# Patient Record
Sex: FEMALE | Race: WHITE | NOT HISPANIC OR LATINO | URBAN - METROPOLITAN AREA
[De-identification: names, ages, dates, MRNs, and addresses within clinical notes are randomized per-mention and may not be internally consistent; named-entity substitution may affect disease eponyms.]

---

## 2017-06-08 ENCOUNTER — APPOINTMENT (OUTPATIENT)
Dept: URBAN - METROPOLITAN AREA CLINIC 200 | Age: 57
Setting detail: DERMATOLOGY
End: 2017-06-08

## 2017-06-08 DIAGNOSIS — L57.8 OTHER SKIN CHANGES DUE TO CHRONIC EXPOSURE TO NONIONIZING RADIATION: ICD-10-CM

## 2017-06-08 DIAGNOSIS — L82.0 INFLAMED SEBORRHEIC KERATOSIS: ICD-10-CM

## 2017-06-08 DIAGNOSIS — L82.1 OTHER SEBORRHEIC KERATOSIS: ICD-10-CM

## 2017-06-08 DIAGNOSIS — Z87.2 PERSONAL HISTORY OF DISEASES OF THE SKIN AND SUBCUTANEOUS TISSUE: ICD-10-CM

## 2017-06-08 PROBLEM — D48.5 NEOPLASM OF UNCERTAIN BEHAVIOR OF SKIN: Status: ACTIVE | Noted: 2017-06-08

## 2017-06-08 PROCEDURE — 99202 OFFICE O/P NEW SF 15 MIN: CPT

## 2017-06-08 PROCEDURE — OTHER COUNSELING: OTHER

## 2017-06-08 PROCEDURE — OTHER REASSURANCE: OTHER

## 2017-06-08 ASSESSMENT — LOCATION SIMPLE DESCRIPTION DERM
LOCATION SIMPLE: RIGHT UPPER BACK
LOCATION SIMPLE: LEFT SHOULDER
LOCATION SIMPLE: RIGHT POSTERIOR THIGH
LOCATION SIMPLE: UPPER BACK

## 2017-06-08 ASSESSMENT — LOCATION DETAILED DESCRIPTION DERM
LOCATION DETAILED: LEFT POSTERIOR SHOULDER
LOCATION DETAILED: RIGHT SUPERIOR UPPER BACK
LOCATION DETAILED: SUPERIOR THORACIC SPINE
LOCATION DETAILED: RIGHT DISTAL POSTERIOR THIGH

## 2017-06-08 ASSESSMENT — LOCATION ZONE DERM
LOCATION ZONE: LEG
LOCATION ZONE: TRUNK
LOCATION ZONE: ARM

## 2018-06-21 ENCOUNTER — APPOINTMENT (OUTPATIENT)
Dept: URBAN - METROPOLITAN AREA CLINIC 200 | Age: 58
Setting detail: DERMATOLOGY
End: 2018-06-27

## 2018-06-21 DIAGNOSIS — D18.0 HEMANGIOMA: ICD-10-CM

## 2018-06-21 DIAGNOSIS — L57.8 OTHER SKIN CHANGES DUE TO CHRONIC EXPOSURE TO NONIONIZING RADIATION: ICD-10-CM

## 2018-06-21 DIAGNOSIS — L82.1 OTHER SEBORRHEIC KERATOSIS: ICD-10-CM

## 2018-06-21 PROBLEM — D18.01 HEMANGIOMA OF SKIN AND SUBCUTANEOUS TISSUE: Status: ACTIVE | Noted: 2018-06-21

## 2018-06-21 PROBLEM — D23.5 OTHER BENIGN NEOPLASM OF SKIN OF TRUNK: Status: ACTIVE | Noted: 2018-06-21

## 2018-06-21 PROCEDURE — OTHER REASSURANCE: OTHER

## 2018-06-21 PROCEDURE — 99213 OFFICE O/P EST LOW 20 MIN: CPT

## 2018-06-21 PROCEDURE — OTHER COUNSELING: OTHER

## 2018-06-21 ASSESSMENT — LOCATION ZONE DERM
LOCATION ZONE: ARM
LOCATION ZONE: SCALP
LOCATION ZONE: TRUNK

## 2018-06-21 ASSESSMENT — LOCATION SIMPLE DESCRIPTION DERM
LOCATION SIMPLE: RIGHT SCALP
LOCATION SIMPLE: RIGHT SHOULDER
LOCATION SIMPLE: CHEST

## 2018-06-21 ASSESSMENT — LOCATION DETAILED DESCRIPTION DERM
LOCATION DETAILED: RIGHT ANTERIOR SHOULDER
LOCATION DETAILED: RIGHT MEDIAL FRONTAL SCALP
LOCATION DETAILED: LEFT MEDIAL SUPERIOR CHEST

## 2018-10-25 RX ORDER — ALPRAZOLAM 0.5 MG/1
0.5 TABLET ORAL 2 TIMES DAILY PRN
Qty: 180 TABLET | Refills: 0 | Status: SHIPPED | OUTPATIENT
Start: 2018-10-25 | End: 2019-01-22 | Stop reason: SDUPTHER

## 2019-01-22 ENCOUNTER — TELEPHONE (OUTPATIENT)
Dept: PRIMARY CARE | Facility: CLINIC | Age: 59
End: 2019-01-22

## 2019-01-22 RX ORDER — RANOLAZINE 500 MG/1
TABLET, FILM COATED, EXTENDED RELEASE ORAL
COMMUNITY
Start: 2018-11-24

## 2019-01-22 RX ORDER — ESTRADIOL AND NORETHINDRONE ACETATE .5; .1 MG/1; MG/1
1 TABLET ORAL 3 TIMES WEEKLY
COMMUNITY
Start: 2018-12-04

## 2019-01-22 RX ORDER — ESCITALOPRAM OXALATE 20 MG/1
TABLET ORAL
COMMUNITY
Start: 2018-11-20 | End: 2019-02-17 | Stop reason: SDUPTHER

## 2019-01-22 RX ORDER — ALPRAZOLAM 0.5 MG/1
0.5 TABLET ORAL 2 TIMES DAILY PRN
Qty: 180 TABLET | Refills: 0 | Status: SHIPPED | OUTPATIENT
Start: 2019-01-22 | End: 2019-04-22 | Stop reason: SDUPTHER

## 2019-01-22 RX ORDER — GABAPENTIN 600 MG/1
TABLET ORAL
COMMUNITY
Start: 2018-12-19 | End: 2020-02-04 | Stop reason: ALTCHOICE

## 2019-01-22 NOTE — TELEPHONE ENCOUNTER
P/C from pt for refill on Xanax-will end in but due for OV in February-please call pt to schedule-thank you

## 2019-02-12 PROBLEM — F41.1 GENERALIZED ANXIETY DISORDER: Status: ACTIVE | Noted: 2019-02-12

## 2019-02-12 PROBLEM — K63.5 POLYP OF COLON: Status: ACTIVE | Noted: 2019-02-12

## 2019-02-12 PROBLEM — G47.00 PERSISTENT DISORDER OF INITIATING OR MAINTAINING SLEEP: Status: ACTIVE | Noted: 2019-02-12

## 2019-02-12 PROBLEM — N95.1 SYMPTOMATIC MENOPAUSAL OR FEMALE CLIMACTERIC STATES: Status: ACTIVE | Noted: 2019-02-12

## 2019-02-12 PROBLEM — I20.9 ANGINA PECTORIS SYNDROME (CMS/HCC): Status: ACTIVE | Noted: 2019-02-12

## 2019-02-12 PROBLEM — E78.5 HYPERLIPIDEMIA: Status: ACTIVE | Noted: 2019-02-12

## 2019-02-12 PROBLEM — Z82.49 FAMILY HISTORY OF ISCHEMIC HEART DISEASE: Status: ACTIVE | Noted: 2019-02-12

## 2019-02-12 PROBLEM — F33.0 MAJOR DEPRESSIVE DISORDER, RECURRENT EPISODE, MILD (CMS/HCC): Status: ACTIVE | Noted: 2019-02-12

## 2019-02-13 ENCOUNTER — OFFICE VISIT (OUTPATIENT)
Dept: PRIMARY CARE | Facility: CLINIC | Age: 59
End: 2019-02-13
Payer: COMMERCIAL

## 2019-02-13 VITALS — DIASTOLIC BLOOD PRESSURE: 80 MMHG | SYSTOLIC BLOOD PRESSURE: 124 MMHG | HEIGHT: 67 IN | HEART RATE: 70 BPM

## 2019-02-13 DIAGNOSIS — I20.9 ANGINA PECTORIS SYNDROME (CMS/HCC): ICD-10-CM

## 2019-02-13 DIAGNOSIS — K63.5 POLYP OF COLON, UNSPECIFIED PART OF COLON, UNSPECIFIED TYPE: ICD-10-CM

## 2019-02-13 DIAGNOSIS — F33.0 MAJOR DEPRESSIVE DISORDER, RECURRENT EPISODE, MILD (CMS/HCC): ICD-10-CM

## 2019-02-13 DIAGNOSIS — N95.1 SYMPTOMATIC MENOPAUSAL OR FEMALE CLIMACTERIC STATES: ICD-10-CM

## 2019-02-13 DIAGNOSIS — F41.1 GENERALIZED ANXIETY DISORDER: ICD-10-CM

## 2019-02-13 DIAGNOSIS — E78.00 PURE HYPERCHOLESTEROLEMIA: Primary | ICD-10-CM

## 2019-02-13 PROCEDURE — 99214 OFFICE O/P EST MOD 30 MIN: CPT | Performed by: NURSE PRACTITIONER

## 2019-02-13 RX ORDER — ROSUVASTATIN CALCIUM 5 MG/1
5 TABLET, COATED ORAL DAILY
COMMUNITY
End: 2022-10-20 | Stop reason: DRUGHIGH

## 2019-02-13 ASSESSMENT — ENCOUNTER SYMPTOMS
FATIGUE: 0
ABDOMINAL PAIN: 0
PALPITATIONS: 0
MALAISE: 0
DECREASED CONCENTRATION: 0
FOCAL SENSORY LOSS: 0
JOINT SWELLING: 0
FOCAL WEAKNESS: 0
LEG PAIN: 0
SHORTNESS OF BREATH: 0
INSOMNIA: 1
ARTHRALGIAS: 0
PANIC: 0
MYALGIAS: 0
ANOREXIA: 0
IRRITABILITY: 0
DEPRESSED MOOD: 1
COMPULSIONS: 0
RESTLESSNESS: 0
CONFUSION: 0
FEELING OF CHOKING: 0
HEADACHES: 0
CHANGE IN BOWEL HABIT: 0
THOUGHT CONTENT - OBSESSIONS: 0
FEVER: 0
NERVOUS/ANXIOUS: 1

## 2019-02-13 NOTE — PROGRESS NOTES
[facility]  Mariel Lee  [address], [address]  [place] [address]  Phone: [phone]  Fax: [phone]      Patient ID: Billie Frank                              : 1960    Visit Date: 2019    Chief Complaint: No chief complaint on file.         Patient ID: Billie Frank is a 58 y.o. female.    Patient Active Problem List   Diagnosis   • Angina pectoris syndrome (CMS/HCC) (HCC)   • Family history of ischemic heart disease   • Generalized anxiety disorder   • Hyperlipidemia   • Major depressive disorder, recurrent episode, mild (CMS/HCC)   • Symptomatic menopausal or female climacteric states   • Persistent disorder of initiating or maintaining sleep   • Polyp of colon         Current Outpatient Prescriptions:   •  ALPRAZolam (XANAX) 0.5 mg tablet, Take 1 tablet (0.5 mg total) by mouth 2 (two) times a day as needed for anxiety., Disp: 180 tablet, Rfl: 0  •  AMABELZ 0.5-0.1 mg per tablet, , Disp: , Rfl:   •  escitalopram (LEXAPRO) 20 mg tablet, , Disp: , Rfl:   •  gabapentin (NEURONTIN) 600 mg tablet, , Disp: , Rfl:   •  RANEXA 500 mg 12 hr tablet, , Disp: , Rfl:   •  rosuvastatin (CRESTOR) 5 mg tablet, Take 5 mg by mouth daily. Every other day, Disp: , Rfl:     Allergies   Allergen Reactions   • Bee Sting [Bee Venom Protein (Honey Bee)] Anaphylaxis   • Iodinated Contrast- Oral And Iv Dye Hives   • Penicillins Rash   • Sulfa (Sulfonamide Antibiotics) Rash       Social History     Social History   • Marital status:      Spouse name: N/A   • Number of children: N/A   • Years of education: N/A     Occupational History   • Not on file.     Social History Main Topics   • Smoking status: Never Smoker   • Smokeless tobacco: Never Used   • Alcohol use Not on file   • Drug use: Unknown   • Sexual activity: Not on file     Other Topics Concern   • Not on file     Social History Narrative   • No narrative on file       Health Maintenance   Topic Date  Due   • DTaP, Tdap, and Td Vaccines (1 - Tdap) 10/02/1978   • Colonoscopy  10/02/2009   • Mammogram  08/09/2020   • Pap Smear  07/12/2021   • HPV Vaccines  Aged Out   • Meningococcal Vaccine  Aged Out   • HIB Vaccines  Aged Out   • IPV Vaccines  Aged Out   • Influenza Vaccine  Completed   • Hepatitis C Screening  Completed       HPI  Routine follow up  Med refills.      Depression   This is a chronic problem. The current episode started more than 1 year ago. The problem occurs daily. The problem has been unchanged. Pertinent negatives include no abdominal pain, anorexia, arthralgias, change in bowel habit, chest pain, fatigue, fever, headaches, joint swelling or myalgias. Nothing aggravates the symptoms. Treatments tried: SSRIs. The treatment provided significant relief.   Anxiety   Presents for follow-up visit. Symptoms include depressed mood, excessive worry, insomnia and nervous/anxious behavior. Patient reports no chest pain, compulsions, confusion, decreased concentration, feeling of choking, irritability, malaise, obsessions, palpitations, panic, restlessness, shortness of breath or suicidal ideas. Symptoms occur occasionally. The severity of symptoms is mild. The quality of sleep is fair. Nighttime awakenings: occasional.     Compliance with medications is %.   Hyperlipidemia   This is a chronic problem. The current episode started more than 1 year ago. The problem is controlled. Recent lipid tests were reviewed and are normal. She has no history of chronic renal disease, diabetes, hypothyroidism, liver disease, obesity or nephrotic syndrome. There are no known factors aggravating her hyperlipidemia. Pertinent negatives include no chest pain, focal sensory loss, focal weakness, leg pain, myalgias or shortness of breath. Current antihyperlipidemic treatment includes exercise, diet change and statins. The current treatment provides significant improvement of lipids. There are no compliance problems.   "      The following have been reviewed and updated as appropriate in this visit:  Allergies  Meds  Problems         Review of System  Review of Systems   Constitutional: Negative for fatigue, fever and irritability.   Respiratory: Negative for shortness of breath.    Cardiovascular: Negative for chest pain and palpitations.   Gastrointestinal: Negative for abdominal pain, anorexia and change in bowel habit.   Musculoskeletal: Negative for arthralgias, joint swelling and myalgias.   Neurological: Negative for focal weakness and headaches.   Psychiatric/Behavioral: Negative for confusion, decreased concentration and suicidal ideas. The patient is nervous/anxious and has insomnia.        Objective     Vitals  Vitals:    02/13/19 0726   BP: 124/80   Pulse: 70   Height: 1.708 m (5' 7.25\")     There is no height or weight on file to calculate BMI.    Physical Exam  Physical Exam   Constitutional: She is oriented to person, place, and time. She appears well-developed and well-nourished. No distress.   Neck: Neck supple. No JVD present. Carotid bruit is not present. No thyromegaly present.   Cardiovascular: Normal rate, regular rhythm and normal heart sounds.    No murmur heard.  Pulmonary/Chest: Effort normal and breath sounds normal. No respiratory distress. She has no wheezes.   Lymphadenopathy:     She has no cervical adenopathy.   Neurological: She is alert and oriented to person, place, and time.   Skin: She is not diaphoretic.   Psychiatric: She has a normal mood and affect. Her speech is normal and behavior is normal. Thought content normal. She expresses no suicidal ideation. She expresses no suicidal plans.   Vitals reviewed.      Assessment/Plan     Problem List Items Addressed This Visit     Angina pectoris syndrome (CMS/HCC) (HCC)     On Ranexa.  Stable.  Sees Cardiology yearly.         Relevant Medications    rosuvastatin (CRESTOR) 5 mg tablet    Generalized anxiety disorder     Stable on meds.  Continue " current meds.  Follow up 6 mo.         Hyperlipidemia - Primary     Lipids stable on Crestor--takes QOD.  Lipid panel due in 8/2019.         Relevant Medications    rosuvastatin (CRESTOR) 5 mg tablet    Major depressive disorder, recurrent episode, mild (CMS/HCC)     Stable.  Continue current meds.  Follow up 6 mo.         Symptomatic menopausal or female climacteric states     Stable on meds-- Gabapentin and Amabelz.  GYN follows.         Polyp of colon     Recent colonoscopy 3 weeks ago.  Dr Alexis Bruno.  Follow up 5 years.                   ALBERT Bowen  2/13/2019

## 2019-02-19 RX ORDER — ESCITALOPRAM OXALATE 20 MG/1
TABLET ORAL
Qty: 90 TABLET | Refills: 1 | Status: SHIPPED | OUTPATIENT
Start: 2019-02-19 | End: 2019-08-06 | Stop reason: SDUPTHER

## 2019-03-04 ENCOUNTER — TELEPHONE (OUTPATIENT)
Dept: PRIMARY CARE | Facility: CLINIC | Age: 59
End: 2019-03-04

## 2019-03-04 NOTE — TELEPHONE ENCOUNTER
Pt works at [employer] and was exposed to Mumps from a coworker.  Health Services there told them to check with their PCP's to find out if they need a booster.  Pt was immunized many years ago as a child.  OK TO LEAVE A DETAILED MESSAGE!  Pt # [phone]

## 2019-03-04 NOTE — TELEPHONE ENCOUNTER
Please find out if she was given 2 doses of MMR, when she was exposed to this coworker and if her job has her being a risk of exposure to mumps.

## 2019-03-04 NOTE — TELEPHONE ENCOUNTER
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  Per our conversation she is aware that you are checking with [facility] for further advisement on vaccine booster"

## 2019-03-05 NOTE — TELEPHONE ENCOUNTER
Carrie - The recommendation is that everyone should get two MMR's and if she is in doubt or is able to confirm she had two, it is advisable for her to get another one. The other option is to have a titer done. The immunization given now will not keep her from getting the mumps if she has already been exposed enough to develop the illness.

## 2019-04-22 NOTE — TELEPHONE ENCOUNTER
Patient requesting Xanax 0.5mg one po BID prn #180.  States last OV discuss being able to send to mail away pharmacy [facility].   shows last fill for #180 on 1/22/19 and time prior 10/26/18 for #180. Last OV 3/2019 and next OV 8/2019

## 2019-04-23 RX ORDER — ALPRAZOLAM 0.5 MG/1
0.5 TABLET ORAL 2 TIMES DAILY PRN
Qty: 180 TABLET | Refills: 0 | Status: SHIPPED | OUTPATIENT
Start: 2019-04-23 | End: 2019-07-15 | Stop reason: SDUPTHER

## 2019-05-17 ENCOUNTER — TRANSCRIBE ORDERS (OUTPATIENT)
Dept: SCHEDULING | Age: 59
End: 2019-05-17

## 2019-05-17 DIAGNOSIS — M79.671 PAIN OF RIGHT FOOT: ICD-10-CM

## 2019-05-18 ENCOUNTER — HOSPITAL ENCOUNTER (OUTPATIENT)
Dept: RADIOLOGY | Age: 59
Discharge: HOME | End: 2019-05-18
Attending: FAMILY MEDICINE
Payer: COMMERCIAL

## 2019-05-18 DIAGNOSIS — M79.671 PAIN OF RIGHT FOOT: ICD-10-CM

## 2019-05-18 PROCEDURE — 73630 X-RAY EXAM OF FOOT: CPT | Mod: RT

## 2019-05-18 PROCEDURE — 73620 X-RAY EXAM OF FOOT: CPT | Mod: RT

## 2019-06-20 ENCOUNTER — APPOINTMENT (OUTPATIENT)
Dept: URBAN - METROPOLITAN AREA CLINIC 200 | Age: 59
Setting detail: DERMATOLOGY
End: 2019-06-23

## 2019-06-20 DIAGNOSIS — L57.8 OTHER SKIN CHANGES DUE TO CHRONIC EXPOSURE TO NONIONIZING RADIATION: ICD-10-CM

## 2019-06-20 DIAGNOSIS — Z85.828 PERSONAL HISTORY OF OTHER MALIGNANT NEOPLASM OF SKIN: ICD-10-CM

## 2019-06-20 PROCEDURE — OTHER COUNSELING: OTHER

## 2019-06-20 PROCEDURE — 99213 OFFICE O/P EST LOW 20 MIN: CPT

## 2019-06-20 PROCEDURE — OTHER MIPS QUALITY: OTHER

## 2019-06-20 ASSESSMENT — LOCATION DETAILED DESCRIPTION DERM
LOCATION DETAILED: PERIUMBILICAL SKIN
LOCATION DETAILED: LEFT MEDIAL SUPERIOR CHEST

## 2019-06-20 ASSESSMENT — LOCATION ZONE DERM: LOCATION ZONE: TRUNK

## 2019-06-20 ASSESSMENT — LOCATION SIMPLE DESCRIPTION DERM
LOCATION SIMPLE: CHEST
LOCATION SIMPLE: ABDOMEN

## 2019-07-15 NOTE — TELEPHONE ENCOUNTER
Medicine Refill Request    Last Office Visit: 2/13/2019  Next Office Visit: 8/14/2019     check OK    Current Outpatient Prescriptions:   •  ALPRAZolam (XANAX) 0.5 mg tablet, Take 1 tablet (0.5 mg total) by mouth 2 (two) times a day as needed for anxiety., Disp: 180 tablet, Rfl: 0  •  AMABELZ 0.5-0.1 mg per tablet, , Disp: , Rfl:   •  escitalopram (LEXAPRO) 20 mg tablet, TAKE 1 TABLET DAILY, Disp: 90 tablet, Rfl: 1  •  gabapentin (NEURONTIN) 600 mg tablet, , Disp: , Rfl:   •  RANEXA 500 mg 12 hr tablet, , Disp: , Rfl:   •  rosuvastatin (CRESTOR) 5 mg tablet, Take 5 mg by mouth daily. Every other day, Disp: , Rfl:       BP Readings from Last 3 Encounters:   02/13/19 124/80       Recent Lab results:  No results found for: CHOL, No results found for: HDL, No results found for: LDLCALC, No results found for: TRIG     No results found for: GLUCOSE, No results found for: HGBA1C      No results found for: CREATININE    No results found for: TSH

## 2019-07-16 RX ORDER — ALPRAZOLAM 0.5 MG/1
0.5 TABLET ORAL 2 TIMES DAILY PRN
Qty: 180 TABLET | Refills: 0 | Status: SHIPPED | OUTPATIENT
Start: 2019-07-16 | End: 2019-10-17 | Stop reason: SDUPTHER

## 2019-08-06 RX ORDER — ESCITALOPRAM OXALATE 20 MG/1
TABLET ORAL
Qty: 90 TABLET | Refills: 0 | Status: SHIPPED | OUTPATIENT
Start: 2019-08-06 | End: 2019-11-05 | Stop reason: SDUPTHER

## 2019-08-06 NOTE — TELEPHONE ENCOUNTER
I see the pt has an appointment but I do not want to send the #90 pills with refill. Please take out the refill.

## 2019-08-06 NOTE — TELEPHONE ENCOUNTER
Medicine Refill Request    Last Office Visit: 2/13/2019  Next Office Visit: 8/14/2019        Current Outpatient Prescriptions:   •  ALPRAZolam (XANAX) 0.5 mg tablet, Take 1 tablet (0.5 mg total) by mouth 2 (two) times a day as needed for anxiety., Disp: 180 tablet, Rfl: 0  •  AMABELZ 0.5-0.1 mg per tablet, , Disp: , Rfl:   •  escitalopram (LEXAPRO) 20 mg tablet, TAKE 1 TABLET DAILY, Disp: 90 tablet, Rfl: 1  •  gabapentin (NEURONTIN) 600 mg tablet, , Disp: , Rfl:   •  RANEXA 500 mg 12 hr tablet, , Disp: , Rfl:   •  rosuvastatin (CRESTOR) 5 mg tablet, Take 5 mg by mouth daily. Every other day, Disp: , Rfl:       BP Readings from Last 3 Encounters:   02/13/19 124/80       Recent Lab results:  No results found for: CHOL, No results found for: HDL, No results found for: LDLCALC, No results found for: TRIG     No results found for: GLUCOSE, No results found for: HGBA1C      No results found for: CREATININE    No results found for: TSH

## 2019-08-14 ENCOUNTER — OFFICE VISIT (OUTPATIENT)
Dept: PRIMARY CARE | Facility: CLINIC | Age: 59
End: 2019-08-14
Payer: COMMERCIAL

## 2019-08-14 VITALS
SYSTOLIC BLOOD PRESSURE: 110 MMHG | BODY MASS INDEX: 24.48 KG/M2 | DIASTOLIC BLOOD PRESSURE: 70 MMHG | HEART RATE: 64 BPM | HEIGHT: 67 IN | WEIGHT: 156 LBS

## 2019-08-14 DIAGNOSIS — I20.9 ANGINA PECTORIS SYNDROME (CMS/HCC): ICD-10-CM

## 2019-08-14 DIAGNOSIS — E78.00 PURE HYPERCHOLESTEROLEMIA: Primary | ICD-10-CM

## 2019-08-14 DIAGNOSIS — F41.1 GENERALIZED ANXIETY DISORDER: ICD-10-CM

## 2019-08-14 DIAGNOSIS — K63.5 POLYP OF COLON, UNSPECIFIED PART OF COLON, UNSPECIFIED TYPE: ICD-10-CM

## 2019-08-14 DIAGNOSIS — H91.90: ICD-10-CM

## 2019-08-14 DIAGNOSIS — F33.0 MAJOR DEPRESSIVE DISORDER, RECURRENT EPISODE, MILD (CMS/HCC): ICD-10-CM

## 2019-08-14 DIAGNOSIS — Z00.00 PREVENTATIVE HEALTH CARE: ICD-10-CM

## 2019-08-14 DIAGNOSIS — S76.312A STRAIN OF LEFT PIRIFORMIS MUSCLE, INITIAL ENCOUNTER: ICD-10-CM

## 2019-08-14 DIAGNOSIS — N95.1 SYMPTOMATIC MENOPAUSAL OR FEMALE CLIMACTERIC STATES: ICD-10-CM

## 2019-08-14 PROCEDURE — 99214 OFFICE O/P EST MOD 30 MIN: CPT | Performed by: NURSE PRACTITIONER

## 2019-08-14 ASSESSMENT — ENCOUNTER SYMPTOMS
THOUGHT CONTENT - OBSESSIONS: 0
FEVER: 0
PANIC: 0
FOCAL SENSORY LOSS: 0
DEPRESSED MOOD: 0
LEG PAIN: 0
RESTLESSNESS: 0
NERVOUS/ANXIOUS: 1
INSOMNIA: 1
FATIGUE: 0
ANOREXIA: 0
CHANGE IN BOWEL HABIT: 0
ARTHRALGIAS: 0
HEADACHES: 0
SHORTNESS OF BREATH: 0
JOINT SWELLING: 0
MYALGIAS: 0
FOCAL WEAKNESS: 0
PALPITATIONS: 0
DECREASED CONCENTRATION: 0
ABDOMINAL PAIN: 0

## 2019-08-14 NOTE — ASSESSMENT & PLAN NOTE
Takes -2 Xanax per day.  Has for 15+ years  Still works well for her  Denies tolerance.  Follow up 6 mo.

## 2019-08-14 NOTE — PROGRESS NOTES
[facility]  Mariel Lee  [address], [address]  [place] [address]  Phone: [phone]  Fax: [phone]      Patient ID: Billie Frank                              : 1960    Visit Date: 2019    Chief Complaint: Med Refill         Patient ID: Billie Frank is a 58 y.o. female.    Patient Active Problem List   Diagnosis   • Angina pectoris syndrome (CMS/HCC) ([facility])   • Family history of ischemic heart disease   • Generalized anxiety disorder   • Hyperlipidemia   • Major depressive disorder, recurrent episode, mild (CMS/HCC)   • Symptomatic menopausal or female climacteric states   • Persistent disorder of initiating or maintaining sleep   • Polyp of colon   • Strain of left piriformis muscle   • Moderate acquired hearing loss         Current Outpatient Prescriptions:   •  ALPRAZolam (XANAX) 0.5 mg tablet, Take 1 tablet (0.5 mg total) by mouth 2 (two) times a day as needed for anxiety., Disp: 180 tablet, Rfl: 0  •  AMABELZ 0.5-0.1 mg per tablet, , Disp: , Rfl:   •  escitalopram (LEXAPRO) 20 mg tablet, TAKE 1 TABLET DAILY, Disp: 90 tablet, Rfl: 0  •  gabapentin (NEURONTIN) 600 mg tablet, , Disp: , Rfl:   •  RANEXA 500 mg 12 hr tablet, , Disp: , Rfl:   •  rosuvastatin (CRESTOR) 5 mg tablet, Take 5 mg by mouth daily. Every other day, Disp: , Rfl:     Allergies   Allergen Reactions   • Bee Sting [Bee Venom Protein (Honey Bee)] Anaphylaxis   • Iodinated Contrast- Oral And Iv Dye Hives   • Penicillins Rash   • Sulfa (Sulfonamide Antibiotics) Rash       Social History     Social History   • Marital status:      Spouse name: N/A   • Number of children: N/A   • Years of education: N/A     Occupational History   • Not on file.     Social History Main Topics   • Smoking status: Never Smoker   • Smokeless tobacco: Never Used   • Alcohol use Not on file   • Drug use: Unknown   • Sexual activity: Not on file     Other Topics Concern   • Not on file     Social  History Narrative   • No narrative on file       Health Maintenance   Topic Date Due   • DTaP, Tdap, and Td Vaccines (1 - Tdap) 10/02/1978   • Cervical Cancer Screening  10/02/1980   • Influenza Vaccine (1) 09/27/2019 (Originally 8/1/2019)   • HIV Screening  08/07/2020 (Originally 10/2/1972)   • Zoster Vaccine (1 of 2) 09/01/2020 (Originally 10/2/2009)   • Mammogram  08/09/2020   • Colonoscopy  01/18/2024   • Meningococcal ACWY  Aged Out   • Varicella Vaccines  Aged Out   • HIB Vaccines  Aged Out   • IPV Vaccines  Aged Out   • HPV Vaccines  Aged Out   • Hepatitis C Screening  Completed   • Pneumococcal  Aged Out       HPI  Med refills/ check up.      Depression   This is a chronic problem. The current episode started more than 1 year ago. The problem occurs daily. The problem has been unchanged. Pertinent negatives include no abdominal pain, anorexia, arthralgias, change in bowel habit, chest pain, fatigue, fever, headaches, joint swelling or myalgias. Nothing aggravates the symptoms. Treatments tried: Lexapro. The treatment provided significant relief.   Anxiety   Presents for follow-up (on Lexapro and Xanax PRN) visit. Symptoms include excessive worry, insomnia and nervous/anxious behavior. Patient reports no chest pain, decreased concentration, depressed mood, obsessions, palpitations, panic, restlessness, shortness of breath or suicidal ideas. Symptoms occur occasionally. The severity of symptoms is moderate. The quality of sleep is fair. Nighttime awakenings: occasional.     Compliance with medications is %. Treatment side effects: None.   Hyperlipidemia   This is a chronic problem. The current episode started more than 1 year ago. The problem is controlled. Recent lipid tests were reviewed and are normal. She has no history of chronic renal disease, diabetes, hypothyroidism, liver disease, obesity or nephrotic syndrome. There are no known factors aggravating her hyperlipidemia. Pertinent negatives  "include no chest pain, focal sensory loss, focal weakness, leg pain, myalgias or shortness of breath. Current antihyperlipidemic treatment includes statins. The current treatment provides significant improvement of lipids. There are no compliance problems.        The following have been reviewed and updated as appropriate in this visit:  Allergies  Meds  Problems         Review of System  Review of Systems   Constitutional: Negative for fatigue and fever.   Respiratory: Negative for shortness of breath.    Cardiovascular: Negative for chest pain and palpitations.   Gastrointestinal: Negative for abdominal pain, anorexia and change in bowel habit.   Musculoskeletal: Negative for arthralgias, joint swelling and myalgias.   Neurological: Negative for focal weakness and headaches.   Psychiatric/Behavioral: Negative for decreased concentration and suicidal ideas. The patient is nervous/anxious and has insomnia.        Objective     Vitals  Vitals:    08/14/19 0737   BP: 110/70   BP Location: Left upper arm   Patient Position: Sitting   Pulse: 64   Weight: 70.8 kg (156 lb)   Height: 1.708 m (5' 7.25\")     Body mass index is 24.25 kg/m².    Physical Exam  Physical Exam   Constitutional: She is oriented to person, place, and time. She appears well-developed and well-nourished. No distress.   Neck: Neck supple. No JVD present. No thyromegaly present.   Cardiovascular: Normal rate, regular rhythm and normal heart sounds.    No murmur heard.  Pulmonary/Chest: Effort normal and breath sounds normal. No respiratory distress. She has no wheezes. She has no rales.   Lymphadenopathy:     She has no cervical adenopathy.   Neurological: She is alert and oriented to person, place, and time.   Skin: She is not diaphoretic.   Psychiatric: She has a normal mood and affect. Her speech is normal and behavior is normal. Thought content normal. She expresses no suicidal ideation. She expresses no suicidal plans.   Vitals " reviewed.      Assessment/Plan     Problem List Items Addressed This Visit     Angina pectoris syndrome (CMS/HCC) (HCC)     Continues on Ranexa  Dr Brooklynn Angeles follows.         Generalized anxiety disorder     Takes -2 Xanax per day.  Has for 15+ years  Still works well for her  Denies tolerance.  Follow up 6 mo.         Hyperlipidemia - Primary     Check lipid panel/CMP. Due now.  Continue statin.         Major depressive disorder, recurrent episode, mild (CMS/HCC)     Stable on meds.  Continue Lexapro daily.  Follow up 6 mo         Symptomatic menopausal or female climacteric states     On Gabapentin--stable.         Polyp of colon     Recent colonoscopy this year.  No polyps this time.         Strain of left piriformis muscle     Has a small tear.  Seeing Ortho.  Working on strengthening area with /[employer].         Moderate acquired hearing loss     Needs hearing aides.  Had recent hearing eval.  Plans to move forward with this soon.           Other Visit Diagnoses     Preventative health care        Relevant Orders    CBC and Differential    Comprehensive metabolic panel    Lipid panel    Urinalysis with Reflex Culture    TSH 3rd Generation              ALBERT Bowen  8/14/2019

## 2019-09-23 LAB
ALBUMIN SERPL-MCNC: 4.7 G/DL (ref 3.5–5.5)
ALBUMIN/GLOB SERPL: 2 {RATIO} (ref 1.2–2.2)
ALP SERPL-CCNC: 52 IU/L (ref 39–117)
ALT SERPL-CCNC: 16 IU/L (ref 0–32)
APPEARANCE UR: CLEAR
AST SERPL-CCNC: 20 IU/L (ref 0–40)
BACTERIA #/AREA URNS HPF: NORMAL /[HPF]
BACTERIA UR CULT: NORMAL
BACTERIA UR CULT: NORMAL
BASOPHILS # BLD AUTO: 0 X10E3/UL (ref 0–0.2)
BASOPHILS NFR BLD AUTO: 0 %
BILIRUB SERPL-MCNC: 0.3 MG/DL (ref 0–1.2)
BILIRUB UR QL STRIP: NEGATIVE
BUN SERPL-MCNC: 15 MG/DL (ref 6–24)
BUN/CREAT SERPL: 17 (ref 9–23)
CALCIUM SERPL-MCNC: 8.9 MG/DL (ref 8.7–10.2)
CHLORIDE SERPL-SCNC: 104 MMOL/L (ref 96–106)
CHOLEST SERPL-MCNC: 186 MG/DL (ref 100–199)
CO2 SERPL-SCNC: 24 MMOL/L (ref 20–29)
COLOR UR: YELLOW
CREAT SERPL-MCNC: 0.9 MG/DL (ref 0.57–1)
EOSINOPHIL # BLD AUTO: 0.2 X10E3/UL (ref 0–0.4)
EOSINOPHIL NFR BLD AUTO: 3 %
EPI CELLS #/AREA URNS HPF: NORMAL /HPF
ERYTHROCYTE [DISTWIDTH] IN BLOOD BY AUTOMATED COUNT: 13.4 % (ref 12.3–15.4)
GLOBULIN SER CALC-MCNC: 2.3 G/DL (ref 1.5–4.5)
GLUCOSE SERPL-MCNC: 92 MG/DL (ref 65–99)
GLUCOSE UR QL: NEGATIVE
HCT VFR BLD AUTO: 40.7 % (ref 34–46.6)
HDLC SERPL-MCNC: 76 MG/DL
HGB BLD-MCNC: 13.4 G/DL (ref 11.1–15.9)
HGB UR QL STRIP: NEGATIVE
IMM GRANULOCYTES # BLD AUTO: 0 X10E3/UL (ref 0–0.1)
IMM GRANULOCYTES NFR BLD AUTO: 0 %
KETONES UR QL STRIP: NEGATIVE
LAB CORP EGFR IF AFRICN AM: 81 ML/MIN/1.73
LAB CORP EGFR IF NONAFRICN AM: 70 ML/MIN/1.73
LAB CORP URINALYSIS REFLEX: (no result)
LDLC SERPL CALC-MCNC: 101 MG/DL (ref 0–99)
LEUKOCYTE ESTERASE UR QL STRIP: (no result)
LYMPHOCYTES # BLD AUTO: 1.6 X10E3/UL (ref 0.7–3.1)
LYMPHOCYTES NFR BLD AUTO: 32 %
MCH RBC QN AUTO: 32.1 PG (ref 26.6–33)
MCHC RBC AUTO-ENTMCNC: 32.9 G/DL (ref 31.5–35.7)
MCV RBC AUTO: 97 FL (ref 79–97)
MICRO URNS: (no result)
MONOCYTES # BLD AUTO: 0.3 X10E3/UL (ref 0.1–0.9)
MONOCYTES NFR BLD AUTO: 6 %
MUCOUS THREADS URNS QL MICRO: PRESENT
NEUTROPHILS # BLD AUTO: 3 X10E3/UL (ref 1.4–7)
NEUTROPHILS NFR BLD AUTO: 59 %
NITRITE UR QL STRIP: NEGATIVE
PH UR STRIP: 6 [PH] (ref 5–7.5)
PLATELET # BLD AUTO: 188 X10E3/UL (ref 150–450)
POTASSIUM SERPL-SCNC: 4.1 MMOL/L (ref 3.5–5.2)
PROT SERPL-MCNC: 7 G/DL (ref 6–8.5)
PROT UR QL STRIP: NEGATIVE
RBC # BLD AUTO: 4.18 X10E6/UL (ref 3.77–5.28)
RBC #/AREA URNS HPF: NORMAL /HPF
SODIUM SERPL-SCNC: 140 MMOL/L (ref 134–144)
SP GR UR: 1.02 (ref 1–1.03)
TRIGL SERPL-MCNC: 46 MG/DL (ref 0–149)
TSH SERPL DL<=0.005 MIU/L-ACNC: 2.23 UIU/ML (ref 0.45–4.5)
UROBILINOGEN UR STRIP-MCNC: 0.2 MG/DL (ref 0.2–1)
VLDLC SERPL CALC-MCNC: 9 MG/DL (ref 5–40)
WBC # BLD AUTO: 5.1 X10E3/UL (ref 3.4–10.8)
WBC #/AREA URNS HPF: NORMAL /HPF

## 2019-10-17 RX ORDER — ALPRAZOLAM 0.5 MG/1
0.5 TABLET ORAL 2 TIMES DAILY PRN
Qty: 180 TABLET | Refills: 0 | Status: SHIPPED | OUTPATIENT
Start: 2019-10-17 | End: 2020-01-21 | Stop reason: SDUPTHER

## 2019-10-17 NOTE — TELEPHONE ENCOUNTER
Medicine Refill Request    Last Office Visit: 8/14/2019  Next Office Visit: 2/4/2020     check OK    Current Outpatient Medications:   •  ALPRAZolam (XANAX) 0.5 mg tablet, Take 1 tablet (0.5 mg total) by mouth 2 (two) times a day as needed for anxiety., Disp: 180 tablet, Rfl: 0  •  AMABELZ 0.5-0.1 mg per tablet, , Disp: , Rfl:   •  escitalopram (LEXAPRO) 20 mg tablet, TAKE 1 TABLET DAILY, Disp: 90 tablet, Rfl: 0  •  gabapentin (NEURONTIN) 600 mg tablet, , Disp: , Rfl:   •  RANEXA 500 mg 12 hr tablet, , Disp: , Rfl:   •  rosuvastatin (CRESTOR) 5 mg tablet, Take 5 mg by mouth daily. Every other day, Disp: , Rfl:       BP Readings from Last 3 Encounters:   08/14/19 110/70   02/13/19 124/80       Recent Lab results:  Lab Results   Component Value Date    CHOL 186 09/21/2019   ,   Lab Results   Component Value Date    HDL 76 09/21/2019   ,   Lab Results   Component Value Date    LDLCALC 101 (H) 09/21/2019   ,   Lab Results   Component Value Date    TRIG 46 09/21/2019        Lab Results   Component Value Date    GLUCOSE 92 09/21/2019   , No results found for: HGBA1C      Lab Results   Component Value Date    CREATININE 0.90 09/21/2019       Lab Results   Component Value Date    TSH 2.230 09/21/2019

## 2019-11-04 NOTE — TELEPHONE ENCOUNTER
Medicine Refill Request    Last Office Visit: 8/14/2019  Next Office Visit: 2/4/2020        Current Outpatient Medications:   •  ALPRAZolam (XANAX) 0.5 mg tablet, Take 1 tablet (0.5 mg total) by mouth 2 (two) times a day as needed for anxiety., Disp: 180 tablet, Rfl: 0  •  AMABELZ 0.5-0.1 mg per tablet, , Disp: , Rfl:   •  escitalopram (LEXAPRO) 20 mg tablet, TAKE 1 TABLET DAILY, Disp: 90 tablet, Rfl: 0  •  gabapentin (NEURONTIN) 600 mg tablet, , Disp: , Rfl:   •  RANEXA 500 mg 12 hr tablet, , Disp: , Rfl:   •  rosuvastatin (CRESTOR) 5 mg tablet, Take 5 mg by mouth daily. Every other day, Disp: , Rfl:       BP Readings from Last 3 Encounters:   08/14/19 110/70   02/13/19 124/80       Recent Lab results:  Lab Results   Component Value Date    CHOL 186 09/21/2019   ,   Lab Results   Component Value Date    HDL 76 09/21/2019   ,   Lab Results   Component Value Date    LDLCALC 101 (H) 09/21/2019   ,   Lab Results   Component Value Date    TRIG 46 09/21/2019        Lab Results   Component Value Date    GLUCOSE 92 09/21/2019   , No results found for: HGBA1C      Lab Results   Component Value Date    CREATININE 0.90 09/21/2019       Lab Results   Component Value Date    TSH 2.230 09/21/2019

## 2019-11-05 RX ORDER — ESCITALOPRAM OXALATE 20 MG/1
TABLET ORAL
Qty: 90 TABLET | Refills: 1 | Status: SHIPPED | OUTPATIENT
Start: 2019-11-05 | End: 2020-02-04 | Stop reason: SDUPTHER

## 2020-01-21 ENCOUNTER — TELEPHONE (OUTPATIENT)
Dept: PRIMARY CARE | Facility: CLINIC | Age: 60
End: 2020-01-21

## 2020-01-21 DIAGNOSIS — F41.1 GENERALIZED ANXIETY DISORDER: Primary | ICD-10-CM

## 2020-01-21 RX ORDER — ALPRAZOLAM 0.5 MG/1
0.5 TABLET ORAL 2 TIMES DAILY PRN
Qty: 180 TABLET | Refills: 0 | Status: SHIPPED | OUTPATIENT
Start: 2020-01-21 | End: 2020-02-04 | Stop reason: SDUPTHER

## 2020-01-21 NOTE — TELEPHONE ENCOUNTER
Medicine Refill Request    Last Office Visit: 8/14/2019  Next Office Visit: 2/4/2020    : last filled 10/22/19 #180 for 90 days    Current Outpatient Medications:   •  ALPRAZolam (XANAX) 0.5 mg tablet, Take 1 tablet (0.5 mg total) by mouth 2 (two) times a day as needed for anxiety., Disp: 180 tablet, Rfl: 0  •  AMABELZ 0.5-0.1 mg per tablet, , Disp: , Rfl:   •  escitalopram (LEXAPRO) 20 mg tablet, TAKE 1 TABLET DAILY, Disp: 90 tablet, Rfl: 1  •  gabapentin (NEURONTIN) 600 mg tablet, , Disp: , Rfl:   •  RANEXA 500 mg 12 hr tablet, , Disp: , Rfl:   •  rosuvastatin (CRESTOR) 5 mg tablet, Take 5 mg by mouth daily. Every other day, Disp: , Rfl:       BP Readings from Last 3 Encounters:   08/14/19 110/70   02/13/19 124/80       Recent Lab results:  Lab Results   Component Value Date    CHOL 186 09/21/2019   ,   Lab Results   Component Value Date    HDL 76 09/21/2019   ,   Lab Results   Component Value Date    LDLCALC 101 (H) 09/21/2019   ,   Lab Results   Component Value Date    TRIG 46 09/21/2019        Lab Results   Component Value Date    GLUCOSE 92 09/21/2019   , No results found for: HGBA1C      Lab Results   Component Value Date    CREATININE 0.90 09/21/2019       Lab Results   Component Value Date    TSH 2.230 09/21/2019

## 2020-02-04 ENCOUNTER — OFFICE VISIT (OUTPATIENT)
Dept: PRIMARY CARE | Facility: CLINIC | Age: 60
End: 2020-02-04
Payer: COMMERCIAL

## 2020-02-04 VITALS
DIASTOLIC BLOOD PRESSURE: 80 MMHG | SYSTOLIC BLOOD PRESSURE: 120 MMHG | HEIGHT: 67 IN | HEART RATE: 77 BPM | BODY MASS INDEX: 24.25 KG/M2

## 2020-02-04 DIAGNOSIS — F41.1 GENERALIZED ANXIETY DISORDER: ICD-10-CM

## 2020-02-04 DIAGNOSIS — K22.2 SCHATZKI'S RING: ICD-10-CM

## 2020-02-04 DIAGNOSIS — N95.1 SYMPTOMATIC MENOPAUSAL OR FEMALE CLIMACTERIC STATES: ICD-10-CM

## 2020-02-04 DIAGNOSIS — I20.9 ANGINA PECTORIS SYNDROME (CMS/HCC): ICD-10-CM

## 2020-02-04 DIAGNOSIS — J01.00 ACUTE NON-RECURRENT MAXILLARY SINUSITIS: ICD-10-CM

## 2020-02-04 DIAGNOSIS — F33.0 MAJOR DEPRESSIVE DISORDER, RECURRENT EPISODE, MILD (CMS/HCC): Primary | ICD-10-CM

## 2020-02-04 DIAGNOSIS — H91.90: ICD-10-CM

## 2020-02-04 PROBLEM — S76.312A STRAIN OF LEFT PIRIFORMIS MUSCLE: Status: RESOLVED | Noted: 2019-08-14 | Resolved: 2020-02-04

## 2020-02-04 PROCEDURE — 99214 OFFICE O/P EST MOD 30 MIN: CPT | Performed by: NURSE PRACTITIONER

## 2020-02-04 RX ORDER — ESCITALOPRAM OXALATE 20 MG/1
20 TABLET ORAL
Qty: 90 TABLET | Refills: 1 | Status: ON HOLD | OUTPATIENT
Start: 2020-02-04 | End: 2020-08-03 | Stop reason: SDUPTHER

## 2020-02-04 RX ORDER — ALPRAZOLAM 0.5 MG/1
0.5 TABLET ORAL 2 TIMES DAILY PRN
Qty: 180 TABLET | Refills: 0 | Status: SHIPPED | OUTPATIENT
Start: 2020-02-04 | End: 2020-06-25 | Stop reason: SDUPTHER

## 2020-02-04 RX ORDER — AZITHROMYCIN 250 MG/1
TABLET, FILM COATED ORAL
Qty: 6 TABLET | Refills: 0 | Status: SHIPPED | OUTPATIENT
Start: 2020-02-04 | End: 2020-02-09

## 2020-02-04 ASSESSMENT — ENCOUNTER SYMPTOMS
LEG PAIN: 0
PANIC: 0
SINUS COMPLAINT: 1
THOUGHT CONTENT - OBSESSIONS: 0
IRRITABILITY: 0
NERVOUS/ANXIOUS: 1
DECREASED CONCENTRATION: 0
NECK PAIN: 0
CHANGE IN BOWEL HABIT: 0
SORE THROAT: 0
HEADACHES: 0
COUGH: 0
INSOMNIA: 1
ANOREXIA: 0
CHILLS: 0
DEPRESSED MOOD: 1
FATIGUE: 0
PALPITATIONS: 0
SWOLLEN GLANDS: 0
ABDOMINAL PAIN: 0
ARTHRALGIAS: 0
DIAPHORESIS: 0
FOCAL WEAKNESS: 0
SINUS PRESSURE: 1
FOCAL SENSORY LOSS: 0
JOINT SWELLING: 0
FEVER: 0
RESTLESSNESS: 0
MYALGIAS: 0
SHORTNESS OF BREATH: 0
HOARSE VOICE: 0

## 2020-02-04 NOTE — ASSESSMENT & PLAN NOTE
ON [employer].  GYN managing  Recent endometrial bx and D&C done for bleeding-- negative for cancer.

## 2020-02-04 NOTE — PROGRESS NOTES
[facility]  Mariel Lee  [address]  [place] [address]  Phone: [phone]  Fax: [phone]      Patient ID: Billie Frank                              : 1960    Visit Date: 2020    Chief Complaint: No chief complaint on file.         Patient ID: Billie Frank is a 59 y.o. female.    Patient Active Problem List   Diagnosis   • Angina pectoris syndrome (CMS/HCC)   • Family history of ischemic heart disease   • Generalized anxiety disorder   • Hyperlipidemia   • Major depressive disorder, recurrent episode, mild (CMS/HCC)   • Symptomatic menopausal or female climacteric states   • Persistent disorder of initiating or maintaining sleep   • Polyp of colon   • Moderate acquired hearing loss   • Schatzki's ring   • Acute non-recurrent maxillary sinusitis         Current Outpatient Medications:   •  ALPRAZolam (XANAX) 0.5 mg tablet, Take 1 tablet (0.5 mg total) by mouth 2 (two) times a day as needed for anxiety., Disp: 180 tablet, Rfl: 0  •  AMABELZ 0.5-0.1 mg per tablet, , Disp: , Rfl:   •  escitalopram (LEXAPRO) 20 mg tablet, Take 1 tablet (20 mg total) by mouth once daily., Disp: 90 tablet, Rfl: 1  •  RANEXA 500 mg 12 hr tablet, , Disp: , Rfl:   •  rosuvastatin (CRESTOR) 5 mg tablet, Take 5 mg by mouth daily. Every other day, Disp: , Rfl:   •  azithromycin (ZITHROMAX) 250 mg tablet, Take 2 tablets the first day, then 1 tablet daily for 4 days., Disp: 6 tablet, Rfl: 0    Allergies   Allergen Reactions   • Bee Sting [Bee Venom Protein (Honey Bee)] Anaphylaxis   • Iodinated Contrast Media Hives   • Penicillins Rash   • Sulfa (Sulfonamide Antibiotics) Rash       Social History     Tobacco Use   • Smoking status: Never Smoker   • Smokeless tobacco: Never Used   Substance Use Topics   • Alcohol use: Not on file   • Drug use: Not on file       Health Maintenance   Topic Date Due   • DTaP, Tdap, and Td Vaccines (1 - Tdap) 10/02/1978   • HIV  Screening  08/07/2020 (Originally 10/2/1972)   • Zoster Vaccine (1 of 2) 09/01/2020 (Originally 10/2/2009)   • Mammogram  08/09/2020   • Colonoscopy  01/18/2024   • Cervical Cancer Screening  01/29/2025   • Influenza Vaccine  Completed   • Hepatitis C Screening  Completed   • Meningococcal ACWY  Aged Out   • Varicella Vaccines  Aged Out   • HIB Vaccines  Aged Out   • IPV Vaccines  Aged Out   • HPV Vaccines  Aged Out   • Pneumococcal  Aged Out       HPI  Med refills.  Possible sinus infection.    Depression   This is a chronic problem. The current episode started more than 1 year ago. The problem occurs daily. Progression since onset: stable. Associated symptoms include congestion. Pertinent negatives include no abdominal pain, anorexia, arthralgias, change in bowel habit, chest pain, chills, coughing, diaphoresis, fatigue, fever, headaches, joint swelling, myalgias, neck pain, sore throat or swollen glands. Nothing aggravates the symptoms. Treatments tried: Lexapro       The treatment provided significant relief.   Anxiety   Presents for follow-up (on Alprazolam BID regularly.) visit. Symptoms include depressed mood, excessive worry, insomnia and nervous/anxious behavior. Patient reports no chest pain, decreased concentration, irritability, obsessions, palpitations, panic, restlessness, shortness of breath or suicidal ideas. Symptoms occur occasionally. The severity of symptoms is moderate. The quality of sleep is good. Nighttime awakenings: occasional.     Compliance with medications is %. Treatment side effects: None.   Hyperlipidemia   This is a chronic problem. The current episode started more than 1 year ago. The problem is controlled. Recent lipid tests were reviewed and are normal. She has no history of chronic renal disease, diabetes, hypothyroidism, liver disease, obesity or nephrotic syndrome. There are no known factors aggravating her hyperlipidemia. Pertinent negatives include no chest pain, focal  "sensory loss, focal weakness, leg pain, myalgias or shortness of breath. Current antihyperlipidemic treatment includes statins. The current treatment provides significant improvement of lipids. There are no compliance problems.    Sinus Problem   This is a new problem. The current episode started 1 to 4 weeks ago. The problem is unchanged. There has been no fever. Pain severity now: mild to moderate. Associated symptoms include congestion and sinus pressure. Pertinent negatives include no chills, coughing, diaphoresis, ear pain, headaches, hoarse voice, neck pain, shortness of breath, sneezing, sore throat or swollen glands. Past treatments include saline sprays. The treatment provided no relief.       The following have been reviewed and updated as appropriate in this visit:  Allergies  Meds  Problems         Review of System  Review of Systems   Constitutional: Negative for chills, diaphoresis, fatigue, fever and irritability.   HENT: Positive for congestion and sinus pressure. Negative for ear pain, hoarse voice, sneezing and sore throat.    Respiratory: Negative for cough and shortness of breath.    Cardiovascular: Negative for chest pain and palpitations.   Gastrointestinal: Negative for abdominal pain, anorexia and change in bowel habit.   Musculoskeletal: Negative for arthralgias, joint swelling, myalgias and neck pain.   Neurological: Negative for focal weakness and headaches.   Psychiatric/Behavioral: Negative for decreased concentration and suicidal ideas. The patient is nervous/anxious and has insomnia.        Objective     Vitals  Vitals:    02/04/20 0758   BP: 120/80   BP Location: Left upper arm   Patient Position: Sitting   Pulse: 77   Height: 1.708 m (5' 7.25\")     Body mass index is 24.25 kg/m².    Physical Exam  Physical Exam   Constitutional: She is oriented to person, place, and time. She appears well-developed and well-nourished. No distress.   HENT:   Right Ear: Tympanic membrane, external " ear and ear canal normal.   Left Ear: Tympanic membrane, external ear and ear canal normal.   Nose: Mucosal edema present. No rhinorrhea. Right sinus exhibits maxillary sinus tenderness. Left sinus exhibits maxillary sinus tenderness.   Mouth/Throat: Posterior oropharyngeal erythema present. No oropharyngeal exudate, posterior oropharyngeal edema or tonsillar abscesses.   Neck: Neck supple. No JVD present. No thyromegaly present.   Cardiovascular: Normal rate, regular rhythm and normal heart sounds. Exam reveals no gallop and no friction rub.   No murmur heard.  Pulmonary/Chest: Effort normal and breath sounds normal. No respiratory distress. She has no wheezes. She has no rales.   Lymphadenopathy:     She has no cervical adenopathy.   Neurological: She is alert and oriented to person, place, and time.   Skin: She is not diaphoretic.   Psychiatric: She has a normal mood and affect. Her speech is normal and behavior is normal. Thought content normal.   Vitals reviewed.    PHQ 2 to 9:  Have You Felt Down, Depressed or Hopeless?: no    Have You Felt Little Interest or Pleasure in Doing Things?: no    No data recorded  No data recorded  No data recorded  No data recorded  No data recorded  No data recorded  No data recorded  No data recorded  No data recorded    No data recorded    No data recorded    Assessment/Plan     Problem List Items Addressed This Visit     Angina pectoris syndrome (CMS/HCC)     Cardiology following regularly.  No change in meds.         Generalized anxiety disorder     Stable on Alprazolam.         Relevant Medications    ALPRAZolam (XANAX) 0.5 mg tablet    escitalopram (LEXAPRO) 20 mg tablet    Major depressive disorder, recurrent episode, mild (CMS/HCC) - Primary     Stable on Lexapro.  Follow up 6 mo.         Relevant Medications    ALPRAZolam (XANAX) 0.5 mg tablet    escitalopram (LEXAPRO) 20 mg tablet    Symptomatic menopausal or female climacteric states     ON Activella.  GYN  managing  Recent endometrial bx and D&C done for bleeding-- negative for cancer.         Moderate acquired hearing loss     Hearing aides--new  Doing well.         Schatzki's ring     Recent dilation esophagus via EGD with GI.  Stable.         Acute non-recurrent maxillary sinusitis     Zpack as directed #1         Relevant Medications    azithromycin (ZITHROMAX) 250 mg tablet              ALBERT Bowen  2/4/2020

## 2020-02-04 NOTE — ASSESSMENT & PLAN NOTE
Stable on statin  Check with cardiology regarding where they want her LDL.  May need to increase statin.

## 2020-03-16 ENCOUNTER — CLINICAL SUPPORT (OUTPATIENT)
Dept: OTHER | Facility: CLINIC | Age: 60
End: 2020-03-16
Attending: INTERNAL MEDICINE
Payer: COMMERCIAL

## 2020-03-16 ENCOUNTER — TELEPHONE (OUTPATIENT)
Dept: FAMILY MEDICINE | Facility: CLINIC | Age: 60
End: 2020-03-16

## 2020-03-16 ENCOUNTER — TELEPHONE (OUTPATIENT)
Dept: PRIMARY CARE | Facility: CLINIC | Age: 60
End: 2020-03-16

## 2020-03-16 DIAGNOSIS — R05.9 COUGH: ICD-10-CM

## 2020-03-16 DIAGNOSIS — R05.9 COUGH: Primary | ICD-10-CM

## 2020-03-16 NOTE — TELEPHONE ENCOUNTER
Patient screened by RN - In [facility] 3/5 2020 - 3/12/2020 and rode the metro to destinations [place]. [place] from [place] airport in [place] to [facility] on 3/12/2020. Started 3/14/2020 with headache, fatique, myalgias,and cough. Cannot take her temperature but reports did test positive for influenza in the past without a fever. Does have chest tightness - mild with mild shortness of breath.  Does not feel she needs to go to the ER now. Call made to the [facility] and COVID 19 ordered. Patient to be called for appointment and she was notified from here of this plan. Advised to go the ER with worsening shortness of breath.

## 2020-03-16 NOTE — TELEPHONE ENCOUNTER
Pt was in [place] from 03/05/2020 until 03/12/2020 and she is worried she may have been exposed.  She does have cough tightness in chest, tiredness and headache.    She was not near anyone to her knowledge who was sick, however she did travel on [place] [employer] public transportation.     Triaged to [facility].     Pt also wanted to advise she never has a fever, years ago she was diagnosed with H1N1 and never had a fever.    She also believes [place] is not reporting all cases.

## 2020-03-16 NOTE — TELEPHONE ENCOUNTER
Called pt, filled out COVID-19 Worksheet, international travel noted, unable to check temp, reports cough and sob. Pt concerned that she had exposure on Metro in [place]. Called [facility], directed to have doctor call if testing is suggested. Dr. Hodges informed.

## 2020-03-17 NOTE — PROGRESS NOTES
Patient was processed today at [facility]-19 drive-up clinic.  Pt denies any sort of medical distress today.  After identifying the patient, a nasopharyngeal sample was obtained and placed in viral transport medium.  Pt was given a post-collection education sheet and encouraged to self-isolate until they receive the results.  Pt directed to [facility] website for [facility] Privacy Practices.  Sample sent to [facility] for processing.  Pt was without additional questions or concerns and was dispositioned from the testing area to home.

## 2020-03-18 ENCOUNTER — TELEPHONE (OUTPATIENT)
Dept: PRIMARY CARE | Facility: CLINIC | Age: 60
End: 2020-03-18

## 2020-03-18 LAB
SARS-COV-2 RNA RESP QL NAA+PROBE: NOT DETECTED
SPECIMEN SOURCE: NORMAL
SYMPTOM: NORMAL

## 2020-03-18 NOTE — TELEPHONE ENCOUNTER
----- Message from Melissa Hodges MD sent at 3/18/2020  1:40 PM EDT -----  Carrie - Please advise this patient that her Covid 19 testing as negative

## 2020-03-20 ENCOUNTER — OFFICE VISIT (OUTPATIENT)
Dept: PRIMARY CARE | Facility: CLINIC | Age: 60
End: 2020-03-20
Payer: COMMERCIAL

## 2020-03-20 ENCOUNTER — TELEPHONE (OUTPATIENT)
Dept: FAMILY MEDICINE | Facility: CLINIC | Age: 60
End: 2020-03-20

## 2020-03-20 VITALS
SYSTOLIC BLOOD PRESSURE: 120 MMHG | DIASTOLIC BLOOD PRESSURE: 80 MMHG | OXYGEN SATURATION: 97 % | HEIGHT: 67 IN | HEART RATE: 76 BPM | BODY MASS INDEX: 24.25 KG/M2

## 2020-03-20 DIAGNOSIS — J32.9 SINOBRONCHITIS: Primary | ICD-10-CM

## 2020-03-20 DIAGNOSIS — I20.9 ANGINA PECTORIS SYNDROME (CMS/HCC): ICD-10-CM

## 2020-03-20 DIAGNOSIS — J40 SINOBRONCHITIS: Primary | ICD-10-CM

## 2020-03-20 DIAGNOSIS — F33.0 MAJOR DEPRESSIVE DISORDER, RECURRENT EPISODE, MILD (CMS/HCC): ICD-10-CM

## 2020-03-20 PROBLEM — J01.00 ACUTE NON-RECURRENT MAXILLARY SINUSITIS: Status: RESOLVED | Noted: 2020-02-04 | Resolved: 2020-03-20

## 2020-03-20 PROCEDURE — 99214 OFFICE O/P EST MOD 30 MIN: CPT | Performed by: NURSE PRACTITIONER

## 2020-03-20 RX ORDER — METHYLPREDNISOLONE 4 MG/1
TABLET ORAL
Qty: 21 TABLET | Refills: 0 | Status: SHIPPED | OUTPATIENT
Start: 2020-03-20 | End: 2020-06-18 | Stop reason: ALTCHOICE

## 2020-03-20 RX ORDER — PROMETHAZINE HYDROCHLORIDE AND CODEINE PHOSPHATE 6.25; 1 MG/5ML; MG/5ML
5 SOLUTION ORAL EVERY 4 HOURS PRN
Qty: 240 ML | Refills: 0 | Status: SHIPPED | OUTPATIENT
Start: 2020-03-20 | End: 2020-06-18 | Stop reason: ALTCHOICE

## 2020-03-20 RX ORDER — PROMETHAZINE HYDROCHLORIDE AND CODEINE PHOSPHATE 6.25; 1 MG/5ML; MG/5ML
5 SOLUTION ORAL EVERY 4 HOURS PRN
Qty: 240 ML | Refills: 0 | Status: SHIPPED | OUTPATIENT
Start: 2020-03-20 | End: 2020-03-20

## 2020-03-20 RX ORDER — ALBUTEROL SULFATE 90 UG/1
2 INHALANT RESPIRATORY (INHALATION) 4 TIMES DAILY PRN
Qty: 1 INHALER | Refills: 1 | Status: SHIPPED | OUTPATIENT
Start: 2020-03-20 | End: 2020-08-04

## 2020-03-20 RX ORDER — AZITHROMYCIN 250 MG/1
TABLET, FILM COATED ORAL
Qty: 6 TABLET | Refills: 0 | Status: SHIPPED | OUTPATIENT
Start: 2020-03-20 | End: 2020-06-18 | Stop reason: ALTCHOICE

## 2020-03-20 ASSESSMENT — ENCOUNTER SYMPTOMS
WHEEZING: 0
FEVER: 0
NECK PAIN: 0
DIAPHORESIS: 0
SORE THROAT: 0
MYALGIAS: 0
HOARSE VOICE: 0
COUGH: 1
HEADACHES: 1
SWOLLEN GLANDS: 0
SINUS COMPLAINT: 1
SHORTNESS OF BREATH: 0
SINUS PRESSURE: 1
CHILLS: 0

## 2020-03-20 NOTE — PROGRESS NOTES
[facility]  Mariel Lee  [address]  [place] [address]  Phone: [phone]  Fax: [phone]      Patient ID: Billie Frank                              : 1960    Visit Date: 3/20/2020    Chief Complaint: Cough (Started with sore throat, headache, fatigue, started Sat AM, was tested for COVD 19 and was NEGATIVE (was tested due to having been out of the country))         Patient ID: Billie Frank is a 59 y.o. female.    Patient Active Problem List   Diagnosis   • Angina pectoris syndrome (CMS/HCC)   • Family history of ischemic heart disease   • Generalized anxiety disorder   • Hyperlipidemia   • Major depressive disorder, recurrent episode, mild (CMS/HCC)   • Symptomatic menopausal or female climacteric states   • Persistent disorder of initiating or maintaining sleep   • Polyp of colon   • Moderate acquired hearing loss   • Schatzki's ring   • Sinobronchitis         Current Outpatient Medications:   •  albuterol HFA (PROAIR HFA) 90 mcg/actuation inhaler, Inhale 2 puffs 4 (four) times a day as needed for wheezing., Disp: 1 Inhaler, Rfl: 1  •  ALPRAZolam (XANAX) 0.5 mg tablet, Take 1 tablet (0.5 mg total) by mouth 2 (two) times a day as needed for anxiety., Disp: 180 tablet, Rfl: 0  •  AMABELZ 0.5-0.1 mg per tablet, , Disp: , Rfl:   •  azithromycin (ZITHROMAX) 250 mg tablet, Take 2 tablets the first day, then 1 tablet daily for 4 days., Disp: 6 tablet, Rfl: 0  •  escitalopram (LEXAPRO) 20 mg tablet, Take 1 tablet (20 mg total) by mouth once daily., Disp: 90 tablet, Rfl: 1  •  methylPREDNISolone (MEDROL DOSEPACK) 4 mg tablet, Follow package directions., Disp: 21 tablet, Rfl: 0  •  promethazine-codeine (PHENERGAN with CODEINE) 6.25-10 mg/5 mL syrup, Take 5 mL by mouth every 4 (four) hours as needed for cough for up to 10 days., Disp: 240 mL, Rfl: 0  •  RANEXA 500 mg 12 hr tablet, , Disp: , Rfl:   •  rosuvastatin (CRESTOR) 5 mg tablet, Take 5 mg  by mouth daily. Every other day, Disp: , Rfl:     Allergies   Allergen Reactions   • Bee Sting [Bee Venom Protein (Honey Bee)] Anaphylaxis   • Iodinated Contrast Media Hives   • Penicillins Rash   • Sulfa (Sulfonamide Antibiotics) Rash       Social History     Tobacco Use   • Smoking status: Never Smoker   • Smokeless tobacco: Never Used   Substance Use Topics   • Alcohol use: Not on file   • Drug use: Not on file       Health Maintenance   Topic Date Due   • Varicella Vaccines (1 of 2 - 2-dose childhood series) 10/02/1960   • DTaP, Tdap, and Td Vaccines (1 - Tdap) 10/02/1970   • HIV Screening  08/07/2020 (Originally 10/2/1972)   • Zoster Vaccine (1 of 2) 09/01/2020 (Originally 10/2/2009)   • Mammogram  08/09/2020   • Colonoscopy  01/18/2024   • Cervical Cancer Screening  01/29/2025   • Influenza Vaccine  Completed   • Hepatitis C Screening  Completed   • Meningococcal ACWY  Aged Out   • HIB Vaccines  Aged Out   • IPV Vaccines  Aged Out   • HPV Vaccines  Aged Out   • Pneumococcal  Aged Out       HPI  Follow up.  Persistent lung and sinus symptoms  COVID tested negative  No flu symptoms  Mucus is now thick and green.    Sinus Problem   This is a new problem. The current episode started 1 to 4 weeks ago. The problem is unchanged. There has been no fever. The pain is mild. Associated symptoms include congestion, coughing, ear pain, headaches and sinus pressure. Pertinent negatives include no chills, diaphoresis, hoarse voice, neck pain, shortness of breath, sneezing, sore throat or swollen glands. Past treatments include nothing.   Cough   This is a new problem. The current episode started 1 to 4 weeks ago. The problem has been unchanged. The cough is productive of purulent sputum. Associated symptoms include ear pain, headaches, nasal congestion and postnasal drip. Pertinent negatives include no chest pain, chills, fever, myalgias, sore throat, shortness of breath or wheezing. Nothing aggravates the symptoms. She has  "tried OTC cough suppressant for the symptoms. The treatment provided no relief.       The following have been reviewed and updated as appropriate in this visit:  Allergies  Meds  Problems         Review of System  Review of Systems   Constitutional: Negative for chills, diaphoresis and fever.   HENT: Positive for congestion, ear pain, postnasal drip and sinus pressure. Negative for hoarse voice, sneezing and sore throat.    Respiratory: Positive for cough. Negative for shortness of breath and wheezing.    Cardiovascular: Negative for chest pain.   Musculoskeletal: Negative for myalgias and neck pain.   Neurological: Positive for headaches.       Objective     Vitals  Vitals:    03/20/20 0957   BP: 120/80   BP Location: Left upper arm   Patient Position: Sitting   Pulse: 76   SpO2: 97%   Height: 1.708 m (5' 7.25\")     Body mass index is 24.25 kg/m².    Physical Exam  Physical Exam   Constitutional: She is oriented to person, place, and time. She appears well-developed and well-nourished. No distress.   HENT:   Right Ear: External ear and ear canal normal. Tympanic membrane is bulging.   Left Ear: External ear and ear canal normal. Tympanic membrane is bulging.   Nose: Mucosal edema present. No rhinorrhea. Right sinus exhibits maxillary sinus tenderness. Left sinus exhibits maxillary sinus tenderness.   Mouth/Throat: Posterior oropharyngeal erythema present. No oropharyngeal exudate, posterior oropharyngeal edema or tonsillar abscesses.   Neck: Neck supple. No JVD present. No thyromegaly present.   Cardiovascular: Normal rate, regular rhythm and normal heart sounds. Exam reveals no gallop and no friction rub.   No murmur heard.  Pulmonary/Chest: Effort normal. No respiratory distress. She has no wheezes. She has rhonchi in the right upper field and the left upper field. She has no rales.   Lymphadenopathy:     She has no cervical adenopathy.   Neurological: She is alert and oriented to person, place, and time. "   Skin: She is not diaphoretic.   Vitals reviewed.      Assessment/Plan     Problem List Items Addressed This Visit     Angina pectoris syndrome (CMS/HCC)     Cardiology follows.  Stable.         Major depressive disorder, recurrent episode, mild (CMS/HCC)     Stable on Escitalopram daily.         Sinobronchitis - Primary     Zpack #1  Medrol #1  Proair PRN  Phen with codeine PRN         Relevant Medications    azithromycin (ZITHROMAX) 250 mg tablet    methylPREDNISolone (MEDROL DOSEPACK) 4 mg tablet    albuterol HFA (PROAIR HFA) 90 mcg/actuation inhaler    promethazine-codeine (PHENERGAN with CODEINE) 6.25-10 mg/5 mL syrup      Follow up if symptoms worsen/persist.  Report status next Tues via phone.        ALBERT Bowen  3/20/2020

## 2020-03-20 NOTE — TELEPHONE ENCOUNTER
Cough medication you called into pharmacy they dont have  Promethazine  Cod  But she did find that [facility] in [place] has it  And  They only have only 2 doses left

## 2020-05-19 NOTE — ASSESSMENT & PLAN NOTE
Refill request from pharmacy for the medication listed below.  Patient was last seen 1/6/2020.  Next appt None.      Last written as  ALPRAZolam (XANAX) 1 MG tablet 60 tablet 0 4/13/2020     Sig: TAKE 1 TABLET BY MOUTH 2 TIMES DAILY AS NEEDED FOR SLEEP/ANXIETY    Sent to pharmacy as: ALPRAZolam 1 MG Oral Tablet (XANAX)    Class: Eprescribe      zolpidem (AMBIEN) 10 MG tablet 180 tablet 1 10/21/2019     Sig: TAKE 2 TABLETS BY MOUTH NIGHTLY AT BEDTIME AS NEEDED FOR SLEEP    Sent to pharmacy as: Zolpidem Tartrate 10 MG Oral Tablet    Class: Eprescribe      citalopram (CELEXA) 40 MG tablet 90 tablet 4 3/6/2019     Sig - Route: Take 1 tablet by mouth daily. - Oral    Sent to pharmacy as: Citalopram Hydrobromide 40 MG Oral Tablet    Class: Eprescribe        Pharmacy:  [facility]   Call when complete?  no      Drug Prescription Monitoring    Associated Diagnosis for Medication: anxiety and bipolar   [place] PDMP Reviewed: Yes and currently on several different controlled substance  Last Dispensed from Pharmacy:  Xanax: 4/13/2020  Ambien: 2/27/20         Zpack as directed #1

## 2020-06-18 ENCOUNTER — TELEMEDICINE (OUTPATIENT)
Dept: PRIMARY CARE | Facility: CLINIC | Age: 60
End: 2020-06-18
Payer: COMMERCIAL

## 2020-06-18 ENCOUNTER — TELEPHONE (OUTPATIENT)
Dept: FAMILY MEDICINE | Facility: CLINIC | Age: 60
End: 2020-06-18

## 2020-06-18 DIAGNOSIS — I20.9 ANGINA PECTORIS SYNDROME (CMS/HCC): ICD-10-CM

## 2020-06-18 DIAGNOSIS — F33.0 MAJOR DEPRESSIVE DISORDER, RECURRENT EPISODE, MILD (CMS/HCC): ICD-10-CM

## 2020-06-18 DIAGNOSIS — N30.01 ACUTE CYSTITIS WITH HEMATURIA: Primary | ICD-10-CM

## 2020-06-18 PROBLEM — J40 SINOBRONCHITIS: Status: RESOLVED | Noted: 2020-03-20 | Resolved: 2020-06-18

## 2020-06-18 PROBLEM — J32.9 SINOBRONCHITIS: Status: RESOLVED | Noted: 2020-03-20 | Resolved: 2020-06-18

## 2020-06-18 PROCEDURE — 99214 OFFICE O/P EST MOD 30 MIN: CPT | Mod: 95 | Performed by: NURSE PRACTITIONER

## 2020-06-18 RX ORDER — DICLOFENAC SODIUM 50 MG/1
50 TABLET, DELAYED RELEASE ORAL 2 TIMES DAILY PRN
COMMUNITY
Start: 2020-06-09 | End: 2023-03-02 | Stop reason: ALTCHOICE

## 2020-06-18 RX ORDER — OMEPRAZOLE 20 MG/1
CAPSULE, DELAYED RELEASE ORAL DAILY PRN
COMMUNITY
Start: 2020-03-26 | End: 2021-04-22 | Stop reason: ALTCHOICE

## 2020-06-18 RX ORDER — NITROFURANTOIN 25; 75 MG/1; MG/1
100 CAPSULE ORAL 2 TIMES DAILY
Qty: 14 CAPSULE | Refills: 0 | Status: SHIPPED | OUTPATIENT
Start: 2020-06-18 | End: 2020-08-04 | Stop reason: ALTCHOICE

## 2020-06-18 RX ORDER — PHENAZOPYRIDINE HYDROCHLORIDE 100 MG/1
100 TABLET, FILM COATED ORAL 3 TIMES DAILY PRN
Qty: 10 TABLET | Refills: 0 | Status: SHIPPED | OUTPATIENT
Start: 2020-06-18 | End: 2020-06-22 | Stop reason: SDUPTHER

## 2020-06-18 ASSESSMENT — ENCOUNTER SYMPTOMS
FLANK PAIN: 0
FREQUENCY: 1
CHILLS: 0
SWEATS: 0
NAUSEA: 0
VOMITING: 0
HEMATURIA: 1

## 2020-06-18 NOTE — PROGRESS NOTES
Verification of Patient Location:  The patient affirms they are currently located in the following state: [place]    Request for Consent:   Video Encounter   Hello, my name is ALBERT Bowen.  Before we proceed, can you please verify your identification by telling me your full name and date of birth?  Can you tell me who is in the room with you?    You and I are about to have a telemedicine check-in or visit because you have requested it.  This is a live video-conference.  I am a real person, speaking to you in real time.  There is no one else with me on the video-conference.  However, when we use ([employer], [employer], etc) it is important for you to know that the video-conference may not be secure or private.  I am not recording this conversation and I am asking you not to record it.  This telemedicine visit will be billed to your health insurance or you, if you are self-insured.  You understand you will be responsible for any copayments or coinsurances that apply to your telemedicine visit.  Before starting our telemedicine visit, I am required to get your consent for this virtual check-in or visit by telemedicine. Do you consent?    Patient Response to Request for Consent:  Yes    Patient Active Problem List   Diagnosis   • Angina pectoris syndrome (CMS/HCC)   • Family history of ischemic heart disease   • Generalized anxiety disorder   • Hyperlipidemia   • Major depressive disorder, recurrent episode, mild (CMS/HCC)   • Symptomatic menopausal or female climacteric states   • Persistent disorder of initiating or maintaining sleep   • Polyp of colon   • Moderate acquired hearing loss   • Schatzki's ring   • Acute cystitis with hematuria       Current Outpatient Medications on File Prior to Visit   Medication Sig Dispense Refill   • albuterol HFA (PROAIR HFA) 90 mcg/actuation inhaler Inhale 2 puffs 4 (four) times a day as needed for wheezing. 1 Inhaler 1   • ALPRAZolam (XANAX) 0.5 mg tablet Take 1 tablet  (0.5 mg total) by mouth 2 (two) times a day as needed for anxiety. 180 tablet 0   • AMABELZ 0.5-0.1 mg per tablet      • diclofenac (VOLTAREN) 50 mg EC tablet Take 50 mg by mouth 2 (two) times a day.     • escitalopram (LEXAPRO) 20 mg tablet Take 1 tablet (20 mg total) by mouth once daily. 90 tablet 1   • omeprazole (PriLOSEC) 20 mg capsule      • RANEXA 500 mg 12 hr tablet      • rosuvastatin (CRESTOR) 5 mg tablet Take 5 mg by mouth daily. Every other day       No current facility-administered medications on file prior to visit.      Allergies   Allergen Reactions   • Bee Sting [Bee Venom Protein (Honey Bee)] Anaphylaxis   • Iodinated Contrast Media Hives   • Penicillins Rash   • Sulfa (Sulfonamide Antibiotics) Rash       Visit Documentation:  Subjective     Patient ID: Billie Frank is a 59 y.o. female.  1960      UTI symptoms-- started this AM.  She is currently in [facility].    UTI    This is a new problem. The current episode started today. The quality of the pain is described as burning. The pain is mild. There has been no fever. She is sexually active. There is no history of pyelonephritis. Associated symptoms include frequency, hematuria and urgency. Pertinent negatives include no chills, discharge, flank pain, hesitancy, nausea, sweats or vomiting. She has tried increased fluids for the symptoms. The treatment provided mild relief. Her past medical history is significant for recurrent UTIs. There is no history of kidney stones.       The following have been reviewed and updated as appropriate in this visit:  Allergies  Meds  Problems       Review of Systems   Constitutional: Negative for chills.   Gastrointestinal: Negative for nausea and vomiting.   Genitourinary: Positive for frequency, hematuria and urgency. Negative for flank pain and hesitancy.     Physical Exam   Constitutional: She is oriented to person, place, and time. She appears well-developed and well-nourished. No distress.    Pulmonary/Chest: No respiratory distress.   Abdominal: She exhibits no distension. There is no tenderness. There is no CVA tenderness.   Neurological: She is alert and oriented to person, place, and time.   Skin: She is not diaphoretic.   Psychiatric: She has a normal mood and affect. Her speech is normal and behavior is normal.     Assessment/Plan   Diagnoses and all orders for this visit:    Acute cystitis with hematuria (Primary)  Assessment & Plan:  Start Macrobid BID  Pyridium PRN  Push po fluids  Report any worsening symptoms-- fever, chills, flank pain, vomiting, increased bleeding.    Orders:  -     nitrofurantoin, macrocrystal-monohydrate, (MACROBID) 100 mg capsule; Take 1 capsule (100 mg total) by mouth 2 (two) times a day for 7 days.  -     phenazopyridine (PYRIDIUM) 100 mg tablet; Take 1 tablet (100 mg total) by mouth 3 (three) times a day as needed for bladder spasms for up to 4 days.    Major depressive disorder, recurrent episode, mild (CMS/HCC)  Assessment & Plan:  Stable on current meds.  No changes.      Angina pectoris syndrome (CMS/HCC)  Assessment & Plan:  Cardiology follows.  Stable.        Time Spent in Medical Discussion During This Encounter:      20 minutes

## 2020-06-18 NOTE — TELEPHONE ENCOUNTER
Call from pt. She states she has a bladder infection. Is peeing blood. She is in [place]. Asking for an abx. Said Mariel did this for her daughter.

## 2020-06-18 NOTE — ASSESSMENT & PLAN NOTE
Start Macrobid BID  Pyridium PRN  Push po fluids  Report any worsening symptoms-- fever, chills, flank pain, vomiting, increased bleeding.

## 2020-06-22 ENCOUNTER — TELEPHONE (OUTPATIENT)
Dept: FAMILY MEDICINE | Facility: CLINIC | Age: 60
End: 2020-06-22

## 2020-06-22 DIAGNOSIS — N30.01 ACUTE CYSTITIS WITH HEMATURIA: ICD-10-CM

## 2020-06-22 RX ORDER — PHENAZOPYRIDINE HYDROCHLORIDE 100 MG/1
100 TABLET, FILM COATED ORAL 3 TIMES DAILY PRN
Qty: 10 TABLET | Refills: 0 | Status: SHIPPED | OUTPATIENT
Start: 2020-06-22 | End: 2020-08-04 | Stop reason: ALTCHOICE

## 2020-06-22 NOTE — TELEPHONE ENCOUNTER
Had a telemed last week for a bladder infection.. The medication worked for 2 days and now she still has bladder  Spasm   In the beginning her  Urine was bright orange and now it is not .

## 2020-06-22 NOTE — TELEPHONE ENCOUNTER
I believe she is not in this area. See if we can order a urine test to [facility] and there is one in her area.

## 2020-06-25 DIAGNOSIS — F41.1 GENERALIZED ANXIETY DISORDER: ICD-10-CM

## 2020-06-25 RX ORDER — ALPRAZOLAM 0.5 MG/1
0.5 TABLET ORAL 2 TIMES DAILY PRN
Qty: 180 TABLET | Refills: 0 | Status: SHIPPED | OUTPATIENT
Start: 2020-06-25 | End: 2020-08-03 | Stop reason: HOSPADM
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
## 2020-06-25 NOTE — TELEPHONE ENCOUNTER
Medicine Refill Request    Last Office Visit: 3/20/2020  Last Telemedicine Visit: 6/18/2020 Mariel Lee CRNP    Next Office Visit: Visit date not found  Next Telemedicine Visit: Visit date not found      checked last filled 04/04    Current Outpatient Medications:   •  albuterol HFA (PROAIR HFA) 90 mcg/actuation inhaler, Inhale 2 puffs 4 (four) times a day as needed for wheezing., Disp: 1 Inhaler, Rfl: 1  •  ALPRAZolam (XANAX) 0.5 mg tablet, Take 1 tablet (0.5 mg total) by mouth 2 (two) times a day as needed for anxiety., Disp: 180 tablet, Rfl: 0  •  AMABELZ 0.5-0.1 mg per tablet, , Disp: , Rfl:   •  diclofenac (VOLTAREN) 50 mg EC tablet, Take 50 mg by mouth 2 (two) times a day., Disp: , Rfl:   •  escitalopram (LEXAPRO) 20 mg tablet, Take 1 tablet (20 mg total) by mouth once daily., Disp: 90 tablet, Rfl: 1  •  nitrofurantoin, macrocrystal-monohydrate, (MACROBID) 100 mg capsule, Take 1 capsule (100 mg total) by mouth 2 (two) times a day for 7 days., Disp: 14 capsule, Rfl: 0  •  omeprazole (PriLOSEC) 20 mg capsule, , Disp: , Rfl:   •  phenazopyridine (PYRIDIUM) 100 mg tablet, Take 1 tablet (100 mg total) by mouth 3 (three) times a day as needed for bladder spasms for up to 4 days., Disp: 10 tablet, Rfl: 0  •  RANEXA 500 mg 12 hr tablet, , Disp: , Rfl:   •  rosuvastatin (CRESTOR) 5 mg tablet, Take 5 mg by mouth daily. Every other day, Disp: , Rfl:       BP Readings from Last 3 Encounters:   03/20/20 120/80   02/04/20 120/80   08/14/19 110/70       Recent Lab results:  Lab Results   Component Value Date    CHOL 186 09/21/2019   ,   Lab Results   Component Value Date    HDL 76 09/21/2019   ,   Lab Results   Component Value Date    LDLCALC 101 (H) 09/21/2019   ,   Lab Results   Component Value Date    TRIG 46 09/21/2019        Lab Results   Component Value Date    GLUCOSE 92 09/21/2019   , No results found for: HGBA1C      Lab Results   Component Value Date    CREATININE 0.90 09/21/2019       Lab Results    Component Value Date    TSH 2.230 09/21/2019

## 2020-06-27 LAB
APPEARANCE UR: CLEAR
BACTERIA #/AREA URNS HPF: NORMAL /[HPF]
BILIRUB UR QL STRIP: NEGATIVE
COLOR UR: YELLOW
EPI CELLS #/AREA URNS HPF: NORMAL /HPF (ref 0–10)
GLUCOSE UR QL: NEGATIVE
HGB UR QL STRIP: NEGATIVE
KETONES UR QL STRIP: NEGATIVE
LAB CORP URINALYSIS REFLEX: NORMAL
LEUKOCYTE ESTERASE UR QL STRIP: NEGATIVE
MICRO URNS: NORMAL
MICRO URNS: NORMAL
NITRITE UR QL STRIP: NEGATIVE
PH UR STRIP: 7 [PH] (ref 5–7.5)
PROT UR QL STRIP: NEGATIVE
RBC #/AREA URNS HPF: NORMAL /HPF (ref 0–2)
SP GR UR: 1.01 (ref 1–1.03)
UROBILINOGEN UR STRIP-MCNC: 0.2 MG/DL (ref 0.2–1)
WBC #/AREA URNS HPF: NORMAL /HPF (ref 0–5)

## 2020-07-05 ENCOUNTER — TELEPHONE (OUTPATIENT)
Dept: PRIMARY CARE | Facility: CLINIC | Age: 60
End: 2020-07-05

## 2020-07-05 PROBLEM — N30.00 ACUTE CYSTITIS WITHOUT HEMATURIA: Status: ACTIVE | Noted: 2020-06-18

## 2020-07-05 RX ORDER — PHENAZOPYRIDINE HYDROCHLORIDE 100 MG/1
100 TABLET, FILM COATED ORAL 3 TIMES DAILY PRN
Qty: 12 TABLET | Refills: 0 | Status: SHIPPED | OUTPATIENT
Start: 2020-07-05 | End: 2020-08-04 | Stop reason: ALTCHOICE

## 2020-07-05 RX ORDER — PHENAZOPYRIDINE HYDROCHLORIDE 100 MG/1
100 TABLET, FILM COATED ORAL 3 TIMES DAILY PRN
Qty: 12 TABLET | Refills: 0 | Status: SHIPPED | OUTPATIENT
Start: 2020-07-05 | End: 2020-07-05 | Stop reason: SDUPTHER

## 2020-07-05 RX ORDER — CIPROFLOXACIN 500 MG/1
500 TABLET ORAL 2 TIMES DAILY
Qty: 14 TABLET | Refills: 0 | Status: SHIPPED | OUTPATIENT
Start: 2020-07-05 | End: 2020-08-04 | Stop reason: ALTCHOICE

## 2020-07-05 NOTE — TELEPHONE ENCOUNTER
Pt phoned at 5:30 pm with complaint of urinary urgency and frequency once again that began this morning. She denies fever, but has multiple antibiotic allergies. She has taken ciprofloxacin before. Warned of sun sensitivity.    Plan: Cipro 500 twice a day for 7 days and Pyridium 100 mg # 12 sent to [facility] in [place].

## 2020-07-22 ENCOUNTER — APPOINTMENT (OUTPATIENT)
Dept: URBAN - METROPOLITAN AREA CLINIC 200 | Age: 60
Setting detail: DERMATOLOGY
End: 2020-07-29

## 2020-07-22 DIAGNOSIS — L57.8 OTHER SKIN CHANGES DUE TO CHRONIC EXPOSURE TO NONIONIZING RADIATION: ICD-10-CM

## 2020-07-22 PROCEDURE — 99213 OFFICE O/P EST LOW 20 MIN: CPT

## 2020-07-22 PROCEDURE — OTHER COUNSELING: OTHER

## 2020-07-22 ASSESSMENT — LOCATION ZONE DERM: LOCATION ZONE: TRUNK

## 2020-07-22 ASSESSMENT — LOCATION DETAILED DESCRIPTION DERM: LOCATION DETAILED: LEFT MEDIAL SUPERIOR CHEST

## 2020-07-22 ASSESSMENT — LOCATION SIMPLE DESCRIPTION DERM: LOCATION SIMPLE: CHEST

## 2020-07-31 ENCOUNTER — HOSPITAL ENCOUNTER (INPATIENT)
Facility: HOSPITAL | Age: 60
LOS: 3 days | Discharge: HOME | DRG: 880 | End: 2020-08-03
Attending: PSYCHIATRY & NEUROLOGY | Admitting: PSYCHIATRY & NEUROLOGY
Payer: COMMERCIAL

## 2020-07-31 DIAGNOSIS — F32.A DEPRESSION, UNSPECIFIED DEPRESSION TYPE: Primary | ICD-10-CM

## 2020-07-31 DIAGNOSIS — F33.0 MAJOR DEPRESSIVE DISORDER, RECURRENT EPISODE, MILD (CMS/HCC): ICD-10-CM

## 2020-07-31 PROBLEM — F32.9 MAJOR DEPRESSION, SINGLE EPISODE: Status: ACTIVE | Noted: 2020-07-31

## 2020-07-31 LAB
AMPHET UR QL SCN: NOT DETECTED
ANION GAP SERPL CALC-SCNC: 13 MEQ/L (ref 3–15)
APAP SERPL-MCNC: <10 UG/ML (ref 10–30)
BARBITURATES UR QL SCN: NOT DETECTED
BASOPHILS # BLD: 0.03 K/UL (ref 0.01–0.1)
BASOPHILS NFR BLD: 0.5 %
BENZODIAZ UR QL SCN: POSITIVE
BUN SERPL-MCNC: 8 MG/DL (ref 8–20)
CALCIUM SERPL-MCNC: 9.3 MG/DL (ref 8.9–10.3)
CANNABINOIDS UR QL SCN: NOT DETECTED
CHLORIDE SERPL-SCNC: 102 MEQ/L (ref 98–109)
CO2 SERPL-SCNC: 22 MEQ/L (ref 22–32)
COCAINE UR QL SCN: NOT DETECTED
CREAT SERPL-MCNC: 0.9 MG/DL (ref 0.6–1.1)
DIFFERENTIAL METHOD BLD: NORMAL
EOSINOPHIL # BLD: 0.08 K/UL (ref 0.04–0.36)
EOSINOPHIL NFR BLD: 1.3 %
ERYTHROCYTE [DISTWIDTH] IN BLOOD BY AUTOMATED COUNT: 13.3 % (ref 11.7–14.4)
ETHANOL SERPL-MCNC: <5 MG/DL
GFR SERPL CREATININE-BSD FRML MDRD: >60 ML/MIN/1.73M*2
GLUCOSE SERPL-MCNC: 115 MG/DL (ref 70–99)
HCT VFR BLDCO AUTO: 42.3 % (ref 35–45)
HGB BLD-MCNC: 14.2 G/DL (ref 11.8–15.7)
IMM GRANULOCYTES # BLD AUTO: 0.03 K/UL (ref 0–0.08)
IMM GRANULOCYTES NFR BLD AUTO: 0.5 %
LYMPHOCYTES # BLD: 1.24 K/UL (ref 1.2–3.5)
LYMPHOCYTES NFR BLD: 19.9 %
MCH RBC QN AUTO: 31.4 PG (ref 28–33.2)
MCHC RBC AUTO-ENTMCNC: 33.6 G/DL (ref 32.2–35.5)
MCV RBC AUTO: 93.6 FL (ref 83–98)
MONOCYTES # BLD: 0.35 K/UL (ref 0.28–0.8)
MONOCYTES NFR BLD: 5.6 %
NEUTROPHILS # BLD: 4.5 K/UL (ref 1.7–7)
NEUTS SEG NFR BLD: 72.2 %
NRBC BLD-RTO: 0 %
OPIATES UR QL SCN: NOT DETECTED
PCP UR QL SCN: NOT DETECTED
PDW BLD AUTO: 10.2 FL (ref 9.4–12.3)
PLATELET # BLD AUTO: 227 K/UL (ref 150–369)
POTASSIUM SERPL-SCNC: 3.8 MEQ/L (ref 3.6–5.1)
RBC # BLD AUTO: 4.52 M/UL (ref 3.93–5.22)
SALICYLATES SERPL-MCNC: <4 MG/DL
SARS-COV-2 RNA RESP QL NAA+PROBE: NEGATIVE
SODIUM SERPL-SCNC: 137 MEQ/L (ref 136–144)
WBC # BLD AUTO: 6.23 K/UL (ref 3.8–10.5)

## 2020-07-31 PROCEDURE — 80048 BASIC METABOLIC PNL TOTAL CA: CPT | Performed by: EMERGENCY MEDICINE

## 2020-07-31 PROCEDURE — 36415 COLL VENOUS BLD VENIPUNCTURE: CPT | Performed by: EMERGENCY MEDICINE

## 2020-07-31 PROCEDURE — 63700000 HC SELF-ADMINISTRABLE DRUG: Performed by: PSYCHIATRY & NEUROLOGY

## 2020-07-31 PROCEDURE — 99285 EMERGENCY DEPT VISIT HI MDM: CPT | Performed by: EMERGENCY MEDICINE

## 2020-07-31 PROCEDURE — 12400000 HC ROOM AND CARE SEMIPRIVATE PSYCH

## 2020-07-31 PROCEDURE — 80307 DRUG TEST PRSMV CHEM ANLYZR: CPT | Performed by: EMERGENCY MEDICINE

## 2020-07-31 PROCEDURE — 85025 COMPLETE CBC W/AUTO DIFF WBC: CPT | Performed by: EMERGENCY MEDICINE

## 2020-07-31 PROCEDURE — U0002 COVID-19 LAB TEST NON-CDC: HCPCS | Performed by: EMERGENCY MEDICINE

## 2020-07-31 PROCEDURE — 63700000 HC SELF-ADMINISTRABLE DRUG: Performed by: EMERGENCY MEDICINE

## 2020-07-31 PROCEDURE — G0480 DRUG TEST DEF 1-7 CLASSES: HCPCS | Performed by: EMERGENCY MEDICINE

## 2020-07-31 PROCEDURE — 63700000 HC SELF-ADMINISTRABLE DRUG: Performed by: PHYSICIAN ASSISTANT

## 2020-07-31 RX ORDER — ROSUVASTATIN CALCIUM 5 MG/1
5 TABLET, COATED ORAL DAILY
Status: DISCONTINUED | OUTPATIENT
Start: 2020-07-31 | End: 2020-08-03 | Stop reason: HOSPADM

## 2020-07-31 RX ORDER — TRAZODONE HYDROCHLORIDE 50 MG/1
50 TABLET ORAL NIGHTLY PRN
Status: DISCONTINUED | OUTPATIENT
Start: 2020-07-31 | End: 2020-08-03 | Stop reason: HOSPADM

## 2020-07-31 RX ORDER — RANOLAZINE 500 MG/1
500 TABLET, EXTENDED RELEASE ORAL 2 TIMES DAILY
Status: DISCONTINUED | OUTPATIENT
Start: 2020-07-31 | End: 2020-08-01

## 2020-07-31 RX ORDER — LORAZEPAM 1 MG/1
1 TABLET ORAL EVERY 6 HOURS PRN
Status: DISCONTINUED | OUTPATIENT
Start: 2020-07-31 | End: 2020-08-03 | Stop reason: HOSPADM

## 2020-07-31 RX ORDER — LORAZEPAM 1 MG/1
1 TABLET ORAL ONCE
Status: COMPLETED | OUTPATIENT
Start: 2020-07-31 | End: 2020-07-31

## 2020-07-31 RX ORDER — ALBUTEROL SULFATE 90 UG/1
2 INHALANT RESPIRATORY (INHALATION) 4 TIMES DAILY PRN
Status: DISCONTINUED | OUTPATIENT
Start: 2020-07-31 | End: 2020-08-03 | Stop reason: HOSPADM

## 2020-07-31 RX ORDER — ESCITALOPRAM OXALATE 20 MG/1
20 TABLET ORAL NIGHTLY
Status: DISCONTINUED | OUTPATIENT
Start: 2020-08-01 | End: 2020-08-03 | Stop reason: HOSPADM

## 2020-07-31 RX ORDER — ACETAMINOPHEN 325 MG/1
650 TABLET ORAL ONCE
Status: COMPLETED | OUTPATIENT
Start: 2020-07-31 | End: 2020-07-31

## 2020-07-31 RX ORDER — HYDROXYZINE HYDROCHLORIDE 25 MG/1
50 TABLET, FILM COATED ORAL EVERY 6 HOURS PRN
Status: DISCONTINUED | OUTPATIENT
Start: 2020-07-31 | End: 2020-08-03 | Stop reason: HOSPADM

## 2020-07-31 RX ORDER — PANTOPRAZOLE SODIUM 20 MG/1
20 TABLET, DELAYED RELEASE ORAL DAILY
Status: DISCONTINUED | OUTPATIENT
Start: 2020-07-31 | End: 2020-08-01

## 2020-07-31 RX ORDER — ACETAMINOPHEN 325 MG/1
650 TABLET ORAL EVERY 4 HOURS PRN
Status: DISCONTINUED | OUTPATIENT
Start: 2020-07-31 | End: 2020-08-03 | Stop reason: HOSPADM

## 2020-07-31 RX ORDER — ESCITALOPRAM OXALATE 20 MG/1
20 TABLET ORAL
Status: DISCONTINUED | OUTPATIENT
Start: 2020-08-01 | End: 2020-07-31

## 2020-07-31 RX ORDER — IBUPROFEN 400 MG/1
400 TABLET ORAL EVERY 6 HOURS PRN
Status: DISCONTINUED | OUTPATIENT
Start: 2020-07-31 | End: 2020-08-01

## 2020-07-31 RX ADMIN — LORAZEPAM 1 MG: 1 TABLET ORAL at 11:36

## 2020-07-31 RX ADMIN — HYDROXYZINE HYDROCHLORIDE 50 MG: 25 TABLET ORAL at 19:41

## 2020-07-31 RX ADMIN — PANTOPRAZOLE SODIUM 20 MG: 20 TABLET, DELAYED RELEASE ORAL at 19:41

## 2020-07-31 RX ADMIN — LORAZEPAM 1 MG: 1 TABLET ORAL at 14:50

## 2020-07-31 RX ADMIN — ESCITALOPRAM OXALATE 20 MG: 20 TABLET ORAL at 23:20

## 2020-07-31 RX ADMIN — TRAZODONE HYDROCHLORIDE 50 MG: 50 TABLET ORAL at 23:05

## 2020-07-31 RX ADMIN — RANOLAZINE 500 MG: 500 TABLET, FILM COATED, EXTENDED RELEASE ORAL at 23:04

## 2020-07-31 RX ADMIN — ROSUVASTATIN CALCIUM 5 MG: 5 TABLET, FILM COATED ORAL at 23:05

## 2020-07-31 RX ADMIN — LORAZEPAM 1 MG: 1 TABLET ORAL at 21:34

## 2020-07-31 RX ADMIN — ACETAMINOPHEN 650 MG: 325 TABLET, FILM COATED ORAL at 13:15

## 2020-07-31 SDOH — HEALTH STABILITY: MENTAL HEALTH: HOW OFTEN DO YOU HAVE A DRINK CONTAINING ALCOHOL?: 2-3 TIMES A WEEK

## 2020-07-31 SDOH — HEALTH STABILITY: MENTAL HEALTH: HOW MANY DRINKS CONTAINING ALCOHOL DO YOU HAVE ON A TYPICAL DAY WHEN YOU ARE DRINKING?: 1 OR 2

## 2020-07-31 ASSESSMENT — COGNITIVE AND FUNCTIONAL STATUS - GENERAL
PERCEPTUAL FUNCTION: NORMAL
THOUGHT_CONTENT: APPROPRIATE
IMPULSE CONTROL: NORMAL
ORIENTATION: FULLY ORIENTED
AFFECT: TEARFUL
INSIGHT: IMPAIRED, MINIMALLY
PSYCHOMOTOR FUNCTIONING: WNL
APPEARANCE: HOSPITAL GOWN
JUDGEMENT: IMPAIRED, MINIMALLY
SPEECH: REGULAR
APPEARANCE: OTHER:
MOOD: HELPLESS;DEPRESSED

## 2020-07-31 ASSESSMENT — ENCOUNTER SYMPTOMS
SHORTNESS OF BREATH: 0
ABDOMINAL PAIN: 0
VOMITING: 0
FEVER: 0
HALLUCINATIONS: 0
NERVOUS/ANXIOUS: 1
DYSPHORIC MOOD: 1

## 2020-07-31 NOTE — ED PROVIDER NOTES
HPI     Chief Complaint   Patient presents with   • Suicidal     pt recently found out  cheating. pt w plan for SI. cannot contract to safety       58 yo F with a h/o post partum depression presents w/ SI. Pt found out 1 mo ago that  cheating on her - they tried to work through it - seeing marriage counselor - staying together in [place] .  promised he was no longer cheating but pt saw texts on his phone from woman a couple days ago. Pt having thoughts of harming self . Attempted to cut herself with blunt scissors the other day and has thoughts of hanging her self. Pt denies h/o hospitalizations. Takes lexapro and xanax PRN. Drinks 2-3 glasses of wine most days no recreational drug use.            Patient History     History reviewed. No pertinent past medical history.    Past Surgical History:   Procedure Laterality Date   • BLADDER REPAIR  2009    sling   • OVARIAN CYST SURGERY         Family History   Problem Relation Age of Onset   • Breast cancer Biological Mother    • COPD Biological Father    • Coronary artery disease Biological Father    • Alcohol abuse Paternal Grandfather        Social History     Tobacco Use   • Smoking status: Never Smoker   • Smokeless tobacco: Never Used   Substance Use Topics   • Alcohol use: Yes     Frequency: 2-3 times a week   • Drug use: Never       Systems Reviewed from Nursing Triage:          Review of Systems     Review of Systems   Constitutional: Negative for fever.   Respiratory: Negative for shortness of breath.    Cardiovascular: Negative for chest pain.   Gastrointestinal: Negative for abdominal pain and vomiting.   Psychiatric/Behavioral: Positive for dysphoric mood, self-injury and suicidal ideas. Negative for hallucinations. The patient is nervous/anxious.         Physical Exam     ED Triage Vitals [07/31/20 1047]   Temp Heart Rate Resp BP SpO2   36.9 °C (98.5 °F) (!) 101 (!) 22 (!) 145/98 99 %      Temp Source Heart Rate Source Patient  "Position BP Location FiO2 (%) (Set)   Tympanic -- Sitting Right upper arm --       Pulse Ox %: 99 % (07/31/20 1310)  Pulse Ox Interpretation: Normal (07/31/20 1310)  Heart Rate: 101 (07/31/20 1310)  Rhythm Strip Interpretation: Normal Sinus Rhythm (07/31/20 1310)    Patient Vitals for the past 24 hrs:   BP Temp Temp src Pulse Resp SpO2 Height Weight   07/31/20 1732 107/87 36.6 °C (97.9 °F) Oral 84 18 -- 1.727 m (5' 8\") 69.1 kg (152 lb 6.4 oz)   07/31/20 1602 140/77 -- -- 76 16 98 % -- --   07/31/20 1047 (!) 145/98 36.9 °C (98.5 °F) Tympanic (!) 101 (!) 22 99 % -- --                                          Physical Exam   Constitutional: She appears well-developed and well-nourished.   Cardiovascular: Normal rate, regular rhythm and normal heart sounds.   Pulmonary/Chest: Effort normal and breath sounds normal.   Psychiatric: Her speech is normal and behavior is normal. She expresses impulsivity. She exhibits a depressed mood. She expresses suicidal ideation. She expresses suicidal plans.            Procedures    Labs Reviewed   BASIC METABOLIC PANEL - Abnormal       Result Value    Sodium 137      Potassium 3.8      Chloride 102      CO2 22      BUN 8      Creatinine 0.9      Glucose 115 (*)     Calcium 9.3      eGFR >60.0      Anion Gap 13      Narrative:     Ethanol, tylenol, salicylate   ER TOXICOLOGY SCR, SERUM - Abnormal    Salicylate <4.0      Acetaminophen <10.0 (*)     Ethanol <5      Narrative:     Ethanol, tylenol, salicylate   DRUG SCREEN PANEL, URINE - Abnormal    PCP Scrn, Ur Not Detected      Benzodiazepine Ur Qual Positive (*)     Cocaine Screen, Urine Not Detected      Amphetamine+Methamphetamine Screen, Ur Not Detected      Cannabinoid Screen, Urine Not Detected      Opiate Scrn, Ur Not Detected      Barbiturate Screen, Ur Not Detected      Narrative:     PCP, Benzodiazepine, Cocaine, Amphetamine, Cannabinoid, Opiate, Barbiturate   SARS-COV-2 (COVID 19), PCR - Normal    SARS-CoV-2 (COVID-19) " Negative     CBC AND DIFF    WBC 6.23      RBC 4.52      Hemoglobin 14.2      Hematocrit 42.3      MCV 93.6      MCH 31.4      MCHC 33.6      RDW 13.3      Platelets 227      MPV 10.2      Differential Type Auto      nRBC 0.0      Immature Granulocytes 0.5      Neutrophils 72.2      Lymphocytes 19.9      Monocytes 5.6      Eosinophils 1.3      Basophils 0.5      Immature Granulocytes, Absolute 0.03      Neutrophils, Absolute 4.50      Lymphocytes, Absolute 1.24      Monocytes, Absolute 0.35      Eosinophils, Absolute 0.08      Basophils, Absolute 0.03     RAINBOW DRAW PANEL    Narrative:     The following orders were created for panel order [facility] Draw Panel.  Procedure                               Abnormality         Status                     ---------                               -----------         ------                     RAINBOW RED[[id number]]                                      In process                   Please view results for these tests on the individual orders.   RAINBOW RED       No orders to display         Sepsis Documentation          Attribution     [email or web address] Lab, Background User 07/31/20 11:44              ED Course & MDM     MDM  Number of Diagnoses or Management Options     Amount and/or Complexity of Data Reviewed  Clinical lab tests: reviewed             Clinical Impressions as of Jul 31 1923   Depression, unspecified depression type        Kim Cobos PA C  07/31/20 1923

## 2020-07-31 NOTE — ED ATTESTATION NOTE
I have personally seen and examined the patient.  I reviewed and agree with physician assistant / nurse practitioner’s assessment and plan of care.     Exam: Patient is resting comfortably in no acute distress.  Her vital signs are stable and she is afebrile.  Patient is awake alert oriented appropriate and cooperative.  Patient is clearly depressed and tearful and withdrawn.  She is neurologically intact without focal deficit and there is no obvious signs of alcohol intoxication or any toxidrome.    Plan: Patient is medically clear for crisis placement           Isra Rojas DO  07/31/20 5976

## 2020-08-01 PROBLEM — E78.5 HYPERLIPIDEMIA: Chronic | Status: ACTIVE | Noted: 2019-02-12

## 2020-08-01 PROBLEM — I20.9 ANGINA PECTORIS SYNDROME (CMS/HCC): Chronic | Status: ACTIVE | Noted: 2019-02-12

## 2020-08-01 PROCEDURE — 200200 PR NO CHARGE: Performed by: INTERNAL MEDICINE

## 2020-08-01 PROCEDURE — 63700000 HC SELF-ADMINISTRABLE DRUG: Performed by: PSYCHIATRY & NEUROLOGY

## 2020-08-01 PROCEDURE — 90792 PSYCH DIAG EVAL W/MED SRVCS: CPT | Performed by: PSYCHIATRY & NEUROLOGY

## 2020-08-01 PROCEDURE — 12400000 HC ROOM AND CARE SEMIPRIVATE PSYCH

## 2020-08-01 RX ORDER — ESTRADIOL AND NORETHINDRONE ACETATE .5; .1 MG/1; MG/1
1 TABLET ORAL 3 TIMES WEEKLY
Status: DISCONTINUED | OUTPATIENT
Start: 2020-08-01 | End: 2020-08-03 | Stop reason: HOSPADM

## 2020-08-01 RX ORDER — EPINEPHRINE 0.15 MG/.3ML
0.15 INJECTION INTRAMUSCULAR AS NEEDED
Status: DISCONTINUED | OUTPATIENT
Start: 2020-08-01 | End: 2020-08-01

## 2020-08-01 RX ORDER — NAPROXEN SODIUM 220 MG/1
81 TABLET, FILM COATED ORAL DAILY
Status: DISCONTINUED | OUTPATIENT
Start: 2020-08-01 | End: 2020-08-03 | Stop reason: HOSPADM

## 2020-08-01 RX ORDER — EPINEPHRINE 1 MG/ML
0.3 INJECTION, SOLUTION INTRAMUSCULAR; SUBCUTANEOUS AS NEEDED
Status: DISCONTINUED | OUTPATIENT
Start: 2020-08-01 | End: 2020-08-03 | Stop reason: HOSPADM

## 2020-08-01 RX ORDER — CALCIUM CARBONATE 200(500)MG
500 TABLET,CHEWABLE ORAL 2 TIMES DAILY PRN
Status: DISCONTINUED | OUTPATIENT
Start: 2020-08-01 | End: 2020-08-03 | Stop reason: HOSPADM

## 2020-08-01 RX ORDER — RANOLAZINE 500 MG/1
500 TABLET, EXTENDED RELEASE ORAL NIGHTLY
Status: DISCONTINUED | OUTPATIENT
Start: 2020-08-01 | End: 2020-08-03 | Stop reason: HOSPADM

## 2020-08-01 RX ORDER — LORAZEPAM 1 MG/1
1 TABLET ORAL EVERY 6 HOURS PRN
Status: DISCONTINUED | OUTPATIENT
Start: 2020-08-01 | End: 2020-08-01 | Stop reason: SDUPTHER

## 2020-08-01 RX ORDER — IBUPROFEN 600 MG/1
600 TABLET ORAL EVERY 6 HOURS PRN
Status: DISCONTINUED | OUTPATIENT
Start: 2020-08-01 | End: 2020-08-03 | Stop reason: HOSPADM

## 2020-08-01 RX ADMIN — ESCITALOPRAM OXALATE 20 MG: 20 TABLET ORAL at 22:27

## 2020-08-01 RX ADMIN — ASPIRIN 81 MG 81 MG: 81 TABLET ORAL at 11:21

## 2020-08-01 RX ADMIN — TRAZODONE HYDROCHLORIDE 50 MG: 50 TABLET ORAL at 22:32

## 2020-08-01 RX ADMIN — ACETAMINOPHEN 650 MG: 325 TABLET, FILM COATED ORAL at 09:22

## 2020-08-01 RX ADMIN — ESTRADIOL AND NORETHINDRONE ACETATE 1 TABLET: .5; .1 TABLET ORAL at 22:35

## 2020-08-01 RX ADMIN — ROSUVASTATIN CALCIUM 5 MG: 5 TABLET, FILM COATED ORAL at 22:27

## 2020-08-01 RX ADMIN — LORAZEPAM 1 MG: 1 TABLET ORAL at 13:16

## 2020-08-01 RX ADMIN — CALCIUM POLYCARBOPHIL 625 MG TABLET 1250 MG: at 11:21

## 2020-08-01 RX ADMIN — LORAZEPAM 1 MG: 1 TABLET ORAL at 19:12

## 2020-08-01 RX ADMIN — RANOLAZINE 500 MG: 500 TABLET, FILM COATED, EXTENDED RELEASE ORAL at 22:32

## 2020-08-01 NOTE — PLAN OF CARE
"  Problem: Adult Behavioral Health Plan of Care  Goal: Plan of Care Review  Flowsheets (Taken 7/31/2020 2353)  Progress: no change  Plan of Care Reviewed With: patient  Patient Agreement with Plan of Care: agrees  Outcome Summary: Billie is a voluntary 201 from [facility] where she presented with depression and suicidal ideation. She has no previous psychiatric hospitalizations. 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 Recently, in June, discovered her  was having an affair. More recently learned that he was lying when he claimed to have ended it. She reports her  has moved out. She reports poor sleep, frequent crying, grief and thoughts of hanging herself. She denies past SA. She states \" I was thinking of hanging myself from a door knob, like Jonny Jane, so I'd just pass out\" She then goes on to say she could never hurt her children like that. She reports drinking on the weekend, 2-3 drinks a night. She admits to increased use of her xanax 0.5 prescription, up to 4 times a day. She is very despondent during interview, and reports feeling hopeless and frightened. She was seen by [facility].  She will remain on 1;1 for safety given SI and plan to hang self. Will continue to monitor and offer support.   Intervention(s): Assessed for SI; Oriented to unit.     Problem: Cognitive Impairment (Depressive Signs/Symptoms)  Goal: Improved Ability to Think and Concentrate (Depressive Signs/Symptoms)  Flowsheets (Taken 7/31/2020 2351)  [place] Goal: participates in attention training; participates in problem resolution; remains focused during activity  Goal Outcome: not progressing  Individual Goal: Patient will attend groups.     "

## 2020-08-01 NOTE — CONSULTS
[facility] Medicine Service -  Inpatient Consultation         Requesting Physician: Dr. Juliet Escalante   Reason for Consultation: Medical management     HISTORY OF PRESENT ILLNESS        This is a 59 y.o. female with a history of hyperlipidemia, small vessel disease on Ranexa, anxiety and depression now presents with worsening psychiatric symptoms.  She currently denies any somatic symptoms of chest pain, shortness of breath, fevers, chills, cough, abdominal pain, nausea, vomiting, diarrhea or new urinary symptoms    PAST MEDICAL AND SURGICAL HISTORY        Past Medical History:   Diagnosis Date   • Angina pectoris syndrome (CMS/HCC) 2/12/2019   • Family history of ischemic heart disease 2/12/2019   • Generalized anxiety disorder 2/12/2019   • Major depressive disorder, recurrent episode, mild (CMS/HCC) 2/12/2019   • Moderate acquired hearing loss 8/14/2019   • Persistent disorder of initiating or maintaining sleep 2/12/2019   • Polyp of colon 2/12/2019   • Schatzki's ring 2/4/2020   • Symptomatic menopausal or female climacteric states 2/12/2019       Past Surgical History:   Procedure Laterality Date   • BLADDER REPAIR  2009    sling   • OVARIAN CYST SURGERY         PCP: Mariel Lee CRNP    MEDICATIONS        Home Medications:  Medications Prior to Admission   Medication Sig Dispense Refill Last Dose   • ALPRAZolam (XANAX) 0.5 mg tablet Take 1 tablet (0.5 mg total) by mouth 2 (two) times a day as needed for anxiety. 180 tablet 0 7/30/2020 at Unknown time   • AMABELZ 0.5-0.1 mg per tablet 1 tablet 3 (three) times a week (Mon, Wed, Fri). TID weekly, due tomorrow Saturday 8/1/2020    Past Week at Unknown time   • escitalopram (LEXAPRO) 20 mg tablet Take 1 tablet (20 mg total) by mouth once daily. 90 tablet 1 7/31/2020 at Unknown time   • RANEXA 500 mg 12 hr tablet    7/31/2020 at Unknown time   • rosuvastatin (CRESTOR) 5 mg tablet Take 5 mg by mouth daily. Every other day   7/30/2020 at Unknown time   • albuterol HFA  (PROAIR HFA) 90 mcg/actuation inhaler Inhale 2 puffs 4 (four) times a day as needed for wheezing. 1 Inhaler 1    • diclofenac (VOLTAREN) 50 mg EC tablet Take 50 mg by mouth 2 (two) times a day.   Unknown at Unknown time   • omeprazole (PriLOSEC) 20 mg capsule    Unknown at Unknown time       Current inpatient medications were personally reviewed.    ALLERGIES        Bee sting [bee venom protein (honey bee)]; Iodinated contrast media; Penicillins; and Sulfa (sulfonamide antibiotics)    FAMILY HISTORY        Family History   Problem Relation Age of Onset   • Breast cancer Biological Mother    • COPD Biological Father    • Coronary artery disease Biological Father    • Alcohol abuse Paternal Grandfather        SOCIAL HISTORY        Social History     Socioeconomic History   • Marital status:      Spouse name: None   • Number of children: None   • Years of education: None   • Highest education level: None   Occupational History   • None   Social Needs   • Financial resource strain: None   • Food insecurity:     Worry: None     Inability: None   • Transportation needs:     Medical: None     Non-medical: None   Tobacco Use   • Smoking status: Never Smoker   • Smokeless tobacco: Never Used   Substance and Sexual Activity   • Alcohol use: Yes     Alcohol/week: 2.0 standard drinks     Types: 2 Glasses of wine per week     Frequency: 2-3 times a week     Drinks per session: 1 or 2   • Drug use: Never   • Sexual activity: Defer   Lifestyle   • Physical activity:     Days per week: None     Minutes per session: None   • Stress: None   Relationships   • Social connections:     Talks on phone: None     Gets together: None     Attends Congregation service: None     Active member of club or organization: None     Attends meetings of clubs or organizations: None     Relationship status: None   • Intimate partner violence:     Fear of current or ex partner: None     Emotionally abused: None     Physically abused: None     Forced  "sexual activity: None   Other Topics Concern   • None   Social History Narrative   • None       REVIEW OF SYSTEMS        All other systems reviewed and negative except as noted in HPI    PHYSICAL EXAMINATION        Visit Vitals  /87 (Patient Position: Sitting)   Pulse 84   Temp 36.6 °C (97.9 °F) (Oral)   Resp 18   Ht 1.727 m (5' 8\")   Wt 69.1 kg (152 lb 6.4 oz)   SpO2 98%   BMI 23.17 kg/m²     Body mass index is 23.17 kg/m².  No intake or output data in the 24 hours ending 08/01/20 0051    Physical Exam   Constitutional: She is oriented to person, place, and time. She appears well-developed and well-nourished.   HENT:   Head: Normocephalic and atraumatic.   Eyes: Pupils are equal, round, and reactive to light. EOM are normal.   Cardiovascular: Normal rate, regular rhythm and normal heart sounds.   Pulmonary/Chest: Effort normal and breath sounds normal.   Abdominal: Soft. Bowel sounds are normal.   Musculoskeletal: She exhibits no edema.   Neurological: She is alert and oriented to person, place, and time.   Skin: Skin is warm and dry.       LABS / EKG        Labs  Results from last 7 days   Lab Units 07/31/20  1119   WBC K/uL 6.23   HEMOGLOBIN g/dL 14.2   HEMATOCRIT % 42.3   PLATELETS K/uL 227     Results from last 7 days   Lab Units 07/31/20  1119   SODIUM mEQ/L 137   POTASSIUM mEQ/L 3.8   CHLORIDE mEQ/L 102   CO2 mEQ/L 22   BUN mg/dL 8   CREATININE mg/dL 0.9   GLUCOSE mg/dL 115*   CALCIUM mg/dL 9.3       ASSESSMENT AND RECOMMENDATIONS           Hyperlipidemia  Assessment & Plan  Continue statin    Angina pectoris syndrome (CMS/HCC)  Assessment & Plan  Continue ranolazine            Rosanna Du MD  8/1/2020         "

## 2020-08-01 NOTE — PLAN OF CARE
Problem: Adult Behavioral Health Plan of Care  Goal: Plan of Care Review  Outcome: Progressing  Flowsheets (Taken 8/1/2020 2940)  Progress: no change  Plan of Care Reviewed With: patient  Patient Agreement with Plan of Care: agrees  Outcome Summary: Billie is visible on unit, tearful this shift, wanting to talk about her marriage and personal situation with RN. Billie denies active SI, CFS, insight improving into her situation and verablizes understanding of the severity of her SI and plan. 1:1 d/vicente this shift, pt remains free of SI, in control, cooperative. attending and participating in groups. med compliant this shift. 72 hour remains in place. staff encouraged healthy coping skills this shift, will continue to montior and provide emotional support as needed.  Intervention(s): Billie encouraged to participate in unit activities this shift.

## 2020-08-01 NOTE — H&P
"Psychiatry History and Physical    Chief Complaint: \"My whole life fell apart.\"    HPI:  This is a [age] year-old female who presented to the ER yesterday for evaluation of suicidal ideation in the context of marital stressors.  A review of the medical record indicates that she states that she had been thinking of hanging herself and had attempted to cut herself with blunt scissors this week.  She agreed to sign a 201 and was subsequently admitted to the inpatient unit.       Presently, she states that for the past three days, in the context of discovering that her  is likely cheating on her again, she has been experiencing \"hopelessness, panic, anxiety\" and intermittent thoughts of suicide with no plan.  Over this period of time, she has had difficulty sleeping and has had a diminished appetite.  She feels that she is having \"a stress reaction\" to the discovery of her 's infidelity- she notes, \"I've been depressed before, and this is not depression.\"         She denies any hallucinations, and is not experiencing any homicidal ideation.  She is vague about the described suicidal gesture which she reported in the ER.  She denies any intention of harming herself- \"I have kids\"- but as above has had some intermittent thoughts about not wanting to be alive.         Past Psychiatric History: She notes that she has been treated for post-partum depression twice previously- once with no medications, and once with multiple trials of different antidepressants including Effexor (\"made me feel like a martian\"), Zoloft (\"just didn't work\"), and Lexapro which she ultimately did feel was helpful.  She has been taking this for over ten years at a dosage of 20 mg.        She has never attempted suicide, and has never been psychiatrically hospitalized.  She is not under the care of a psychiatrist currently; her primary care physician is prescribing the Lexapro as well a Xanax 0.5 mg twice daily as needed.  She states " "that she usually takes the Xanax as one pill at nighttime, and that it helps with her anxiety.         She has been seeing a marriage counselor for the past six months with her , but states that \"they will just become my therapist because we're not doing marriage counseling anymore.\"      Family History: She states that there is a strong family history of depression.  She notes that she had a great-aunt who committed suicide.    Substance History: She denies use of tobacco or street drugs.  She drinks about two or three glasses of wine about three times weekly.  She has never been treated for substance use.    Social History: She notes that she had been living with her  up until three days ago.  As above, there is marital conflict over her 's infidelity.  She is employed as an  of [employer] at [employer], and is a [employer] school graduate.      Medical History:   Past Medical History:   Diagnosis Date   • Angina pectoris syndrome (CMS/HCC) 2/12/2019   • Family history of ischemic heart disease 2/12/2019   • Generalized anxiety disorder 2/12/2019   • Major depressive disorder, recurrent episode, mild (CMS/HCC) 2/12/2019   • Moderate acquired hearing loss 8/14/2019   • Persistent disorder of initiating or maintaining sleep 2/12/2019   • Polyp of colon 2/12/2019   • Schatzki's ring 2/4/2020   • Symptomatic menopausal or female climacteric states 2/12/2019       Surgical History:   Past Surgical History:   Procedure Laterality Date   • BLADDER REPAIR  2009    sling   • OVARIAN CYST SURGERY         Allergies:   Allergies   Allergen Reactions   • Bee Sting [Bee Venom Protein (Honey Bee)] Anaphylaxis   • Iodinated Contrast Media Hives   • Penicillins Rash   • Sulfa (Sulfonamide Antibiotics) Rash       Current Medications:  •  acetaminophen, 650 mg, oral, q4h PRN  •  albuterol HFA, 2 puff, inhalation, 4x daily PRN  •  aspirin, 81 mg, oral, Daily  •  calcium carbonate, 500 mg, oral, " 2x daily PRN  •  escitalopram, 20 mg, oral, Nightly  •  hydrOXYzine, 50 mg, oral, q6h PRN  •  ibuprofen, 600 mg, oral, q6h PRN  •  LORazepam, 1 mg, oral, q6h PRN  •  LORazepam, 1 mg, oral, q6h PRN  •  polycarbophil, 1,250 mg, oral, Daily  •  ranolazine, 500 mg, oral, Nightly  •  rosuvastatin, 5 mg, oral, Daily  •  trazodone, 50 mg, oral, Nightly PRN    Home Medications:  •  ALPRAZolam, Take 1 tablet (0.5 mg total) by mouth 2 (two) times a day as needed for anxiety.  •  AMABELZ, 1 tablet 3 (three) times a week (Mon, Wed, Fri). TID weekly, due tomorrow Saturday 8/1/2020   •  escitalopram, Take 1 tablet (20 mg total) by mouth once daily.  •  RANEXA,   •  rosuvastatin, Take 5 mg by mouth daily. Every other day  •  albuterol HFA, Inhale 2 puffs 4 (four) times a day as needed for wheezing.  •  diclofenac, Take 50 mg by mouth 2 (two) times a day.  •  omeprazole,     Review of Systems  Constitutional: negative for malaise and fevers  Eyes: negative for irritation and redness  Ears, nose, mouth, throat, and face: negative for earaches and sore throat  Respiratory: negative for asthma and cough  Cardiovascular: negative for chest pain and chest pressure/discomfort  Gastrointestinal: negative for constipation and diarrhea  Genitourinary:negative for dysuria and frequency  Integument/breast: negative for pruritus and rash  Hematologic/lymphatic: negative for bleeding and easy bruising  Musculoskeletal:negative for arthralgias and back pain  Neurological: negative for headaches and seizures  Endocrine: negative for cold intolerance and heat intolerance  Allergic/Immunologic: negative for hay fever and urticaria      Objective     Vital Signs for the last 24 hours:  Temp:  [36.6 °C (97.9 °F)-36.9 °C (98.5 °F)] 36.6 °C (97.9 °F)  Heart Rate:  [] 84  Resp:  [16-22] 18  BP: (107-145)/(77-98) 107/87    Labs  UDS (7/31/20)- +BZD  BMP, CBC none acute    Imaging  None relevant    ECG   None available    MENTAL STATUS  EXAM  Appearance: well groomed  Gait and Motor: no abnormal movements and normal gait  Speech: normal rate/rhythm/volume  Mood: anxious, angry and irritable  Affect: labile  Associations: coherent  Thought Process: goal-directed  Thought Content: appropriate to situation  Suicidality/Homicidality: thoughts of being dead/ no desire or plan to die  Judgement/Insight: minimizes severity level of illness and help accepting  Orientation: day, month, year, type of place and name of place       Assessment/Plan   This [age] year-old female was admitted to the inpatient unit for suicidal thoughts in the context of marital stress.  Given the acute stressors of discovering her 's infidelity, she appears to be experiencing an acute stress reaction rather than an exacerbation of her depressive disorder.  She states that she is only drinking two or three nights weekly; would obtain collateral to ensure there are no signs of an alcohol use disorder.  I did discuss with her that Xanax is not an ideal treatment for anxiety and that she may benefit from working on coping skills and relaxation techniques during this hospitalization.  As such, will proceed as follows:  1) Acute stress disorder: Will continue Lexapro 20 mg daily.  As above, will hold Xanax and encourage her to attend groups and work on relaxation techniques.  Will offer Ativan 1 mg every six hours as needed to ensure no benzodiazepine withdrawal.  She likely would benefit from individual therapy as an outpatient.  2) Disposition: Admit to inpatient unit.    vEelyn Torre MD

## 2020-08-02 VITALS
OXYGEN SATURATION: 98 % | RESPIRATION RATE: 18 BRPM | DIASTOLIC BLOOD PRESSURE: 69 MMHG | HEART RATE: 93 BPM | HEIGHT: 68 IN | SYSTOLIC BLOOD PRESSURE: 120 MMHG | BODY MASS INDEX: 23.1 KG/M2 | WEIGHT: 152.4 LBS | TEMPERATURE: 98.6 F

## 2020-08-02 PROCEDURE — 63700000 HC SELF-ADMINISTRABLE DRUG: Performed by: PSYCHIATRY & NEUROLOGY

## 2020-08-02 PROCEDURE — 99232 SBSQ HOSP IP/OBS MODERATE 35: CPT | Performed by: PSYCHIATRY & NEUROLOGY

## 2020-08-02 PROCEDURE — 12400000 HC ROOM AND CARE SEMIPRIVATE PSYCH

## 2020-08-02 RX ORDER — ROSUVASTATIN CALCIUM 5 MG/1
TABLET, COATED ORAL
Status: DISPENSED
Start: 2020-08-02 | End: 2020-08-03

## 2020-08-02 RX ADMIN — ESTRADIOL AND NORETHINDRONE ACETATE 1 TABLET: .5; .1 TABLET ORAL at 21:55

## 2020-08-02 RX ADMIN — RANOLAZINE 500 MG: 500 TABLET, FILM COATED, EXTENDED RELEASE ORAL at 21:55

## 2020-08-02 RX ADMIN — ASPIRIN 81 MG 81 MG: 81 TABLET ORAL at 08:36

## 2020-08-02 RX ADMIN — CALCIUM POLYCARBOPHIL 625 MG TABLET 1250 MG: at 08:36

## 2020-08-02 RX ADMIN — ROSUVASTATIN CALCIUM 5 MG: 5 TABLET, FILM COATED ORAL at 21:52

## 2020-08-02 RX ADMIN — LORAZEPAM 1 MG: 1 TABLET ORAL at 18:55

## 2020-08-02 RX ADMIN — LORAZEPAM 1 MG: 1 TABLET ORAL at 12:55

## 2020-08-02 RX ADMIN — LORAZEPAM 1 MG: 1 TABLET ORAL at 23:19

## 2020-08-02 RX ADMIN — TRAZODONE HYDROCHLORIDE 50 MG: 50 TABLET ORAL at 21:58

## 2020-08-02 RX ADMIN — ESCITALOPRAM OXALATE 20 MG: 20 TABLET ORAL at 21:52

## 2020-08-02 NOTE — PLAN OF CARE
Problem: Adult Behavioral Health Plan of Care  Goal: Plan of Care Review  Flowsheets  Taken 8/1/2020 1349 by Alistair Morris, RN  Progress: no change  Taken 8/1/2020 2100 by Floresita Barrett, RN  Plan of Care Reviewed With: patient  Patient Agreement with Plan of Care: agrees  Taken 8/1/2020 2157 by Floresita Barrett, RN  Outcome Summary: Pt visible on the unit and social with peers.  Pt with depressed mood and sad affect.  Pt denies SI and cfs.    Intervention(s): Encouraged pt to participate in unit activities.

## 2020-08-02 NOTE — PROGRESS NOTES
"Psychiatry Progress Note    Chief Complaint/Reason for follow-up: Depression, SI    Interval History: Today, she is quite tearful when discussing her mood, stating that she feels \"overwhelmed and extremely sad.\"  She continues to reiterate that she is not feeling suicidal and has no intention of harming herself.  She is trying to participate in the groups and utilize her coping skills.  She is eager to leave the unit- \"I just can't go through this without being able to see my daughter and my best friend.\"  She denies any homicidal ideation or hallucinations.    Review of Systems:   Sleep:  Fair, Appetite: Fair and GI: No complaints    Vital Signs for the last 24 hours:  Temp:  [36.8 °C (98.2 °F)] 36.8 °C (98.2 °F)  Heart Rate:  [82] 82  Resp:  [16] 16  BP: (118)/(79) 118/79    Labs:  No new labs.    Mental Status Exam:  Appearance: well groomed  Gait and Motor: no abnormal movements  Speech: normal rate/rhythm/volume  Mood: anxious and sad  Affect: congruent  Associations: coherent  Thought Process: goal-directed  Thought Content: focused on marriage  Suicidality/Homicidality: denies  Judgement/Insight: acknowledges illness and help accepting  Orientation: day, month, year, type of place and name of place        Assessment/Plan  This [age] year-old female is presently admitted to the inpatient unit for management of an acute stress reaction in the context of marital issues.  She did sign a seventy-hour notice at the time of her arrival to the unit on ; this would  tomorrow.  She is eager to be discharged.  As such, will proceed as follows:  1) Acute stress reaction: Will continue Lexapro.  She would benefit from having a more coherent outpatient plan set up (currently is planning to have her marriage counselor become her individual counselor).  As above, she did sign a seventy-two hour notice on .  Continue to encourage groups and milieu therapy.  2) Disposition: Continue inpatient treatment; " seventy-two hour notice expires tomorrow.    Evelyn Torre MD

## 2020-08-02 NOTE — PLAN OF CARE
Problem: Adult Behavioral Health Plan of Care  Goal: Plan of Care Review  Outcome: Progressing  Flowsheets (Taken 8/2/2020 8246)  Progress: improving  Plan of Care Reviewed With: patient  Patient Agreement with Plan of Care: agrees  Outcome Summary: Billie began the shift sobbing in her room, wanting to talk to staff about her marital issues, very tearful all shift, oh the phone, stating that she just wants to get home to be with her kids and her friends, requesting to talk one on one multiple times this shift. Denies SI, states that she knows that she can't kill herself and that she wouldn't actually do it because of her kids. Remains sad, devastated, depressed about her marriage falling apart. CFS. Attending groups this shift. ADLs and appetite good. Staff will continue to monitor and encourage healthy coping skills.  Intervention(s): Pt encouraged to utilize healthy coping skills to manage stressors.

## 2020-08-03 ENCOUNTER — TELEPHONE (OUTPATIENT)
Dept: ADMISSIONS | Facility: HOSPITAL | Age: 60
End: 2020-08-03

## 2020-08-03 LAB
ATRIAL RATE: 64
P AXIS: 63
PR INTERVAL: 162
QRS DURATION: 88
QT INTERVAL: 412
QTC CALCULATION(BAZETT): 425
R AXIS: 21
T WAVE AXIS: 46
VENTRICULAR RATE: 64

## 2020-08-03 PROCEDURE — 99239 HOSP IP/OBS DSCHRG MGMT >30: CPT | Performed by: PSYCHIATRY & NEUROLOGY

## 2020-08-03 PROCEDURE — 63700000 HC SELF-ADMINISTRABLE DRUG: Performed by: PSYCHIATRY & NEUROLOGY

## 2020-08-03 PROCEDURE — 93005 ELECTROCARDIOGRAM TRACING: CPT | Performed by: PSYCHIATRY & NEUROLOGY

## 2020-08-03 RX ORDER — QUETIAPINE FUMARATE 25 MG/1
25 TABLET, FILM COATED ORAL ONCE
Status: COMPLETED | OUTPATIENT
Start: 2020-08-03 | End: 2020-08-03

## 2020-08-03 RX ORDER — LORAZEPAM 1 MG/1
1 TABLET ORAL NIGHTLY PRN
Qty: 30 TABLET | Refills: 0 | Status: SHIPPED | OUTPATIENT
Start: 2020-08-03 | End: 2020-08-20 | Stop reason: SDUPTHER

## 2020-08-03 RX ORDER — ESCITALOPRAM OXALATE 20 MG/1
20 TABLET ORAL
Qty: 30 TABLET | Refills: 0 | Status: SHIPPED | OUTPATIENT
Start: 2020-08-03 | End: 2020-10-20

## 2020-08-03 RX ORDER — QUETIAPINE FUMARATE 25 MG/1
25 TABLET, FILM COATED ORAL 2 TIMES DAILY PRN
Qty: 60 TABLET | Refills: 0 | Status: SHIPPED | OUTPATIENT
Start: 2020-08-03 | End: 2020-08-17 | Stop reason: SDUPTHER

## 2020-08-03 RX ORDER — QUETIAPINE FUMARATE 25 MG/1
25 TABLET, FILM COATED ORAL 2 TIMES DAILY PRN
Status: DISCONTINUED | OUTPATIENT
Start: 2020-08-03 | End: 2020-08-03 | Stop reason: HOSPADM

## 2020-08-03 RX ADMIN — ASPIRIN 81 MG 81 MG: 81 TABLET ORAL at 08:27

## 2020-08-03 RX ADMIN — QUETIAPINE FUMARATE 25 MG: 25 TABLET ORAL at 10:11

## 2020-08-03 RX ADMIN — CALCIUM POLYCARBOPHIL 625 MG TABLET 1250 MG: at 08:27

## 2020-08-03 NOTE — DISCHARGE SUMMARY
Inpatient Psychiatry Discharge Summary    BRIEF OVERVIEW  Admitting Provider: Darwin Benitez MD  Discharge Provider: Caroline Campa MD  Primary Care Physician at Discharge: Mariel Lee CRNP [phone]     Admission Date: 7/31/2020     Discharge Date: 8/3/2020    Discharge Diagnosis  Acute Stress Reaction  MDD     Discharge Disposition  Home     Discharge Medications     Medication List      START taking these medications    LORazepam 1 mg tablet  Commonly known as:  ATIVAN  Take 1 tablet (1 mg total) by mouth nightly as needed (insomnia).  Dose:  1 mg     QUEtiapine 25 mg tablet  Commonly known as:  SEROquel  Take 1 tablet (25 mg total) by mouth 2 (two) times a day as needed (acute anxiety/distress).  Dose:  25 mg        CONTINUE taking these medications    albuterol HFA 90 mcg/actuation inhaler  Commonly known as:  PROAIR HFA  Inhale 2 puffs 4 (four) times a day as needed for wheezing.  Dose:  2 puff     AMABELZ 0.5-0.1 mg per tablet  1 tablet 3 (three) times a week (Mon, Wed, Fri). TID weekly, due tomorrow Saturday 8/1/2020  Dose:  1 tablet  Generic drug:  estradiol-norethindrone acet     diclofenac 50 mg EC tablet  Commonly known as:  VOLTAREN  Take 50 mg by mouth 2 (two) times a day.  Dose:  50 mg     escitalopram 20 mg tablet  Commonly known as:  LEXAPRO  Take 1 tablet (20 mg total) by mouth once daily.  Dose:  20 mg     omeprazole 20 mg capsule  Commonly known as:  PriLOSEC     RANEXA 500 mg 12 hr tablet  Generic drug:  ranolazine     rosuvastatin 5 mg tablet  Commonly known as:  CRESTOR  Take 5 mg by mouth daily. Every other day  Dose:  5 mg        STOP taking these medications    ALPRAZolam 0.5 mg tablet  Commonly known as:  XANAX                Outpatient Follow-Up            Tomorrow [facility] [place]           [facility]  [address] [place] [address]  [address] (6th Floor)  [place] [address]  [phone] (Front  "Desk)  Two appointments:  8/4/20 @8:30 AM with Alison Mason (Evaluation for Partial Hospital Program)  (Please arrive at 8:15 AM so that temperature can be checked. Please wear face mask.)   8/4/20 @10:40 AM with Dr. Renate Hall (Psychiatric evaluation)       Test Results Pending at Discharge  None      DETAILS OF HOSPITAL STAY    Presenting Problem/History of Present Illness   This is a [age] year-old female who presented to the ER yesterday for evaluation of suicidal ideation in the context of marital stressors.  A review of the medical record indicates that she states that she had been thinking of hanging herself and had attempted to cut herself with blunt scissors this week.  She agreed to sign a 201 and was subsequently admitted to the inpatient unit.       Presently, she states that for the past three days, in the context of discovering that her  is likely cheating on her again, she has been experiencing \"hopelessness, panic, anxiety\" and intermittent thoughts of suicide with no plan.  Over this period of time, she has had difficulty sleeping and has had a diminished appetite.  She feels that she is having \"a stress reaction\" to the discovery of her 's infidelity- she notes, \"I've been depressed before, and this is not depression.\"         She denies any hallucinations, and is not experiencing any homicidal ideation.  She is vague about the described suicidal gesture which she reported in the ER.  She denies any intention of harming herself- \"I have kids\"- but as above has had some intermittent thoughts about not wanting to be alive.           Hospital Course  The patient was admitted to the [facility] acute inpatient psychiatric unit under the care of Dr. Torre. The patient signed a 72 hour notice upon arrival to the unit. The patient was restarted on Lexapro 20mg qday. Given the acute stressors of discovering her 's infidelity, she appeared to be experiencing an acute " stress reaction rather than an exacerbation of her depressive disorder.  Dr. Torre discussed with the patient that  Xanax is not an ideal treatment for anxiety and that she may benefit from working on coping skills and relaxation techniques. Xanax was held. The patient remained tearful and eager to process this stressful event, but she consistently denied suicidal thoughts.     Care was transferred to Dr. Campa. The patient's 72 hour notice  on 8/3/20 and the patient did not meet criteria for involuntary commitment. Discussed with patient recommendation to consider staying longer on the unit and she declined. Patient was open to trying Seroquel as a PRN for acute distress in an attempt to limit Ativan to qhs PRN insomnia only.     Physician discussed patient's concerning behaviors over the past week. Billie shared that the area of on her hand where she attempted to self injure last week had no visible injury (this was confirmed my physician). She stated that the used blunt scissors to put pressure to the area because it relieved some of her distress. She stated it was not a suicide attempt.     The patient stated that she continued to feel pain and betrayal because of her 's behavior but was very consistent that she did not feel suicidal. She stated that she wants to live to be with her children. She was future oriented about going back to work and talked about a recent positive performance review. She stated she found peers on the unit to be disturbing to her and she believed she would be better in a home setting surrounded by her support network. She stated she has been working with a therapist and planned to see him twice a week. She was also interested in going to the [facility].     Billie stated she would be staying with her friend Brenda Mora for the next week ([phone]). Physician called MsMusa Mora and confirmed this plan and Ms. Mora's comfort with the  "patient being discharged. Physician also called patient's adult son, Andrey ([phone]). Andrey shared that he had been talking to his mother over the past two days and she consistently told him that she did not have any ideas, intentions, or plans to harm herself. He stated that he was supportive of discharge if the patient stayed with her friend and not by herself in her apartment.     At the time of discharge the patient had no thoughts of harming self or others. She was brighter, future-oriented, and tolerating medications without side effects. In the event that thoughts of harming self or others should occur, the patient was directed to go to the nearest crisis response center or emergency department or to call 911. Prescriptions for a thirty day supply of medications were provided to the patient.       Consults:   Orders Placed This Encounter   Procedures   • Inpatient consult to Social Work       Procedures: None    Mental Status Exam at Discharge  Heart Rate: 93  Resp: 18  BP: 120/69  Temp: 37 °C (98.6 °F)  Weight: 69.1 kg (152 lb 6.4 oz)    Appearance: appropriate attire  Gait and Motor: no abnormal movements  Speech: normal rate/rhythm/volume  Mood: \"in pain but safe\"  Affect: appropriate  Associations: coherent  Thought Process: goal-directed  Thought Content: no auditory or visual hallucinations.  Suicidality/Homicidality: denies  Judgement/Insight: acknowledges illness  Orientation: day, month, year and situation  Memory: recalls recent events and recalls remote events  Attention: alert  Knowledge: normal  Language: normal     Caroline Campa MD  8/3/2020  10:54 AM    Time spent: 60 minutes, with >50% of the time spent on counseling and coordination of care     "

## 2020-08-03 NOTE — PLAN OF CARE
"  Problem: Suicidal Behavior  Goal: Suicidal Behavior is Absent or Managed  Outcome: Progressing  Flowsheets (Taken 8/3/2020 1016)  Individual Goal: I didn't fall asleep with Trazadone but with Ativan prn I slept well, anxiety is 3, better since I spoke with Dr. Campa, I am not depressed, not suicidal, I go tot groups, I do a special breathing shown to me by a therapist and pray to my  mother as coping skills, no pain. My heart is broken and I am sad. I want to go home and be with my daughter.\" EKG Qtc 425.   Pt tried Seroquel 25 mg at 1011 per MD to try to see if it decreases anxiety. Pt had good relief with Seroquel.  Discharge instructions reviewed with pt. She reports good understanding. Belongings packed. Awaiting her ride   Goal Outcome: progressing     "

## 2020-08-03 NOTE — PROGRESS NOTES
Patient left for scheduled discharge at this time. Discharge instructions were reviewed with patient with previous shift. All belongings were returned to patient. At time of discharge she denied SI/HI. Her mood/affect was appropriate. She is discharging home with ride home from her daughter.

## 2020-08-03 NOTE — PLAN OF CARE
Problem: Adult Behavioral Health Plan of Care  Goal: Adheres to Safety Considerations for Self and Others  Outcome: Progressing  Pt will share self harm/suicidal feelings with staff  Goal: Optimized Coping Skills in Response to Life Stressors  Outcome: Progressing  Pt will identify healthy coping skills daily  Goal: Develops/Participates in Therapeutic [place] to Support Successful Transition  Outcome: Progressing  Pt will participate in group therapy daily

## 2020-08-03 NOTE — DISCHARGE INSTR - APPOINTMENTS
[facility]  [address] [place] [address]  [address] (6th Floor)  [place] [address]  [phone] ()  Two appointments:  8/4/20 @8:30 AM with Alison Mason (Evaluation for [facility])  (Please arrive at 8:15 AM so that temperature can be checked. Please wear face mask.)   8/4/20 @10:40 AM with Dr. Renate Hall (Psychiatric evaluation)

## 2020-08-03 NOTE — TELEPHONE ENCOUNTER
WE Initial Intake    Billie Frank, a 59 y.o. female.  WE Intake      General  Reason for seeking services (in client's own words)?: stepping down from inpt    Mental Health History  Mental Health History: Yes(hx of PPD)    Mental Health Treatment History  Have you had any mental health treatment in the last 6 months?: inpt at [facility]    Suicide Thoughts/Plans  Have you had suicidal thoughts in the past 72 hours?: No  Have you ever had suicidal thoughts?: Yes  Describe: recent SI after finding out  cheating on her  Do you have a plan?: Yes  Have you ever attempted?: Yes(attempted to cut herself with blunt scissors; thoughts of hanging self but did not attempt)  Have you ever harmed yourself?: No  Do you have easy access to firearms?: No    Violence/Trauma  Do you currently have, or have you ever had, thoughts of harming someone else?: No  Any history of an event that you would consider emotionally/psychologically/physically traumatic?: recent trauma of  cheating    Pregnancy/Breastfeeding  Are you pregnant?: No  Are you currently breastfeeding or bottle feeding?: No    Substance Use Details:   Substance Use Includes:: Alcohol  Alcohol  Select one: Primary  Details: 2-3 glasses of wine about 2x/wk  Frequency of Use: 1-2/wk    Substance Use Treatment History  Are you currently experiencing, or have you ever experienced, withdrawal symptoms?: No  Prior treatment reported?: No    Eating Disorders  Do you have any problematic food related behaviors?: No    Additional Information  How would you describe your gender identity?: Female  If referred from another facility - Clinical Documentation requested?: [facility] inpatient  Determination: ([facility])  Comments: pt being d/c from [facility] on 8/3

## 2020-08-03 NOTE — PLAN OF CARE
Problem: Adult Behavioral Health Plan of Care  Goal: Plan of Care Review  Flowsheets  Taken 8/2/2020 1252 by Alistair Morris RN  Progress: improving  Taken 8/2/2020 2034 by Floresita Barrett RN  Plan of Care Reviewed With: patient  Patient Agreement with Plan of Care: agrees  Taken 8/2/2020 2055 by Floresita Barrett, RN  Outcome Summary: Pt visible on the unit and social with peers.  Pt with a depressed mood and sad affect.  Pt reports anxiety over marital issues.  Pt denies si and cfs.    Intervention(s): Encouraged pt to participate in unit activities.

## 2020-08-04 ENCOUNTER — OFFICE VISIT (OUTPATIENT)
Dept: PRIMARY CARE | Facility: CLINIC | Age: 60
End: 2020-08-04
Payer: COMMERCIAL

## 2020-08-04 ENCOUNTER — TELEPHONE (OUTPATIENT)
Dept: PSYCHIATRY | Facility: HOSPITAL | Age: 60
End: 2020-08-04

## 2020-08-04 ENCOUNTER — PHP (OUTPATIENT)
Dept: PSYCHIATRY | Facility: HOSPITAL | Age: 60
End: 2020-08-04
Attending: PSYCHIATRY & NEUROLOGY
Payer: COMMERCIAL

## 2020-08-04 ENCOUNTER — PHP (OUTPATIENT)
Dept: PSYCHIATRY | Facility: HOSPITAL | Age: 60
End: 2020-08-04
Attending: COUNSELOR
Payer: COMMERCIAL

## 2020-08-04 VITALS
WEIGHT: 156 LBS | HEART RATE: 88 BPM | SYSTOLIC BLOOD PRESSURE: 130 MMHG | DIASTOLIC BLOOD PRESSURE: 82 MMHG | HEIGHT: 67 IN | BODY MASS INDEX: 24.48 KG/M2

## 2020-08-04 VITALS
RESPIRATION RATE: 16 BRPM | HEIGHT: 68 IN | OXYGEN SATURATION: 97 % | WEIGHT: 146.4 LBS | DIASTOLIC BLOOD PRESSURE: 82 MMHG | BODY MASS INDEX: 22.19 KG/M2 | SYSTOLIC BLOOD PRESSURE: 140 MMHG | HEART RATE: 71 BPM

## 2020-08-04 DIAGNOSIS — F41.1 GENERALIZED ANXIETY DISORDER: Primary | ICD-10-CM

## 2020-08-04 DIAGNOSIS — F43.22 ADJUSTMENT DISORDER WITH ANXIOUS MOOD: ICD-10-CM

## 2020-08-04 DIAGNOSIS — F33.0 MAJOR DEPRESSIVE DISORDER, RECURRENT EPISODE, MILD (CMS/HCC): ICD-10-CM

## 2020-08-04 DIAGNOSIS — F33.41 RECURRENT MAJOR DEPRESSIVE DISORDER, IN PARTIAL REMISSION (CMS/HCC): Primary | ICD-10-CM

## 2020-08-04 DIAGNOSIS — F33.0 MAJOR DEPRESSIVE DISORDER, RECURRENT EPISODE, MILD (CMS/HCC): Primary | ICD-10-CM

## 2020-08-04 PROBLEM — N30.00 ACUTE CYSTITIS WITHOUT HEMATURIA: Status: RESOLVED | Noted: 2020-06-18 | Resolved: 2020-08-04

## 2020-08-04 PROCEDURE — 90791 PSYCH DIAGNOSTIC EVALUATION: CPT | Performed by: COUNSELOR

## 2020-08-04 PROCEDURE — 99214 OFFICE O/P EST MOD 30 MIN: CPT | Performed by: NURSE PRACTITIONER

## 2020-08-04 PROCEDURE — 90792 PSYCH DIAG EVAL W/MED SRVCS: CPT | Performed by: PSYCHIATRY & NEUROLOGY

## 2020-08-04 RX ORDER — ASCORBIC ACID 500 MG
1000 TABLET ORAL DAILY
COMMUNITY

## 2020-08-04 RX ORDER — ASPIRIN 81 MG/1
81 TABLET ORAL DAILY
COMMUNITY

## 2020-08-04 RX ORDER — VITAMIN B COMPLEX
1 CAPSULE ORAL DAILY
COMMUNITY

## 2020-08-04 RX ORDER — CHOLECALCIFEROL (VITAMIN D3) 25 MCG
1000 TABLET ORAL DAILY
COMMUNITY

## 2020-08-04 ASSESSMENT — COGNITIVE AND FUNCTIONAL STATUS - GENERAL
THOUGHT_CONTENT: APPROPRIATE
EYE_CONTACT: WNL
RECENT MEMORY: WNL
ATTENTION: WNL
PERCEPTUAL FUNCTION: NORMAL
ORIENTATION: FULLY ORIENTED
AROUSAL LEVEL: ALERT
APPETITE: DECREASED
EST. PREMORBID INTELLIGENCE: ABOVE AVERAGE
LIBIDO: NO CHANGE
DELUSIONS: NONE OR AGE APPROPRIATE
CONCENTRATION: WNL
SPEECH: REGULAR
IMPULSE CONTROL: INTACT
AFFECT: LABILE;TEARFUL;TENSE
MOOD: ANXIOUS;MOTIVATED
SLEEP_WAKE_CYCLE: DECREASED
INSIGHT: INTACT
REMOTE MEMORY: WNL
APPEARANCE: WELL GROOMED
THOUGHT_PROCESS: WNL
PSYCHOMOTOR FUNCTIONING: WNL

## 2020-08-04 ASSESSMENT — ENCOUNTER SYMPTOMS
NERVOUS/ANXIOUS: 1
FATIGUE: 0
HEADACHES: 0
JOINT SWELLING: 0
RESPIRATORY NEGATIVE: 1
FEVER: 0
MUSCULOSKELETAL NEGATIVE: 1
ARTHRALGIAS: 0
RESTLESSNESS: 0
EYES NEGATIVE: 1
MYALGIAS: 0
CONSTIPATION: 1
ANOREXIA: 0
PANIC: 0
INSOMNIA: 1
CHANGE IN BOWEL HABIT: 0
ABDOMINAL DISTENTION: 1
ABDOMINAL PAIN: 0
HEMATOLOGIC/LYMPHATIC NEGATIVE: 1
CARDIOVASCULAR NEGATIVE: 1
CONSTITUTIONAL NEGATIVE: 1
NEUROLOGICAL NEGATIVE: 1
PSYCHIATRIC NEGATIVE: 1
PALPITATIONS: 0
DEPRESSED MOOD: 1
ENDOCRINE NEGATIVE: 1
THOUGHT CONTENT - OBSESSIONS: 0
SHORTNESS OF BREATH: 0

## 2020-08-04 ASSESSMENT — PAIN SCALES - GENERAL: PAINLEVEL: 0-NO PAIN

## 2020-08-04 NOTE — ASSESSMENT & PLAN NOTE
Continue current meds  Daily intensive outpatient program  Seeing Psych.  Seeing therapist.  Follow up 4 weeks

## 2020-08-04 NOTE — PROGRESS NOTES
[facility]  Mariel Lee  [address]  [place] [address]  Phone: [phone]  Fax: [phone]      Patient ID: Billie Frank                              : 1960    Visit Date: 2020    Chief Complaint: Follow-up         Patient ID: Billie Frank is a 59 y.o. female.    Patient Active Problem List   Diagnosis   • Angina pectoris syndrome (CMS/HCC)   • Family history of ischemic heart disease   • Generalized anxiety disorder   • Hyperlipidemia   • Major depressive disorder, recurrent episode, mild (CMS/HCC)   • Symptomatic menopausal or female climacteric states   • Persistent disorder of initiating or maintaining sleep   • Polyp of colon   • Moderate acquired hearing loss   • Schatzki's ring         Current Outpatient Medications:   •  albuterol HFA (PROAIR HFA) 90 mcg/actuation inhaler, Inhale 2 puffs 4 (four) times a day as needed for wheezing., Disp: 1 Inhaler, Rfl: 1  •  AMABELZ 0.5-0.1 mg per tablet, 1 tablet 3 (three) times a week (Mon, Wed, Fri). TID weekly, due tomorrow 2020 , Disp: , Rfl:   •  diclofenac (VOLTAREN) 50 mg EC tablet, Take 50 mg by mouth 2 (two) times a day., Disp: , Rfl:   •  escitalopram (LEXAPRO) 20 mg tablet, Take 1 tablet (20 mg total) by mouth once daily., Disp: 30 tablet, Rfl: 0  •  LORazepam (ATIVAN) 1 mg tablet, Take 1 tablet (1 mg total) by mouth nightly as needed (insomnia)., Disp: 30 tablet, Rfl: 0  •  omeprazole (PriLOSEC) 20 mg capsule, , Disp: , Rfl:   •  QUEtiapine (SEROquel) 25 mg tablet, Take 1 tablet (25 mg total) by mouth 2 (two) times a day as needed (acute anxiety/distress)., Disp: 60 tablet, Rfl: 0  •  RANEXA 500 mg 12 hr tablet, , Disp: , Rfl:   •  rosuvastatin (CRESTOR) 5 mg tablet, Take 5 mg by mouth daily. Every other day, Disp: , Rfl:     Allergies   Allergen Reactions   • Bee Sting [Bee Venom Protein (Honey Bee)] Anaphylaxis   • Iodinated Contrast Media Hives   • Penicillins  Rash   • Sulfa (Sulfonamide Antibiotics) Rash       Social History     Tobacco Use   • Smoking status: Never Smoker   • Smokeless tobacco: Never Used   Substance Use Topics   • Alcohol use: Yes     Alcohol/week: 2.0 standard drinks     Types: 2 Glasses of wine per week     Frequency: 2-3 times a week     Drinks per session: 1 or 2   • Drug use: Never       Health Maintenance   Topic Date Due   • Varicella Vaccines (1 of 2 - 2-dose childhood series) 10[birth year]02/1960   • DTaP, Tdap, and Td Vaccines (1 - Tdap) 10/02/1970   • Influenza Vaccine (1) 08/01/2020   • HIV Screening  08/07/2020 (Originally 10/2/1972)   • Zoster Vaccine (1 of 2) 09/01/2020 (Originally 10/2/2009)   • Mammogram  08/09/2020   • Colonoscopy  01/18/2024   • Cervical Cancer Screening  01/29/2025   • Hepatitis C Screening  Completed   • Meningococcal ACWY  Aged Out   • HIB Vaccines  Aged Out   • IPV Vaccines  Aged Out   • HPV Vaccines  Aged Out   • Pneumococcal  Aged Out       HPI  Follow up after hospital stay at [facility] psych unit for depression and suicidal thoughts.  Has been in counseling with  for infidelity on his part and she found out recently that he was still seeing her.  She is now living with her best friend and is starting a day intensive program today in [place].  She was taken off Xanax and put on Seroquel and Ativan.  She is doing fair.  Depression   This is a new problem. The current episode started more than 1 month ago. The problem occurs daily. The problem has been unchanged. Pertinent negatives include no abdominal pain, anorexia, arthralgias, change in bowel habit, chest pain, fatigue, fever, headaches, joint swelling or myalgias. The symptoms are aggravated by stress. Treatments tried: on Lexapro, Seroquel and Ativan. The treatment provided mild relief.   Anxiety   Presents for follow-up visit. Symptoms include depressed mood, excessive worry, insomnia and nervous/anxious behavior. Patient reports no chest pain,  "obsessions, palpitations, panic, restlessness, shortness of breath or suicidal ideas. Symptoms occur constantly. The severity of symptoms is moderate. The quality of sleep is good. Nighttime awakenings: occasional.     Compliance with medications is %. Treatment side effects: None.       The following have been reviewed and updated as appropriate in this visit:  Allergies  Meds  Problems         Review of System  Review of Systems   Constitutional: Negative for fatigue and fever.   Respiratory: Negative for shortness of breath.    Cardiovascular: Negative for chest pain and palpitations.   Gastrointestinal: Negative for abdominal pain, anorexia and change in bowel habit.   Musculoskeletal: Negative for arthralgias, joint swelling and myalgias.   Neurological: Negative for headaches.   Psychiatric/Behavioral: Negative for suicidal ideas. The patient is nervous/anxious and has insomnia.        Objective     Vitals  Vitals:    08/04/20 0756   BP: 130/82   Pulse: 88   Weight: 70.8 kg (156 lb)   Height: 1.708 m (5' 7.25\")     Body mass index is 24.25 kg/m².    Physical Exam  Physical Exam   Constitutional: She is oriented to person, place, and time. She appears well-developed and well-nourished. No distress.   Neck: Neck supple. No JVD present. No thyromegaly present.   Cardiovascular: Normal rate, regular rhythm and normal heart sounds. Exam reveals no gallop and no friction rub.   No murmur heard.  Pulmonary/Chest: Effort normal and breath sounds normal. No respiratory distress. She has no wheezes. She has no rales.   Musculoskeletal: She exhibits no edema.   Lymphadenopathy:     She has no cervical adenopathy.   Neurological: She is alert and oriented to person, place, and time.   Skin: She is not diaphoretic.   Psychiatric: She has a normal mood and affect. Her speech is normal and behavior is normal. She expresses no suicidal ideation. She expresses no suicidal plans.   Vitals reviewed.      Assessment/Plan "     Problem List Items Addressed This Visit     Generalized anxiety disorder - Primary     Continue current meds  Daily intensive outpatient program  Seeing Psych.  Seeing therapist.  Follow up 4 weeks         Major depressive disorder, recurrent episode, mild (CMS/HCC)     See plan under anxiety.  Meds updated.             Over 50% of 25 minute visit spent in counseling and education today        ALBERT Bowen  8/4/2020

## 2020-08-04 NOTE — TELEPHONE ENCOUNTER
Pt signed EDGARD for LPC to contact her OP therapist, Clifford Flores to coordiante care while she is in PHP tx @ [facility]. LPC informed pt's OP therapist that pt did sign EDGARD and that she will be starting PHP tx on 8/5/20. LPC left contact info for pt's OP therapist to remain in contact with [facility] during pt's tx stay.
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
## 2020-08-04 NOTE — PROGRESS NOTES
PHP INITIAL PSYCHIATRY NURSING ASSESSMENT    Pt is a 59 yr old female presenting for initial PHP nursing assessment.     History Reviewed: Yes, patient histories updated.    Outpatient Encounter Medications as of 8/4/2020   Medication Sig   • AMABELZ 0.5-0.1 mg per tablet 1 tablet 3 (three) times a week (Mon, Wed, Fri). TID weekly, due tomorrow Saturday 8/1/2020    • ascorbic acid (VITAMIN C) 500 mg tablet Take 1,000 mg by mouth daily.   • aspirin 81 mg enteric coated tablet Take 81 mg by mouth daily.   • b complex vitamins capsule Take 1 capsule by mouth daily.   • cholecalciferol, vitamin D3, 1,000 unit (25 mcg) tablet Take 1,000 Units by mouth daily.   • diclofenac (VOLTAREN) 50 mg EC tablet Take 50 mg by mouth 2 (two) times a day as needed.     • escitalopram (LEXAPRO) 20 mg tablet Take 1 tablet (20 mg total) by mouth once daily.   • LORazepam (ATIVAN) 1 mg tablet Take 1 tablet (1 mg total) by mouth nightly as needed (insomnia).   • omeprazole (PriLOSEC) 20 mg capsule daily as needed.     • QUEtiapine (SEROquel) 25 mg tablet Take 1 tablet (25 mg total) by mouth 2 (two) times a day as needed (acute anxiety/distress).   • RANEXA 500 mg 12 hr tablet    • rosuvastatin (CRESTOR) 5 mg tablet Take 5 mg by mouth daily. Every other day   • [DISCONTINUED] albuterol HFA (PROAIR HFA) 90 mcg/actuation inhaler Inhale 2 puffs 4 (four) times a day as needed for wheezing.   • [DISCONTINUED] ciprofloxacin (CIPRO) 500 mg tablet Take 1 tablet (500 mg total) by mouth 2 (two) times a day for 7 days.   • [DISCONTINUED] nitrofurantoin, macrocrystal-monohydrate, (MACROBID) 100 mg capsule Take 1 capsule (100 mg total) by mouth 2 (two) times a day for 7 days.   • [DISCONTINUED] phenazopyridine (PYRIDIUM) 100 mg tablet Take 1 tablet (100 mg total) by mouth 3 (three) times a day as needed for bladder spasms for up to 4 days.   • [DISCONTINUED] phenazopyridine (PYRIDIUM) 100 mg tablet Take 1 tablet (100 mg total) by mouth 3 (three) times a  "day as needed for bladder spasms for up to 4 days.       Review of Systems   Constitutional: Negative.    HENT: Negative.    Eyes: Negative.    Respiratory: Negative.    Cardiovascular: Negative.    Gastrointestinal: Positive for abdominal distention and constipation.        Last BM week ago; pt reports normal for her; encouraged increased fluids/fiber   Endocrine: Negative.    Genitourinary: Negative.    Musculoskeletal: Negative.    Skin: Negative.         Had precancerous skin removed LLE week ago; no signs infection, healed   Allergic/Immunologic: Positive for environmental allergies.   Neurological: Negative.    Hematological: Negative.    Psychiatric/Behavioral: Negative.        Vitals:    08/04/20 1208   Weight: 66.4 kg (146 lb 6.4 oz)   Height: 1.727 m (5' 8\")     Pt denies pain presently, reports hx chronic back pain r/t herniate disks L4, L5, arthritis. Pt reports back pain sometimes interfere with activities. Pt denies dizziness, but reports hx dizziness when standing up (orthostatic B/P). Mood - anxious, denies SI, HI.      Labs Reviewed  I have reviewed the patient's labs.  Significant abnormals are Glucose - 115.      Screenings done this visit:      1. Within the past month, have you wished you were dead or wished you could go to sleep and not wake up?: Yes  2. Within the past month, have you actually had any thoughts of killing yourself?: Yes  3. Within the past month, have you been thinking about how you might kill yourself?: Yes  4. Within the past month, have you had these thoughts and had some intention of acting on them?: No  5. Within the past month, have you started to work out or worked out the details of how to kill yourself? Do you intend to carry out this plan?: No  6. Have you ever done anything, started to do anything, or prepared to do anything to end your life?: No    Metabolic Monitoring Log Completed/Updated:  Yes    Assessment/Plan: Follow-up with PHP as scheduled     Time spent " with patient:  30 minutes  Yessy Anderson RN @ 12:31 PM
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
## 2020-08-05 ENCOUNTER — PHP (OUTPATIENT)
Dept: PSYCHIATRY | Facility: HOSPITAL | Age: 60
End: 2020-08-05
Attending: PSYCHIATRY & NEUROLOGY
Payer: COMMERCIAL

## 2020-08-05 DIAGNOSIS — F43.22 ADJUSTMENT DISORDER WITH ANXIOUS MOOD: ICD-10-CM

## 2020-08-05 DIAGNOSIS — F33.41 RECURRENT MAJOR DEPRESSIVE DISORDER, IN PARTIAL REMISSION (CMS/HCC): Primary | ICD-10-CM

## 2020-08-05 PROCEDURE — H0035 MH PARTIAL HOSP TX UNDER 24H: HCPCS | Performed by: COUNSELOR

## 2020-08-05 ASSESSMENT — COGNITIVE AND FUNCTIONAL STATUS - GENERAL
SLEEP_WAKE_CYCLE: DECREASED
APPEARANCE: WELL GROOMED
EYE_CONTACT: WNL
THOUGHT_PROCESS: WNL
INSIGHT: INTACT
EST. PREMORBID INTELLIGENCE: ABOVE AVERAGE
APPETITE: DECREASED
PSYCHOMOTOR FUNCTIONING: WNL
DELUSIONS: NONE OR AGE APPROPRIATE
MOOD: ANXIOUS;HELPLESS;HOPELESS
AROUSAL LEVEL: ALERT
AFFECT: LABILE;TEARFUL;TENSE
REMOTE MEMORY: WNL
CONCENTRATION: IMPAIRED, MILD
RECENT MEMORY: WNL
IMPULSE CONTROL: INTACT
ORIENTATION: FULLY ORIENTED
LIBIDO: NO CHANGE
SPEECH: REGULAR
ATTENTION: WNL
THOUGHT_CONTENT: APPROPRIATE
PERCEPTUAL FUNCTION: NORMAL

## 2020-08-05 NOTE — GROUP NOTE
"Date:  August 5  Start Time:  12:10 PM  End Time:   1:10 PM  Billie Frank, YOB: 1960  Psychoeducational/Skills Based Group  4 members attended group today    Group Focus: Goal of group was to introduce skills and psychoeducation related to topic of PHP day.   Group members were provided instruction and opportunities to practice presented skills.  LPC answered questions as they arose and shared how presented skills can be utilized on a daily basis to increase emotional wellness.      About the Group: Session 8: Trauma Psycho Ed/Skills Based Group Summary  Topic of Curriculum was “Trauma”. LPC educated group members about the body’s response to trauma and shared the  “Brain on Trauma” worksheet from [employer]. Group members were educated about neuroplasticity and how the brain is changeable through practice of coping skills, grounding techniques and time. LPC showed a video on Neuroplasticity from [employer]  to highlight changeability of neurons with time and effort.  LPC introduced and facilitated practice with group members of two grounding techniques, Progressive Muscle Relaxation from [employer] and Bilateral Stimulation “Butterfly hug” from [facility]. Group members then discussed their experiences with one another and LPC closed group session with a grounding meditation of body scan from   Minds: [place].    Yusra Hall MD was onsite when services were rendered.      Patient  was an active group participant.  Assessment/observation of group participation/presentation: Pt was open and engaged throughout group. She appeared to be anxious at times and had difficulty with grounding exercise and avoided by intellectualizing. She requested for more information on the concept of trauma and posed the question of whether \"anything good can come from trauma?\". She appeared to be comforted from an affirmation from another group member that her coming to treatment and not being a " victim demonstrates growth.   Treatment plan areas addressed: Self Care and Trauma  Visit Diagnosis:    1. Recurrent major depressive disorder, in partial remission (CMS/HCC)    2. Adjustment disorder with anxious mood      Plan: Continue with PHP , Meet with PHP Therapist, Patient to F/U with treatment goals as outlined in treatment plan and Patient to F/U with local ED or call 911 should SI/HI arise.        Kathleen Lee, LPC

## 2020-08-05 NOTE — GROUP NOTE
Date:   Start Time:   9:30 AM  End Time: 10:30 AM  Billie Frank, YOB: 1960,  was an active group participant.    Community Check In Group  4 members attended group today.     Group Focus: Goal of group was to welcome new and returning PHP members to the [facility], check in regarding group member needs, and assess safety.    About the Group: LPC reviewed [facility] Group Code of Conduct and answered any questions that arose.  LPC welcomed new members to the group and facilitated brief introductions of group members to one another.  Introduced topic/theme for the treatment day:Trauma.  Encouraged group members to request support throughout the day as needed.  LPC reviewed group members’ Morning Reflection Sheets and did a verbal check in with each group member regarding safety (SI/HI/self harm), safety planning, medication compliance, if they needed to consult with anyone today and individual treatment needs/goals for the day.  Session ended with a brief meditation/calming activity.   Yusra Hall MD was on site when services rendered.    Morning Self reflection:   Depression:   Anxiety:   Anger: 2/5  Suicidal Ideation:   0/5 - None  Self Injury: 1/5 - Thought  Engaged in Self Injury since yesterday: No  Homicidal Ideation:   0/5 - None  Are you able to follow your Safety Plan? Yes.  I can/will:  Call someone, Use a coping skill, Use grounding with my 5 senses and Call 911 if I'm unsafe.  Substance Use:  No  Self Care:  No.  I am satisfied with my daily hygiene:  yes  Nourish:Number of meals eaten yesterday? 2 and Describe:  Minimal    Sleep:  Uses sleep aid? yes     Social Interaction:Moderate: Family and Friends  Physical Activity: No  Mindful Activity: Prayed to mother who is    Medication: Prescribed  Thought Content: Racing thoughts   Pt reported use of coping skills including successful follow through and barriers to follow through: breathing techniques, prayer, talking with  friends and children  Pt reported significant or positive event/step: helped someone she met in the hospital   Pt identified goal for the treatment day: reduce the emotional pain I am feeling   Pt treatment plan areas addressed:  Depression and Anxiety  Pt requested consult with: Therapist   Visit Diagnosis:  No diagnosis found.    Assessment/Observations:  Pt was very flooded with emotion this morning.  Pt was tremulous and hyper aroused being new in the group and seeming overwhelmed. Pt introduced herself to the group and shared that she is in immense emotional pain. Pt shared that she was in [facility] and is open to receiving help.   Plan:  Continue with ROSAMARIA Mason, KAIDEN

## 2020-08-05 NOTE — GROUP NOTE
"Date: August 5  Start Time:  1:20 PM  End Time:   2:20 PM  Billie Frank, YOB: 1960,  was an active group participant.    Wrap Up Group   4 attended group today.   Group Focus: Goal of group was to wrap up and process the treatment day.  Assist  members in planning for the evening ahead and to assess and plan surrounding any  safety needs.      About the Group:  LPC reviewed group members Afternoon  Reflection Sheets and did a verbal check in with each group member regarding safety  (SI/HI/self harm) and any necessary safety planning .  Session ended with a brief  meditation/calming activity.  Yusra Hall MD was onsite when  services rendered.        Afternoon Self Reflection  Depression: 1/5  Anxiety: 4/5  Anger: 2/5  Suicidal Ideation:   0/5 - None  Self Injury: 1/5 - Thought  Homicidal Ideation:   0/5 - None  Are you able to follow your Safety Plan? Yes.  I can/will:  Call someone, Use a coping skill, Use grounding with my 5 senses and Call 911 if I'm unsafe.  Pt reported significant moment/insight of the day: \"That progress here has already begun- silver-lining is here.\"  Pt reported gratitude list: \"For my fellow patients, staff, facility, seeing my daughter tonight, that I can leave at the end of the day.\"  Pt identified goal progress for the day: \"A little. Trying to reduce pain through breaking it down.\"  Pt reported evening self care plan: \"Be with my daughter, I will feel her love, I will be lonely afterwards.\"  Pt treatment plan areas addressed: Depression, Anxiety, Trauma and Suicidal Ideation & Safety Planning    Visit Diagnosis:    1. Recurrent major depressive disorder, in partial remission (CMS/HCC)    2. Adjustment disorder with anxious mood      Assessment/Observations:  Pt was open and engaged during group. She was able to further reflect on feedback from group members and indicated that she would like to better understand the silver-lining in her situation. She was able to " "identify plan for the remainder of the day to be with family but acknowledged concerns with loneliness later to which she was not receptive to feedback and stated she would just be \"filling the space\". Therapist validated the feelings and encouraged pt to continue to discuss in group. Therapist also encouraged pt to engage in a meditation before bedtime to induce sleep if she is able. Other group members provided suggestion meditations.  Plan: Continue with PHP , Meet with PHP Therapist, Patient to F/U with treatment goals as outlined in treatment plan and Patient to F/U with local ED or call 911 should SI/HI arise.        Kathleen Lee, LPC    "

## 2020-08-05 NOTE — GROUP NOTE
Date:  August 5  Start Time:  10:40 AM  End Time:  11:40 AM  Billie Frank, YOB: 1960   4 members attended group today.    Group Focus:  Goal of group was to establish and build social support, review coping skills, normalize the struggles of being human, and increase self awareness and self compassion.    About the Group:  LPC started group with a prompting quote/song to facilitate group discussion.  Group engaged in active and supportive discussion. Topics discussed included trauma response, victimization with trauma and empowerment with healing.  Group members were able to offer insightful and supportive feedback and suggestions about how to manage difficult situations.  Group ended with a meditation/song.  Yusra Hall MD was onsite when services were rendered.        Patient  was an active group participant.  Assessment/observation of group participation/presentation: Pt was flooding out with emotions at beginning of group.  Pt shared her story with the group about what led her to inpatient hospitalization and PHP tx. Pt continued to say that she felt immense pain and anguish.  LPC helped pt to identify more concrete feelings to have some sense of control over what she is feeling.  Pt continued to say that she cannot kill herself because she has children who need her.  Pt shared that she wants her pain to end and she does not know what to do. Pt shared that she had difficulty sleeping last night so she took an additional Ativan 4 hours after the initial one to help her sleep. Pt shared that she did eventually sleep for 5-6 hours.  Pt shared that when she woke up this morning, she immediately began to think about her marital situation and felt overwhelmed. Pt shared that there were certain parts of her day yesterday when she felt emotionally safe, such as being in [facility] for eval and being with her friends last evening. Pt received support from group members and LPC talked with pt about  grounding techniques as well as naming feelings.  LPC also discussed concerns related to over taking medication. LPC affirmed pt for her honesty and openness with group.  LPC also spoke about resiliency and the process of healing emotional pain. Pt seemed open and receptive to support and feedback. Pt appears to be overwhelmed by her emotional pain and unable to contain at times when her thoughts obsess about her current stressors with her . Pt also discussed with the help of group, her feelings of betrayal, abandonment, rejection, and grief.   Treatment plan areas addressed: Depression and Anxiety  Visit Diagnosis:    1. Recurrent major depressive disorder, in partial remission (CMS/HCC)    2. Adjustment disorder with anxious mood        Plan: Continue with PHP     Koko Mason, KAIDEN

## 2020-08-05 NOTE — PROGRESS NOTES
"[facility] Psychotherapy Note    Billie Frank is a 59 y.o. female who presents for Follow-up.     Treatment Plan areas addressed during this visit:  Depression, Adjustment Disorder with Anxiety, coping skills and emotion regulation    Mental Status Exam:  Arousal Level: Alert  Appearance: Well Groomed  Speech: Regular  Psychomotor Functioning: WNL  Eye Contact: WNL  Est. Premorbid Intelligence: Above average  Orientation: Fully oriented  Attention: WNL  Concentration: Impaired, Mild  Recent Memory: WNL  Remote Memory: WNL  Thought Content: Appropriate  Thought Process: WNL  Insight: Intact  Perceptual Function: Normal  Delusions: None or age appropriate  Sleeping: Decreased  Appetite: Decreased  Libido: No change  Affect: Labile, Tearful, Tense  Mood: Anxious, Helpless, Hopeless  PHQ-9: Brief Depression Severity Measure Score: 12       PHQ-9: Brief Depression Severity Measure Score: 12    [place] Suicide Severity Rating Scale: Not done today        Assessment:   Pt entered the session shaky and tense. Pt shared that she needs to process what she has been feeling and what has been happening since her admission and discharge from the hospital. Pt shared that she feels as though she will never feel better. Pt shared that she has never experienced this level of devastation in her life and she is afraid that she will never be \"ok again\".  LPC validated pt's experience and helped her to focus on intellect more than emotion.  LPC spoke with pt about strategies for not feeding into her feelings and the negative thinking that accompanies those feelings. LPC spoke with pt about the grieving process as she grieves her marriage and relationship with her .  Pt is assessed to be in the denial stage with difficulty comprehending and making sense of her 's behaviors. LPC encouraged pt to work on self care, grounding techniques and processing her feelings/thoughts with safe supports. LPC spoke with pt about having limited " contact with her  at this time and refraining from seeing him in person at this time until further stabilization so that she is not triggered and unable to contain her emotions. Pt agreed with this. LPC spoke with pt about resiliency and finding her power in looking back at past life experiences and recognizing other painful experiences that she has overcome. Pt was appreciative of the session and feedback. Pt is recommended to and plans to continue with PHP tx.   Psychological condition is generally: showing no change  Plan: Patient to F/U with PHP.    Visit Diagnosis:  No diagnosis found.    Time  Start Time: 1400  End Time: 1500  Total Time: 60  Koko Mason LPC @ 3:29 PM

## 2020-08-06 ENCOUNTER — PHP (OUTPATIENT)
Dept: PSYCHIATRY | Facility: HOSPITAL | Age: 60
End: 2020-08-06
Attending: PSYCHIATRY & NEUROLOGY
Payer: COMMERCIAL

## 2020-08-06 DIAGNOSIS — F33.41 RECURRENT MAJOR DEPRESSIVE DISORDER, IN PARTIAL REMISSION (CMS/HCC): Primary | ICD-10-CM

## 2020-08-06 DIAGNOSIS — F43.22 ADJUSTMENT DISORDER WITH ANXIOUS MOOD: ICD-10-CM

## 2020-08-06 PROCEDURE — H0035 MH PARTIAL HOSP TX UNDER 24H: HCPCS | Performed by: COUNSELOR

## 2020-08-06 NOTE — GROUP NOTE
"Date:  August 6  Start Time:  10:40 AM  End Time:  11:30 AM  Billie Frank, YOB: 1960   4 members attended group today.    Group Focus:Interpersonal effectiveness  Goal of group was to establish and build social support, review coping skills, normalize the struggles of being human, and increase self awareness and self compassion.    About the Group:  LPC started group with a prompting quote/song to facilitate group discussion.  Group engaged in active and supportive discussion. Topics discussed included Setting healthy boundaries in relationships; vulnerability and courage.  Group discussion was around a quote by Lila Lombardo \" Daring to set boundaries is about having the courage to love ourselves even when we risk disappointing others.\"  Group members were able to offer insightful and supportive feedback and suggestions about how to manage difficult situations.  Group ended with a meditation/song.  Yusra Hall MD was onsite when services were rendered.        Patient  was an active group participant.  Assessment/observation of group participation/presentation: Pt related with peers about the challenge of setting healthy boundaries in relationship with a partner and spoke of how she did not realize she had poor boundaries with her  during their marriage (for example, making decisions without his input on vacation) while also recognizing his difficulty with boundaries.  Pt also offered supportive feedback to peers.    Treatment plan areas addressed: Depression and Anxiety  Visit Diagnosis:    1. Recurrent major depressive disorder, in partial remission (CMS/HCC)    2. Adjustment disorder with anxious mood        Plan: Continue with PHP , Patient to F/U with treatment goals as outlined in treatment plan and Patient to F/U with local ED or call 911 should SI/HI arise.        Andressa Mcleod LPC  "

## 2020-08-06 NOTE — GROUP NOTE
Date: August 6  Start Time:  1:20 PM  End Time:   2:20 PM  Billie Frank, YOB: 1960,  was an active group participant.    Wrap Up Group  3 members  attended group today.   Group Focus: Goal of group was to wrap up and process the treatment day.  Assist  members in planning for the evening ahead and to assess and plan surrounding any  safety needs.      About the Group:  LPC reviewed group members Afternoon  Reflection Sheets and did a verbal check in with each group member regarding safety  (SI/HI/self harm) and any necessary safety planning .  Session ended with a brief  meditation/calming activity.  Yusra Hall MD was onsite when  services rendered.        Afternoon Self Reflection  Depression: 1/5  Anxiety: 3/5  Anger: 1/5  Suicidal Ideation:   0/5 - None  Self Injury: 1/5 - Thought  Homicidal Ideation:   0/5 - None  Are you able to follow your Safety Plan? Yes.  I can/will:  Call someone, Use a coping skill, Use grounding with my 5 senses and Call 911 if I'm unsafe.  Pt reported significant moment/insight of the day: Understanding different types of communication  Pt reported gratitude list: new shirt, my therapy, water   Pt identified goal progress for the day: to feel less emotional pain today and I do  Pt reported evening self care plan: be with good friends  Pt treatment plan areas addressed: Depression and Anxiety    Visit Diagnosis:    1. Recurrent major depressive disorder, in partial remission (CMS/HCC)    2. Adjustment disorder with anxious mood        Assessment/Observations:  Pt was engaged in group disucssion. Pt shared that she typically communicates assertively but also can be aggressive when she wants something. Pt shared that she tends to have good boundaries but may at times be over involved in other people's emotions and stress. Pt shared that her goal for today was to lessen her emotional pain to some degree, and she feels as though she has done that a little bit today.   Group members and facilitator celebrated that with the pt.   Plan: Continue with PHP , Patient to F/U with treatment goals as outlined in treatment plan and Patient to F/U with local ED or call 911 should SI/HI arise.        Koko Mason, LPC

## 2020-08-06 NOTE — GROUP NOTE
"Date: August 6  Start Time:   9:30 AM  End Time: 10:30 AM  Billie Frank, YOB: 1960,  was an active group participant.    Community Check In Group  4 attended group today.     Group Focus: Goal of group was to welcome new and returning PHP members to the [facility], check in regarding group member needs, and assess safety.    About the Group: LPC reviewed [facility] Group Code of Conduct and answered any questions that arose.  LPC welcomed new members to the group and facilitated brief introductions of group members to one another.  Introduced topic/theme for the treatment day:Interpersonal Effectiveness.  Encouraged group members to request support throughout the day as needed.  LPC reviewed group members’ Morning Reflection Sheets and did a verbal check in with each group member regarding safety (SI/HI/self harm), safety planning, medication compliance, if they needed to consult with anyone today and individual treatment needs/goals for the day.  Session ended with a brief meditation/calming activity.   Yusra Hall MD was on site when services rendered.    Morning Self reflection:   Depression: 1/5 \"Crushing sadness\"  Anxiety: 5/5  Anger: 1/5  Suicidal Ideation:  0/5 - None  Self Injury: 1/5 - Thought   Engaged in Self Injury since yesterday: No  Homicidal Ideation:   0/5 - None  Are you able to follow your Safety Plan? Yes.  I can/will:  Call someone, Use a coping skill, Use grounding with my 5 senses and Call 911 if I'm unsafe.  Substance Use:  No  Self Care:  I completed my self care activity last night:  \"Kind of, saw daughter!\"  I am satisfied with my daily hygiene:  yes  Nourish:Number of meals eaten yesterday? 2 and Describe:  Minimal    Sleep:  Uses sleep aid? yes    and Describe:  Broken and Number of hours:  5  Social Interaction:Moderate: Family and Friends  Physical Activity: No  Mindful Activity: Yes, \"tried to meditate.\"  Medication: Prescribed  Thought Content: Cloudy, racing thoughts  Pt " "reported use of coping skills including successful follow through and barriers to follow through: \"Saw daughter- it was wonderful.  No barriers.\"  Pt reported significant or positive event/step: \"Had a good session with Nancy after group.\"  Pt identified goal for the treatment day: \"To lessen the pain I am in.\"  Pt treatment plan areas addressed:  Depression and Mood dysregulation  Pt requested consult with: None  Visit Diagnosis:    1. Recurrent major depressive disorder, in partial remission (CMS/HCC)    2. Adjustment disorder with anxious mood        Assessment/Observations: Pt arrived late to group.  She was initially tearful as she reported that although she has thoughts of SIB, and passive SI (pt initially indicated 0/5-None for SI at the start of group), she does not have the intent or plan to act because she does not want to hurt her children.  She stated she will live for them.  Pt was receptive to supportive feedback from group members and engaged in discussing how SIB is not only an unhealthy coping skill but that it does not solve the problem.  On the contrary, being engaged in treatment and working on learning and using positive coping skills is the way to healing and strength.    Plan:  Continue with PHP , Patient to F/U with treatment goals as outlined in treatment plan and Patient to F/U with local ED or call 911 should SI/HI arise.        Andressa Mcleod, LPC      "

## 2020-08-06 NOTE — GROUP NOTE
"Date:  August 6  Start Time:  12:10 PM  End Time:   1:10 PM  Billie Frank, YOB: 1960  Psychoeducational/Skills Based Group  4 members attended group today    Group Focus: Goal of group was to introduce skills and psychoeducation related to topic of PHP day.   Group members were provided instruction and opportunities to practice presented skills.  LPC answered questions as they arose and shared how presented skills can be utilized on a daily basis to increase emotional wellness.      About the Group: Session 9: DBT- Interpersonal Effectiveness Psycho Ed/Skills Based Group Summary   Topic of curriculum was “DBT Interpersonal Effectiveness”.  LPC educated group members on Communication Styles of Passive, Assertive, Aggressive, and Passive-Aggressive and had group  members identify examples of each. LPC facilitated discussion on how some of these communication styles may have worked well previously, but are not serving group member now.  LPC used worksheet from [employer] for communication styles. LPC then centered discussion on the importance of using Assertive communication. LPC used worksheet on “I Statements” from [employer] and encouraged group members to reflect on their own needs and how they can use “I statements” to communicate these needs. LPC Closed group session with meditation from Zack Abdullahi.     Yusra Hall MD was onsite when services were rendered.      Patient  was an active group participant.  Assessment/observation of group participation/presentation: Pt was open and engaged throughout group as evidenced by valuable contributions and taking notes. She expressed understanding of types of communication and acknowledged that her spouse has tended to be more passive by not expressing needs and pt has been more aggressive, which has caused challenges. Pt also acknowledged that she is more assertive in the workplace because it is expected in the culture. Pt participated in \"I " "Statement\" exercise and identified an effective way of expressing needs to her manager. She noted that there is a perpetual volume of work that she can't keep up with but she tries to address concerns as she is able in an assertive manner.   Treatment plan areas addressed: Relationship issues/Communication skills  Visit Diagnosis:    1. Recurrent major depressive disorder, in partial remission (CMS/HCC)    2. Adjustment disorder with anxious mood      Plan: Continue with PHP , Patient to F/U with treatment goals as outlined in treatment plan and Patient to F/U with local ED or call 911 should SI/HI arise.        Kathleen Lee, LPC  "

## 2020-08-07 ENCOUNTER — PHP (OUTPATIENT)
Dept: PSYCHIATRY | Facility: HOSPITAL | Age: 60
End: 2020-08-07
Attending: PSYCHIATRY & NEUROLOGY
Payer: COMMERCIAL

## 2020-08-07 DIAGNOSIS — F43.22 ADJUSTMENT DISORDER WITH ANXIOUS MOOD: ICD-10-CM

## 2020-08-07 DIAGNOSIS — F33.41 RECURRENT MAJOR DEPRESSIVE DISORDER, IN PARTIAL REMISSION (CMS/HCC): Primary | ICD-10-CM

## 2020-08-07 PROCEDURE — H0035 MH PARTIAL HOSP TX UNDER 24H: HCPCS | Performed by: COUNSELOR

## 2020-08-07 NOTE — GROUP NOTE
"Date: August 7  Start Time:   9:30 AM  End Time: 10:30 AM  Billie Frank, YOB: 1960,  was an active group participant.    Community Check In Group  4 attended group today.     Group Focus: Goal of group was to welcome new and returning PHP members to the [facility], check in regarding group member needs, and assess safety.    About the Group: LPC reviewed [facility] Group Code of Conduct and answered any questions that arose.  LPC welcomed new members to the group and facilitated brief introductions of group members to one another.  Introduced topic/theme for the treatment day:Self Compassion.  Encouraged group members to request support throughout the day as needed.  LPC reviewed group members’ Morning Reflection Sheets and did a verbal check in with each group member regarding safety (SI/HI/self harm), safety planning, medication compliance, if they needed to consult with anyone today and individual treatment needs/goals for the day.  Session ended with a brief meditation/calming activity.   Yusra Hall MD was on site when services rendered.    Morning Self reflection:   Depression: 1/5  Anxiety: 4/5  Anger: 1/5  Suicidal Ideation:   0/5 - None  Self Injury: 0/5 - None  Engaged in Self Injury since yesterday: No  Homicidal Ideation:   0/5 - None  Are you able to follow your Safety Plan? Yes.  I can/will:  Call someone, Use a coping skill, Use grounding with my 5 senses and Call 911 if I'm unsafe.  Substance Use:  No  Self Care:  I completed my self care activity last night:  Ice cream  I am satisfied with my daily hygiene:  no  Nourish:Number of meals eaten yesterday? 3 and Describe:  Minimal    Sleep:  Uses sleep aid? yes    and Describe:  Number of hours:  7  Social Interaction:Moderate: Family and Friends  Physical Activity: No  Mindful Activity: \"Prayer, meditation.\"  Medication: Prescribed  Thought Content: \"Clear, cloudy, racing thoughts.\"  Pt reported use of coping skills including successful follow " "through and barriers to follow through: \"Friends, prayer, meditation.\"  Pt reported significant or positive event/step: \"Went for ice cream.\"  Pt identified goal for the treatment day: \"Find some normalcy in day.\"  Pt treatment plan areas addressed:  Depression, Anxiety and Self Care  Pt requested consult with: Therapist   Visit Diagnosis:    1. Recurrent major depressive disorder, in partial remission (CMS/HCC)      Assessment/Observations:  Pt was open and engaged in group. She reflected on having recent connected with friends and initiating request to get ice cream after dinner. Therapist and group members praised her for initiating and reaching out to supports, especially when she would rather isolate. Pt expressed interest in topic of self-compassion as she has difficulty expressing this to herself, and expressed understanding of guilt and shame as defined. She asked to clarify the relationship between self-compassion and guilt and shame and appeared to understand with further explanation.   Plan:  Continue with PHP , Patient to F/U with treatment goals as outlined in treatment plan and Patient to F/U with local ED or call 911 should SI/HI arise.        Kathleen Lee, LPC      "

## 2020-08-07 NOTE — GROUP NOTE
Date: August 7  Start Time:  1:20 PM  End Time:   2:20 PM  Billie Frank, YOB: 1960,  was an active group participant.    Wrap Up Group  4 members attended group today.   Group Focus: Goal of group was to wrap up and process the treatment day.  Assist  members in planning for the evening ahead and to assess and plan surrounding any  safety needs.      About the Group:  LPC reviewed group members Afternoon  Reflection Sheets and did a verbal check in with each group member regarding safety  (SI/HI/self harm) and any necessary safety planning .  Session ended with a brief  meditation/calming activity.  Yusra Hall MD was onsite when  services rendered.        Afternoon Self Reflection  Depression: 1/5  Anxiety: 4/5  Anger: 1/5  Suicidal Ideation:   0/5 - None  Self Injury: 0/5 - None  Homicidal Ideation:   0/5 - None  Are you able to follow your Safety Plan? Yes.  I can/will:  Call someone, Use a coping skill, Use grounding with my 5 senses and Call 911 if I'm unsafe.  Pt reported significant moment/insight of the day: Helping a group member with feedback  Pt reported gratitude list: upcoming pedicure, my kids, my parents   Pt identified goal progress for the day: Pt wanted to feel some sense of normalcy in her day   Pt reported evening self care plan:spend time with friends  Pt treatment plan areas addressed: Depression and Anxiety    Visit Diagnosis:    1. Recurrent major depressive disorder, in partial remission (CMS/HCC)    2. Adjustment disorder with anxious mood        Assessment/Observations:  Pt shared that she is worried about the weekend and what she will do to keep herself from feeling overwhelmed. Pt shared that she had planned to spend weekends at the beach with her  and feels like a big part of her is missing.  LPC normalized and validated pt's feelings of grief and sadness.  LPC spoke about grief not only related to a person or relationship but also a vision or dream. Pt  related with this.  Pt was supported by group members and encouraged to do some self care this weekend. Pt identified family, friends and some activities that she would be engaging with this weekend.   Plan: Continue with PHP , Patient to F/U with treatment goals as outlined in treatment plan and Patient to F/U with local ED or call 911 should SI/HI arise.        Koko Mason, LPC

## 2020-08-07 NOTE — GROUP NOTE
"Date:  August 7  Start Time:  12:10 PM  End Time:   1:10 PM  Billie Frank, YOB: 1960  Psychoeducational/Skills Based Group  4 members attended group today    Group Focus: Goal of group was to introduce skills and psychoeducation related to topic of PHP day.   Group members were provided instruction and opportunities to practice presented skills.  LPC answered questions as they arose and shared how presented skills can be utilized on a daily basis to increase emotional wellness.      About the Group: Session 10: Self Compassion/Self Love Psycho Ed/Skills Based Group Summary   Topic of Curriculum was “Self Compassion/Self Love”. LPC showed Caridad Brooke’s video describing Self-Compassion to help group members understand the use of self compassion as a coping skill. LPC facilitated discussion as a group directed toward “negative thoughts about self that can be transformed into self-compassionate statements.”  For example, asking members to share negative thoughts that often go through their mind, “Would you say that to someone you love? What might you say instead?” LPC introduced a few examples of Self-compassionate phrases: “I am only human.” “I am doing the best I can.” “This is really hard right now and others have felt this way too.”  Group members then discussed their experiences and potential utilization of self compassion to help them in the healing process. LPC closed group session with a 2 minute breathing meditation to help group members practice grounding techniques    Yusra Hall MD was onsite when services were rendered.          Patient  was an active group participant.  Assessment/observation of group participation/presentation: Pt shared that she typically is an optimistic person.  Pt shared that when she finds herself in a painful \"existential\" place of suffering like she currently is in, she begins to have a more negative view.  Pt inquired during the group \"who deems people " "worthy and loveable?\" LPC responded that each person needs to deem themselves worthy and loveable from within, and utilize lemuel, social supports, reading, etc to help affirm this. Pt shared that she feels like she has a big hole in her and is worried about being a \"burden\" to family and friends on the weekend. LPC challenged pt's perception of being a burden and helped her to let go of assumptions regarding other's peoples perceptions or experiences of her. Pt was receptive to this.   Treatment plan areas addressed: Depression and Anxiety  Visit Diagnosis:    1. Recurrent major depressive disorder, in partial remission (CMS/HCC)        Plan: Continue with PHP , Patient to F/U with treatment goals as outlined in treatment plan and Patient to F/U with local ED or call 911 should SI/HI arise.        Koko Mason, KAIDEN  "

## 2020-08-07 NOTE — GROUP NOTE
Date:  August 7  Start Time:  10:40 AM  End Time:  11:40 AM  Billie Frank, YOB: 1960   4 members attended group today.    Group Focus:  Goal of group was to establish and build social support, review coping skills, normalize the struggles of being human, and increase self awareness and self compassion.    About the Group:  LPC started group with a prompting quote/song to facilitate group discussion.  Group engaged in active and supportive discussion. Topics discussed included developing self-compassion, difference between guilt and shame, barriers to cultivating self-compassion, normalizing pain as part of human experience and what connects us.  Group members were able to offer insightful and supportive feedback and suggestions about how to manage difficult situations.  Group ended with a meditation/song.  Yusra Hall MD was onsite when services were rendered.    Patient  was an active group participant.  Assessment/observation of group participation/presentation: Pt was open and engaged throughout group. Pt expressed understanding of concepts of guilt, shame and self-compassion. In reference to quote provided by therapist, pt discussed her recent experiences of being compassionate towards others and recognizing shared humanity. She shared about her experience in the hospital in which she became acquainted with another pt and was able to relate around mental health challenges despite other differences. Pt was receptive to feedback from other group members and therapist around the ways she is growing in self-compassion by coming to PHP and valuing her mental health.   Treatment plan areas addressed: Depression, Anxiety and Self Care  Visit Diagnosis:    1. Recurrent major depressive disorder, in partial remission (CMS/HCC)      Plan: Continue with PHP , Patient to F/U with treatment goals as outlined in treatment plan and Patient to F/U with local ED or call 911 should SI/HI arise.         Kathleen Lee, [facility]

## 2020-08-10 ENCOUNTER — PHP (OUTPATIENT)
Dept: PSYCHIATRY | Facility: HOSPITAL | Age: 60
End: 2020-08-10
Attending: COUNSELOR
Payer: COMMERCIAL

## 2020-08-10 ENCOUNTER — PHP (OUTPATIENT)
Dept: PSYCHIATRY | Facility: HOSPITAL | Age: 60
End: 2020-08-10
Attending: PSYCHIATRY & NEUROLOGY
Payer: COMMERCIAL

## 2020-08-10 ENCOUNTER — DOCUMENTATION (OUTPATIENT)
Dept: PSYCHIATRY | Facility: HOSPITAL | Age: 60
End: 2020-08-10

## 2020-08-10 VITALS — SYSTOLIC BLOOD PRESSURE: 143 MMHG | HEART RATE: 73 BPM | DIASTOLIC BLOOD PRESSURE: 96 MMHG | RESPIRATION RATE: 16 BRPM

## 2020-08-10 DIAGNOSIS — F43.22 ADJUSTMENT DISORDER WITH ANXIOUS MOOD: ICD-10-CM

## 2020-08-10 DIAGNOSIS — F33.41 RECURRENT MAJOR DEPRESSIVE DISORDER, IN PARTIAL REMISSION (CMS/HCC): Primary | ICD-10-CM

## 2020-08-10 PROCEDURE — H0035 MH PARTIAL HOSP TX UNDER 24H: HCPCS | Performed by: COUNSELOR

## 2020-08-10 ASSESSMENT — COGNITIVE AND FUNCTIONAL STATUS - GENERAL
THOUGHT_PROCESS: WNL;WORRY
RECENT MEMORY: MILD LOSS
AROUSAL LEVEL: ALERT
CONCENTRATION: IMPAIRED, MILD
THOUGHT_CONTENT: APPROPRIATE
SPEECH: REGULAR
DELUSIONS: NONE OR AGE APPROPRIATE
EST. PREMORBID INTELLIGENCE: ABOVE AVERAGE
LIBIDO: NO CHANGE
APPEARANCE: WELL GROOMED
ATTENTION: IMPAIRED, MILD
INSIGHT: INTACT
IMPULSE CONTROL: INTACT
MOOD: ANXIOUS
REMOTE MEMORY: WNL
ORIENTATION: FULLY ORIENTED
PSYCHOMOTOR FUNCTIONING: WNL
PERCEPTUAL FUNCTION: NORMAL
SLEEP_WAKE_CYCLE: NO CHANGE

## 2020-08-10 ASSESSMENT — PAIN SCALES - GENERAL: PAINLEVEL: 0-NO PAIN

## 2020-08-10 NOTE — GROUP NOTE
"Date:  August 10  Start Time:  10:40 AM  End Time:  11:40 AM  Billie Frank, YOB: 1960   2 members attended group today.    Group Focus:  Goal of group was to establish and build social support, review coping skills, normalize the struggles of being human, and increase self awareness and self compassion.    About the Group:  LPC started group with a prompting quote/song to facilitate group discussion.  Group engaged in active and supportive discussion. Topics discussed included coping with anxiety and depression, relationship trauma, practicing mindfulness.  Group members were able to offer insightful and supportive feedback and suggestions about how to manage difficult situations.  Group ended with a meditation/song.  Lilliam Diego MD was onsite when services were rendered.    Patient  was an active group participant.  Assessment/observation of group participation/presentation: Pt was open and engaged throughout group. She continued in conversation around her weekend and the difficulties she experienced due to severe anxiety and limited contact with her . Throughout group pt posed questions of whether her response is \"normal\", \"am I having a nervous breakdown\", \"am I going off the rails?\" and was receptive from normalization and validation of her experience from therapist and group members. She acknowledged wanting to escape from the emotional pain she is experiencing and tearfully described her relationship with her  and loss of her marriage and she has known it and future plans. She expressed concerns about \"burning out\" her family and friends and acknowledged that her friend, with whom she is staying, has mentioned that she needs more time for herself on the weekends. Therapist assured pt that [facility] staff will continue to support her through the week and assess what she needs to stay safe and effectively manage her symptoms. Pt also provided positive feedback to another group member who " was describing recent relationship concerns and difficulty with overwhelming sadness and anxiety about the past and future.   Treatment plan areas addressed: Depression and Anxiety  Visit Diagnosis:    1. Recurrent major depressive disorder, in partial remission (CMS/HCC)    2. Adjustment disorder with anxious mood      Plan: Continue with PHP , Patient to F/U with treatment goals as outlined in treatment plan and Patient to F/U with local ED or call 911 should SI/HI arise.        Kathleen Lee, LPC

## 2020-08-10 NOTE — GROUP NOTE
Date: August 10  Start Time:  1:20 PM  End Time:   2:20 PM  Billie Frank, YOB: 1960,  was an active group participant.    Wrap Up Group  2 members attended group today.   Group Focus: Goal of group was to wrap up and process the treatment day.  Assist  members in planning for the evening ahead and to assess and plan surrounding any  safety needs.      About the Group:  LPC reviewed group members Afternoon  Reflection Sheets and did a verbal check in with each group member regarding safety  (SI/HI/self harm) and any necessary safety planning .  Session ended with a brief  meditation/calming activity.  Lilliam Diego MD was onsite when  services rendered.        Afternoon Self Reflection  Depression: 2/5  Anxiety: 4/5  Anger: 1/5  Suicidal Ideation:   0/5 - None  Self Injury: 1/5 - Thought  Homicidal Ideation:   0/5 - None  Are you able to follow your Safety Plan? Yes.  I can/will:  Call someone, Use a coping skill, Use grounding with my 5 senses and Call 911 if I'm unsafe.  Pt reported significant moment/insight of the day: talking about the pain and horror I feel  Pt reported gratitude list: my children, this program, my car  Pt identified goal progress for the day: trying to resolve some pain   Pt reported evening self care plan:work on coping mechanisms that I am learning   Pt treatment plan areas addressed: Depression and Anxiety    Visit Diagnosis:    1. Recurrent major depressive disorder, in partial remission (CMS/HCC)    2. Adjustment disorder with anxious mood        Assessment/Observations:  Pt seemed open to group session and seems to benefit from support of group. Pt continues to have difficulty processing emotional pain and be,ieving that she will feel better.  Pt has tendency to intellectualize processes and has difficulty with emotional overwhelm.   Plan: Continue with PHP , Patient to F/U with treatment goals as outlined in treatment plan and Patient to F/U with local ED or call 911  should SI/HI arise.        Koko Mason, LPC

## 2020-08-10 NOTE — PROGRESS NOTES
"[facility]  Patient reported to RN that she experienced an episode of SI over the weekend with the thought/hope that \"someone would shoot me.\"  In addition, patient with episode of self-harm, bit hand in response to overwhelming \"pain inside.\"    Met briefly with patient to review symptoms over weekend.  Patient confirms she feels safe toward self, denies SI plan or intent.  Continues to stay with friend Ezio Mora,  a supportive, helpful situation.    Over the course of the weekend patient took an additional Seroquel 25 mg and an additional dose of Ativan.  Denies intent to harm self, \"I was just trying to calm down.\"    Sleep disrupted at 3 AM, appetite decreased.  Denies alcohol, denies marijuana    Reviewed importance of developing ongoing coping strategies to manage periods of more extreme motions.    Impression:  Patient is a 59-year-old female with history of MDD with acute onset mood lability, decrease sense of self in context of 's infidelity, currently day 4 of [facility].  Patient denies suicidal plan or intent, admits to transient passive SI and an episode of self-injurious behavior over the weekend.  Reviewed alternate coping strategies.  Patient is gradually increasing range of skills including tapping, breathing, change of environment.  At this time patient is not immediate danger to self and remains appropriate for [facility] level of care.    Plan:  Increase Seroquel to 25 mg p.o. 3 times daily PRN targeting periods of lability and sleep-wake cycle  Continue Lorazepam 1 mg p.o. nightly as needed, patient advised to not take additional doses  Continue Lexapro 20 mg p.o. Daily  Patient will follow-up with individual therapist this afternoon to further develop coping strategies  Follow up with Dr Hall as scheduled 8/11/20  At this time patient is not an immediate danger to self and remains appropriate for PHP level of care.    "

## 2020-08-10 NOTE — PROGRESS NOTES
"Met with pt per Therapist request since pt took additional doses of medications over weekend. Pt reports she had a rough weekend since it was the first weekend without her . Pt tearful, reports excessive crying, not sleeping; little control over emotions. Pt was with friends, but consumed by horrible emotional pain over loss of .    Sat & Sunday took 25 mg TID prn anxiety, rather than BID.  Took Ativan 1 mg @ bed as prescribed both Sat & sunday - woke up @ 0300 both nights with horrible panic attacks, sweats, took another Ativan 1mg to go back to sleep   Trying meditation, tapping, affirmations, not relieving s/s anixety.    Sleep - 5 hrs  Appetite - low  Energy - low  Reports Passive SI - \"I wish someone would come in and shoot me over weekend\", denies SI plan, HI, intrusive thoughts, denies SI presently, but increased depression.Anxiety  Mood - anxious, depressed, overwhelmed      ROS - negative    Saw OP therapist 8/8/2020 via telemedicine    Symptoms - bit her left hand yesterday, no signs of infection, skin intact. Pt reports she was so hysterical, reports hx biting herself occasionally to relieve emotional pain. Reports seroquel helps with grief.  Tolerated extra doses of seroquel and ativan well. Will review with psychiatric team.  "
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
## 2020-08-10 NOTE — GROUP NOTE
Date:  August 10  Start Time:  12:10 PM  End Time:   1:10 PM  Billie Frank, YOB: 1960  Psychoeducational/Skills Based Group  2 members attended group today    Group Focus: Goal of group was to introduce skills and psychoeducation related to topic of PHP day.   Group members were provided instruction and opportunities to practice presented skills.  LPC answered questions as they arose and shared how presented skills can be utilized on a daily basis to increase emotional wellness.      About the Group: Topic of curriculum was “Mindfulness.” [facility] further introduced the concept of mindfulness by showing group members a Moncho Skinner video. [facility] educated group members on the many different ways to use mindfulness day to day, moment to moment by sharing a Skills Overview worksheets. Concepts described on the worksheets included: 1) observe, 2) describe, 3) participate, 4) non-judgmental stance, 5) one-mindfully, 6) effectively, 7) wise mind (including a video description). The goal of sharing the skills was to provide multiple techniques for practicing mindfulness in our lives. [facility] then encouraged group members to complete a Wise Mind worksheet to relate their own experience to the concept of Wise Mind. [facility] facilitated a group discussion to check for understanding of presented skills. [facility] ended group by leading group members through a “5 Senses” exercises to help group members link practice of Observe, Describe and One-Mindfully skills.    Lilliam Diego MD was onsite when services were rendered.          Patient  was an active group participant.  Assessment/observation of group participation/presentation: Pt was willing to engage with discussion related to her difficulty being present with her emotions. Pt shared about feeling overwhelmed and distracted by her thoughts and emotional pain. Pt was wiling to practice mindfulness techniques and shared that following the body scan meditation she felt more relaxed. Pt  shared that she had a difficult weekend and was not able to regulate her emotions on her own.   Treatment plan areas addressed: Depression and Anxiety  Visit Diagnosis:    1. Recurrent major depressive disorder, in partial remission (CMS/HCC)    2. Adjustment disorder with anxious mood        Plan: Continue with PHP , Patient to F/U with treatment goals as outlined in treatment plan and Patient to F/U with local ED or call 911 should SI/HI arise.        Koko Mason, LPC

## 2020-08-11 ENCOUNTER — PHP (OUTPATIENT)
Dept: PSYCHIATRY | Facility: HOSPITAL | Age: 60
End: 2020-08-11
Attending: PSYCHIATRY & NEUROLOGY
Payer: COMMERCIAL

## 2020-08-11 ENCOUNTER — TELEPHONE (OUTPATIENT)
Dept: PSYCHIATRY | Facility: HOSPITAL | Age: 60
End: 2020-08-11

## 2020-08-11 DIAGNOSIS — F33.41 RECURRENT MAJOR DEPRESSIVE DISORDER, IN PARTIAL REMISSION (CMS/HCC): Primary | ICD-10-CM

## 2020-08-11 DIAGNOSIS — F43.22 ADJUSTMENT DISORDER WITH ANXIOUS MOOD: ICD-10-CM

## 2020-08-11 PROCEDURE — 99213 OFFICE O/P EST LOW 20 MIN: CPT | Performed by: PSYCHIATRY & NEUROLOGY

## 2020-08-11 PROCEDURE — H0035 MH PARTIAL HOSP TX UNDER 24H: HCPCS | Performed by: COUNSELOR

## 2020-08-11 NOTE — GROUP NOTE
"Date:  August 11  Start Time:  10:40 AM  End Time:  11:40 AM  Billie Frank, YOB: 1960   2 members attended group today.    Group Focus:  Goal of group was to establish and build social support, review coping skills, normalize the struggles of being human, and increase self awareness and self compassion.    About the Group:  LPC started group with a prompting quote/song to facilitate group discussion.  Group engaged in active and supportive discussion. Topics discussed included \"Letting go of guilt\".  Group members were able to offer insightful and supportive feedback and suggestions about how to manage difficult situations.  Group ended with a meditation/song.  Yusra Hall MD was onsite when services were rendered.        Patient  was an active group participant.  Assessment/observation of group participation/presentation: In response to a poem to facilitate discussion to topic, pt stated, \"Identify what to put down the cause of the pain and identify the light.\"  She shared about letting go of the guilt of her father's abandonment when she was age 1 and commented on how the recent situation with her  triggered effects of this trauma.  Pt also related with group member of the guilt of a loved one committing suicide; sharing that she was the last person to see her ex boyfriend in college alive.  They had dinner together and she later realized it was dinner to say goodbye.   Treatment plan areas addressed: Depression, Grief and Loss and Mood dysregulation  Visit Diagnosis:    1. Recurrent major depressive disorder, in partial remission (CMS/HCC)    2. Adjustment disorder with anxious mood        Plan: Continue with [facility] , Patient to F/U with treatment goals as outlined in treatment plan and Patient to F/U with local ED or call 911 should SI/HI arise.        Andressa Mcleod LPC  "

## 2020-08-11 NOTE — GROUP NOTE
Date:  August 11  Start Time:  12:10 PM  End Time:   1:10 PM  Billie Frank, YOB: 1960  Psychoeducational/Skills Based Group  1 member attended group today    Group Focus: Goal of group was to introduce skills and psychoeducation related to topic of PHP day.   Group members were provided instruction and opportunities to practice presented skills.  LPC answered questions as they arose and shared how presented skills can be utilized on a daily basis to increase emotional wellness.      About the Group: Session 2: Emotion Regulation Psycho-Ed/Skills Based Group Summary   Topic of curriculum was “Emotion Regulation”. LPC began group discussion by educating group members of the importance of having coping skills available to use at any time whether for maintaining emotional wellness of responding to a stimulating event. LPC shared that the goal of the group is to provide coping skills to group members for ongoing emotional wellness as well as crisis intervention when intense emotions are triggered/activated. LPC provided the first set of proactive skills to reduce emotional vulnerability which included “P.L.E.A.S.E.” and “Paying Attention to Positive Events”. The next set of responsive coping skills, used  when a surge of emotion comes, negative event happens, etc, included 1) naming the emotion, 2) opposite action, 3) check the facts, 4) feeling not acting, and 5) ride the wave. LPC provided group members with worksheets to reinforce the skills. While completing the P.L.E.A.S.E worksheet, LPC also discussed hormonal vulnerability as women and barriers to healthy sleep and provided resources on bedtime routine/healthy sleep hygiene. LPC continued the topic discussion and checked for understanding of presented skills. LPC ended the group with a sleep meditation led by Zack Abdullahi.     Yusra Hall MD was onsite when services were rendered.          Patient  was an active group  "participant.  Assessment/observation of group participation/presentation: LPC spoke with pt about proactive coping skills and responsive coping skills.  Pt shared that she was distracted during group session because she was concerned about other group members.  Pt shared that she noticed one group member did not attend services today, and the newest group member left in the middle of the tx day. Pt shared that she was concerned about something she may have done or said. LPC assured the pt that she is not responsible for anyone else's process in tx.  LPC spoke with pt about her tendency to become over attached to other people's emotions and needs. Pt and LPC discussed traits of codependency and pt recognized that she is co dependent in her relationship with her , and that she has always played the rescuer role. LPC emphasized the importance of self care and boundaries.  LPC spoke with pt about \"fact checking\" as taught in DBT regarding her over attachment to stories that she creates in regards to her 's infidelity and whether he is continuing to be with the female he was seeing. LPC helped pt to see that when she feeds that story line without fact checking, she becomes overwhelmed with emotion and has difficulty regulating.   Treatment plan areas addressed: Depression and Anxiety  Visit Diagnosis:  No diagnosis found.    Plan: Continue with PHP , Patient to F/U with treatment goals as outlined in treatment plan and Patient to F/U with local ED or call 911 should SI/HI arise.        Koko Mason LPC  "

## 2020-08-11 NOTE — GROUP NOTE
"Date: August 11  Start Time:   9:30 AM  End Time: 10:30 PM  Billie Frank, YOB: 1960,  was an active group participant.    Community Check In Group  2 attended group today.     Group Focus: Goal of group was to welcome new and returning PHP members to the [facility], check in regarding group member needs, and assess safety.    About the Group: LPC reviewed [facility] Group Code of Conduct and answered any questions that arose.  LPC welcomed new members to the group and facilitated brief introductions of group members to one another.  Introduced topic/theme for the treatment day:Emotion Regulation.  Encouraged group members to request support throughout the day as needed.  LPC reviewed group members’ Morning Reflection Sheets and did a verbal check in with each group member regarding safety (SI/HI/self harm), safety planning, medication compliance, if they needed to consult with anyone today and individual treatment needs/goals for the day.  Session ended with a brief meditation/calming activity.   Yusra Hall MD was on site when services rendered.    Morning Self reflection:   Depression: 1/5  Anxiety: 3/5  \"A touch better\"  Anger: 1/5  Suicidal Ideation:   0/5 - None  Self Injury: 0/5 - None  Engaged in Self Injury since yesterday: No  Homicidal Ideation:   0/5 - None  Are you able to follow your Safety Plan? Yes.  I can/will:  Call someone, Journal, Use a coping skill, Use grounding with my 5 senses and Call 911 if I'm unsafe.  Substance Use:  No  Self Care:  I completed my self care activity last night:  \"Started a journal, called a friend\"  I am satisfied with my daily hygiene:  yes  Nourish:Number of meals eaten yesterday? 3 and Describe:  Minimal    Sleep:  Uses sleep aid? yes    and Describe:  Number of hours:  8 and \"Ok\"  Social Interaction:Moderate: Family and Friends  Physical Activity: No  Mindful Activity: \"Yes, 5 senses technique\"  Medication: Prescribed  Thought Content: Clear, Cloudy, Racing " "Thoughts  Pt reported use of coping skills including successful follow through and barriers to follow through: \"5 Senses technique, started a journal.  Both helped some- No barriers.\"  Pt reported significant or positive event/step: \"Bought journals\"  Pt identified goal for the treatment day: \"Being self sufficient\"  Pt treatment plan areas addressed:  Depression and Mood dysregulation  Pt requested consult with: None  Visit Diagnosis:  No diagnosis found.    Assessment/Observations:  Pt reported feeling better since the weekend and cited program attendance, medication adjustment, and individual session with her assigned therapist all helped her.  Pt reported having a positive, supportive, calm conversation with her  yesterday and his asking her how she is doing.  Pt said it was comforting to speak with him as they still love each other and are on the same page about taking steps to work out their situation.  Pt was engaged with new group member, offering supportive feedback and relating with her about grieving loss.  Plan:  Continue with PHP , Patient to F/U with treatment goals as outlined in treatment plan and Patient to F/U with local ED or call 911 should SI/HI arise.        Andressa Mcleod, LPC      "

## 2020-08-11 NOTE — TELEPHONE ENCOUNTER
LPC contacted pt's OP therapist to inquire about psychiatry services through [facility] on going. LPC left VM for pt's OP therapist to inquire about psychiatry services through their program. Pt has med checks and psych services at [facility] while attending PHP tx and will also have that available during IOP tx but pt will benefit from OP psychiatrist moving forward. LPC left contact information for pt's OP therapist to return the call.

## 2020-08-11 NOTE — PROGRESS NOTES
"[facility] PSYCHIATRY FOLLOW UP/MEDICATION CHECK    Billie Frank is a 59 y.o. female who presents for Anxiety and Depression.    HPI     See Dr. Diego's note from yesterday detailing events from last weekend.    Today, pt acknowledges that this was \"the worst weekend of my life.\" Found yesterday's PHP session helpful in reducing anxiety, also spoke to  last night which was reassuring to her.     Slept well 8 hours last night for first time in 2 months. Anxiety improved compared to the weekend.    Mood is \"pretty decent\" now. Continues to feel like she would accept death if it came to her, feels parts of life not worth living but denies any actual thoughts of suicide, no plan, no intent. Future oriented towards children - \"i've got to find a way through this despair.\"    No urges to self-harm since the weekend.    Took quetiapine 25 mg x 3 yesterday, today only has taken once today - \"helps with the anguish.\" Has been adhering to lorazepam 1 mg daily at bedtime, no increased dose. No oversedation with quetiapine or lorazepam.    Psychiatric ROS:  Sleep: As per HPI   Appetite: Lower than usual, but was able to eat three meals yesterday  Libido: Deferred  Exercise: Deferred  ETOH/Substances: Denies any alcohol or drug use during PHP    Medical Review of Systems  Review of Systems   Constitutional: As per HPI  Eyes: Denies  Ears/Nose/Mouth/Throat: Denies  Respiratory: Denies  Cardiovascular: Denies  Gastrointestinal: Denies  Urinary: Denies  Genital: Denies  Musculoskeletal: Denies  Skin: Denies  Neurologic: Denies  Endocrinologic: Denies  Hematologic: Denies  Immune/Lymph: Denies  Pain (0-10): Denies        PMSH: No changes were made to non-psychiatric medications/allergies/medical history.     Risk/Benefit/side Effects discussed regarding the following medications:  Escitalopram, quetiapine, lorazepam  PDMP Queried: No    Allergies:   Allergies   Allergen Reactions   • Bee Sting [Bee Venom Protein (Honey Bee)] " "Anaphylaxis   • Iodinated Contrast Media Hives   • Spinach GI intolerance     Raw Spinach only   • Penicillins Rash   • Sulfa (Sulfonamide Antibiotics) Rash       Current Outpatient Medications:   •  AMABELZ 0.5-0.1 mg per tablet, 1 tablet 3 (three) times a week (Mon, Wed, Fri). TID weekly, due tomorrow Saturday 8/1/2020 , , Disp: , Rfl:   •  ascorbic acid (VITAMIN C) 500 mg tablet, Take 1,000 mg by mouth daily., , Disp: , Rfl:   •  aspirin 81 mg enteric coated tablet, Take 81 mg by mouth daily., , Disp: , Rfl:   •  b complex vitamins capsule, Take 1 capsule by mouth daily., , Disp: , Rfl:   •  cholecalciferol, vitamin D3, 1,000 unit (25 mcg) tablet, Take 1,000 Units by mouth daily., , Disp: , Rfl:   •  diclofenac (VOLTAREN) 50 mg EC tablet, Take 50 mg by mouth 2 (two) times a day as needed.  , , Disp: , Rfl:   •  escitalopram (LEXAPRO) 20 mg tablet, Take 1 tablet (20 mg total) by mouth once daily., , Disp: 30 tablet, Rfl: 0  •  LORazepam (ATIVAN) 1 mg tablet, Take 1 tablet (1 mg total) by mouth nightly as needed (insomnia)., , Disp: 30 tablet, Rfl: 0  •  omeprazole (PriLOSEC) 20 mg capsule, daily as needed.  , , Disp: , Rfl:   •  QUEtiapine (SEROquel) 25 mg tablet, Take 1 tablet (25 mg total) by mouth 2 (two) times a day as needed (acute anxiety/distress)., , Disp: 60 tablet, Rfl: 0  •  RANEXA 500 mg 12 hr tablet, , , Disp: , Rfl:   •  rosuvastatin (CRESTOR) 5 mg tablet, Take 5 mg by mouth daily. Every other day, , Disp: , Rfl:     Objective     Physical Exam   Gen: Well-appearing no acute distress    There were no vitals taken for this visit.    MENTAL STATUS EXAM  Appearance: well groomed, good hygiene  Gait and Motor: no abnormal movements, no tremor, no PMR/PMA  Speech: normal rate/rhythm/volume  Mood: \"decent\"  Affect: anxious, dysphoric, full range  Associations: intact  Thought Process: linear, logical, goal-directed  Thought Content: no auditory or visual hallucinations, no delusionary " content  Suicidality/Homicidality: denies  Judgement/Insight: good  Orientation: Intact to interview  Memory: Intact to interview  Attention: alert  Knowledge: normal  Language: normal    PHQ-9: Brief Depression Severity Measure Score: 12    Rawlings Suicide Severity Rating Scale:  Not done today       Labs  No new labs.    Imaging  Not applicable    ECG   Not applicable.     Brief Psychiatric Formulation: 59 year old  with past psych hx of MDD (including PPD), 1 past lifetime hospitalization at [facility], no past suicide attempts or true SIB (though with gesture at self-injury prior to recent hospitalization) who presents with worsening mood in context of ongoing discord with .      Recent sx do not appear consistent with true major depressive episode but instead likely reflect affective instability and limited coping skills in context of acute interpersonal stressor, though given her personal hx of depression and severe family hx, will want to monitor for burgeoning depressive episode.     Pt today appears improved compared to yesterday and during weekend in light of ongoing support from PHP and increased quetiapine use. Will monitor sx this week and reassess need for standing medication change on Friday.     She is at chronic elevated risk of harm to self given family hx of suicide, personal hx of depression. Acute risk is elevated by hospital discharge, recent SI and attempted SIB, but her risk is mitigated by lack of SI at this time, improvement from hospitalization, protective factors, future orientation. Her acute risk of harm to self is not significantly elevated from her usual baseline and she will likely benefit from ongoing support of PHP.       Plan:  - Continue escitalopram 20 mg daily  - Continue quetiapine 25 mg 3x daily PRN anxiety. Pt counseled on quetiapine in my usual fashion, has baseline lipids in system. Will follow up with PCP or psychiatrist in 3 months for monitoring  - Continue  lorazepam 1 mg daily at bedtime as needed. Patient was counseled on the risks/benefits/alternatives/side effects of benzodiazepines in my usual fashion.  - Continue in PHP, reinforced DBT skills today      Recertification: I certify that PHP level of care continues to be required, improvement is expected and without this medically necessary treatment, inpatient psychiatric care would be required.  Billie Frank's response to therapeutic intervention has shown gradual improvement. Billie Frank remains at risk for psychiatric hospitalization due to moderate to severe mood symptoms and severe anxiety. Treatment goals and coordination of care with multidisciplinary team as outlined in updated treatment plan    Visit Diagnosis:  1. Recurrent major depressive disorder, in partial remission (CMS/HCC)        Duration:  20 minutes  Yusra Hall MD @ 3:35 PM

## 2020-08-11 NOTE — GROUP NOTE
"Date: August 11  Start Time:  1:20 PM  End Time:   2:20 PM  Billie Frank, YOB: 1960,  was an active group participant.    Wrap Up Group   1 attended group today.   Group Focus: Goal of group was to wrap up and process the treatment day.  Assist  members in planning for the evening ahead and to assess and plan surrounding any  safety needs.      About the Group:  LPC reviewed group members Afternoon  Reflection Sheets and did a verbal check in with each group member regarding safety  (SI/HI/self harm) and any necessary safety planning .  Session ended with a brief  meditation/calming activity.  Yusra Hall MD was onsite when services rendered.      Afternoon Self Reflection  Depression: 1/5  Anxiety: 3/5  Anger: 1/5  Suicidal Ideation:   0/5 - None  Self Injury: 0/5 - None  Homicidal Ideation:   0/5 - None  Are you able to follow your Safety Plan? Yes.  I can/will:  Call someone, Journal, Use a coping skill, Use grounding with my 5 senses and Call 911 if I'm unsafe.  Pt reported significant moment/insight of the day: \"My role in co-dependency.\"  Pt reported gratitude list: \"This program, my kids, my new journal.\"  Pt identified goal progress for the day: \"I am working on this. I bleached my own jeans when they got messed up in the wash (Pj usually would).\"  Pt reported evening self care plan: \"Write in journal, eat a good dinner, self-care. No obstacles.\"  Pt treatment plan areas addressed: Depression, Anxiety, Co-dependency and Phase of life problems    Visit Diagnosis:    1. Recurrent major depressive disorder, in partial remission (CMS/HCC)    2. Adjustment disorder with anxious mood      Assessment/Observations:  Pt was open and engaged in group. Pt discussed the insights she has gained over the course of the day from therapist and other group members, particularly around her co-dependent patterns in relationships. Pt shared her recent interaction with her  which was helpful in " settling her anxiety from yesterday, as was having a conversation with her friend with whom she is living about helpful boundaries during pt's recovery. Pt discussed ways in which she intends to engage in self-care tonight and reconnect with her values by journaling, enjoying dinner with her friend and boyfriend and taking up Thai lessons again. Therapist praised pt for the work she is doing in PHP and the skills she has been able to put into practice.  Plan: Continue with PHP , Meet with [facility] Psychiatry Team, Patient to F/U with treatment goals as outlined in treatment plan and Patient to F/U with local ED or call 911 should SI/HI arise.        Kathleen Lee, LPC

## 2020-08-12 ENCOUNTER — PHP (OUTPATIENT)
Dept: PSYCHIATRY | Facility: HOSPITAL | Age: 60
End: 2020-08-12
Attending: PSYCHIATRY & NEUROLOGY
Payer: COMMERCIAL

## 2020-08-12 ENCOUNTER — TELEPHONE (OUTPATIENT)
Dept: PSYCHIATRY | Facility: HOSPITAL | Age: 60
End: 2020-08-12

## 2020-08-12 DIAGNOSIS — F33.41 RECURRENT MAJOR DEPRESSIVE DISORDER, IN PARTIAL REMISSION (CMS/HCC): Primary | ICD-10-CM

## 2020-08-12 PROCEDURE — H0035 MH PARTIAL HOSP TX UNDER 24H: HCPCS | Performed by: COUNSELOR

## 2020-08-12 NOTE — GROUP NOTE
"Date: August 12  Start Time:  1:20 PM  End Time:   2:20 PM  Billie Frank, YOB: 1960,  was an active group participant.    Wrap Up Group    2 attended group today.   Group Focus: Goal of group was to wrap up and process the treatment day.  Assist  members in planning for the evening ahead and to assess and plan surrounding any  safety needs.      About the Group:  LPC reviewed group members Afternoon  Reflection Sheets and did a verbal check in with each group member regarding safety  (SI/HI/self harm) and any necessary safety planning .  Session ended with a brief  meditation/calming activity.  Yusra Hall MD was onsite when  services rendered.        Afternoon Self Reflection  Depression: 2/5  Anxiety: 4/5  Anger: 2/5  Suicidal Ideation:   0/5 - None  Self Injury: 0/5 - None  Homicidal Ideation:   0/5 - None  Are you able to follow your Safety Plan? Yes.  I can/will:  Call someone, Journal, Use a coping skill and Call 911 if I'm unsafe.  Pt reported significant moment/insight of the day: \"Trouble with container exercise.\"  Pt reported gratitude list: \"Ok, health insurance, a place to live.\"  Pt identified goal progress for the day: \"A small bit.\"  Pt reported evening self care plan: \"See/talk to daughter, sanity/love, no obstacles.\"  Pt treatment plan areas addressed: Trauma    Visit Diagnosis:    1. Recurrent major depressive disorder, in partial remission (CMS/HCC)      Assessment/Observations:  Pt was open and engaged throughout group. She expressed some surprise around her reaction to container exercise and difficulty putting her concerns into the container to come back to another time. Therapist reminded pt that, while containment is a useful skill for many, it may not be helpful for her at the present time and to utilize other skills to manage her emotional responses outside of sessions. Pt was able to identify ways that she will effectively cope outside of PHP until tomorrow such as " "speaking with her daughter and \"sanity and love\" but receiving a hug from someone else. Therapist praised pt for the continued work she is doing in PHP.  Plan: Continue with PHP , Patient to F/U with treatment goals as outlined in treatment plan and Patient to F/U with local ED or call 911 should SI/HI arise.      Kathleen Lee, LPC    "

## 2020-08-12 NOTE — GROUP NOTE
Date: August 12  Start Time:   9:30 AM  End Time: 10:30 AM  Billie Frank, YOB: 1960,  was an active group participant.    Community Check In Group  1 member attended group today.     Group Focus: Goal of group was to welcome new and returning PHP members to the [facility], check in regarding group member needs, and assess safety.    About the Group: LPC reviewed [facility] Group Code of Conduct and answered any questions that arose.  LPC welcomed new members to the group and facilitated brief introductions of group members to one another.  Introduced topic/theme for the treatment day:Trauma.  Encouraged group members to request support throughout the day as needed.  LPC reviewed group members’ Morning Reflection Sheets and did a verbal check in with each group member regarding safety (SI/HI/self harm), safety planning, medication compliance, if they needed to consult with anyone today and individual treatment needs/goals for the day.  Session ended with a brief meditation/calming activity.   Yusra Hall MD was on site when services rendered.    Morning Self reflection:   Depression: 2/5  Anxiety: 5/5  Anger: 2/5  Suicidal Ideation:   0/5 - None  Self Injury: 0/5 - None  Engaged in Self Injury since yesterday: No  Homicidal Ideation:   0/5 - None  Are you able to follow your Safety Plan? Yes.  I can/will:  Call someone, Journal, Use a coping skill and Listen to music  Substance Use:  No  Self Care:  I completed my self care activity last night:  meditation, walk, shower, journal  I am satisfied with my daily hygiene:  yes  Nourish:Number of meals eaten yesterday? 3 and Describe:  Minimal    Sleep:  Describe:  Number of hours:  6  Social Interaction:Moderate: Family and Friends  Physical Activity: Walked outside  Mindful Activity: Guided meditation  Medication: Prescribed  Thought Content: cloudy and racing   Pt reported use of coping skills including successful follow through and barriers to follow through:  walked, journal, meditation with no barriers other than those did not alleviate anxiety   Pt reported significant or positive event/step: walked outside despite not wanting to   Pt identified goal for the treatment day: to gain some ground back in progress in tx and managing anxiety   Pt treatment plan areas addressed:  Depression and Anxiety  Pt requested consult with: None  Visit Diagnosis:  No diagnosis found.    Assessment/Observations:  Pt was overly anxious today.  LPC facilitated body scan with chair yoga but pt stated that anxiety was still present. LPC and pt spoke about the source of pt's anxiety.  Pt shared that she is consumed with thoughts about her , the woman he had an affair with and her inability to trust him because he has lied in the past, as well as being out of work while in tx and having a permanent place to live right now. LPC used white board to help pt breakdown these concerns and to highlight what she can control in each stress category. Pt was about to ID that she has work things under control with their support at this time.  Pt was able to ID that she has a place to stay either with her friend or at her own condo in [place] but she is worried about being alone because she feels lonely right now and that leads to more anxiety. Pt was able to ID boundaries with her  right now and the importance of taking time to heal herself while giving him space to work on himself and taking time to trust her actions and behaviors. Pt's anxiety seems to decrease when she is in PHP tx programming but tends to escalate in the evenings when she gets stuck in future oriented thinking and worry. Pt is also in the depression stage of grief this morning and feeling sad about the state her marriage is in. LPC spoke with pt about being able to self soothe so that she isn't completely reliant on her  to make her feel better emotionally.   Plan:  Continue with [facility] , Patient to F/U with  treatment goals as outlined in treatment plan and Patient to F/U with local ED or call 911 should SI/HI arise.        Koko Mason, LPC

## 2020-08-12 NOTE — GROUP NOTE
"Date:  August 12  Start Time:  10:40 AM  End Time:  11:40 AM  Billie Frank, YOB: 1960   2 members attended group today.    Group Focus:  Goal of group was to establish and build social support, review coping skills, normalize the struggles of being human, and increase self awareness and self compassion.    About the Group:  LPC started group with a prompting quote/song to facilitate group discussion.  Group engaged in active and supportive discussion. Topics discussed included trauma and coping skills for hyperarousal  Group members were able to offer insightful and supportive feedback and suggestions about how to manage difficult situations.  Group ended with a meditation/song.  Yusra Hall MD was onsite when services were rendered.        Patient  was an active group participant.  Assessment/observation of group participation/presentation: Pt shared about her feelings of sadness today which is different than other days when she has felt more anxious.  Pt shared that she questions the progress she is making because she continues to feel overwhelmed and anxious. Pt also related to a group member about feeling depressed in the past and needing to find her way out of it but also feeling like she couldn't find her way out.  Pt shared that she is trying to find her way out of her anxiety currently and worries that she may need more support if she does not begin to manage her emotions better. LPC spoke with pt about her progress thus far in tx, and the importance of coping skills such as \"thought stopping; breathing techniques; mind-body engaging techniques and continuing to process her feelings in a therapeutic and safe space.   Treatment plan areas addressed: Depression and Anxiety  Visit Diagnosis:    1. Recurrent major depressive disorder, in partial remission (CMS/HCC)        Plan: Continue with PHP , Patient to F/U with treatment goals as outlined in treatment plan and Patient to F/U with " local ED or call 911 should SI/HI arise.        Koko Mason, LPC

## 2020-08-13 ENCOUNTER — PHP (OUTPATIENT)
Dept: PSYCHIATRY | Facility: HOSPITAL | Age: 60
End: 2020-08-13
Attending: PSYCHIATRY & NEUROLOGY
Payer: COMMERCIAL

## 2020-08-13 DIAGNOSIS — F33.41 RECURRENT MAJOR DEPRESSIVE DISORDER, IN PARTIAL REMISSION (CMS/HCC): Primary | ICD-10-CM

## 2020-08-13 DIAGNOSIS — F43.22 ADJUSTMENT DISORDER WITH ANXIOUS MOOD: ICD-10-CM

## 2020-08-13 PROCEDURE — H0035 MH PARTIAL HOSP TX UNDER 24H: HCPCS | Performed by: COUNSELOR

## 2020-08-13 NOTE — GROUP NOTE
"Date: August 13  Start Time:  1:20 PM  End Time:   2:20 PM  Billie Frank, YOB: 1960,  was an active group participant.    Wrap Up Group   3 attended group today.   Group Focus: Goal of group was to wrap up and process the treatment day.  Assist  members in planning for the evening ahead and to assess and plan surrounding any  safety needs.      About the Group:  LPC reviewed group members Afternoon  Reflection Sheets and did a verbal check in with each group member regarding safety  (SI/HI/self harm) and any necessary safety planning .  Session ended with a brief  meditation/calming activity.  Yusra Hall MD was onsite when  services rendered.        Afternoon Self Reflection  Depression: 1/5  Anxiety: 3/5  Anger: 1/5  Suicidal Ideation:   0/5 - None  Self Injury: 0/5 - None  Homicidal Ideation:   0/5 - None  Are you able to follow your Safety Plan? Yes.  I can/will:  Call someone, Journal, Use a coping skill and Call 911 if I'm unsafe.  Pt reported significant moment/insight of the day: \"Realizing that I 'overpersonalize' (distorted thinking pattern).\"  Pt reported gratitude list: \"My meds, my little bit of bravery, my family.\"  Pt identified goal progress for the day: \"I think so.\"  Pt reported evening self care plan: \"Write in journal, meditation, talking to support, positive outcome is more calm, no obstacles.\"  Pt treatment plan areas addressed: Depression and Anxiety    Visit Diagnosis:    1. Recurrent major depressive disorder, in partial remission (CMS/HCC)    2. Adjustment disorder with anxious mood      Assessment/Observations:  Pt was open and engaged during the group. She expressed appreciation around the insights she has gained during PHP today. She provided empowering feedback to another group member around how to find glimmers of hope in the midst of struggle. She described her evening plan, to include speaking with her  around various matters, and was receptive to " feedback from therapist around staying within main objectives of the conversation rather than discussing their relationship. She agreed to do so and also discussed other aspects of her evening to help stabilize her mood such as talking to family and friends.   Plan: Continue with PHP , Patient to F/U with treatment goals as outlined in treatment plan and Patient to F/U with local ED or call 911 should SI/HI arise.        Kathleen Lee, LPC

## 2020-08-13 NOTE — GROUP NOTE
Date:  August 13  Start Time:  10:40 AM  End Time:  11:40 AM  Billie Frank, YOB: 1960   3 members attended group today.    Group Focus:  Goal of group was to establish and build social support, review coping skills, normalize the struggles of being human, and increase self awareness and self compassion.    About the Group:  LPC started group with a prompting quote/song to facilitate group discussion.  Group engaged in active and supportive discussion. Topics discussed included Cognitive triangle and CBT.  Group members were able to offer insightful and supportive feedback and suggestions about how to manage difficult situations.  Group ended with a meditation/song.  Yusra Hall MD was onsite when services were rendered.        Patient  was an active group participant.  Assessment/observation of group participation/presentation: Pt engaged in the discussion to topic; learning the cognitive triangle- the connection to her thoughts, to her feelings, and behaviors.  She also commented on how mindfulness helps her to be aware of her thoughts and emotions so she can identify them, ackowledge, and respond with a healthier, more productive behavior.     Treatment plan areas addressed: Depression and Anxiety  Visit Diagnosis:    1. Recurrent major depressive disorder, in partial remission (CMS/HCC)    2. Adjustment disorder with anxious mood        Plan: Continue with PHP , Meet with PHP Psychiatry Team, Meet with PHP Therapist, Meet with PHP Nurse, Patient to F/U with treatment goals as outlined in treatment plan and Patient to F/U with local ED or call 911 should SI/HI arise.        Andressa Mcleod LPC

## 2020-08-13 NOTE — GROUP NOTE
"Date: August 13  Start Time:   9:30 AM  End Time: 10:30 AM  Billie Frank, YOB: 1960,  was an active group participant.    Community Check In Group  3 attended group today.     Group Focus: Goal of group was to welcome new and returning PHP members to the [facility], check in regarding group member needs, and assess safety.    About the Group: LPC reviewed [facility] Group Code of Conduct and answered any questions that arose.  LPC welcomed new members to the group and facilitated brief introductions of group members to one another.  Introduced topic/theme for the treatment day:CBT.  Encouraged group members to request support throughout the day as needed.  LPC reviewed group members’ Morning Reflection Sheets and did a verbal check in with each group member regarding safety (SI/HI/self harm), safety planning, medication compliance, if they needed to consult with anyone today and individual treatment needs/goals for the day.  Session ended with a brief meditation/calming activity.   Yusra Hall MD was on site when services rendered.    Morning Self reflection:   Depression: 1/5  Anxiety: 4/5  Anger: 1/5  Suicidal Ideation:   0/5 - None  Self Injury: 0/5 - None  Engaged in Self Injury since yesterday: No  Homicidal Ideation:   0/5 - None  Are you able to follow your Safety Plan? Yes.  I can/will:  Call someone, Journal, Use a coping skill and Call 911 if I'm unsafe.  Substance Use:  No  Self Care:  I completed my self care activity last night:  \"Saw daughter, walked, meditated\"  I am satisfied with my daily hygiene:  yes  Nourish:Number of meals eaten yesterday? 3 and Describe:  Minimal    Sleep:  Uses sleep aid? yes    and Describe:  Broken and Restless  Social Interaction:Moderate: Family and Friends  Physical Activity: Yes- Walked  Mindful Activity: Yes- Meditated  Medication: Prescribed and Taking more than prescribed- Took an Ativan (PRN) last night due to panic attack.    Thought Content: Clear, cloudy, " "racing thoughts  Pt reported use of coping skills including successful follow through and barriers to follow through: \"Meditation, see daughter, wrote in journal\"  Pt reported significant or positive event/step: \"Slept alone in the house.\"  Pt identified goal for the treatment day: \"to stay strong\"  Pt treatment plan areas addressed:  Depression and Anxiety  Pt requested consult with: None  Visit Diagnosis:    1. Recurrent major depressive disorder, in partial remission (CMS/HCC)    2. Adjustment disorder with anxious mood        Assessment/Observations:  Pt reported a positive event was sleeping at the house alone last night for the first time since she  from her .  She did experience a panic attack and so took an Ativan (PRN) which helped.  She is also feeling anxious about meeting up with her  later today to go over paperwork regarding their mortgage and for their daughter's tuition.  They are meeting in a neutral, public place.  Pt is nervous not only to see him but is anxious that he may disclose more secrets (pt has fear he may have other children).   She stated intention to focus on being mindful to manage her anxiety, to remember coping skills and principles she has learned in treatment, and to focus on positive affirmations outlines in her treatment plan.  Pt reported progress in the absence of SI and thoughts of SIB.   Plan:  Continue with PHP , Meet with [facility] Psychiatry Team, Meet with PHP Therapist, Meet with PHP Nurse, Patient to F/U with treatment goals as outlined in treatment plan and Patient to F/U with local ED or call 911 should SI/HI arise.        Andressa Mcleod, LPC      "

## 2020-08-13 NOTE — GROUP NOTE
Date:  August 13  Start Time:  12:10 PM  End Time:   1:10 PM  Billie Frank, YOB: 1960  Psychoeducational/Skills Based Group  3 members attended group today    Group Focus: Goal of group was to introduce skills and psychoeducation related to topic of PHP day.   Group members were provided instruction and opportunities to practice presented skills.  LPC answered questions as they arose and shared how presented skills can be utilized on a daily basis to increase emotional wellness.      About the Group: Session 4: CBT (Cognitive Behavioral Therapy) Psycho-Ed/Skills Based Group   Topic of curriculum was “CBT (Cognitive Behavioral Therapy)”. LPC began group by briefly reviewing description of CBT from group 1. LPC showed a video entitled “Parable of the Two Wolves” to describe how our cognitions, both positive and negative are strengthened or intensified when we give them attention and the power of mindfulness. LPC provided psychoeducation about “core beliefs” and “cognitive distortions” and discussed how core beliefs impact our current thoughts, feelings and behaviors. LPC provided a core beliefs worksheet as a way of helping group members challenge the negative thoughts associated with negative core beliefs. LPC then introduced the “ATR worksheet” as a way of describing the relationship between thoughts, feelings and behaviors (cognitive triangle) and the process of challenging and reframing thoughts. LPC also introduced “ANTS” worksheet to further describe the process of reframing thoughts. LPC then facilitated an open discussion on CBT concepts presented and assessed for group member understanding. LPC ended group by showing a YouTube video of [employer] show and an interview with Zack Abdullahi.    Yusra Hall MD was onsite when services were rendered.      Patient  was an active group participant.  Assessment/observation of group participation/presentation: Pt was open and engaged  "throughout group. She expressed understanding of concepts of CBT and posed the question of \"what is the difference between thoughts and feelings?\" further sharing the context of her question around recent life stressors. She expressed appreciation on discussion of cognitive fusion vs. defusion and \"leaves on a stream\" meditation and her ability to \"control\" her thoughts in the sense that she can describe how much/whether to engage with her thoughts. Pt also provided positive feedback to another group member struggling with negative automatic thoughts related to depression and the arduous process of improving symptoms of depression.   Treatment plan areas addressed: Depression and Anxiety  Visit Diagnosis:    1. Recurrent major depressive disorder, in partial remission (CMS/HCC)    2. Adjustment disorder with anxious mood      Plan: Continue with PHP , Patient to F/U with treatment goals as outlined in treatment plan and Patient to F/U with local ED or call 911 should SI/HI arise.        Kathleen Lee, LPC  "

## 2020-08-14 ENCOUNTER — PHP (OUTPATIENT)
Dept: PSYCHIATRY | Facility: HOSPITAL | Age: 60
End: 2020-08-14
Attending: PSYCHIATRY & NEUROLOGY
Payer: COMMERCIAL

## 2020-08-14 DIAGNOSIS — F33.41 RECURRENT MAJOR DEPRESSIVE DISORDER, IN PARTIAL REMISSION (CMS/HCC): Primary | ICD-10-CM

## 2020-08-14 DIAGNOSIS — F43.22 ADJUSTMENT DISORDER WITH ANXIOUS MOOD: ICD-10-CM

## 2020-08-14 PROCEDURE — 99213 OFFICE O/P EST LOW 20 MIN: CPT | Performed by: PSYCHIATRY & NEUROLOGY

## 2020-08-14 PROCEDURE — H0035 MH PARTIAL HOSP TX UNDER 24H: HCPCS | Performed by: COUNSELOR

## 2020-08-14 RX ORDER — BUSPIRONE HYDROCHLORIDE 15 MG/1
TABLET ORAL
Qty: 60 TABLET | Refills: 0 | Status: SHIPPED | OUTPATIENT
Start: 2020-08-14 | End: 2020-08-20 | Stop reason: SDUPTHER

## 2020-08-14 NOTE — PROGRESS NOTES
"[facility] PSYCHIATRY FOLLOW UP/MEDICATION CHECK    Billie Frank is a 59 y.o. female who presents for Depression.    HPI     Describes today as \"bumpy\" in light of last weekend's events and worried about what this weekend will go. She has regularly been taking quetiapine 25 mg three times daily over this past week. Still requiring lorazepam regularly for insomnia.     Feels anxiety \"on and on throughout the day.\"     Continues to feel like she would accept death if it came to her (I.e. Would be ok if dx with breast ca at mammogram next week), feels parts of life not worth living but denies any actual thoughts of suicide, no plan, no intent. Future oriented towards children.  No urges to self-harm since the weekend.    Psychiatric ROS:  Sleep: As per HPI, 6-7 hours/night  Appetite: Improved from hospitalization, but still not back to her baseline  Libido: Deferred  Exercise: Deferred  ETOH/Substances: Denies any alcohol or drug use during PHP    Medical Review of Systems  Review of Systems   Constitutional: As per HPI  Eyes: Denies  Ears/Nose/Mouth/Throat: Denies  Respiratory: Denies  Cardiovascular: + chest pain and palpitations when anxious  Gastrointestinal: Denies  Urinary: Denies  Genital: Denies  Musculoskeletal: Denies  Skin: Denies  Neurologic: Denies  Endocrinologic: Denies  Hematologic: Denies  Immune/Lymph: Denies  Pain (0-10): Denies        PMSH: No changes were made to non-psychiatric medications/allergies/medical history.     Risk/Benefit/side Effects discussed regarding the following medications:  Escitalopram, quetiapine, lorazepam, buspirone  PDMP Queried: No    Allergies:   Allergies   Allergen Reactions   • Bee Sting [Bee Venom Protein (Honey Bee)] Anaphylaxis   • Iodinated Contrast Media Hives   • Spinach GI intolerance     Raw Spinach only   • Penicillins Rash   • Sulfa (Sulfonamide Antibiotics) Rash       Current Outpatient Medications:   •  AMABELZ 0.5-0.1 mg per tablet, 1 tablet 3 (three) times a " "week (Mon, Wed, Fri). TID weekly, due tomorrow Saturday 8/1/2020 , , Disp: , Rfl:   •  ascorbic acid (VITAMIN C) 500 mg tablet, Take 1,000 mg by mouth daily., , Disp: , Rfl:   •  aspirin 81 mg enteric coated tablet, Take 81 mg by mouth daily., , Disp: , Rfl:   •  b complex vitamins capsule, Take 1 capsule by mouth daily., , Disp: , Rfl:   •  busPIRone (BUSPAR) 15 mg tablet, Take 1/2 tab (7.5 mg) twice daily x 4 days, then increase to 1 tab (15 mg) twice daily, , Disp: 60 tablet, Rfl: 0  •  cholecalciferol, vitamin D3, 1,000 unit (25 mcg) tablet, Take 1,000 Units by mouth daily., , Disp: , Rfl:   •  diclofenac (VOLTAREN) 50 mg EC tablet, Take 50 mg by mouth 2 (two) times a day as needed.  , , Disp: , Rfl:   •  escitalopram (LEXAPRO) 20 mg tablet, Take 1 tablet (20 mg total) by mouth once daily., , Disp: 30 tablet, Rfl: 0  •  LORazepam (ATIVAN) 1 mg tablet, Take 1 tablet (1 mg total) by mouth nightly as needed (insomnia)., , Disp: 30 tablet, Rfl: 0  •  omeprazole (PriLOSEC) 20 mg capsule, daily as needed.  , , Disp: , Rfl:   •  QUEtiapine (SEROquel) 25 mg tablet, Take 1 tablet (25 mg total) by mouth 2 (two) times a day as needed (acute anxiety/distress)., , Disp: 60 tablet, Rfl: 0  •  RANEXA 500 mg 12 hr tablet, , , Disp: , Rfl:   •  rosuvastatin (CRESTOR) 5 mg tablet, Take 5 mg by mouth daily. Every other day, , Disp: , Rfl:     Objective     Physical Exam   Gen: Well-appearing no acute distress    There were no vitals taken for this visit.    MENTAL STATUS EXAM  Appearance: well groomed, good hygiene  Gait and Motor: no abnormal movements, no tremor, no PMR/PMA  Speech: normal rate/rhythm/volume  Mood: \"it's been bumpy\"  Affect: anxious, dysphoric, full range  Associations: intact  Thought Process: linear, logical, goal-directed  Thought Content: no auditory or visual hallucinations, no delusionary content  Suicidality/Homicidality: no current thoughts of suicide, no plan, no intent  Judgement/Insight: " good/good  Orientation: Intact to interview  Memory: Intact to interview  Attention: alert  Knowledge: normal  Language: normal         [place] Suicide Severity Rating Scale:  Not done today       Labs  No new labs.    Imaging  Not applicable    ECG   Not applicable.     Brief Psychiatric Formulation: 59 year old  with past psych hx of MDD (including PPD), 1 past lifetime hospitalization at [facility], no past suicide attempts or true SIB (though with gesture at self-injury prior to recent hospitalization) who presents with worsening mood in context of ongoing discord with .      Recent sx do not appear consistent with true major depressive episode but instead likely reflect affective instability and limited coping skills in context of acute interpersonal stressor, though given her personal hx of depression and severe family hx, will want to monitor for burgeoning depressive episode.     Pt has improved with PHP, though remains anxious and down and requiring multiple PRNs daily. Will add on buspirone to target mood/anxiety.    She is at chronic elevated risk of harm to self given family hx of suicide, personal hx of depression. Acute risk is elevated by hospital discharge, recent SI and attempted SIB, impending weekend, but her risk is mitigated by lack of SI at this time, improvement cefrom hospitalization, protective factors, future orientation. Her acute risk of harm to self is not significantly elevated from her usual baseline and she will likely benefit from ongoing support of PHP.       Plan:  - Start buspirone 7.5 mg twice daily x 4 days, then increase to 15 mg twice daily. Reviewed risks in my usual fashion, including dizziness/lightheadedness, flushing, serotonin syndrome with escitalopram  - Continue escitalopram 20 mg daily  - Continue quetiapine 25 mg 3x daily PRN anxiety. Pt counseled on quetiapine in my usual fashion, has baseline lipids in system. Will follow up with PCP or psychiatrist in 3  months for monitoring  - Continue lorazepam 1 mg daily at bedtime as needed. Patient was counseled on the risks/benefits/alternatives/side effects of benzodiazepines in my usual fashion.  - Continue in PHP, reinforced DBT skills today      Recertification: I certify that PHP level of care continues to be required, improvement is expected and without this medically necessary treatment, inpatient psychiatric care would be required.  Billie Frank's response to therapeutic intervention has shown gradual improvement. Billie Frank remains at risk for psychiatric hospitalization due to moderate to severe mood symptoms and severe anxiety. Treatment goals and coordination of care with multidisciplinary team as outlined in updated treatment plan    Visit Diagnosis:  1. Recurrent major depressive disorder, in partial remission (CMS/HCC)        Duration:  20 minutes  Yusra Hall MD @ 3:13 PM

## 2020-08-14 NOTE — GROUP NOTE
"Date:  August 14  Start Time:  10:40 AM  End Time:  11:40 AM  Billie Frank, YOB: 1960   4 members attended group today.    Group Focus:  Goal of group was to establish and build social support, review coping skills, normalize the struggles of being human, and increase self awareness and self compassion.    About the Group:  LPC started group with a prompting quote/song to facilitate group discussion.  Group engaged in active and supportive discussion. Topics discussed included coping with depression and anxiety over the weekend, managing triggers using coping skills, interpersonal difficulties.  Group members were able to offer insightful and supportive feedback and suggestions about how to manage difficult situations.  Group ended with a meditation/song.  Yusra Hall MD was onsite when services were rendered.    Patient  was an active group participant.  Assessment/observation of group participation/presentation: Pt was open and engaged throughout group. With permission from other group members, she shared at length about her meeting with her  last night and ability to stay on topic with him concerning their finances, children, homes and matters of business. She acknowledged the difficulty of said interaction but she was able to notice changed behaviors from previous. She was receptive to praise from therapist and other patients around her ability to regulate her anxiety and not use her  as a \"woobie.\" Pt also provided positive feedback to other group members around various concerns.   Treatment plan areas addressed: Depression, Anxiety and Self Care  Visit Diagnosis:    1. Recurrent major depressive disorder, in partial remission (CMS/HCC)    2. Adjustment disorder with anxious mood      Plan: Continue with PHP , Meet with [facility] Psychiatry Team, Patient to F/U with treatment goals as outlined in treatment plan and Patient to F/U with local ED or call 911 should SI/HI arise.    "     Kathleen Lee, [facility]

## 2020-08-14 NOTE — GROUP NOTE
Date:  August 14  Start Time:  12:10 PM  End Time:  01:10 PM  Billie Frank, YOB: 1960  Psychoeducational/Skills Based Group   4 members attended group today.    Group Focus: Goal of group was to introduce skills and psychoeducation related to topic of PHP day.   Group members were provided instruction and opportunities to practice presented skills.  RN answered questions as they arose and shared how presented skills can be utilized on a daily basis to increase emotional wellness.      About the Group: Session 5: Resiliency Building Psycho-Ed/Skills Based Group Summary  Topic of curriculum was “self-care. RN began group by discussing sleep hygiene techniques and provided group members with a handout. Group members were encouraged to identify sleep suggestions that they would like to try over the weekend, including sleep diaries, essential oils, etc. RN led the group through a time of reflection on prioritizing sleep hygiene with the goal of help group members identify their values to build up their resilience. Group members were asked the following questions: 1) in what ways can you identify ways to improve your sleep? 2) What areas of your life do you want to prioritize to assist in healthy decision-making? 3) How can you cultivate more time for play/creativity/rest? RN suggested patients start to incorporate old hobbies that they enjoyed in the past as well as incorporate healthy nutrition/exercise. RN answered questions regarding medications, side effects and tips to manage weight gain. RN closed group by encouraging patients to pick an activity they would like to do this weekend such as walk, read, listen to music, etc.    Yusra Hall MD was onsite when services were rendered.      Patient  was an active group participant.  Assessment/observation of group participation/presentation: Pt maintained eye contact throughout the group and nodded her head many times. Pt seemed receptive to sleep  "hygiene topic and wrote suggestions down to improve sleep. Pt did not participate much during group. When one pt reported she was fearful of the weekend, Billie started to cry and reported she was \"really overwhelmed.\" RN encouraged Billie to plan a couple activities that she can do during the weekend that she enjoys such as walking, reading, etc.   Treatment plan areas addressed: Self Care  Visit Diagnosis:    1. Recurrent major depressive disorder, in partial remission (CMS/HCC)    2. Adjustment disorder with anxious mood      Plan: Continue with PHP , Patient to F/U with treatment goals as outlined in treatment plan and Patient to F/U with local ED or call 911 should SI/HI arise.        Kathleen Lee, LPC  "

## 2020-08-14 NOTE — GROUP NOTE
"Date: August 14  Start Time:  1:20 PM  End Time:   2:20 PM  Billie Frank, YOB: 1960,  was an active group participant.    Wrap Up Group   4 attended group today.   Group Focus: Goal of group was to wrap up and process the treatment day.  Assist  members in planning for the evening ahead and to assess and plan surrounding any  safety needs.      About the Group:  LPC reviewed group members Afternoon  Reflection Sheets and did a verbal check in with each group member regarding safety  (SI/HI/self harm) and any necessary safety planning .  Session ended with a brief  meditation/calming activity.  Yusra Hall MD was onsite when  services rendered.        Afternoon Self Reflection  Depression: 2/5  Anxiety: 4/5  Anger: 1/5  Suicidal Ideation:   0/5 - None  Self Injury: 0/5 - None  Homicidal Ideation:   0/5 - None  Are you able to follow your Safety Plan? Yes.  I can/will:  Call someone, Journal, Use a coping skill and Call 911 if I'm unsafe.  Pt reported significant moment/insight of the day: \"Dealing with abandonment.\"  Pt reported gratitude list: \"My friends, my therapy, my meds.\"  Pt identified goal progress for the day: \"Not so much?\"  Pt reported evening self care plan: \"See a friend for support, feeling loved. No obstacles.\"  Pt treatment plan areas addressed: Depression, Anxiety and Self Care    Visit Diagnosis:    1. Recurrent major depressive disorder, in partial remission (CMS/HCC)    2. Adjustment disorder with anxious mood      Assessment/Observations:  Pt was open and engaged during group. She was tearful at times and indicated that after discussing her interaction with her , she was met with overwhelming grief after talking about it in group. Pt expressed significant anxiety about the weekend and lack of structure but also expressed overwhelm in doing weekend planning. During exercise, pt acknowledged a strong schema of abandonment and relationship tendencies as a result that " "have recently been forced to shift. She had difficulty speaking words of kindness to her \"vulnerable child\" mode and asked the question of whether her \"inner critic\" is true in that people will abandon her. Therapist reminded her that just because pt has experienced that in a few relationships does not been that she will ultimately be abandoned in all relationships and that her need to be loved and accepted is valid and desires to be cared for. Pt was able to remind herself of the relationships in her life that have remained constant.   Plan: Continue with PHP , Meet with [facility] Psychiatry Team, Patient to F/U with treatment goals as outlined in treatment plan and Patient to F/U with local ED or call 911 should SI/HI arise.        Kathleen Lee, LPC    "

## 2020-08-17 ENCOUNTER — PHP (OUTPATIENT)
Dept: PSYCHIATRY | Facility: HOSPITAL | Age: 60
End: 2020-08-17
Attending: PSYCHIATRY & NEUROLOGY
Payer: COMMERCIAL

## 2020-08-17 DIAGNOSIS — F43.22 ADJUSTMENT DISORDER WITH ANXIOUS MOOD: ICD-10-CM

## 2020-08-17 DIAGNOSIS — F33.41 RECURRENT MAJOR DEPRESSIVE DISORDER, IN PARTIAL REMISSION (CMS/HCC): Primary | ICD-10-CM

## 2020-08-17 PROCEDURE — H0035 MH PARTIAL HOSP TX UNDER 24H: HCPCS | Performed by: COUNSELOR

## 2020-08-17 RX ORDER — QUETIAPINE FUMARATE 50 MG/1
TABLET, FILM COATED ORAL
Qty: 60 TABLET | Refills: 0 | Status: SHIPPED | OUTPATIENT
Start: 2020-08-17 | End: 2020-08-20 | Stop reason: SDUPTHER

## 2020-08-17 NOTE — PROGRESS NOTES
"Psychiatry Brief Note    Evaluated patient today after she appeared increasingly dysregulated in group session to evaluate safety.     Pt reports that weekend started off well but then yesterday, felt acute onset of \"anguish.\" Took Seroquel 4x yesterday, had taken additional lorazepam the night before for anxiety. She reports thoughts of wishing she could \"disappear\" and thoughts of accepting death if it were to come. However, she denies any recent or current thoughts of suicide, no plan, no intent. Cites children as protective factors. She is spontaneously future oriented towards completing partial and getting car fixed this week.    MSE notable for labile, tearful affect, circumstantial thought process often returning to 's infidelity, fair insight and judgement.    In sum, pt continues to struggle with affective instability in light of 's infidelity. She is at chronic elevated risk of harm to self given family hx of suicide, personal hx of depression. Acute risk is elevated by hospital discharge, recent SI and attempted SIB precipitating hospitalization, emotional dysregulation this weekend and today, but her risk is mitigated by lack of true thoughts of suicide at this time, lack of plan, lack of intent, protective factors, future orientation, engagement in PHP. Her acute risk of harm to self is not significantly elevated from her usual baseline and she will likely benefit from ongoing support of PHP.     Plan  - Start quetiapine 50 mg daily at bedtime and increase to 50 mg three times daily as needed for anxiety/insomnia. Will reassess need for dose increase 8/20 at 2:40 PM.  - Continue other medications as outlined at last PHP visit, including uptitration in buspirone.   - Patient aware of how to contact office prior to next appointment with any issues, after-hours resources. Patient instructed to call 911 or go to nearest ED if thoughts of self-harm, suicide, or violence towards others. "     Yusra Hall MD

## 2020-08-17 NOTE — PROGRESS NOTES
"Met with pt during her lunch break. Pt crying excessively, overwhelmed. Pt reports SI that \"I want to die.\" Pt denies a plan. Pt able to contract for safety, reports she will not harm herself because \"I do not want to go back to [place].\" pt reports a good weekend with her kids. Pt just wants to call her kids. Pt reports med compliance. Pt struggling with emotions. Dr. Hall aware and will meet with pt today.  "

## 2020-08-17 NOTE — GROUP NOTE
"Date: August 17  Start Time:   9:30 AM  End Time: 10:30 AM  Billie Frank, YOB: 1960,  was an active group participant.    Community Check In Group    5 attended group today.     Group Focus: Goal of group was to welcome new and returning PHP members to the [facility], check in regarding group member needs, and assess safety.    About the Group: LPC reviewed [facility] Group Code of Conduct and answered any questions that arose.  LPC welcomed new members to the group and facilitated brief introductions of group members to one another.  Introduced topic/theme for the treatment day:Self Care.  Encouraged group members to request support throughout the day as needed.  LPC reviewed group members’ Morning Reflection Sheets and did a verbal check in with each group member regarding safety (SI/HI/self harm), safety planning, medication compliance, if they needed to consult with anyone today and individual treatment needs/goals for the day.  Session ended with a brief meditation/calming activity.   Yusra Hall MD was on site when services rendered.    Morning Self reflection:   Depression: 3/5 and \"so sad\"  Anxiety: 5/5  Anger: 2/5  Suicidal Ideation:   0/5 - None  Self Injury: 0/5 - None  Engaged in Self Injury since yesterday: No  Homicidal Ideation:   0/5 - None  Are you able to follow your Safety Plan? Yes.  I can/will:  Call someone, Journal, Use a coping skill and Call 911 if I'm unsafe.  Substance Use:  No  Self Care:  I completed my self care activity last night:  meditation, journal, family  I am satisfied with my daily hygiene:  yes  Nourish:Number of meals eaten yesterday? 2 and Describe:  Nutritious    Sleep:  Uses sleep aid? yes    and Describe:  Broken and Restless  Social Interaction:Moderate: Family and Friends  Physical Activity: No  Mindful Activity: Yes- meditation  Medication: Taking more than prescribed +Ativan and Seroquel   Thought Content: Cloudy, racing thoughts  Pt reported use of coping " "skills including successful follow through and barriers to follow through: \"Family, meditation, journal, they helped a little.\"  Pt reported significant or positive event/step: \"Spent time with son.\"  Pt identified goal for the treatment day: \"Figure out next steps- terrified.\"  Pt treatment plan areas addressed:  Depression and Anxiety  Pt requested consult with: Psychiatry Team and Therapist   Visit Diagnosis:    1. Recurrent major depressive disorder, in partial remission (CMS/HCC)    2. Adjustment disorder with anxious mood      Assessment/Observations:  Pt was tearful and anxious throughout group. She was attentive as other group members shared, but at times was observed to have her eyes closed and had difficulty focusing on the conversation. She reported a difficult weekend after speaking with her  stated that she \"wants to die\" because of the level of emotional pain she is enduring. She denied active intent or plans.  Plan:  Continue with PHP , Meet with [facility] Psychiatry Team, Patient to F/U with treatment goals as outlined in treatment plan and Patient to F/U with local ED or call 911 should SI/HI arise.        Kathleen Lee, KAIDEN      "

## 2020-08-17 NOTE — GROUP NOTE
"Date: August 17  Start Time:  1:20 PM  End Time:   2:20 PM  Billie Frank, YOB: 1960,  Pt was a passive group participant.    Wrap Up Group  3 attended group today.   Group Focus: Goal of group was to wrap up and process the treatment day.  Assist  members in planning for the evening ahead and to assess and plan surrounding any  safety needs.      About the Group:  LPC reviewed group members Afternoon  Reflection Sheets and did a verbal check in with each group member regarding safety  (SI/HI/self harm) and any necessary safety planning .  Session ended with a brief  meditation/calming activity.  Yusra Hall MD was onsite when  services rendered.        Afternoon Self Reflection  Depression: 3/5  Anxiety: 5/5  Anger: 2/5  Suicidal Ideation:   0/5 - None  Self Injury: 0/5 - None  Homicidal Ideation:   0/5 - None  Are you able to follow your Safety Plan? Yes.  I can/will:  Call someone, Journal, Use a coping skill and Call 911 if I'm unsafe.  Pt reported significant moment/insight of the day: \"Trying to get through the pain.\"  Pt reported gratitude list: \"This program.\"  Pt identified goal progress for the day: \"Working on next steps.\"  Pt reported evening self care plan:\"Meditation, journal, walk to feel better.  No obstacles.\"  Pt treatment plan areas addressed: Depression and Anxiety    Visit Diagnosis:    1. Recurrent major depressive disorder, in partial remission (CMS/HCC)    2. Adjustment disorder with anxious mood        Assessment/Observations:  Pt presented with blunted, sad affect and depression, anxious mood.  She was late to group due to check in with her psychiatrist but engaged in the session when she arrived.  When prompted, she reported feeling better since meeting with her psychiatrist and stated in general terms that she will focus on self care by meditating, journaling, and going to a walk today.  She will also do some processing/ thinking about \"next steps\".   Pt denied any " safety concerns.  Plan: Continue with PHP , Patient to F/U with treatment goals as outlined in treatment plan and Patient to F/U with local ED or call 911 should SI/HI arise.        Andressa Mcleod, LPC

## 2020-08-17 NOTE — GROUP NOTE
Date:  August 17  Start Time:  12:10 PM  End Time:   1:10 PM  Billie Frank, YOB: 1960  Psychoeducational/Skills Based Group  4 members attended group today    Group Focus: Goal of group was to introduce skills and psychoeducation related to topic of PHP day.   Group members were provided instruction and opportunities to practice presented skills.  LPC answered questions as they arose and shared how presented skills can be utilized on a daily basis to increase emotional wellness.      About the Group: Session 6: Self Care Psycho-Ed/Skills Based Group Summary  Topic of curriculum was “Self Care.”  LPC introduced the idea of setting goals for self-care and the importance of taking time for self amidst people’s busy lives. LPC facilitated discussion on  “[place] of Control” to help group members practice recognizing what they can and cannot control.  LPC utilized worksheet on “[place] of Control” to help group members increase identification of what is within their control. LPC led a group discussion encouraging group members to identify ideas for self care. Group members highlighted strategies for self care that they have used past, present and will use in the future. LPC utilized “Behavior Activation Worksheet”  from [employer] to help members set goal for self care that is realistic and able to be managed in a realistic time frame. LPC closed the group session with a mindful breathing meditation from [employer] for self care.     Yusra Hall MD was onsite when services were rendered.          Patient  was quiet but attentive.  Assessment/observation of group participation/presentation: Pt was alert and attentive but at times preoccupied with her anxiety related to her situation with her  and upcoming check in with the psychistrist.  She was receptive to material presented including the [place] of influence and the wellness wheel but remained passive throughout.  With prompting, she  did comment about self care activities such as meditation and journaling and shared that she has made progress in taking care of herself since starting PHP.    Treatment plan areas addressed: Depression and Anxiety  Visit Diagnosis:    1. Recurrent major depressive disorder, in partial remission (CMS/HCC)    2. Adjustment disorder with anxious mood        Plan: Continue with PHP , Meet with PHP Psychiatry Team, Patient to F/U with treatment goals as outlined in treatment plan and Patient to F/U with local ED or call 911 should SI/HI arise.        Andressa Mcleod, LPC

## 2020-08-17 NOTE — GROUP NOTE
"Date:  August 17  Start Time:  10:40 AM  End Time:  11:40 AM  Billie Frank, YOB: 1960   5 members attended group today.    Group Focus:  Goal of group was to establish and build social support, review coping skills, normalize the struggles of being human, and increase self awareness and self compassion.    About the Group:  LPC started group with a prompting quote/song to facilitate group discussion.  Group engaged in active and supportive discussion. Topics discussed included weekend stressors, difficulty coping during COVID-19, interpersonal difficulties.  Group members were able to offer insightful and supportive feedback and suggestions about how to manage difficult situations.  Group ended with a meditation/song.  Yusra Hall MD was onsite when services were rendered.    Patient  was an active group participant.  Assessment/observation of group participation/presentation: Pt reported being in a \"dissociative state\" but was responsive to therapist and other group members. She tearfully described highs and lows of her weekend, the low point being yesterday after thinking about her interaction with her  on Saturday and deep pain related to their relationship. She stated \"wanting to die\" and wanting to \"get a divorce\" to ease the pain. She acknowledged these being in response to what she is feeling and denied active intent or plan to harm herself. She also discussed feeling like a burden to her children but not having others to speak to during this time. Therapist and group members praised her for coming to group and validated her feelings.   Treatment plan areas addressed: Depression, Anxiety, Relationship issues/Communication skills and Self Care  Visit Diagnosis:    1. Recurrent major depressive disorder, in partial remission (CMS/HCC)    2. Adjustment disorder with anxious mood        Plan: Continue with PHP , Meet with [facility] Psychiatry Team, Patient to F/U with treatment goals as " outlined in treatment plan and Patient to F/U with local ED or call 911 should SI/HI arise.        Kathleen Lee, LPC

## 2020-08-18 ENCOUNTER — PHP (OUTPATIENT)
Dept: PSYCHIATRY | Facility: HOSPITAL | Age: 60
End: 2020-08-18
Attending: PSYCHIATRY & NEUROLOGY
Payer: COMMERCIAL

## 2020-08-18 DIAGNOSIS — F33.41 RECURRENT MAJOR DEPRESSIVE DISORDER, IN PARTIAL REMISSION (CMS/HCC): Primary | ICD-10-CM

## 2020-08-18 DIAGNOSIS — F43.22 ADJUSTMENT DISORDER WITH ANXIOUS MOOD: ICD-10-CM

## 2020-08-18 PROCEDURE — H0035 MH PARTIAL HOSP TX UNDER 24H: HCPCS | Performed by: COUNSELOR

## 2020-08-18 NOTE — GROUP NOTE
Date:  August 18  Start Time:  10:40 AM  End Time:  11:40 AM  Billie Frank, YOB: 1960   5 members attended group today.    Group Focus:  Goal of group was to establish and build social support, review coping skills, normalize the struggles of being human, and increase self awareness and self compassion.    About the Group:  LPC started group with a prompting quote/song to facilitate group discussion.  Group engaged in active and supportive discussion. Topics discussed included interpersonal boundaries, coping with depression and anxiety.  Group members were able to offer insightful and supportive feedback and suggestions about how to manage difficult situations.  Group ended with a meditation/song.  Yusra Hall MD was onsite when services were rendered.    Patient  was an active group participant.  Assessment/observation of group participation/presentation: Pt was open and engaged in group and spoke in more depth about her conversation with her  last night, despite suggestion from MH providers to limit conversation around their relationship. She indicated that the conversation was painful, in that he mentioned having had second thoughts before marrying her, but it provided her with further information around their marriage and his recent bxs of infidelity. Therapist encouraged pt to consider how she can continue to gain insight about herself, and her patterns in relationships, in light of her conversation with her . Pt was agreeable.   Treatment plan areas addressed: Depression, Anxiety and Relationship issues/Communication skills  Visit Diagnosis:    1. Recurrent major depressive disorder, in partial remission (CMS/HCC)    2. Adjustment disorder with anxious mood      Plan: Continue with PHP , Meet with PHP Therapist, Patient to F/U with treatment goals as outlined in treatment plan and Patient to F/U with local ED or call 911 should SI/HI arise.        Kathleen Lee,  LPC

## 2020-08-18 NOTE — GROUP NOTE
Date:  August 18  Start Time:  12:10 PM  End Time:   1:10 PM  Billie Frank, YOB: 1960  Psychoeducational/Skills Based Group  6 members attended group today    Group Focus: Goal of group was to introduce skills and psychoeducation related to topic of PHP day.   Group members were provided instruction and opportunities to practice presented skills.  LPC answered questions as they arose and shared how presented skills can be utilized on a daily basis to increase emotional wellness.      About the Group: Session 7: DBT- Distress Tolerance Psycho Ed/Skills Based Group Summary  Topic of Curriculum was “Distress Tolerance”. LPC educated group members about Distraction Techniques (creative but healthy ways of distracting from pain) and Radical Acceptance (You stop fighting reality) from The Dialectical Behavior Therapy Skills Workbook by Horacio Espinal, PHd, Shanna Donis, Rio Bellamy MD. LPC discussed goal of providing immediate ways of coping with stress. LPC introduced the PLEASE stress distraction technique as a temporary coping skill utilized mindfully to reduce emotional distress in the moment. LPC also introduced other techniques for stress tolerance in the moment, such as the STOP technique and ACCEPTS worksheet all taken from The Dialectical Behavior Therapy Skills Workbook by Horacio Espinal, PHd, Shanna Donis, Rio Bellamy MD.  LPC showed Distress Tolerance Video on ACCEPTS  from [facility]. LPC closed group session with 5 Sense Technique to help group members practice Distress Tolerance techniques in the moment.     Yusra Hall MD was onsite when services were rendered.          Patient  was an active group participant.  Assessment/observation of group participation/presentation: Pt presented with appropriate affect and was engaged in group discussion to topic.  She was interactive with peers, and participated in mindfulness breathing  guided meditation, ACCEPT distraction technique exercise, and 5 senses technique.  She shared about using STOP to help her cope with difficulty emotions and discussed using distractions for temporary relief and decreasing distress versus using them to avoid.  Pt related with peers about the effectiveness of doing something for another person as a distraction and stated intention to look into volunteering with animals.  Treatment plan areas addressed: Depression and Anxiety  Visit Diagnosis:  No diagnosis found.    Plan: Continue with PHP , Patient to F/U with treatment goals as outlined in treatment plan and Patient to F/U with local ED or call 911 should SI/HI arise.        Andressa Mcleod, LPC

## 2020-08-18 NOTE — GROUP NOTE
Date: August 18  Start Time:   9:30 AM  End Time: 10:30 AM  Billie Frank, YOB: 1960,  was an active group participant.    Community Check In Group  5 attended group today.     Group Focus: Goal of group was to welcome new and returning PHP members to the [facility], check in regarding group member needs, and assess safety.    About the Group: LPC reviewed [facility] Group Code of Conduct and answered any questions that arose.  LPC welcomed new members to the group and facilitated brief introductions of group members to one another.  Introduced topic/theme for the treatment day:Distress Tolerance.  Encouraged group members to request support throughout the day as needed.  LPC reviewed group members’ Morning Reflection Sheets and did a verbal check in with each group member regarding safety (SI/HI/self harm), safety planning, medication compliance, if they needed to consult with anyone today and individual treatment needs/goals for the day.  Session ended with a brief meditation/calming activity.   Yusra Hall MD was on site when services rendered.    Morning Self reflection:   Depression: 2/5  Anxiety: 3/5  Anger: 1/5  Suicidal Ideation:   0/5 - None  Self Injury: 0/5 - None  Engaged in Self Injury since yesterday: No  Homicidal Ideation:   0/5 - None  Are you able to follow your Safety Plan? Yes.  I can/will:  Call someone, Journal, Use a coping skill and Call 911 if I'm unsafe.  Substance Use:  No  Self Care:  I completed my self care activity last night:  Meditation, journal  I am satisfied with my daily hygiene:  yes  Nourish:Number of meals eaten yesterday? 3 and Describe:  Minimal    Sleep:  Uses sleep aid? yes    and Describe:  Number of hours:  7.5  Social Interaction:Moderate: Family and Friends  Physical Activity: No  Mindful Activity: Meditation  Medication: Prescribed  Thought Content: Clear, cloudy, racing thoughts  Pt reported use of coping skills including successful follow through and  "barriers to follow through: \"Meditation, journal, helped a little. No barriers.\"  Pt reported significant or positive event/step: \"Communicated with kids. Understood more about the marriage.\"  Pt identified goal for the treatment day: \"Focus on positivity.\"  Pt treatment plan areas addressed:  Depression and Anxiety  Pt requested consult with: None  Visit Diagnosis:    1. Recurrent major depressive disorder, in partial remission (CMS/HCC)    2. Adjustment disorder with anxious mood      Assessment/Observations:  Pt was open and engaged in group, and presented with less anxiety and tearfulness than the day prior. She reported having texted her children after group to let them know that she was struggling. She also requested to meet with her  to gain further understanding about her marriage and found that helpful.   Plan:  Continue with PHP , Meet with PHP Therapist, Patient to F/U with treatment goals as outlined in treatment plan and Patient to F/U with local ED or call 911 should SI/HI arise.        Kathleen Lee, KAIDEN      "

## 2020-08-18 NOTE — GROUP NOTE
Date: August 18  Start Time:  1:20 PM  End Time:   2:20 PM  Billie Frank, YOB: 1960,  was an active group participant.    Wrap Up Group  5 members attended group today.   Group Focus: Goal of group was to wrap up and process the treatment day.  Assist  members in planning for the evening ahead and to assess and plan surrounding any  safety needs.      About the Group:  LPC reviewed group members Afternoon  Reflection Sheets and did a verbal check in with each group member regarding safety  (SI/HI/self harm) and any necessary safety planning .  Session ended with a brief  meditation/calming activity.  Yusra Hall MD was onsite when  services rendered.        Afternoon Self Reflection  Depression: 1/5  Anxiety: 3/5  Anger: 1/5  Suicidal Ideation:   0/5 - None  Self Injury: 0/5 - None  Homicidal Ideation:   0/5 - None  Are you able to follow your Safety Plan? Yes.  I can/will:  Call someone, Journal, Use a coping skill and Call 911 if I'm unsafe.  Pt reported significant moment/insight of the day: Thinking about volunteering  Pt reported gratitude list: my children, this program, encouraging words from work  Pt identified goal progress for the day: stayed positive today   Pt reported evening self care plan:journal, meditate, talk with friends   Pt treatment plan areas addressed: Depression and Anxiety    Visit Diagnosis:    1. Recurrent major depressive disorder, in partial remission (CMS/HCC)    2. Adjustment disorder with anxious mood        Assessment/Observations:  Pt was engaged in group session and supportive of peers. Pt shared that she is thinking about volunteering to help feel better and get out of her head. Pt shared that she is trying to remain a bit more positive today and feels that she has been able to do that.   Plan: Continue with PHP , Patient to F/U with treatment goals as outlined in treatment plan and Patient to F/U with local ED or call 911 should SI/HI arise.         Koko Mason, LPC

## 2020-08-19 ENCOUNTER — PHP (OUTPATIENT)
Dept: PSYCHIATRY | Facility: HOSPITAL | Age: 60
End: 2020-08-19
Attending: PSYCHIATRY & NEUROLOGY
Payer: COMMERCIAL

## 2020-08-19 DIAGNOSIS — F33.41 RECURRENT MAJOR DEPRESSIVE DISORDER, IN PARTIAL REMISSION (CMS/HCC): Primary | ICD-10-CM

## 2020-08-19 DIAGNOSIS — F43.22 ADJUSTMENT DISORDER WITH ANXIOUS MOOD: ICD-10-CM

## 2020-08-19 PROCEDURE — H0035 MH PARTIAL HOSP TX UNDER 24H: HCPCS | Performed by: COUNSELOR
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
## 2020-08-19 NOTE — GROUP NOTE
Date:  August 19  Start Time:  10:40 AM  End Time:  11:40 AM  Billie Frank, YOB: 1960   6 members attended group today.    Group Focus:  Goal of group was to establish and build social support, review coping skills, normalize the struggles of being human, and increase self awareness and self compassion.    About the Group:  LPC started group with a prompting quote/song to facilitate group discussion.  Group engaged in active and supportive discussion. Topics discussed included Trauma, stress impacting the brain, window of tolerance and emotion regulation.  Group members were able to offer insightful and supportive feedback and suggestions about how to manage difficult situations.  Group ended with a meditation/song.  Yusra Hall MD was onsite when services were rendered.        Patient  was an active group participant.  Assessment/observation of group participation/presentation: Pt was engaged in group session and provided support to peers. Pt shared about her own negative narrative and self talk that she realized when she started PHP tx.  Pt shared that she has begun to change that narrative by using positive self affirming statements. Pt shared that she has been utilizing coping skills and practicing compassion with self. Pt shared that she does not have any SI currently but still has emotional pain and would not mind being dead so that she would not have to feel her pain any longer. Pt shared that she feels as though she can manage this pain better since she joined PHP tx.   Treatment plan areas addressed: Depression and Anxiety  Visit Diagnosis:    1. Recurrent major depressive disorder, in partial remission (CMS/HCC)    2. Adjustment disorder with anxious mood        Plan: Continue with PHP , Patient to F/U with treatment goals as outlined in treatment plan and Patient to F/U with local ED or call 911 should SI/HI arise.        Koko Mason LPC

## 2020-08-19 NOTE — GROUP NOTE
"Date: August 19  Start Time:  1:20 PM  End Time:   2:20 PM  Billie Frank, YOB: 1960,  was an active group participant.    Wrap Up Group  6 attended group today.   Group Focus: Goal of group was to wrap up and process the treatment day.  Assist  members in planning for the evening ahead and to assess and plan surrounding any  safety needs.      About the Group:  LPC reviewed group members Afternoon  Reflection Sheets and did a verbal check in with each group member regarding safety  (SI/HI/self harm) and any necessary safety planning .  Session ended with a brief  meditation/calming activity.  Yusra Hall MD was onsite when  services rendered.        Afternoon Self Reflection  Depression: 2/5  Anxiety: 3/5  Anger: 2/5  Suicidal Ideation:   0/5 - None  Self Injury: 0/5 - None  Homicidal Ideation:   0/5 - None  Are you able to follow your Safety Plan? Yes.  I can/will:  Call someone, Journal, Use a coping skill and Call 911 if I'm unsafe.  Pt reported significant moment/insight of the day: \"Realizing the importance of the window of tolerance!\"  Pt reported gratitude list: \"My car (w/ 132,000 miles), my benefits, my family\"  Pt identified goal progress for the day: \"Yes (met goals), I stayed relatively positive.  Also overwhelmed.\"  Pt reported evening self care plan:\"Journal, walk, meditate to increase mood and lower anxiety.\"  0 obstacles.\"  Pt treatment plan areas addressed: Depression and Anxiety    Visit Diagnosis:    1. Recurrent major depressive disorder, in partial remission (CMS/HCC)    2. Adjustment disorder with anxious mood        Assessment/Observations:  Pt was engaged and responsive to peers with supportive feedback.  She related with peers in feeling anxious and overwhelmed and using mindfulness to stay focused on the group which helps.  Pt also shared plan to journal, meditate and go for a walk.\"  Plan: Continue with PHP , Patient to F/U with treatment goals as outlined in " treatment plan and Patient to F/U with local ED or call 911 should SI/HI arise.        Andressa Mcleod, LPC

## 2020-08-19 NOTE — GROUP NOTE
Date: August 19  Start Time:   9:30 AM  End Time: 10:30 AM  Billie Frank, YOB: 1960,  was an active group participant.    Community Check In Group  6 members attended group today.     Group Focus: Goal of group was to welcome new and returning PHP members to the [facility], check in regarding group member needs, and assess safety.    About the Group: LPC reviewed [facility] Group Code of Conduct and answered any questions that arose.  LPC welcomed new members to the group and facilitated brief introductions of group members to one another.  Introduced topic/theme for the treatment day:Trauma.  Encouraged group members to request support throughout the day as needed.  LPC reviewed group members’ Morning Reflection Sheets and did a verbal check in with each group member regarding safety (SI/HI/self harm), safety planning, medication compliance, if they needed to consult with anyone today and individual treatment needs/goals for the day.  Session ended with a brief meditation/calming activity.   Yusra Hall MD was on site when services rendered.    Morning Self reflection:   Depression: 2/5  Anxiety: 4/5  Anger: 2/5  Suicidal Ideation:   0/5 - None  Self Injury: 0/5 - None  Engaged in Self Injury since yesterday: No  Homicidal Ideation:   0/5 - None  Are you able to follow your Safety Plan? Yes.  I can/will:  Call someone, Journal, Use a coping skill and Call 911 if I'm unsafe.  Substance Use:  No  Self Care:  I completed my self care activity last night:  watched tv, breating exercise, friends   I am satisfied with my daily hygiene:  yes  Nourish:Number of meals eaten yesterday? 3    Sleep:  Uses sleep aid? yes     Social Interaction:Moderate: Family and Friends  Physical Activity: None  Mindful Activity: breathing   Medication: Prescribed  Thought Content: clear and cloudy   Pt reported use of coping skills including successful follow through and barriers to follow through: watched a tv show for the first in  a while  Pt reported significant or positive event/step: maintained emotionally   Pt identified goal for the treatment day: Keep positive   Pt treatment plan areas addressed:  Depression and Anxiety  Pt requested consult with: None  Visit Diagnosis:  No diagnosis found.    Assessment/Observations:  Pt was engaged in community check in group. Pt shared that she found herself relaxing by watching tv last night and maintaining her mood.  Pt shared that usually she journals, and does a guided meditation but last night she chose to watch a show which she has not done in a while. Pt shared that she also finds journaling and meditation to be helpful for her.   Plan:  Continue with PHP , Patient to F/U with treatment goals as outlined in treatment plan and Patient to F/U with local ED or call 911 should SI/HI arise.        Koko Mason, LPC
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
## 2020-08-20 ENCOUNTER — PHP (OUTPATIENT)
Dept: PSYCHIATRY | Facility: HOSPITAL | Age: 60
End: 2020-08-20
Attending: PSYCHIATRY & NEUROLOGY
Payer: COMMERCIAL

## 2020-08-20 DIAGNOSIS — F33.41 RECURRENT MAJOR DEPRESSIVE DISORDER, IN PARTIAL REMISSION (CMS/HCC): Primary | ICD-10-CM

## 2020-08-20 DIAGNOSIS — F43.22 ADJUSTMENT DISORDER WITH ANXIOUS MOOD: ICD-10-CM

## 2020-08-20 PROCEDURE — 99214 OFFICE O/P EST MOD 30 MIN: CPT | Performed by: PSYCHIATRY & NEUROLOGY

## 2020-08-20 PROCEDURE — H0035 MH PARTIAL HOSP TX UNDER 24H: HCPCS | Performed by: COUNSELOR

## 2020-08-20 RX ORDER — QUETIAPINE FUMARATE 50 MG/1
TABLET, FILM COATED ORAL
Qty: 90 TABLET | Refills: 0 | Status: SHIPPED | OUTPATIENT
Start: 2020-08-20 | End: 2021-08-24

## 2020-08-20 RX ORDER — LORAZEPAM 1 MG/1
1 TABLET ORAL NIGHTLY PRN
Qty: 30 TABLET | Refills: 0 | Status: SHIPPED | OUTPATIENT
Start: 2020-08-20 | End: 2022-04-20 | Stop reason: ALTCHOICE

## 2020-08-20 RX ORDER — BUSPIRONE HYDROCHLORIDE 15 MG/1
15 TABLET ORAL 2 TIMES DAILY
Qty: 60 TABLET | Refills: 0 | Status: SHIPPED | OUTPATIENT
Start: 2020-08-20 | End: 2020-12-16

## 2020-08-20 NOTE — GROUP NOTE
Date:  August 20  Start Time:  10:40 AM  End Time:  11:40 AM  Billie Frank, YOB: 1960   6 members attended group today.    Group Focus:  Goal of group was to establish and build social support, review coping skills, normalize the struggles of being human, and increase self awareness and self compassion.    About the Group:  LPC started group with a prompting quote/song to facilitate group discussion.  Group engaged in active and supportive discussion. Topics discussed included Self compassion and Self love.  Group members were able to offer insightful and supportive feedback and suggestions about how to manage difficult situations.  Group ended with a meditation/song.  Yusra Hall MD was onsite when services were rendered.        Patient  was an active group participant.  Assessment/observation of group participation/presentation: Pt was engaged and responsive throughout.  She was receptive to support from peers to focus on her self care and recovery.    Treatment plan areas addressed: Depression and Anxiety  Visit Diagnosis:    1. Recurrent major depressive disorder, in partial remission (CMS/HCC)    2. Adjustment disorder with anxious mood        Plan: Continue with [facility] , Patient to F/U with treatment goals as outlined in treatment plan and Patient to F/U with local ED or call 911 should SI/HI arise.        Andressa Mcleod LPC

## 2020-08-20 NOTE — PROGRESS NOTES
PHP PSYCHIATRY FOLLOW UP/MEDICATION CHECK    Billie Frank is a 59 y.o. female who presents for Depression.    Reports feeling anxious in light of discharge from [facility] to [facility] today, though overall improved compared to earlier this week. Ongoing thoughts that she would accept death if it came to her, but denies any recent or current thoughts of suicide, no plan, no intent.      Adherent to current medications, tolerated uptitration in buspirone with no issue. Has been taking PRN quetiapine three times a day regularly, no missed doses. Still taking lorazepam daily, took one extra last night because of anxiety. Denies any oversedation with medication regimen.    Psychiatric ROS:  Sleep: Improved with quetiapine  Appetite: Improved from hospitalization, but still not back to her baseline  Libido: Deferred  Exercise: Deferred  ETOH/Substances: Denies any alcohol or drug use during PHP    Medical Review of Systems  Review of Systems   Constitutional: As per HPI  Eyes: Denies  Ears/Nose/Mouth/Throat: Denies  Respiratory: Denies  Cardiovascular: + chest pain and palpitations when anxious  Gastrointestinal: Denies  Urinary: Denies  Genital: Denies  Musculoskeletal: + tingling in arms when anxious  Skin: Denies  Neurologic: Denies  Endocrinologic: Denies  Hematologic: Denies  Immune/Lymph: Denies  Pain (0-10): Denies        PMSH: No changes were made to non-psychiatric medications/allergies/medical history.     Risk/Benefit/side Effects discussed regarding the following medications:  Escitalopram, quetiapine, lorazepam, buspirone  PDMP Queried: No    Allergies:   Allergies   Allergen Reactions   • Bee Sting [Bee Venom Protein (Honey Bee)] Anaphylaxis   • Iodinated Contrast Media Hives   • Spinach GI intolerance     Raw Spinach only   • Penicillins Rash   • Sulfa (Sulfonamide Antibiotics) Rash       Current Outpatient Medications:   •  AMABELZ 0.5-0.1 mg per tablet, 1 tablet 3 (three) times a week (Mon, Wed, Fri). TID weekly,  "due tomorrow Saturday 8/1/2020 , , Disp: , Rfl:   •  ascorbic acid (VITAMIN C) 500 mg tablet, Take 1,000 mg by mouth daily., , Disp: , Rfl:   •  aspirin 81 mg enteric coated tablet, Take 81 mg by mouth daily., , Disp: , Rfl:   •  b complex vitamins capsule, Take 1 capsule by mouth daily., , Disp: , Rfl:   •  busPIRone (BUSPAR) 15 mg tablet, Take 1 tablet (15 mg total) by mouth 2 (two) times a day., , Disp: 60 tablet, Rfl: 0  •  cholecalciferol, vitamin D3, 1,000 unit (25 mcg) tablet, Take 1,000 Units by mouth daily., , Disp: , Rfl:   •  diclofenac (VOLTAREN) 50 mg EC tablet, Take 50 mg by mouth 2 (two) times a day as needed.  , , Disp: , Rfl:   •  escitalopram (LEXAPRO) 20 mg tablet, Take 1 tablet (20 mg total) by mouth once daily., , Disp: 30 tablet, Rfl: 0  •  LORazepam (ATIVAN) 1 mg tablet, Take 1 tablet (1 mg total) by mouth nightly as needed (insomnia)., , Disp: 30 tablet, Rfl: 0  •  omeprazole (PriLOSEC) 20 mg capsule, daily as needed.  , , Disp: , Rfl:   •  QUEtiapine (SEROquel) 50 mg tablet, Take 1 tab daily at bedtime. Also ok to take 1 tab up to three times daily as needed for anxiety, , Disp: 90 tablet, Rfl: 0  •  RANEXA 500 mg 12 hr tablet, , , Disp: , Rfl:   •  rosuvastatin (CRESTOR) 5 mg tablet, Take 5 mg by mouth daily. Every other day, , Disp: , Rfl:     Objective     Physical Exam   Gen: Well-appearing no acute distress    There were no vitals taken for this visit.    MENTAL STATUS EXAM  Appearance: well groomed, good hygiene  Gait and Motor: no abnormal movements, no tremor, no PMR/PMA  Speech: normal rate/rhythm/volume  Mood: \"anxious\"  Affect: anxious, dysphoric, full range, but notably less labile than earlier this week  Associations: intact  Thought Process: linear, logical, goal-directed  Thought Content: no auditory or visual hallucinations, no delusionary content  Suicidality/Homicidality: no current thoughts of suicide, no plan, no intent  Judgement/Insight: good/good  Orientation: Intact " to interview  Memory: Intact to interview  Attention: alert  Knowledge: normal  Language: normal         [place] Suicide Severity Rating Scale:  Not done today       Labs  No new labs.    Imaging  Not applicable    ECG   Not applicable.     Brief Psychiatric Formulation: 59 year old  with past psych hx of MDD (including PPD), 1 past lifetime hospitalization at [facility], no past suicide attempts or true SIB (though with gesture at self-injury prior to recent hospitalization) who presents with worsening mood in context of ongoing discord with .      Recent sx do not appear consistent with true major depressive episode but instead likely reflect affective instability and limited coping skills in context of acute interpersonal stressor, though given her personal hx of depression and severe family hx, will want to monitor for burgeoning depressive episode.     Pt reports improvement with PHP and appears substantially less labile and more in control of emotions today compared to earlier this week. She denies any SI and is future oriented towards starting work next week. She continues to use her PRN quetiapine regularly which will need to be monitored closely by her outpatient psychiatrist as if she continues to need this regularly, alternate options to her standing antidepressant may need to be considered.     She is at chronic elevated risk of harm to self given family hx of suicide, personal hx of depression. Acute risk is elevated by hospital discharge, recent SI and attempted SIB, but her risk is mitigated by her improvement over course of PHP, lack of thoughts of suicide, no plan, no intent, abstinence from alcohol, mediation adherence, future orientation, and treatment-seeking behavior. Her acute risk of harm to self is not significantly elevated from her usual baseline and she will likely benefit from stepdown from PHP to IOP.       Plan:  - Continue buspirone 15 mg twice daily   - Continue escitalopram 20  mg daily  - Continue quetiapine 50 mg daily at bedtime and 50 mg 3x daily PRN anxiety. Pt counseled on quetiapine in my usual fashion, has baseline lipids in system. Instructed her today that she will need follow up metabolic monitoring in ~2 months, will follow up with outpatient provider  - Continue lorazepam 1 mg daily at bedtime as needed. Patient was counseled on the risks/benefits/alternatives/side effects of benzodiazepines in my usual fashion. PDMP reviewed. Instructed her not to take any additional doses of lorazepam, discussed risks.  - Patient reports she has a call into [facility] regarding outpatient psychiatry, but she has no appointment scheduled. Strongly encouraged her to follow up ASAP to ensure psychiatry appointment can be scheduled in the next month. If she is unable to secure an appointment during that time, I encouraged her to contact [facility] for psychiatry appointment to bridge her to new provider.  - Discharge from PHP today, will start IOP tomorrow    Visit Diagnosis:  1. Recurrent major depressive disorder, in partial remission (CMS/HCC)        Duration:  20 minutes  Yusra Hall MD @ 9:11 PM

## 2020-08-20 NOTE — GROUP NOTE
Date:  August 20  Start Time:  12:10 PM  End Time:   1:10 PM  Billie Frank, YOB: 1960  Psychoeducational/Skills Based Group  6 members attended group today    Group Focus: Goal of group was to introduce skills and psychoeducation related to topic of PHP day.   Group members were provided instruction and opportunities to practice presented skills.  LPC answered questions as they arose and shared how presented skills can be utilized on a daily basis to increase emotional wellness.      About the Group: Session 9: DBT- Interpersonal Effectiveness Psycho Ed/Skills Based Group Summary   Topic of curriculum was “DBT Interpersonal Effectiveness”.  LPC educated group members on Communication Styles of Passive, Assertive, Aggressive, and Passive-Aggressive and had group  members identify examples of each. LPC facilitated discussion on how some of these communication styles may have worked well previously, but are not serving group member now.  LPC used worksheet from [employer] for communication styles. LPC then centered discussion on the importance of using Assertive communication. LPC used worksheet on “I Statements” from [employer] and encouraged group members to reflect on their own needs and how they can use “I statements” to communicate these needs. LPC Closed group session with meditation from Zack Abdullahi.     Yusra Hall MD was onsite when services were rendered.          Patient  was an active group participant.  Assessment/observation of group participation/presentation: Pt was engaged in group discussion and highlighted different examples of communication styles. Pt shared that she has a mix of assertive and aggressive communication tendencies.  Pt was supportive of group members and related to the discussion.   Treatment plan areas addressed: Depression and Anxiety  Visit Diagnosis:  No diagnosis found.    Plan: Continue with PHP , Patient to F/U with treatment goals as outlined in  treatment plan and Patient to F/U with local ED or call 911 should SI/HI arise.        Koko Mason, LPC

## 2020-08-20 NOTE — GROUP NOTE
"Date: August 20  Start Time:   9:30 AM  End Time: 10:30 AM  Billie Frank, YOB: 1960,  was an active group participant.    Community Check In Group  6 attended group today.     Group Focus: Goal of group was to welcome new and returning PHP members to the [facility], check in regarding group member needs, and assess safety.    About the Group: LPC reviewed [facility] Group Code of Conduct and answered any questions that arose.  LPC welcomed new members to the group and facilitated brief introductions of group members to one another.  Introduced topic/theme for the treatment day:Interpersonal Effectiveness.  Encouraged group members to request support throughout the day as needed.  LPC reviewed group members’ Morning Reflection Sheets and did a verbal check in with each group member regarding safety (SI/HI/self harm), safety planning, medication compliance, if they needed to consult with anyone today and individual treatment needs/goals for the day.  Session ended with a brief meditation/calming activity.   Yusra Hall MD was on site when services rendered.    Morning Self reflection:   Depression: 1.5  Anxiety: 4/5  Anger: 1/5  Suicidal Ideation:   0/5 - None  Self Injury: 0/5 - None  Engaged in Self Injury since yesterday: No  Homicidal Ideation:   0/5 - None  Are you able to follow your Safety Plan? Yes.  I can/will:  Call someone, Journal, Use a coping skill and Call 911 if I'm unsafe.  Substance Use:  No  Self Care:  I completed my self care activity last night:  \"walk, meditation, pray, journal\"  I am satisfied with my daily hygiene:  yes  Nourish:Number of meals eaten yesterday? 3 and Describe:  Normal and Minimal    Sleep:  Uses sleep aid? yes    and Describe:  Restless  Social Interaction:Moderate: Family and Friends  Physical Activity: \"Yes, Walked!\"  Mindful Activity: \"Yes, meditation, prayer.\"  Medication: Prescribed plus 1 Ativan  Thought Content: Clear, Cloudy, Racing thoughts  Pt reported use " "of coping skills including successful follow through and barriers to follow through: \"\"Walked, prayed, journal, meditation, helped a bit.  No barriers.\"  Pt reported significant or positive event/step: \"Just trying to keep it together.\"  Pt identified goal for the treatment day: \"To move on in as strong a way as possible.\"  Pt treatment plan areas addressed:  Depression and Anxiety  Pt requested consult with: None  Visit Diagnosis:    1. Recurrent major depressive disorder, in partial remission (CMS/HCC)    2. Adjustment disorder with anxious mood        Assessment/Observations:  Pt presented anxious and was tearful at times sharing that she was feeling \"terrified\" of discharging today and starting IOP tomorrow.  She spoke of heartache regarding her marriage issues, fear of decompensating and not being able to work, and leaving the safety of PHP.  She was receptive to supportive feedback and encouragement from group members.  Plan:  Patient to F/U with treatment goals as outlined in treatment plan, Patient to F/U with local ED or call 911 should SI/HI arise.     and Discharge from [facility]. Patient to F/U with IOP 3 days a week, 3 hours per day     Andressa Mcleod LPC      "

## 2020-08-20 NOTE — GROUP NOTE
"Date: August 20  Start Time:  1:20 PM  End Time:   2:20 PM  Billie Frank, YOB: 1960,  was an active group participant.    Wrap Up Group    6 attended group today.   Group Focus: Goal of group was to wrap up and process the treatment day.  Assist  members in planning for the evening ahead and to assess and plan surrounding any  safety needs.      About the Group:  LPC reviewed group members Afternoon  Reflection Sheets and did a verbal check in with each group member regarding safety  (SI/HI/self harm) and any necessary safety planning .  Session ended with a brief  meditation/calming activity.  Yusra Hall MD was onsite when  services rendered.      Afternoon Self Reflection  Depression: 1/5  Anxiety: 3/5  Anger: 1/5  Suicidal Ideation:   0/5 - None  Self Injury: 0/5 - None  Homicidal Ideation:   0/5 - None  Are you able to follow your Safety Plan? Yes.  I can/will:  Call someone, Journal, Use a coping skill and Call 911 if I'm unsafe.  Pt reported significant moment/insight of the day: \"Preparing for next steps, others reassurance is helpful.\"  Pt reported gratitude list: \"My upcoming acupuncture appt, my support system, FMLA.\"   Pt identified goal progress for the day: \"Yes.\"  Pt reported evening self care plan: \"Journal, acupuncture appt, meditate.\"    Visit Diagnosis:  Recurrent major depressive disorder, in partial remission (CMS/HCC) [F33.41]    Assessment/Observations:  Pt was open and engaged during group. She identified skills and insights she had learned over the course of the day around communication style and interpersonal skills. Being that it was her last group in PHP she reflected on her progress in the program, the skills she has learned and how much she has valued support from the group. Therapist and group members offered encouragement and well wishes as she steps down to [facility].   Plan: Patient to F/U with treatment goals as outlined in treatment plan, Patient to F/U with local " ED or call 911 should SI/HI arise.     and Discharge from [facility]. Patient to F/U with IOP 3 days a week, 3 hours per day     Kathleen Lee LPC

## 2020-08-21 ENCOUNTER — APPOINTMENT (OUTPATIENT)
Dept: PSYCHIATRY | Facility: HOSPITAL | Age: 60
End: 2020-08-21
Attending: SOCIAL WORKER
Payer: COMMERCIAL

## 2020-08-21 ENCOUNTER — TELEPHONE (OUTPATIENT)
Dept: PSYCHIATRY | Facility: HOSPITAL | Age: 60
End: 2020-08-21

## 2020-08-21 DIAGNOSIS — F43.22 ADJUSTMENT DISORDER WITH ANXIOUS MOOD: ICD-10-CM

## 2020-08-21 DIAGNOSIS — F33.41 RECURRENT MAJOR DEPRESSIVE DISORDER, IN PARTIAL REMISSION (CMS/HCC): Primary | ICD-10-CM

## 2020-08-21 PROCEDURE — S9480 INTENSIVE OUTPATIENT PSYCHIA: HCPCS | Mod: 95 | Performed by: SOCIAL WORKER

## 2020-08-21 ASSESSMENT — COGNITIVE AND FUNCTIONAL STATUS - GENERAL
PSYCHOMOTOR FUNCTIONING: WNL
SPEECH: REGULAR
MOOD: ANXIOUS
SLEEP_WAKE_CYCLE: NO CHANGE
APPETITE: NO CHANGE
AFFECT: FULL RANGE

## 2020-08-21 NOTE — TELEPHONE ENCOUNTER
LPC called pt's OP therapist to confirm discharge from PHP tx and admission into IOP tx. KAIDEN LVM and encourgaed him to call [facility] with any follow up questions.

## 2020-08-21 NOTE — GROUP NOTE
"Date:  August 21  Start Time:  12:30 PM  End Time:   3:30 PM    Billie Frank, YOB: 1960,  was an active group participant.     Number of Attendees in Group:  4    IOP Session 2:  Topic of curriculum was \"Mindfulness.\"   Facilitated check in utilizing \"Adrianna, Thorn and Seed\" format and 0-5 safety scale regarding SI.  Showed Moncho Skinner's video \"Mindfulness is the New Superpower\" and facilitated discussion surrounding mindfulness in our everyday lives.  Provided handouts including:\"Mindfull vs Minful\" picture;  quote from Agueda Cárdenas normalizing restlessness in meditation and to stay with the practice even when it is difficulty; \"What is Mindfulness\" handout which summarizes the basics of mindfulness including the need to practice and to cultivate a non-judgmental attitude; reasons to meditate from [employer] [place];  \"Here's How Meditation Reduces Inflammation and Prevents Disease\" by Teresa Rush which summarizes recent scientific findings about how meditation improves not only our emotional, but our physical health as well; an excerpt from \"Wherever You Go, There You Are\" by Robert Moreland which describes viewing meditation as if we are standing behind a waterfall watching our thoughts pass; \"The 5 Senses Techniqure;\"  \"Attitudinal Foundations of Mindfulness Practice\" which describes the foundations such as patience and beginner's mind that are part of the building a mindfulness practice; \"Relaxing Sighs\" which describes the skill of using relaxing sighs to calm ourselves and be mindful; \"Mindful Breathing\" which provides instructions on using breathing techniques to practice mindfulness in the moment; \"Mindfulness Meditation Resources\" such as mindfulness apps, and mindfulness reminders; \"Developing Mindfulness Triggers\" by G[employer]ito which shares ideas of how to integrate mindfulness into our daily lives; \"What Are Alexa Beads and How Do I Use Them?\" by Etelvina Jaquez and \"DIY Alexa " "Beads\" from [employer] [place] which provides instructions for members to make their own magalie beads;  \"Resources for Recovering Resilience: Sense and Savor Walk\" which provides instructions on how to practice a walking meditation; and several mandalas for pts to color.           Patients reaction:  Pt shared she was grateful to be in her first IOP session today. She processed with the group the upcoming transitions of starting IOP, [place] [facility] [place] tomorrow which she identified has triggering of her recent marital issues, and resuming work part time on Monday. Pt identified she has significant family and friend support, and has a friend coming in from out of town tomorrow to spend the weekend with her. Pt discussed how she is able to use mindfulness and grounding exercises if she feels triggered while at the beach this weekend. Pt also stated she will be able to cancel the day trip if she feels that is necessary.     Request for Consent  Patient provided verbal consent to treat via telemedicine. Clinician introduced the secure telemedicine platform that we are utilizing to provide care during the COVID-19 pandemic. Patient understands the session will be billed to their insurance or patient directly.  Patient was informed only the group patients  and the clinician are permitted on?the video conference, sessions are not recorded by the clinician, and the patient is not permitted to record the session.? Patient was provided clinician's unique meeting ID prior to session, patient was asked to arrive to virtual session on time just as patient would if we were in the office. Clinician confirmed identification of patient by name and birthdate, provider name, location of patient and clinician, and callback number in case disconnected.  Patient Response to Request for Consent: Yes  Visit Type performed: Audio and Video.    Mental Status:  Mood: Anxious  Affect: Full Range  Speech: Regular  Psychomotor " Functioning: WNL  Anxiety Level: 1  Depression Level: 1  Anger Level: 1  Safety Scale: 1  Safety Contract:: N/A  Behaviors:  ADL'S:: Good  Social Status:: Normal  Med Compliance: Yes  Substance Use: Denies  Self Injury:: None    Visit Diagnosis: Recurrent major depressive disorder, in partial remission (CMS/HCC) [F33.41]    Plan: Continue IOP to : 8/24    Notes for Treatment day: None      Karina Otero LCSW

## 2020-08-24 ENCOUNTER — TELEPHONE (OUTPATIENT)
Dept: PSYCHIATRY | Facility: HOSPITAL | Age: 60
End: 2020-08-24

## 2020-08-24 ENCOUNTER — APPOINTMENT (OUTPATIENT)
Dept: PSYCHIATRY | Facility: HOSPITAL | Age: 60
End: 2020-08-24
Attending: SOCIAL WORKER
Payer: COMMERCIAL

## 2020-08-24 DIAGNOSIS — F33.41 RECURRENT MAJOR DEPRESSIVE DISORDER, IN PARTIAL REMISSION (CMS/HCC): Primary | ICD-10-CM

## 2020-08-24 DIAGNOSIS — F43.22 ADJUSTMENT DISORDER WITH ANXIOUS MOOD: ICD-10-CM

## 2020-08-24 PROCEDURE — S9480 INTENSIVE OUTPATIENT PSYCHIA: HCPCS | Mod: 95 | Performed by: SOCIAL WORKER

## 2020-08-24 RX ORDER — LORAZEPAM 0.5 MG/1
1 TABLET ORAL NIGHTLY PRN
Qty: 14 TABLET | Refills: 0 | Status: CANCELLED | OUTPATIENT
Start: 2020-08-24 | End: 2020-08-31

## 2020-08-24 ASSESSMENT — COGNITIVE AND FUNCTIONAL STATUS - GENERAL
AFFECT: FULL RANGE
SPEECH: REGULAR
APPETITE: NO CHANGE
MOOD: ANXIOUS
SLEEP_WAKE_CYCLE: NO CHANGE
PSYCHOMOTOR FUNCTIONING: WNL

## 2020-08-24 NOTE — GROUP NOTE
Date:  August 24  Start Time:  12:30 PM  End Time:   3:30 PM    Billie Frank, YOB: 1960,  was an active group participant.     Number of Attendees in Group:  4    IOP Session 3: Topic of curriculum was “Self Care and Stress Management.” Facilitated check in utilizing “Adrianna, Thorn and Seed” format and 0-5 safety scale regarding SI.   Provided handouts including:  “As a person…” a poem reminding pts of their rights; “Maslow’s Hierarchy of Needs” which presents a framework of where to invest our energy to get our needs met; “Make Your Own Mental Health Self Care Kit” by Rere Miles which provides ideas of what sensory soothing items can be used in a “kit” to take care of ourselves; a list of coping skills generated by previous IOP groups; “So What is Self Care?” and “Positive Steps to Wellbeing” which describe the basics of self care;  information on EFT/Tapping (from [facility]) including information on meridian points and where they connect to in the body by Jonny Salvador; “Acupressure Points for the Hands” by Jonny Salvador;    “Breathing: The Little Known Secret to Peace of Mind” which describes alternate nostril breathing; “Twelve Simple Tips to Improve Your Sleep” from [facility] which describes 12 tips to assist pts in obtaining more restful and peaceful sleep;  “Sensual Awareness Inventory” by Sven Combs which encourages members to list what they experience as soothing in regards to their 5 senses; “Modifying Mood and Meaning with Music and Poetry” by Malissa Quintana which provide directions on using music to shift our emotional experience; “Music as Medicine” by Ailyn Watts which describes how music therapy can improve health outcomes; “Why Girl Gangs Make for Good Health” by Sherlyn Marques which summarizes the research that supports women making and cultivating strong and intimate female relationships and how it positively impacts both their physical  "and emotional well being;; “Resources for Recovering Resilience: Hanging Out with Healthy Brains” by Lilliam Luz which encourages pts to spend time with people who are resilient and to obtain a “gratitude pily” to assist in building a gratitude practice; and “Resources for Recovering Resilience: Creating a [place] of Support” by Lilliam Luz which describes how visualizing people who are supportive to use in a moment of distress can be just as powerful as if we were able to actually be near our supports in that moment.          Patients reaction:  Pt shared she was feeling \"pretty okay\" today and had a good weekend. Pt discussed that her weekends continue to become easier each week and she has learned she does need to maintain a schedule and was grateful for her friend visiting from out of town. Pt discussed her anxiety continues to be difficult to handle, but she was thankful she was able to successfully have a visit the beach this weekend, as she had identified this as a triggering location. Pt discussed her transition back to work today as well and acknowledges need for ongoing self care and monitoring her needs.     Request for Consent  Patient provided verbal consent to treat via telemedicine. Clinician introduced the secure telemedicine platform that we are utilizing to provide care during the COVID-19 pandemic. Patient understands the session will be billed to their insurance or patient directly.  Patient was informed only the group patients  and the clinician are permitted on?the video conference, sessions are not recorded by the clinician, and the patient is not permitted to record the session.? Patient was provided clinician's unique meeting ID prior to session, patient was asked to arrive to virtual session on time just as patient would if we were in the office. Clinician confirmed identification of patient by name and birthdate, provider name, location of patient and clinician, and callback number in " case disconnected.  Patient Response to Request for Consent: Yes  Visit Type performed: Audio and Video.    Mental Status:  Mood: Anxious  Affect: Full Range  Speech: Regular  Psychomotor Functioning: WNL  Anxiety Level: 3  Depression Level: 1  Anger Level: 1  Safety Scale: 0  Safety Contract:: N/A  Behaviors:  ADL'S:: Good  Social Status:: Normal  Med Compliance: Yes  Substance Use: Denies  Self Injury:: None    Visit Diagnosis: Recurrent major depressive disorder, in partial remission (CMS/HCC) [F33.41]    Plan: Continue IOP to : 8/26    Notes for Treatment day: None      Karina Otero LCSW

## 2020-08-24 NOTE — TELEPHONE ENCOUNTER
"Pt reports Ativan too soon to be refilled since she had to take some additional doses during PHP program \"because I woke up with panic attacks.\" RN confirmed with pharmacy, next refill 8/30/2020. PDMP checked, no signs of abuse. Ativan last filled 8/3/2020 (30 pills). Pt requesting Ativan script until 8/30/2020 since she ran out of meds. RN confirmed there is documentation on several occasions when pt had to take extra doses of Ativan. Pt struggled with severe anxiety during PHP program. RN will review with provider if additional doses can be ordered until next refill. Pt aware that Ativan dose will not be increased and the recommended dose is 1mg HS prn insomnia.  "

## 2020-08-24 NOTE — TELEPHONE ENCOUNTER
Pt called and stated that one of the prescriptions is too soon to be filled. She asked that Dr. Hall call in 3-4 pills instead.

## 2020-08-25 NOTE — TELEPHONE ENCOUNTER
RN spoke with pt by phone to notify pt that Ativan is available at her pharmacy per Dr. Hall approval. Pt aware to take Ativan as prescribed and refrain from any additional doses. Pt reports an appt with a psychiatrist at [facility] on 9/22/2020. RN will inform Dr. Hall of upcoming appt in case Billie encounters any gaps in medication until 9/22/2020.

## 2020-08-25 NOTE — TELEPHONE ENCOUNTER
Reviewed case, patient did indeed require additional doses of lorazepam during PHP as she had acute worsening in anxiety. Spoke to patient's pharmacy and they will fill the 30-day script of lorazepam I sent in last week for the patient today. PDMP reviewed and last lorazepam script as per Dr. Campa during [facility] hospitalization.    Will ask RN to reach out to patient to inform her that lorazepam is available at pharmacy and that patient should only take lorazepam as prescribed at this time with no additional doses, otherwise should follow up with psychiatrist as planned at [facility].    Yusra Hall MD

## 2020-08-26 ENCOUNTER — APPOINTMENT (OUTPATIENT)
Dept: PSYCHIATRY | Facility: HOSPITAL | Age: 60
End: 2020-08-26
Attending: SOCIAL WORKER
Payer: COMMERCIAL

## 2020-08-26 DIAGNOSIS — F33.41 RECURRENT MAJOR DEPRESSIVE DISORDER, IN PARTIAL REMISSION (CMS/HCC): Primary | ICD-10-CM

## 2020-08-26 DIAGNOSIS — F43.22 ADJUSTMENT DISORDER WITH ANXIOUS MOOD: ICD-10-CM

## 2020-08-26 PROCEDURE — S9480 INTENSIVE OUTPATIENT PSYCHIA: HCPCS | Mod: 95 | Performed by: SOCIAL WORKER

## 2020-08-26 ASSESSMENT — COGNITIVE AND FUNCTIONAL STATUS - GENERAL
SPEECH: REGULAR
PSYCHOMOTOR FUNCTIONING: WNL
MOOD: ANXIOUS
APPETITE: NO CHANGE
SLEEP_WAKE_CYCLE: NO CHANGE
AFFECT: FULL RANGE;TEARFUL

## 2020-08-26 NOTE — GROUP NOTE
Date:  August 26  Start Time:  12:30 PM  End Time:   3:30 PM    Billie Frank, YOB: 1960,  was an active group participant.    Number of Attendees in Group:  5    IOP Session 4: Topic of curriculum was “Body Image.”  Facilitated check in utilizing “Adrianna, Thorn and Seed” format and 0-5 safety scale regarding SI.   Provided and facilitated discussion on handouts including: “Dear Society” a letter by Marilyn Edward about her rejection of society’s norms/expectations for women;  “If I Had a Little Girl…” which prompts members to consider what they would say to the little girl within themselves; “Are We Finally Fed Up With the Media’s Unrealistic Portrayal of Women’s Bodies?” by Julissa Fabian which describes how even “normal” bodies portrayed in the media are not realistic or representative of the greater society; “Tips for Becoming a Critical Viewer of the Media” which describes being aware that advertisements are constructions to create vulnerability in us to purchase a product and strategies to fight media messages;  “Women Need Mindfulness Even More Than Men Do” by Shakila Sneed which describes how women are more likely to multitask and worry so mindfulness is highly beneficial for them; “Listen to Your Body” from National Eating Disorders.org which promotes listening to and honoring the signals our body gives us as a compass for what we need; “Interoception: Know Yourself Inside and Out” by Elbert Calderón which discusses how there are neurons in our guts and how we can increase our awareness of our bodies; “How Trauma Affects Our Relationship with Our Bodies” by Shelley Hidalgo which describes how we often self soothe with food or other external resources when trauma is unprocessed in our bodies;  “Emotional Eating: How to Recognize and Stop Emotional Eating” which describes the difference between emotional and physical hunger and strategies to identify what we need emotionally to fill the need in a  "healthy manner; “Types and Symptoms of Eating Disorders” and descriptions of eating disorders from National Eating Disorders.org;  “How Would You Treat a Friend?” which highlights our tendency to treat others with compassion more so than we treat ourselves - the exercise prompts pts to consider how they would treat a friend and to turn that kindness back on themselves; “Resources for Recovering Resilience: Looking in the Mirror with Kindness” by Lilliam Luz which prompts pts to take time to look at themselves in the mirror and to appreciate their positive attributes; a body image activity which prompts members to draw their bodies and then identify 5 aspects they like and appreciate about their bodies; and reminder of starting to accept our bodies starts small.              Patients reaction:  Pt shared that she has been feeling overwhelmed with her transition to work this week and ongoing family stressors. Pt stated she feels confident she will be able to cope with the stress, but stated 'I just need to get back into the rhythm.\" Pt shared she is struggling with the feelings of losing her idea of family, but has continued to support of her children, friends, and extended family.    Mental Status:  Mood: Anxious  Affect: Full Range, Tearful  Speech: Regular  Psychomotor Functioning: WNL  Anxiety Level: 3  Depression Level: 1  Anger Level: 1  Safety Scale: 0  Safety Contract:: N/A  Behaviors:  ADL'S:: Good  Social Status:: Normal  Med Compliance: Yes  Substance Use: Denies  Self Injury:: None    Visit Diagnosis: No primary diagnosis found.    Plan: Continue IOP to : 8/28    Notes for Treatment day: None      Karina Otero LCSW    "

## 2020-08-28 ENCOUNTER — TELEMEDICINE (OUTPATIENT)
Dept: PSYCHIATRY | Facility: HOSPITAL | Age: 60
End: 2020-08-28
Attending: SOCIAL WORKER
Payer: COMMERCIAL

## 2020-08-28 DIAGNOSIS — F33.41 RECURRENT MAJOR DEPRESSIVE DISORDER, IN PARTIAL REMISSION (CMS/HCC): Primary | ICD-10-CM

## 2020-08-28 DIAGNOSIS — F43.22 ADJUSTMENT DISORDER WITH ANXIOUS MOOD: ICD-10-CM

## 2020-08-28 PROCEDURE — S9480 INTENSIVE OUTPATIENT PSYCHIA: HCPCS | Mod: 95 | Performed by: SOCIAL WORKER

## 2020-08-28 ASSESSMENT — COGNITIVE AND FUNCTIONAL STATUS - GENERAL
PSYCHOMOTOR FUNCTIONING: WNL
MOOD: ANXIOUS
SPEECH: REGULAR
AFFECT: FULL RANGE;TEARFUL
SLEEP_WAKE_CYCLE: NO CHANGE
APPETITE: NO CHANGE

## 2020-08-28 NOTE — GROUP NOTE
Date:  August 28  Start Time:  12:30 PM  End Time:   3:30 PM    Billie Frank, YOB: 1960,  was an active group participant.    Number of Attendees in Group:  3    IOP Session 5: Topic of curriculum was “Self Esteem.”  Facilitated check in utilizing “Adrianna, Thorn and Seed” format and 0-5 safety scale regarding SI.  Provided handouts including: several quotes about healthy self esteem; “What is Self Esteem?” which summarizes where self esteem comes from and how healthy self esteem can be supported.; “Where Does Self Esteem Come From?” by Brooklynn Hall which challenges the idea of how we measure our self worth; an exercise where members are encouraged to identify and list their strengths;  a list of positive traits; a blank “gingerbread” woman for members to construct their own positive self esteem woman;  “Self Esteem Journal” which encourages pts to identify things they feel good about themselves over the course of a week; “My Strengths and Qualities” which provides a blank worksheet for pts to identify and list positives about themselves; “A Simple Exercise to Help Us Figure Ourselves Out” by Nasim Baumann which encourages pts to compare how they would like to live with how they are actually living to see where they can make changes; “The Five Myths of Self Compassion” by Caridad Cox which challenges the reasons why we feel we cannot be compassionate with ourselves and summarizes the research that supports being self compassionate actually makes us more empathetic and productive; “Alarming New Research on Perfectionism” by Sherlyn Shipman which discusses how perfectionism is highly correlated with depression and anxiety and the need for self compassion; and “Perfectionism and Claiming Shame” by Lila Lombardo which describes how perfectionism is an unsuccessful and often destructive attempt to avoid feeling uncomfortable feelings.             Patients reaction:  Pt shared that she was having a difficult day  "with some overwhelming feelings of grief and sadness about the end of her marriage. Pt processed with group her feelings of her not doing enough to help her , as well as not doing enough to protect herself which made her vulnerable to this emotional pain. Pt was open to feedback about \"riding the wave\" of these feelings and using container exercise if feelings became too difficult Pt stated she also has mixed feelings about returning to her home possibly in the next week when her  moves out.     Mental Status:  Mood: Anxious  Affect: Full Range, Tearful  Speech: Regular  Psychomotor Functioning: WNL  Anxiety Level: 3  Depression Level: 2  Anger Level: 2  Safety Scale: 0  Safety Contract:: N/A  Behaviors:  ADL'S:: Good  Social Status:: Normal  Med Compliance: Yes  Substance Use: Denies  Self Injury:: None    Visit Diagnosis: Recurrent major depressive disorder, in partial remission (CMS/[facility]) [F33.41]    Plan: Continue IOP to : 8/31    Notes for Treatment day: None      Karina Otero LCSW    "

## 2020-08-31 ENCOUNTER — APPOINTMENT (OUTPATIENT)
Dept: PSYCHIATRY | Facility: HOSPITAL | Age: 60
End: 2020-08-31
Attending: SOCIAL WORKER
Payer: COMMERCIAL

## 2020-08-31 DIAGNOSIS — F43.22 ADJUSTMENT DISORDER WITH ANXIOUS MOOD: ICD-10-CM

## 2020-08-31 DIAGNOSIS — F33.41 RECURRENT MAJOR DEPRESSIVE DISORDER, IN PARTIAL REMISSION (CMS/HCC): Primary | ICD-10-CM

## 2020-08-31 PROCEDURE — S9480 INTENSIVE OUTPATIENT PSYCHIA: HCPCS | Mod: 95 | Performed by: SOCIAL WORKER

## 2020-09-02 ENCOUNTER — OFFICE VISIT (OUTPATIENT)
Dept: PRIMARY CARE | Facility: CLINIC | Age: 60
End: 2020-09-02
Payer: COMMERCIAL

## 2020-09-02 ENCOUNTER — TELEMEDICINE (OUTPATIENT)
Dept: PSYCHIATRY | Facility: HOSPITAL | Age: 60
End: 2020-09-02
Attending: SOCIAL WORKER
Payer: COMMERCIAL

## 2020-09-02 VITALS
TEMPERATURE: 97.4 F | RESPIRATION RATE: 16 BRPM | SYSTOLIC BLOOD PRESSURE: 126 MMHG | OXYGEN SATURATION: 97 % | HEIGHT: 68 IN | HEART RATE: 72 BPM | BODY MASS INDEX: 22.26 KG/M2 | DIASTOLIC BLOOD PRESSURE: 80 MMHG

## 2020-09-02 DIAGNOSIS — F41.1 GENERALIZED ANXIETY DISORDER: ICD-10-CM

## 2020-09-02 DIAGNOSIS — F43.22 ADJUSTMENT DISORDER WITH ANXIOUS MOOD: ICD-10-CM

## 2020-09-02 DIAGNOSIS — F33.41 RECURRENT MAJOR DEPRESSIVE DISORDER, IN PARTIAL REMISSION (CMS/HCC): Primary | ICD-10-CM

## 2020-09-02 DIAGNOSIS — Z23 NEED FOR VACCINATION: ICD-10-CM

## 2020-09-02 DIAGNOSIS — F33.0 MAJOR DEPRESSIVE DISORDER, RECURRENT EPISODE, MILD (CMS/HCC): Primary | ICD-10-CM

## 2020-09-02 PROCEDURE — 90686 IIV4 VACC NO PRSV 0.5 ML IM: CPT | Performed by: NURSE PRACTITIONER

## 2020-09-02 PROCEDURE — 90471 IMMUNIZATION ADMIN: CPT | Performed by: NURSE PRACTITIONER

## 2020-09-02 PROCEDURE — S9480 INTENSIVE OUTPATIENT PSYCHIA: HCPCS | Mod: GT | Performed by: SOCIAL WORKER

## 2020-09-02 PROCEDURE — 99213 OFFICE O/P EST LOW 20 MIN: CPT | Mod: 25 | Performed by: NURSE PRACTITIONER

## 2020-09-02 ASSESSMENT — COGNITIVE AND FUNCTIONAL STATUS - GENERAL
PSYCHOMOTOR FUNCTIONING: WNL
SPEECH: REGULAR
AFFECT: FULL RANGE
APPETITE: NO CHANGE
MOOD: EUTHYMIC (NORMAL)
SLEEP_WAKE_CYCLE: NO CHANGE

## 2020-09-02 ASSESSMENT — ENCOUNTER SYMPTOMS
DEPRESSED MOOD: 1
SHORTNESS OF BREATH: 0
FATIGUE: 0
FEVER: 0
INSOMNIA: 1
JOINT SWELLING: 0
PANIC: 0
RESTLESSNESS: 0
DECREASED CONCENTRATION: 0
HEADACHES: 0
PALPITATIONS: 0
ANOREXIA: 0
ABDOMINAL PAIN: 0
NERVOUS/ANXIOUS: 1
MYALGIAS: 0
CHANGE IN BOWEL HABIT: 0
THOUGHT CONTENT - OBSESSIONS: 0
ARTHRALGIAS: 0
CHILLS: 0

## 2020-09-02 NOTE — PROGRESS NOTES
[facility]  Mariel Lee  [address]  [place] [address]  Phone: [phone]  Fax: [phone]      Patient ID: Billie Frank                              : 1960    Visit Date: 2020    Chief Complaint: Follow-up         Patient ID: Billie Frank is a 59 y.o. female.    Patient Active Problem List   Diagnosis   • Angina pectoris syndrome (CMS/HCC)   • Family history of ischemic heart disease   • Generalized anxiety disorder   • Hyperlipidemia   • Major depressive disorder, recurrent episode, mild (CMS/HCC)   • Symptomatic menopausal or female climacteric states   • Persistent disorder of initiating or maintaining sleep   • Polyp of colon   • Moderate acquired hearing loss   • Schatzki's ring         Current Outpatient Medications:   •  AMABELZ 0.5-0.1 mg per tablet, 1 tablet 3 (three) times a week (Mon, Wed, Fri). TID weekly, due tomorrow 2020 , Disp: , Rfl:   •  ascorbic acid (VITAMIN C) 500 mg tablet, Take 1,000 mg by mouth daily., Disp: , Rfl:   •  aspirin 81 mg enteric coated tablet, Take 81 mg by mouth daily., Disp: , Rfl:   •  b complex vitamins capsule, Take 1 capsule by mouth daily., Disp: , Rfl:   •  busPIRone (BUSPAR) 15 mg tablet, Take 1 tablet (15 mg total) by mouth 2 (two) times a day., Disp: 60 tablet, Rfl: 0  •  cholecalciferol, vitamin D3, 1,000 unit (25 mcg) tablet, Take 1,000 Units by mouth daily., Disp: , Rfl:   •  diclofenac (VOLTAREN) 50 mg EC tablet, Take 50 mg by mouth 2 (two) times a day as needed.  , Disp: , Rfl:   •  escitalopram (LEXAPRO) 20 mg tablet, Take 1 tablet (20 mg total) by mouth once daily., Disp: 30 tablet, Rfl: 0  •  LORazepam (ATIVAN) 1 mg tablet, Take 1 tablet (1 mg total) by mouth nightly as needed (insomnia)., Disp: 30 tablet, Rfl: 0  •  omeprazole (PriLOSEC) 20 mg capsule, daily as needed.  , Disp: , Rfl:   •  QUEtiapine (SEROquel) 50 mg tablet, Take 1 tab daily at bedtime. Also ok to  take 1 tab up to three times daily as needed for anxiety, Disp: 90 tablet, Rfl: 0  •  RANEXA 500 mg 12 hr tablet, , Disp: , Rfl:   •  rosuvastatin (CRESTOR) 5 mg tablet, Take 5 mg by mouth daily. Every other day, Disp: , Rfl:     Allergies   Allergen Reactions   • Bee Sting [Bee Venom Protein (Honey Bee)] Anaphylaxis   • Iodinated Contrast Media Hives   • Spinach GI intolerance     Raw Spinach only   • Penicillins Rash   • Sulfa (Sulfonamide Antibiotics) Rash       Social History     Tobacco Use   • Smoking status: Never Smoker   • Smokeless tobacco: Never Used   Substance Use Topics   • Alcohol use: Yes     Alcohol/week: 2.0 standard drinks     Types: 2 Glasses of wine per week     Frequency: 2-3 times a week     Drinks per session: 1 or 2   • Drug use: Never       Health Maintenance   Topic Date Due   • Varicella Vaccines (1 of 2 - 2-dose childhood series) 10/02/1960   • DTaP, Tdap, and Td Vaccines (1 - Tdap) 10/02/1970   • HIV Screening  10/02/1972   • Zoster Vaccine (1 of 2) 10/02/2009   • Influenza Vaccine (1) 08/01/2020   • Mammogram  08/09/2020   • Colonoscopy  01/18/2024   • Cervical Cancer Screening  01/29/2025   • Hepatitis C Screening  Completed   • Meningococcal ACWY  Aged Out   • HIB Vaccines  Aged Out   • IPV Vaccines  Aged Out   • HPV Vaccines  Aged Out   • Pneumococcal  Aged Out       HPI  Follow up depression and anxiety.  In 3 day a week 1/2 day program at present.  Working when she is not in the program and doing well.   from  and living with best friend.  Seeing psych.  They have taken over all of her meds.  She is sleeping well.  Mood is good.    Depression   This is a new problem. The current episode started more than 1 month ago. The problem has been rapidly improving. Pertinent negatives include no abdominal pain, anorexia, arthralgias, change in bowel habit, chest pain, chills, fatigue, fever, headaches, joint swelling or myalgias. Treatments tried: Lexapro, Buspar, Seroquel.  "The treatment provided significant relief.   Anxiety   Presents for follow-up visit. Symptoms include depressed mood, excessive worry, insomnia and nervous/anxious behavior. Patient reports no chest pain, decreased concentration, obsessions, palpitations, panic, restlessness, shortness of breath or suicidal ideas. Symptoms occur occasionally. The severity of symptoms is mild. The quality of sleep is good. Nighttime awakenings: occasional.     Compliance with medications is % (on Buspar and Lorazepam and Seroquel.). Treatment side effects: None.       The following have been reviewed and updated as appropriate in this visit:  Allergies  Meds  Problems         Review of System  Review of Systems   Constitutional: Negative for chills, fatigue and fever.   Respiratory: Negative for shortness of breath.    Cardiovascular: Negative for chest pain and palpitations.   Gastrointestinal: Negative for abdominal pain, anorexia and change in bowel habit.   Musculoskeletal: Negative for arthralgias, joint swelling and myalgias.   Neurological: Negative for headaches.   Psychiatric/Behavioral: Negative for decreased concentration and suicidal ideas. The patient is nervous/anxious and has insomnia.        Objective     Vitals  Vitals:    09/02/20 1050   BP: 126/80   BP Location: Left upper arm   Patient Position: Sitting   Pulse: 72   Resp: 16   Temp: 36.3 °C (97.4 °F)   TempSrc: Oral   SpO2: 97%   Height: 1.727 m (5' 8\")     Body mass index is 22.26 kg/m².    Physical Exam  Physical Exam   Constitutional: She is oriented to person, place, and time. She appears well-developed and well-nourished. No distress.   Neck: Neck supple. No JVD present. No thyromegaly present.   Cardiovascular: Normal rate, regular rhythm and normal heart sounds. Exam reveals no gallop and no friction rub.   No murmur heard.  Pulmonary/Chest: Effort normal and breath sounds normal. No respiratory distress. She has no wheezes. She has no rales. "   Lymphadenopathy:     She has no cervical adenopathy.   Neurological: She is alert and oriented to person, place, and time.   Skin: She is not diaphoretic.   Psychiatric: She has a normal mood and affect. Her speech is normal and behavior is normal. She expresses no suicidal ideation. She expresses no suicidal plans.   Vitals reviewed.      Assessment/Plan     Problem List Items Addressed This Visit     Generalized anxiety disorder     Stable.  Psych following.  Continue current meds.         Major depressive disorder, recurrent episode, mild (CMS/HCC) - Primary     Stable.  Psych following.  Continue current meds.           Other Visit Diagnoses     Need for vaccination        Relevant Orders    Influenza vaccine quadrivalent preservative free 6 mon and older IM (FluLaval) (Completed)              ALBERT Bowen  9/2/2020
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
## 2020-09-04 ENCOUNTER — TELEMEDICINE (OUTPATIENT)
Dept: PSYCHIATRY | Facility: HOSPITAL | Age: 60
End: 2020-09-04
Attending: SOCIAL WORKER
Payer: COMMERCIAL

## 2020-09-04 DIAGNOSIS — F43.22 ADJUSTMENT DISORDER WITH ANXIOUS MOOD: ICD-10-CM

## 2020-09-04 DIAGNOSIS — F33.41 RECURRENT MAJOR DEPRESSIVE DISORDER, IN PARTIAL REMISSION (CMS/HCC): Primary | ICD-10-CM

## 2020-09-04 PROCEDURE — S9480 INTENSIVE OUTPATIENT PSYCHIA: HCPCS | Mod: GT | Performed by: SOCIAL WORKER

## 2020-09-04 ASSESSMENT — COGNITIVE AND FUNCTIONAL STATUS - GENERAL
AFFECT: FULL RANGE
SLEEP_WAKE_CYCLE: NO CHANGE
PSYCHOMOTOR FUNCTIONING: WNL
APPETITE: NO CHANGE
MOOD: EUTHYMIC (NORMAL)
SPEECH: REGULAR
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
## 2020-09-09 ENCOUNTER — TELEMEDICINE (OUTPATIENT)
Dept: PSYCHIATRY | Facility: HOSPITAL | Age: 60
End: 2020-09-09
Attending: SOCIAL WORKER
Payer: COMMERCIAL

## 2020-09-09 DIAGNOSIS — F43.22 ADJUSTMENT DISORDER WITH ANXIOUS MOOD: ICD-10-CM

## 2020-09-09 DIAGNOSIS — F33.41 RECURRENT MAJOR DEPRESSIVE DISORDER, IN PARTIAL REMISSION (CMS/HCC): Primary | ICD-10-CM

## 2020-09-09 PROCEDURE — S9480 INTENSIVE OUTPATIENT PSYCHIA: HCPCS | Mod: GT | Performed by: SOCIAL WORKER

## 2020-09-09 ASSESSMENT — COGNITIVE AND FUNCTIONAL STATUS - GENERAL
APPETITE: NO CHANGE
PSYCHOMOTOR FUNCTIONING: WNL
AFFECT: FULL RANGE
SPEECH: REGULAR
SLEEP_WAKE_CYCLE: NO CHANGE
MOOD: EUTHYMIC (NORMAL)

## 2020-09-09 NOTE — GROUP NOTE
Date:  September 9  Start Time:  12:30 PM  End Time:   3:30 PM    Billie Frank, YOB: 1960,  was an active group participant.    Number of Attendees in Group:  4    IOP Session 10: Topic of curriculum was “Attachment Style and Caregiving.”  Facilitated check in utilizing “Adrianna, Thorn and Seed” format and 0-5 safety scale regarding SI.  Provided handouts including: the lyrics to the song “Grateful” by Rasheeda Hagan; “How to Avoid the Empathy Trap” by Jonny Hartley and Raquel Starks which explains the need for empathy in relationships, but also presents strategies for pts to become more aware of their empathy traps and how to find a better balance in their relationships; “How Does Your Attachment Style Impact Your Adult Relationships?” by Shahnaz Petersen which presents a brief description of attachment styles and how they may be playing out in our current relationships; “Change Your Attachment Style to Have a Better Life” which further explores attachment styles and how we can adjust and change our attachment styles; a handout with tips and strategies on behavioral changes to start to build secure attachments; and “How to Care for the Caregiver” by Linda Martinez which summarizes the need for caregivers to prioritize their own well being in order to be present in their caregiving role, it also presents suggestions on how to take care of ourselves; “stress test/ self-care for helpers”; video by Jose R Marquez which asks us to consider, “How would our lives and relationships and our world transform if we stopped being so afraid of pain? What if we just once and for all decided we were strong enough for the pain in our lives and instead of hiding from it, we rushed straight towards it and allowed our pain to become our power?”; “5 Love Languages Test” which provides better understanding of inherent love languages and “Guided Meditation; Lovingkindness: Seeing Past the Mask”.           Patients reaction:  Pt  "shared that she had a good weekend overall although acknowledged the 'rollercoaster' of emotions she felt returning to her vacation home. Pt stated \"I feel better equipped to handle these things now, even if I know some day I might have a total melt down again, I can get through it\". Pt shared did have a panic attack one night recently, but identified the ongoing supports she has at home and at work. Pt discussed her plan to move back to her home over the next two days now that her  has found another apartment and moved out. Pt acknowledged this will be emotional, but stated she feels confident she will have the ability to cope, even with the challenging days.     Request for Consent  Patient provided verbal consent to treat via telemedicine. Clinician introduced the secure telemedicine platform that we are utilizing to provide care during the COVID-19 pandemic. Patient understands the session will be billed to their insurance or patient directly.  Patient was informed only the group patients  and the clinician are permitted on?the video conference, sessions are not recorded by the clinician, and the patient is not permitted to record the session.? Patient was provided clinician's unique meeting ID prior to session, patient was asked to arrive to virtual session on time just as patient would if we were in the office. Clinician confirmed identification of patient by name and birthdate, provider name, location of patient and clinician, and callback number in case disconnected.  Patient Response to Request for Consent: Yes  Visit Type performed: Audio and Video    Mental Status:  Mood: Euthymic (normal)  Affect: Full Range  Speech: Regular  Psychomotor Functioning: WNL  Anxiety Level: 0  Depression Level: 1  Anger Level: 1  Safety Scale: 0  Safety Contract:: N/A  Behaviors:  ADL'S:: Good  Social Status:: Normal  Med Compliance: Yes  Substance Use: Denies  Self Injury:: None    Visit Diagnosis: Recurrent major " depressive disorder, in partial remission (CMS/[facility]) [F33.41]    Plan: Continue IOP to : 9/11    Notes for Treatment day: None      Karina Otero LCSW

## 2020-09-11 ENCOUNTER — TELEMEDICINE (OUTPATIENT)
Dept: PSYCHIATRY | Facility: HOSPITAL | Age: 60
End: 2020-09-11
Attending: SOCIAL WORKER
Payer: COMMERCIAL

## 2020-09-11 DIAGNOSIS — F33.41 RECURRENT MAJOR DEPRESSIVE DISORDER, IN PARTIAL REMISSION (CMS/HCC): Primary | ICD-10-CM

## 2020-09-11 DIAGNOSIS — F43.22 ADJUSTMENT DISORDER WITH ANXIOUS MOOD: ICD-10-CM

## 2020-09-11 PROCEDURE — S9480 INTENSIVE OUTPATIENT PSYCHIA: HCPCS | Mod: GT | Performed by: SOCIAL WORKER

## 2020-09-11 ASSESSMENT — COGNITIVE AND FUNCTIONAL STATUS - GENERAL
APPETITE: NO CHANGE
AFFECT: FULL RANGE
MOOD: HOPEFUL;EUTHYMIC (NORMAL)
SPEECH: REGULAR
SLEEP_WAKE_CYCLE: NO CHANGE
PSYCHOMOTOR FUNCTIONING: WNL

## 2020-09-11 NOTE — GROUP NOTE
Date:  September 11  Start Time:  12:30 PM  End Time:   3:30 PM    Billie Frank, YOB: 1960,  was an active group participant.    Number of Attendees in Group:  3    IOP Session 11: Topic of curriculum was “Self Talk.”  Facilitated check in utilizing “Adrianna, Thorn and Seed” format and 0-5 safety scale regarding SI.  Provided handouts including: “Cut Yourself Some Slack” from “Don’t Sweat the Small Stuff” by Agustín Gonzalez which encouraged pts to be kind to themselves as they recover and to not expect “perfection” as they are learning new skills; “Automatic Thoughts of Currently Depressed People” which summarizes the commonality of highly negative and critical self talk; “What is Self Talk?” by Keyana Marcelo which provides an example of how the stories we tell ourselves may not be accurate; “Surprising Ways to Quiet Your Inner Critic” by Bambi Munoz which encouraged pts to consider what need may be underneath their negative self talk; “Using Coping Thoughts” which provides examples of distressing situations and how we can shift our thinking to promote coping as opposed to distress; “Using Self Encouraging Coping Thoughts” which encourages pts to identify coping thoughts they can utilize to soothe themselves in difficult situations; “Big Picture Evidence Log” which encourages pts to take a step back and look at the evidence for their thoughts to see if they are accurate and fair; “[address] [place], Supportive Things to Tell Yourself Today” by Staci Swanson which describes the author’s journey of recognizing her self talk as an internalized version of a negative authority figure from her childhood and how she shifted the voice in her head; “Self Compassion Break” which coaches pts on how to allow themselves 5 minutes to connect with themselves and be mindful, compassionate, and aware of our shared humanity; “Use Your Strengths” which describes a way for pts to identify a strength and how they would like  to use that strength that day; and “Self Compassionate Letter” which describes for pts a coping skill about imagining a loved one describing a part of you that you are ashamed of;  Heart Assisted 2 minute stress relief, emotional freedom technique and tapping, “hand over heart” by Jonny Salvador, and “Dialogue With the Inner Critic” by Lilliam Luz which provide techniques to shift the relationships with the inner critic.           Patients reaction:  Pt shared she moved back to her home two nights ago and has been tearful but overall coping better than she had anticipated with this change. Pt discussed that she is concerned about feeling isolated now that she is living alone, but has mad a point to make plans with friends regularly. Pt discussed that she is grateful her  has been working seriously on his mental health and does have some hope about what their future may hold together as they are both focusing on their own mental health needs right now. Pt stated she has been able to resume regular exercise since returning home.     Request for Consent  Patient provided verbal consent to treat via telemedicine. Clinician introduced the secure telemedicine platform that we are utilizing to provide care during the COVID-19 pandemic. Patient understands the session will be billed to their insurance or patient directly.  Patient was informed only the group patients  and the clinician are permitted on?the video conference, sessions are not recorded by the clinician, and the patient is not permitted to record the session.? Patient was provided clinician's unique meeting ID prior to session, patient was asked to arrive to virtual session on time just as patient would if we were in the office. Clinician confirmed identification of patient by name and birthdate, provider name, location of patient and clinician, and callback number in case disconnected.  Patient Response to Request for Consent: Yes  Visit Type  performed: Audio and Video.    Mental Status:  Mood: Hopeful, Euthymic (normal)  Affect: Full Range  Speech: Regular  Psychomotor Functioning: WNL  Anxiety Level: 2  Depression Level: 1  Anger Level: 1  Safety Scale: 0  Safety Contract:: N/A  Behaviors:  ADL'S:: Good  Social Status:: Normal  Med Compliance: Yes  Substance Use: Denies  Self Injury:: None    Visit Diagnosis: Recurrent major depressive disorder, in partial remission (CMS/HCC) [F33.41]    Plan: Continue IOP to : 9/14    Notes for Treatment day: None      Karina Otero LCSW

## 2020-09-14 ENCOUNTER — TELEMEDICINE (OUTPATIENT)
Dept: PSYCHIATRY | Facility: HOSPITAL | Age: 60
End: 2020-09-14
Attending: SOCIAL WORKER
Payer: COMMERCIAL

## 2020-09-14 DIAGNOSIS — F43.22 ADJUSTMENT DISORDER WITH ANXIOUS MOOD: ICD-10-CM

## 2020-09-14 DIAGNOSIS — F33.41 RECURRENT MAJOR DEPRESSIVE DISORDER, IN PARTIAL REMISSION (CMS/HCC): Primary | ICD-10-CM

## 2020-09-14 PROCEDURE — S9480 INTENSIVE OUTPATIENT PSYCHIA: HCPCS | Mod: GT | Performed by: SOCIAL WORKER

## 2020-09-14 ASSESSMENT — COGNITIVE AND FUNCTIONAL STATUS - GENERAL
PSYCHOMOTOR FUNCTIONING: WNL
SPEECH: REGULAR
SLEEP_WAKE_CYCLE: NO CHANGE
APPETITE: NO CHANGE
MOOD: EUTHYMIC (NORMAL)
AFFECT: FULL RANGE

## 2020-09-14 NOTE — GROUP NOTE
Date:  September 14  Start Time:  12:30 PM  End Time:  3:00PM    Billie Frank, YOB: 1960,  was an active group participant.    Number of Attendees in Group:  2    IOP Session 12: Topic of curriculum was “Schema and Core Beliefs.”  Facilitated check in utilizing “Adrianna, Thorn and Seed” format and 0-5 safety scale regarding SI. Provided handouts including: “Rules for Being Human” a list of concepts regarding the common human experience; “List of Schemas” which provides descriptions of common schemas; “What Are Core Beliefs?” which describes core beliefs and how they may be causing road blocks in our recovery; Two additional handouts on core beliefs that provide the visuals of magnets (attracting information that confirms the belief and rejecting information that challenges the belief) and as sunglasses which signifies core beliefs as tainting or coloring how we interpret information in our  world; “What if… Thinking” worksheet which describes how our “what if…” thinking tends to be negative and does not consider the potential positive outcomes that may occur; “Hindsight Bias” which explains the memory distortion as our tendency to look back and see events that have already occurred as having been more predictable that was actually the case to allow pts to engage in self compassion about their previous decisions; and “Capitalizing on Positive Events” which provides instructions on how pts can increase a sense of closeness and positive rapport between them and an acquaintance; “It’s all About perception; You Can Look Through the Lens of Love or Fear” which stresses we are responsible for how we perceive what we are seeing; “Healing Your Negative Core Beliefs” by Douglas Bloch; “Accepting Absolutely Everything” which discusses our self-imposed cages and “How Mommy Drinking Culture has Normalized Alcoholism for Women in [place]”, which brings awareness to the importance of honesty with  "self.          Patients reaction:  Pt shared overall she had a good weekend, but had a \"down day\" on Sunday due to feeling triggered by watching football which is an activity she would usually do with her . Pt shared that she was able to have a tearful mood yesterday, but it was improved today and she was able to use affirmation of \"this too will pass\" to cope through the challenging mood. Pt shared that she has been \"finding myself again\" and incorporating more into her life that is what she values and her interests including moving back into her apartment, exercising, working, and she has a goal to resume [place] lessons. Pt left group at 3:00pm due to prior scheduled medical appointment.     Request for Consent  Patient provided verbal consent to treat via telemedicine. Clinician introduced the secure telemedicine platform that we are utilizing to provide care during the COVID-19 pandemic. Patient understands the session will be billed to their insurance or patient directly.  Patient was informed only the group patients  and the clinician are permitted on?the video conference, sessions are not recorded by the clinician, and the patient is not permitted to record the session.? Patient was provided clinician's unique meeting ID prior to session, patient was asked to arrive to virtual session on time just as patient would if we were in the office. Clinician confirmed identification of patient by name and birthdate, provider name, location of patient and clinician, and callback number in case disconnected.  Patient Response to Request for Consent: Yes  Visit Type performed: Audio and Video    Mental Status:  Mood: Euthymic (normal)  Affect: Full Range  Speech: Regular  Psychomotor Functioning: WNL  Anxiety Level: 1  Depression Level: 0  Anger Level: 1  Safety Scale: 0  Safety Contract:: N/A  Behaviors:  ADL'S:: Good  Social Status:: Normal  Med Compliance: Yes  Substance Use: Denies  Self Injury:: " None    Visit Diagnosis: Recurrent major depressive disorder, in partial remission (CMS/[facility]) [F33.41]    Plan: Continue IOP to : 9/16    Notes for Treatment day: None      Karina Otero LCSW

## 2020-09-16 ENCOUNTER — TELEMEDICINE (OUTPATIENT)
Dept: PSYCHIATRY | Facility: HOSPITAL | Age: 60
End: 2020-09-16
Attending: SOCIAL WORKER
Payer: COMMERCIAL

## 2020-09-16 DIAGNOSIS — F33.41 RECURRENT MAJOR DEPRESSIVE DISORDER, IN PARTIAL REMISSION (CMS/HCC): Primary | ICD-10-CM

## 2020-09-16 DIAGNOSIS — F43.22 ADJUSTMENT DISORDER WITH ANXIOUS MOOD: ICD-10-CM

## 2020-09-16 PROCEDURE — S9480 INTENSIVE OUTPATIENT PSYCHIA: HCPCS | Mod: GT | Performed by: SOCIAL WORKER

## 2020-09-16 ASSESSMENT — COGNITIVE AND FUNCTIONAL STATUS - GENERAL
MOOD: EUTHYMIC (NORMAL)
APPETITE: NO CHANGE
AFFECT: FULL RANGE
PSYCHOMOTOR FUNCTIONING: WNL
SLEEP_WAKE_CYCLE: NO CHANGE
SPEECH: REGULAR

## 2020-09-16 NOTE — GROUP NOTE
Date:  September 16  Start Time:  12:30 PM  End Time:   3:30 PM    Billie Frank, YOB: 1960,  was an active group participant.    Number of Attendees in Group:  5      Session 13:  Topic of curriculum was “Cognitive Distortions, Defense Mechanisms, and Comparing Self to Others”.  Facilitated check in using “Adrianna, Thorn and Seed” format and 0-5 safety scale regarding SI.  Provided handouts including: several inspirational quotes; “Cognitive Distortions” and “Types of Thinking Errors” which summarizes and provides examples of cognitive distortions to increase pts’ awareness of maladaptive thought habits; “Disputing Questions” which provides examples of questions to ask oneself to challenge thought distortions; “Challenging Negative Thoughts” which provide examples of additional questions to ask themselves to challenge the validity of their negative thoughts to ultimately reduce depression and anxiety; “Daily Record of Dysfunctional Thoughts” which provides prompts for pts to write and track their negative thoughts; “15 Common Defense Mechanisms” by Feroz Villatoro which summarizes what defense mechanisms are and provides brief descriptions of 15 commonly used defense mechanisms; “3 Simple Ways to Overcome Negativity Bias” by Miguel Mosher also featuring corresponding PERICO Talk; “What an Overweight Former Model can Teach us About Breaking Free of Bad Habits” which explores the secondary gains of maladaptive behaviors;  article entitled “Etelvina Anniston Calls Out ‘reckless assumptions’ About Why She is Not a Mom” which underscores the tendency of jumping to false conclusions;  “Why It Doesn’t Pay to be a People-Pleaser” by Kathleen Zhang which discusses the stress and anxiety that occurs when we do not live aligned with our integrity and honesty; and “Hands Up: When Did Motherhood Become a Competition?” by Ora Kong which provides a reminder that we, especially mothers, are doing as best as we can and to  "focus on our own needs and not compare ourselves.            Patients reaction:  Pt shared that she has been through ups and downs the last two days and identifies \"a meltdown\" one evening this week again feeling overwhelmed about the current rift in her marriage. Pt shared she has continued to be able to find support and 'ride the wave' of her emotion and is feeling in a good mood today. Pt identified that she can acknowledge the presence of resilience and see in herself her ability to cope, which she was once unable to acknowledge.     Request for Consent  Patient provided verbal consent to treat via telemedicine. Clinician introduced the secure telemedicine platform that we are utilizing to provide care during the COVID-19 pandemic. Patient understands the session will be billed to their insurance or patient directly.  Patient was informed only the group patients  and the clinician are permitted on?the video conference, sessions are not recorded by the clinician, and the patient is not permitted to record the session.? Patient was provided clinician's unique meeting ID prior to session, patient was asked to arrive to virtual session on time just as patient would if we were in the office. Clinician confirmed identification of patient by name and birthdate, provider name, location of patient and clinician, and callback number in case disconnected.  Patient Response to Request for Consent: Yes  Visit Type performed: Audio and Video.    Mental Status:  Mood: Euthymic (normal)  Affect: Full Range  Speech: Regular  Psychomotor Functioning: WNL  Anxiety Level: 1  Depression Level: 0  Anger Level: 1  Safety Scale: 0  Safety Contract:: N/A  Behaviors:  ADL'S:: Good  Social Status:: Normal  Med Compliance: Yes  Substance Use: Denies  Self Injury:: None    Visit Diagnosis: Recurrent major depressive disorder, in partial remission (CMS/[facility]) [F33.41]    Plan: Continue IOP to : 9/18    Notes for Treatment day: " None      Karina Otero LCSW

## 2020-09-18 ENCOUNTER — TELEMEDICINE (OUTPATIENT)
Dept: PSYCHIATRY | Facility: HOSPITAL | Age: 60
End: 2020-09-18
Attending: SOCIAL WORKER
Payer: COMMERCIAL

## 2020-09-18 DIAGNOSIS — F33.41 RECURRENT MAJOR DEPRESSIVE DISORDER, IN PARTIAL REMISSION (CMS/HCC): Primary | ICD-10-CM

## 2020-09-18 DIAGNOSIS — F43.22 ADJUSTMENT DISORDER WITH ANXIOUS MOOD: ICD-10-CM

## 2020-09-18 PROCEDURE — S9480 INTENSIVE OUTPATIENT PSYCHIA: HCPCS | Performed by: SOCIAL WORKER

## 2020-09-18 ASSESSMENT — COGNITIVE AND FUNCTIONAL STATUS - GENERAL
PSYCHOMOTOR FUNCTIONING: WNL
MOOD: EUTHYMIC (NORMAL)
AFFECT: FULL RANGE
SLEEP_WAKE_CYCLE: NO CHANGE
APPETITE: NO CHANGE
SPEECH: REGULAR

## 2020-09-18 NOTE — GROUP NOTE
"Date:  September 18  Start Time:  12:30 PM  End Time:   3:30 PM    Billie Frank, YOB: 1960,  was an active group participant.    Number of Attendees in Group:  3    Session 14: Topic of curriculum was “Trauma and Safety.”  Facilitated check in utilizing “Adrianna, Thorn and Seed” format and 0-5 safety scale regarding SI.  Provided handouts including: several inspirational quotes from Katy Heath; “Skillful Ways to Deal with Stress and Trauma” by Lilliam Luz which summarizes how out brains and bodies react to stress and trauma and ways to help soothe and calm our bodies; “The Power of Music- Creating Playlists to Heal Grief and Trauma” by Malissa Quintana introducing experiential technique of music as a coping skill; “Window of Tolerance- Widening the Comfort Zone for Increased Flexibility” that explains importance of developing skills to remain within the Window of Tolerance through use of self-soothing and self-regulating tools;  a blank copy of a safety plan for pt’s use if their skills or support people change or they need an additional copy of a safety plan; an excerpt from “True Refuge” by Julissa Nolasco which provides information on how trauma lives in our bodies and when we are traumatized it is due to the energy of “fight/flight/freeze” not being exhausted from our bodies, additionally, stressed that each person’s perception of trauma may be different based on their unique physiological and psychological makeup; EMDR/PTSD information worksheets, and a copy of EMDR Resource “Container Script”; and “Body Scan Meditation” which describes how pts can practice the body scan to increase inner peace.  Video by Tala Kevin demonstrating EFT tapping also showed to group to increase understanding of how to implement this coping technique.           Patients reaction:  Pt shared that she has been having a good few days and feels positive today going into the weekend. Pt discussed she feels she \"has a " "new baseline\" where she feels a low level of anxiety, but is able to easily manage and it does not interfere with her functioning. Pt is looking forward to a weekend with her children, and focusing on mindfulness and rest.     Request for Consent  Patient provided verbal consent to treat via telemedicine. Clinician introduced the secure telemedicine platform that we are utilizing to provide care during the COVID-19 pandemic. Patient understands the session will be billed to their insurance or patient directly.  Patient was informed only the group patients  and the clinician are permitted on?the video conference, sessions are not recorded by the clinician, and the patient is not permitted to record the session.? Patient was provided clinician's unique meeting ID prior to session, patient was asked to arrive to virtual session on time just as patient would if we were in the office. Clinician confirmed identification of patient by name and birthdate, provider name, location of patient and clinician, and callback number in case disconnected.  Patient Response to Request for Consent: No  Visit Type performed: Audio and Video.    Mental Status:  Mood: Euthymic (normal)  Affect: Full Range  Speech: Regular  Psychomotor Functioning: WNL  Anxiety Level: 1  Depression Level: 0  Anger Level: 1  Safety Scale: 0  Safety Contract:: N/A  Behaviors:  ADL'S:: Good  Social Status:: Normal  Med Compliance: Yes  Substance Use: Denies  Self Injury:: None    Visit Diagnosis: Recurrent major depressive disorder, in partial remission (CMS/[facility]) [F33.41]    Plan: Continue IOP to : 9/21    Notes for Treatment day: None      Karina Otero LCSW    "

## 2020-09-21 ENCOUNTER — TELEMEDICINE (OUTPATIENT)
Dept: PSYCHIATRY | Facility: HOSPITAL | Age: 60
End: 2020-09-21
Attending: SOCIAL WORKER
Payer: COMMERCIAL

## 2020-09-21 DIAGNOSIS — F33.41 RECURRENT MAJOR DEPRESSIVE DISORDER, IN PARTIAL REMISSION (CMS/HCC): Primary | ICD-10-CM

## 2020-09-21 PROCEDURE — S9480 INTENSIVE OUTPATIENT PSYCHIA: HCPCS | Performed by: SOCIAL WORKER

## 2020-09-21 ASSESSMENT — COGNITIVE AND FUNCTIONAL STATUS - GENERAL
SPEECH: REGULAR
MOOD: EUTHYMIC (NORMAL)
APPETITE: NO CHANGE
AFFECT: FULL RANGE
SLEEP_WAKE_CYCLE: NO CHANGE
PSYCHOMOTOR FUNCTIONING: WNL

## 2020-09-21 NOTE — GROUP NOTE
Date:  9/21/2020  Start Time:  12:30 PM  End Time:   3:30 PM    Billie Frank, YOB: 1960,  was an active group participant.    Number of Attendees in Group:  2    Session 15: Topic of curriculum was “Values and Goal Setting.” Facilitated check in utilizing “Adrianna, Thorn and Seed” format and 0-5 safety scale regarding SI.  Provided handouts including: “Let Go” a poem which explores letting go of some of our unrealistic expectations for ourselves and other people and accepting what is; several handouts exploring values including explanation of what values you and a values checklist to assist pts in identifying and prioritizing what we value and “Exploring Values” which prompts pts to consider where they learned their values; “Setting Life Goals” which prompts pts to consider small, achievable goals for making changes in their lives while recognizing what they are doing well; and “Goal Visualization” which explains how to set ourselves up for success by setting small, achievable goals to promote optimism;  “Five Ways to be Fully Authentic” by Mindful Movement; instructions on how to practice EMDR Resource “Comfortable Place Script”; and “Five Research Based Ways to Say No” by Kathleen Zhang which explores the research behind and provides strategies for saying no to others - the article discusses the “harshness bias” which is our tendency to assume others are judging us harsher than they actually are.  LCSW facilitated discussion around values and had pts identify what they core values are. Other topics of discussion included: Coependency, recognizing your [place] of influence and setting limits to enable you to live by your values.           Patients reaction:  Pt was open in group, connecting with the topic and sharing how since PHP she has found herself giving more thoughts to her goals and values and living in alignment with those. Pt shared that one of the goals that she had clarified was 'I deserve  to be in a truthful relationship' and that she would like to develop a greater sense of spirituality. Pt supportive and encouraging to group member, especially around her own experience in motherhood.     Mental Status:  Mood: Euthymic (normal)  Affect: Full Range  Speech: Regular  Psychomotor Functioning: WNL  Anxiety Level: 1  Depression Level: 1  Anger Level: 1  Safety Scale: 0  Safety Contract:: N/A  Behaviors:  ADL'S:: Good  Social Status:: Normal  Med Compliance: Yes  Substance Use: Denies  Self Injury:: None    Visit Diagnosis:     ICD-10-CM ICD-9-CM   1. Recurrent major depressive disorder, in partial remission (CMS/[facility])  F33.41 296.35       Plan: Continue IOP to : 9/23/20    Notes for Treatment day: N/A      Raquel Gallegos LCSW

## 2020-09-23 ENCOUNTER — TELEMEDICINE (OUTPATIENT)
Dept: PSYCHIATRY | Facility: HOSPITAL | Age: 60
End: 2020-09-23
Attending: SOCIAL WORKER
Payer: COMMERCIAL

## 2020-09-23 DIAGNOSIS — F33.41 RECURRENT MAJOR DEPRESSIVE DISORDER, IN PARTIAL REMISSION (CMS/HCC): Primary | ICD-10-CM

## 2020-09-23 PROCEDURE — S9480 INTENSIVE OUTPATIENT PSYCHIA: HCPCS | Performed by: SOCIAL WORKER

## 2020-09-23 ASSESSMENT — COGNITIVE AND FUNCTIONAL STATUS - GENERAL
MOOD: EUTHYMIC (NORMAL)
AFFECT: FULL RANGE
SLEEP_WAKE_CYCLE: NO CHANGE
PSYCHOMOTOR FUNCTIONING: WNL
APPETITE: NO CHANGE
SPEECH: REGULAR

## 2020-09-23 NOTE — GROUP NOTE
Date:  9/23/2020  Start Time:  12:30 PM  End Time:   3:30 PM    Billie Frank, YOB: 1960,  was an active group participant.    Number of Attendees in Group:  2    Session 16: Topic of curriculum was “Discharge Planning.”  Facilitated check in utilizing “Adrianna, Thorn and Seed” format and 0-5 safety scale regarding SI.  Provided handouts including: “ Make Your Own Mental Health Self Care Kit” which provides instructions on how to create a self care kit identifying items that are self soothing for the 5 senses so it is easily accessed, information on “The Stages of Change” to validate difficulty of change and how it is a learning process; “20 Quotes to Encourage You & Inspire Resilience” by Jazmyn Wallace; “The Pursuit of Happiness” by Bhargav Fernandez which summarizes the research on what makes human beings content to assist pts in setting realistic and achievable expectations for their well being; “The Road to Resilience” by the American Psychological Association which summarizes what resilience is and ways to build resilience; “Best Possible Self” which encourages pts to consider what their best possible self would be and then restructure our priorities to achieve our goals; “Lapse and Relapse Management” which highlights setbacks as part of the process of recovery and encourages pts to consider what they can learn from the experience; and “26 Pieces of Advice That Have Actually Helped People With Mental Illness” by Tamara Castillo which summarizes strategies that have assisted others in managing their struggles. LCSW shared essay ' You're Never Fully Ready' and facilitated a discussion around ways to engage in continued resilience and change even when you feel stuck. Other topics of discussion included post-partum depression, feelings vs facts and drawing on past times of resilience for current struggles.           Patients reaction:  Pt shared that she had been sad after last session as she has been  preparing her and her 's beach house to rent out to another tenant. Pt shared that she has been grieving the loss of the future that she had hoped for and that the beach house was symbolic of this. Pt expressed feeling pleased with the fact that she had met with her new psychiatrist who described her as 'resilient'. Pt was engaged and open throughout session.    Mental Status:  Mood: Euthymic (normal)  Affect: Full Range  Speech: Regular  Psychomotor Functioning: WNL  Anxiety Level: 1  Depression Level: 1  Anger Level: 1  Safety Scale: 0  Safety Contract:: N/A  Behaviors:  ADL'S:: Good  Social Status:: Normal  Med Compliance: Yes  Substance Use: Denies  Self Injury:: None    Visit Diagnosis:     ICD-10-CM ICD-9-CM   1. Recurrent major depressive disorder, in partial remission (CMS/[facility])  F33.41 296.35       Plan: Continue IOP to : 9/25/20    Notes for Treatment day: Pt met with new psychiatrist day prior.       Raquel Gallegos LCSW

## 2020-09-25 ENCOUNTER — TELEMEDICINE (OUTPATIENT)
Dept: PSYCHIATRY | Facility: HOSPITAL | Age: 60
End: 2020-09-25
Attending: SOCIAL WORKER
Payer: COMMERCIAL

## 2020-09-25 DIAGNOSIS — F33.0 MAJOR DEPRESSIVE DISORDER, RECURRENT EPISODE, MILD (CMS/HCC): Primary | ICD-10-CM

## 2020-09-25 PROCEDURE — S9480 INTENSIVE OUTPATIENT PSYCHIA: HCPCS | Performed by: SOCIAL WORKER

## 2020-09-25 ASSESSMENT — COGNITIVE AND FUNCTIONAL STATUS - GENERAL
MOOD: EUTHYMIC (NORMAL)
SLEEP_WAKE_CYCLE: NO CHANGE
SPEECH: REGULAR
APPETITE: NO CHANGE
AFFECT: FULL RANGE
PSYCHOMOTOR FUNCTIONING: WNL

## 2020-09-25 NOTE — GROUP NOTE
Request for Consent  Patient provided verbal consent to treat via telemedicine. Clinician introduced the secure telemedicine platform that we are utilizing to provide care during the COVID-19 pandemic. Patient understands the session will be billed to their insurance or patient directly.  Patient was informed only the group patients  and the clinician are permitted on?the video conference, sessions are not recorded by the clinician, and the patient is not permitted to record the session.? Patient was provided clinician's unique meeting ID prior to session, patient was asked to arrive to virtual session on time just as patient would if we were in the office. Clinician confirmed identification of patient by name and birthdate, provider name, location of patient and clinician, and callback number in case disconnected.  Patient Response to Request for Consent: Yes  Visit Type performed: Audio and Video      Date:  9/25/2020  Start Time:  12:30 PM  End Time:   3:30 PM    Billie Frank, YOB: 1960,  was an active group participant.     Number of Attendees in Group:  4    IOP Session 1-Topic of curriculum was “Psychoeducation regarding Emotions and Mental Health.”  Facilitated check in utilizing “Adrianna, Thorn and Seed” format and 0-5 safety scale regarding SI.  Showed pts Lila Lombardo’s video on “Empathy” and facilitated discussion regarding the concept of empathy facilitating connection.   Provided handouts including: quotes regarding the need to validate and honor emotions; “The Guest House” by Diamante which encourages pts to accept every emotion that arrives in their experience; handouts describing the symptoms, etiology and treatment recommendations on depression, GINA, panic, social anxiety, bipolar disorder, and perfectionism to provide pts with accurate diagnostic information in a format that can be shared with loved ones if needed; a mood pictograph;  “The Vicious Cycle of Depression” and the “The Vicious  Cycle of Anxiety” which discuss how the symptoms of depression and anxiety often causes a cycle that feeds upon itself - it suggests to make small changes to shift the cycle such as prioritizing self care; “Threat System” which provides information on the bodily symptoms that occur when our fear system is activated; “Relaxed Breathing” which provides instructions on diaphragmatic breathing to assist in emotional regulation;  LGBTQ and the connection with mental health information from [facility] to increase awareness of the population’s needs; “Disclosing to Others” and “Talking About Mental Illness with your Child” to facilitate connection and support with others; “Moods and Hormones: Emotional Health and Well Being Through the Lifecycle” which discusses the connection between mental health difficulties and hormonal changes for women; information on post partum depression, menopause, and PMS from the American College of Obstetricians and Gynecologists; “Resources for Recovering Resilience: Shit Happens… Shift Happens” by Lilliam Luz which describes how Automatic Negative Thoughts can be shifted through the use of Automatic Negative Thoughts to train our brains and to ultimately make ourselves feel better; and “How Accepting Anxiety Can Lead to Peace” by Lenora Collazo which describes the author’s journey to accepting her anxiety and instead of fighting it trying to listen to what it is telling her. Other topics of discussion included shame around mental health, postpartum depression and grounding skills for panic attacks.       Patients reaction:  Pt was engaged throughout group session and expressed feelings of sadness that she was finalizing things with her and her 's beach house to rent out for the next year as they figured out where there relationship was headed. Pt shared grounding techniques in PHP that she had acquired to support group members.     Mental Status:  Mood: Euthymic (normal)  Affect: Full  Range  Speech: Regular  Psychomotor Functioning: WNL  Anxiety Level: 2  Depression Level: 1  Anger Level: 2  Safety Scale: 0  Safety Contract:: N/A  Behaviors:  ADL'S:: Good  Social Status:: Normal  Med Compliance: Yes  Substance Use: Denies  Self Injury:: None    Visit Diagnosis:     ICD-10-CM ICD-9-CM   1. Major depressive disorder, recurrent episode, mild (CMS/HCC)  F33.0 296.31       Plan: Continue IOP to : 9/28/20    Notes for Treatment day: LCSW unable to update pt's treatment plan due to issue with [facility], to be updated Monday.       Raquel Gallegos LCSW

## 2020-09-28 ENCOUNTER — TELEMEDICINE (OUTPATIENT)
Dept: PSYCHIATRY | Facility: HOSPITAL | Age: 60
End: 2020-09-28
Attending: SOCIAL WORKER
Payer: COMMERCIAL

## 2020-09-28 DIAGNOSIS — F33.0 MAJOR DEPRESSIVE DISORDER, RECURRENT EPISODE, MILD (CMS/HCC): Primary | ICD-10-CM

## 2020-09-28 DIAGNOSIS — F43.22 ADJUSTMENT DISORDER WITH ANXIOUS MOOD: ICD-10-CM

## 2020-09-28 PROCEDURE — S9480 INTENSIVE OUTPATIENT PSYCHIA: HCPCS | Mod: 95 | Performed by: SOCIAL WORKER

## 2020-09-28 ASSESSMENT — COGNITIVE AND FUNCTIONAL STATUS - GENERAL
SLEEP_WAKE_CYCLE: NO CHANGE
PSYCHOMOTOR FUNCTIONING: WNL
APPETITE: NO CHANGE
AFFECT: FULL RANGE
MOOD: EUTHYMIC (NORMAL)
SPEECH: REGULAR

## 2020-09-28 NOTE — GROUP NOTE
"Date:  9/28/2020  Start Time:  12:30 PM  End Time:   3:30 PM     Billie Frank, YOB: 1960,  was an active group participant.     Number of Attendees in Group:  4    IOP Session 2:  Topic of curriculum was \"Mindfulness.\"   Facilitated check in utilizing \"Adrianna, Thorn and Seed\" format and 0-5 safety scale regarding SI.  Showed Moncho Skinner's video \"Mindfulness is the New Superpower\" and facilitated discussion surrounding mindfulness in our everyday lives.  Provided handouts including:\"Mindfull vs Minful\" picture;  quote from Agueda Cárdenas normalizing restlessness in meditation and to stay with the practice even when it is difficulty; \"What is Mindfulness\" handout which summarizes the basics of mindfulness including the need to practice and to cultivate a non-judgmental attitude; reasons to meditate from [employer] [place];  \"Here's How Meditation Reduces Inflammation and Prevents Disease\" by Teresa Rush which summarizes recent scientific findings about how meditation improves not only our emotional, but our physical health as well; an excerpt from \"Wherever You Go, There You Are\" by Robert Moreland which describes viewing meditation as if we are standing behind a waterfall watching our thoughts pass; \"The 5 Senses Techniqure;\"  \"Attitudinal Foundations of Mindfulness Practice\" which describes the foundations such as patience and beginner's mind that are part of the building a mindfulness practice; \"Relaxing Sighs\" which describes the skill of using relaxing sighs to calm ourselves and be mindful; \"Mindful Breathing\" which provides instructions on using breathing techniques to practice mindfulness in the moment; \"Mindfulness Meditation Resources\" such as mindfulness apps, and mindfulness reminders; \"Developing Mindfulness Triggers\" by Gatito which shares ideas of how to integrate mindfulness into our daily lives; \"What Are Alexa Beads and How Do I Use Them?\" by Etelvina Jaquez and \"DIY Alexa " "Beads\" from [employer] [place] which provides instructions for members to make their own magalie beads;  \"Resources for Recovering Resilience: Sense and Savor Walk\" which provides instructions on how to practice a walking meditation; and several mandalas for pts to color.           Patients reaction:  Pt shared that she has been continuing to cope well with the ups and downs in her family. Pt discussed how mindfulness has supported her in allowing her to be aware of feelings and emotions, and not get stuck in her ongoing negative thinking and feeling. Pt also identified the balance needed to care for herself and also provide support for her  who is also receiving mental health treatment, but not allowing herself to enable him. Pt identified she has been able to keep herself busy and had a day dedicated to self care over the weekend.     Request for Consent  Patient provided verbal consent to treat via telemedicine. Clinician introduced the secure telemedicine platform that we are utilizing to provide care during the COVID-19 pandemic. Patient understands the session will be billed to their insurance or patient directly.  Patient was informed only the group patients  and the clinician are permitted on?the video conference, sessions are not recorded by the clinician, and the patient is not permitted to record the session.? Patient was provided clinician's unique meeting ID prior to session, patient was asked to arrive to virtual session on time just as patient would if we were in the office. Clinician confirmed identification of patient by name and birthdate, provider name, location of patient and clinician, and callback number in case disconnected. Patient consents to behavioral health treatment    Patient Response to Request for Consent: Yes  Visit Type performed: Audio and Video.    Mental Status:  Mood: Euthymic (normal)  Affect: Full Range  Speech: Regular  Psychomotor Functioning: WNL  Anxiety Level: " 1  Depression Level: 1  Anger Level: 1  Safety Scale: 0  Safety Contract:: N/A  Behaviors:  ADL'S:: Good  Social Status:: Normal  Med Compliance: Yes  Substance Use: Denies  Self Injury:: None    Visit Diagnosis:     ICD-10-CM ICD-9-CM   1. Major depressive disorder, recurrent episode, mild (CMS/HCC)  F33.0 296.31   2. Adjustment disorder with anxious mood  F43.22 309.24       Plan: Continue IOP to : 9/30    Notes for Treatment day: Pt planned to complete IOP on 10/2      Karina Otero LCSW

## 2020-09-30 ENCOUNTER — TELEMEDICINE (OUTPATIENT)
Dept: PSYCHIATRY | Facility: HOSPITAL | Age: 60
End: 2020-09-30
Attending: SOCIAL WORKER
Payer: COMMERCIAL

## 2020-09-30 DIAGNOSIS — F43.22 ADJUSTMENT DISORDER WITH ANXIOUS MOOD: ICD-10-CM

## 2020-09-30 DIAGNOSIS — F33.0 MAJOR DEPRESSIVE DISORDER, RECURRENT EPISODE, MILD (CMS/HCC): Primary | ICD-10-CM

## 2020-09-30 PROCEDURE — S9480 INTENSIVE OUTPATIENT PSYCHIA: HCPCS | Mod: 95 | Performed by: SOCIAL WORKER

## 2020-09-30 ASSESSMENT — COGNITIVE AND FUNCTIONAL STATUS - GENERAL
PSYCHOMOTOR FUNCTIONING: WNL
APPETITE: NO CHANGE
SPEECH: REGULAR
MOOD: EUTHYMIC (NORMAL)
AFFECT: FULL RANGE
SLEEP_WAKE_CYCLE: NO CHANGE

## 2020-09-30 NOTE — GROUP NOTE
Date:  9/30/2020  Start Time:  1:45pm  End Time:   3:30 PM    Billie Frank, YOB: 1960,  was an active group participant.     Number of Attendees in Group:  4    IOP Session 3: Topic of curriculum was “Self Care and Stress Management.” Facilitated check in utilizing “Adrianna, Thorn and Seed” format and 0-5 safety scale regarding SI.   Provided handouts including:  “As a person…” a poem reminding pts of their rights; “Maslow’s Hierarchy of Needs” which presents a framework of where to invest our energy to get our needs met; “Make Your Own Mental Health Self Care Kit” by Rere Miles which provides ideas of what sensory soothing items can be used in a “kit” to take care of ourselves; a list of coping skills generated by previous IOP groups; “So What is Self Care?” and “Positive Steps to Wellbeing” which describe the basics of self care;  information on EFT/Tapping (from [facility]) including information on meridian points and where they connect to in the body by Jonny Salvador; “Acupressure Points for the Hands” by Jonny Salvador;    “Breathing: The Little Known Secret to Peace of Mind” which describes alternate nostril breathing; “Twelve Simple Tips to Improve Your Sleep” from [facility] which describes 12 tips to assist pts in obtaining more restful and peaceful sleep;  “Sensual Awareness Inventory” by Sven Combs which encourages members to list what they experience as soothing in regards to their 5 senses; “Modifying Mood and Meaning with Music and Poetry” by Malissa Quintana which provide directions on using music to shift our emotional experience; “Music as Medicine” by Ailyn Watts which describes how music therapy can improve health outcomes; “Why Girl Gangs Make for Good Health” by Sherlyn Marques which summarizes the research that supports women making and cultivating strong and intimate female relationships and how it positively impacts both their physical and  "emotional well being;; “Resources for Recovering Resilience: Hanging Out with Healthy Brains” by Lilliam Lzu which encourages pts to spend time with people who are resilient and to obtain a “gratitude pily” to assist in building a gratitude practice; and “Resources for Recovering Resilience: Creating a [place] of Support” by Lilliam Luz which describes how visualizing people who are supportive to use in a moment of distress can be just as powerful as if we were able to actually be near our supports in that moment.          Patients reaction:  Pt shared that she was late to group today due to a work emergency that arose at the starting time of group session. Pt discussed that this week has been a challenge and she has been feeling sad after renting her vacation home this week for the upcoming year. Pt discussed that although she has been sad, she has not felt \"stuck\" and has allowed these feelings to come and go. Pt discussed focusing on allowing self compassion and expressing gratitude as ongoing self care tasks.     Request for Consent  Patient provided verbal consent to treat via telemedicine. Clinician introduced the secure telemedicine platform that we are utilizing to provide care during the COVID-19 pandemic. Patient understands the session will be billed to their insurance or patient directly.  Patient was informed only the group patients  and the clinician are permitted on?the video conference, sessions are not recorded by the clinician, and the patient is not permitted to record the session.? Patient was provided clinician's unique meeting ID prior to session, patient was asked to arrive to virtual session on time just as patient would if we were in the office. Clinician confirmed identification of patient by name and birthdate, provider name, location of patient and clinician, and callback number in case disconnected. Patient consents to behavioral health treatment    Patient Response to Request for " Consent: Yes  Visit Type performed: Audio and Video.    Mental Status:  Mood: Euthymic (normal)  Affect: Full Range  Speech: Regular  Psychomotor Functioning: WNL  Anxiety Level: 2  Depression Level: 2  Anger Level: 2  Safety Scale: 0  Safety Contract:: N/A  Behaviors:  ADL'S:: Good  Social Status:: Normal  Med Compliance: Yes  Substance Use: Denies  Self Injury:: None    Visit Diagnosis:     ICD-10-CM ICD-9-CM   1. Major depressive disorder, recurrent episode, mild (CMS/HCC)  F33.0 296.31   2. Adjustment disorder with anxious mood  F43.22 309.24       Plan: Continue IOP to : 10/2    Notes for Treatment day: Pt planned to complete IOP during next session.       Karina Otero LCSW

## 2020-10-01 ENCOUNTER — TELEMEDICINE (OUTPATIENT)
Dept: PSYCHIATRY | Facility: HOSPITAL | Age: 60
End: 2020-10-01
Attending: SOCIAL WORKER
Payer: COMMERCIAL

## 2020-10-01 DIAGNOSIS — F43.23 ADJUSTMENT DISORDER WITH MIXED ANXIETY AND DEPRESSED MOOD: Primary | ICD-10-CM

## 2020-10-01 PROCEDURE — 90791 PSYCH DIAGNOSTIC EVALUATION: CPT | Mod: 95 | Performed by: SOCIAL WORKER

## 2020-10-01 ASSESSMENT — COGNITIVE AND FUNCTIONAL STATUS - GENERAL
INSIGHT: INTACT
REMOTE MEMORY: WNL
APPETITE: DECREASED
PSYCHOMOTOR FUNCTIONING: WNL
PERCEPTUAL FUNCTION: NORMAL
THOUGHT_CONTENT: APPROPRIATE
IMPULSE CONTROL: INTACT
EST. PREMORBID INTELLIGENCE: ABOVE AVERAGE
LIBIDO: NO CHANGE
SPEECH: REGULAR
MOOD: DEPRESSED;ANXIOUS;ANGRY
APPEARANCE: WELL GROOMED
SLEEP_WAKE_CYCLE: NO CHANGE
RECENT MEMORY: WNL
ORIENTATION: FULLY ORIENTED
EYE_CONTACT: WNL
CONCENTRATION: WNL
ATTENTION: WNL
AROUSAL LEVEL: AWAKE
AFFECT: FULL RANGE
DELUSIONS: NONE OR AGE APPROPRIATE
THOUGHT_PROCESS: WNL
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
## 2020-10-02 ENCOUNTER — TELEMEDICINE (OUTPATIENT)
Dept: PSYCHIATRY | Facility: HOSPITAL | Age: 60
End: 2020-10-02
Attending: SOCIAL WORKER
Payer: COMMERCIAL

## 2020-10-02 DIAGNOSIS — F43.23 ADJUSTMENT DISORDER WITH MIXED ANXIETY AND DEPRESSED MOOD: Primary | ICD-10-CM

## 2020-10-02 DIAGNOSIS — F33.0 MAJOR DEPRESSIVE DISORDER, RECURRENT EPISODE, MILD (CMS/HCC): ICD-10-CM

## 2020-10-02 PROCEDURE — S9480 INTENSIVE OUTPATIENT PSYCHIA: HCPCS | Performed by: SOCIAL WORKER

## 2020-10-02 ASSESSMENT — COGNITIVE AND FUNCTIONAL STATUS - GENERAL
PSYCHOMOTOR FUNCTIONING: WNL
AFFECT: FULL RANGE
MOOD: EUTHYMIC (NORMAL)
SLEEP_WAKE_CYCLE: NO CHANGE
SPEECH: REGULAR
APPETITE: DECREASED

## 2020-10-02 NOTE — PROGRESS NOTES
"Discharge Summary     Patient Name: Billie Frank  : 1960  Treatment start date: 2020  Treatment end date: 10/02/20    Discharge Type: Mental Health  Discharge Reason: Program Complete    Reason for admission and treatment: Pt was admitted to [facility] following step down from PHP to continue to work on coping skills for anxiety, panic, and depression.    Services offered and response to treatment: Pt successfully competed [facility] treatment.    Client status - Condition upon discharge: Pt completed [facility] treatment. Pt made significant progress towards goals of coping with anxiety and challenging negative thinking. Pt was able to identify supports, utilize coping skills of 1) exercise, 2) mindfulness, 3) self care, 4) resuming engagement in hobbies, 5) working on effective communication, and 6) developing emotional regulation skills.     Pt has increased insight, motivation, and resilience.     Clinical concerns to be addressed in continued care: Pt to continue to work on challenging negative thinking and lingering negative beliefs about self in relation to the cause of separation in her marriage as pt continues to report \"I didn't protect myself, I should have been more guarded\" Pt to also continue to utilize coping skills and effective communication as she moves forward with her separation or returning to her marriage.     Aftercare appointments: Pt to attend IT with Clifford Flores on Saturday 10/3. Pt to follow up with Dr. Willard for medication management on 10/16. Pt to schedule for IOP [facility] and M[facility] groups at [facility].     Special Instructions: None    Medical Follow Up needed?  No     Is patient receiving Medicated Assisted Treatment? No    Mental Health Crisis Support:    [facility] Crisis Number: [phone]   [facility] Crisis Number: [phone]   [facility] Crisis Number: [phone]   [facility] Crisis Number: [phone]      In case of Mental Health Crisis, go to the closest " Emergency Department, call 9-1-1, or contact the National Suicide Prevention Hotline at: 1-784.892.2711  Other Support Agencies:  [place] National Walker of Mental Illness: 943.377.2401    [place] Advocacy System: 981.958.1420    Depression & Bipolar Walker: [phone]      Patient offered a copy of plan? Yes     Patient provided a copy? No, Describe Pt denied       Karina Otero, TANYA @ 3:53 PM

## 2020-10-02 NOTE — GROUP NOTE
Date:  10/2/2020  Start Time:  12:30 PM  End Time:   3:30 PM    Billie Frank, YOB: 1960,  was an active group participant.    Number of Attendees in Group:  5    [facility] Session 4: Topic of curriculum was “Body Image.”  Facilitated check in utilizing “Adrianna, Thorn and Seed” format and 0-5 safety scale regarding SI.   Provided and facilitated discussion on handouts including: “Dear Society” a letter by Marilyn Edward about her rejection of society’s norms/expectations for women;  “If I Had a Little Girl…” which prompts members to consider what they would say to the little girl within themselves; “Are We Finally Fed Up With the Media’s Unrealistic Portrayal of Women’s Bodies?” by Julissa Fabian which describes how even “normal” bodies portrayed in the media are not realistic or representative of the greater society; “Tips for Becoming a Critical Viewer of the Media” which describes being aware that advertisements are constructions to create vulnerability in us to purchase a product and strategies to fight media messages;  “Women Need Mindfulness Even More Than Men Do” by Shakila Sneed which describes how women are more likely to multitask and worry so mindfulness is highly beneficial for them; “Listen to Your Body” from National Eating Disorders.org which promotes listening to and honoring the signals our body gives us as a compass for what we need; “Interoception: Know Yourself Inside and Out” by Elbert Calderón which discusses how there are neurons in our guts and how we can increase our awareness of our bodies; “How Trauma Affects Our Relationship with Our Bodies” by Shelley Hidalgo which describes how we often self soothe with food or other external resources when trauma is unprocessed in our bodies;  “Emotional Eating: How to Recognize and Stop Emotional Eating” which describes the difference between emotional and physical hunger and strategies to identify what we need emotionally to fill the need in a  "healthy manner; “Types and Symptoms of Eating Disorders” and descriptions of eating disorders from National Eating Disorders.org;  “How Would You Treat a Friend?” which highlights our tendency to treat others with compassion more so than we treat ourselves - the exercise prompts pts to consider how they would treat a friend and to turn that kindness back on themselves; “Resources for Recovering Resilience: Looking in the Mirror with Kindness” by Lilliam Luz which prompts pts to take time to look at themselves in the mirror and to appreciate their positive attributes; a body image activity which prompts members to draw their bodies and then identify 5 aspects they like and appreciate about their bodies; and reminder of starting to accept our bodies starts small.    Pts participated in graduation of one group member.           Patients reaction:  Pt shared that she has mixed emotions as she completes IOP today, but is looking forward to continuing to move forward. Pt shared \"I am ready to move on\" and discussed she has her aftercare plan in place and is grateful for the support in her mental health program and with her family and friends. Pt discussed today is also her birthday and she has been feeling intense emotions, including paul, anger, and sadness but feels about to continue to cope and enjoy her birthday celebration this evening    Request for Consent  Patient provided verbal consent to treat via telemedicine. Clinician introduced the secure telemedicine platform that we are utilizing to provide care during the COVID-19 pandemic. Patient understands the session will be billed to their insurance or patient directly.  Patient was informed only the group patients  and the clinician are permitted on?the video conference, sessions are not recorded by the clinician, and the patient is not permitted to record the session.? Patient was provided clinician's unique meeting ID prior to session, patient was asked to " arrive to virtual session on time just as patient would if we were in the office. Clinician confirmed identification of patient by name and birthdate, provider name, location of patient and clinician, and callback number in case disconnected. Patient consents to behavioral health treatment    Patient Response to Request for Consent: Yes  Visit Type performed: Audio and Video.    Mental Status:  Mood: Euthymic (normal)  Affect: Full Range  Speech: Regular  Psychomotor Functioning: WNL  Anxiety Level: 2  Depression Level: 1  Anger Level: 2  Safety Scale: 0  Safety Contract:: N/A  Behaviors:  ADL'S:: Good  Social Status:: Normal  Med Compliance: Yes  Substance Use: Denies  Self Injury:: None    Visit Diagnosis:     ICD-10-CM ICD-9-CM   1. Adjustment disorder with mixed anxiety and depressed mood  F43.23 309.28   2. Major depressive disorder, recurrent episode, mild (CMS/HCC)  F33.0 296.31       Plan: Discharge patient today.    Notes for Treatment day: Pt competed IOP today.       Karina Otero LCSW

## 2020-10-15 ENCOUNTER — TELEMEDICINE (OUTPATIENT)
Dept: PSYCHIATRY | Facility: HOSPITAL | Age: 60
End: 2020-10-15
Attending: COUNSELOR
Payer: COMMERCIAL

## 2020-10-15 DIAGNOSIS — F33.0 MAJOR DEPRESSIVE DISORDER, RECURRENT EPISODE, MILD (CMS/HCC): ICD-10-CM

## 2020-10-15 DIAGNOSIS — F43.23 ADJUSTMENT DISORDER WITH MIXED ANXIETY AND DEPRESSED MOOD: Primary | ICD-10-CM

## 2020-10-15 PROCEDURE — 90853 GROUP PSYCHOTHERAPY: CPT | Mod: 95 | Performed by: COUNSELOR

## 2020-10-15 ASSESSMENT — COGNITIVE AND FUNCTIONAL STATUS - GENERAL
INSIGHT: INTACT
APPEARANCE: WELL GROOMED
AFFECT: FULL RANGE
EYE_CONTACT: WNL
PSYCHOMOTOR FUNCTIONING: WNL
POSITIVE INVOLVEMENT: OPEN;INTERESTED
MOOD: EUTHYMIC (NORMAL)
JUDGEMENT: GOOD
THOUGHT_PROCESS: WNL

## 2020-10-15 NOTE — GROUP NOTE
Request for Consent  Patient provided verbal consent to treat via telemedicine. Clinician introduced the secure telemedicine platform that we are utilizing to provide care during the COVID-19 pandemic. Patient understands the session will be billed to their insurance or patient directly.  Patient was informed only the group patients  and the clinician are permitted on?the video conference, sessions are not recorded by the clinician, and the patient is not permitted to record the session.? Patient was provided clinician's unique meeting ID prior to session, patient was asked to arrive to virtual session on time just as patient would if we were in the office. Clinician confirmed identification of patient by name and birthdate, provider name, location of patient and clinician, and callback number in case disconnected. Patient consents to behavioral health treatment    Patient Response to Request for Consent: Yes  Visit Type performed: Audio and VideoDate:  10/15/2020  Start Time:   5:30 PM  End Time:   7:00 PM    Billie Frank, YOB: 1960,  was an active group participant.    M3 Session 7: Mindful Boundaries  8 members attended group today.  LPC introduced the format and expectations for participating in the Mindfulness, Meditation and Movement Group, advising all that the flow of the group is designed to introduce members to movement practices, guided meditations and mindfulness-based psycho-education topics.  LPC shared that the focus of today’s group will explore the connection between internal boundaries and internal communication vs. boundaries in relationships and how we relate to others.  Group members were provided an analogy of understanding boundary systems as an apple from Setting Healthy Boundaries by Cristian Guthrie.   Group then reviewed the concept of empathy fatigue, hyper-empathy and what relationships with others who have any of these conditions could look like.  Group members were encouraged  "to share about their own relationships and experiences with setting (or having difficulty setting) boundaries in their own lives.  Group members were educated between the differences/characteristics of rigid, permissive and healthy boundaried relationships.  Group members were led through mindful movement practice today for 20-30 minutes.  Group members were then led through a 20 minute loving-kindness/Tonglen meditation practice.                      Mental Status:  Findings  Mood: Euthymic (normal)  Affect: Full Range  Rapport: Appropriate  Eye Contact: WNL  Appearance: Well Groomed  Psychomotor Functioning: WNL  Thought Process: WNL  Positive Involvement: Open, Interested  Judgment: Good  Insight: Intact    Patients reaction:  Pt introduced herself being new to the group. Pt shared that she completed PHP tx at [facility] and recently IOP tx. Pt shared that the word and concept of \"boudanries\" seems harsh to her so she prefers to describe boundaries as a definition of \"what I will and what I won't allow\". Pt shared that this way of conceptualizing boundaries has helped her to set expectations in relationships.     Visit Diagnosis:  No diagnosis found.    Plan:  Please see treatment plan for details. F/U with Biweekly  group with LCSW. Pt to F/U with local ED/call 911 should SI/HI arises.     Koko Mason LPC  "

## 2020-10-17 DIAGNOSIS — F33.0 MAJOR DEPRESSIVE DISORDER, RECURRENT EPISODE, MILD (CMS/HCC): ICD-10-CM

## 2020-10-19 NOTE — TELEPHONE ENCOUNTER
Medicine Refill Request    Last Office Visit: 9/2/2020  Last Telemedicine Visit: 6/18/2020 Mariel Lee CRNP    Next Office Visit: Visit date not found  Next Telemedicine Visit: Visit date not found         Current Outpatient Medications:   •  AMABELZ 0.5-0.1 mg per tablet, 1 tablet 3 (three) times a week (Mon, Wed, Fri). TID weekly, due tomorrow Saturday 8/1/2020 , Disp: , Rfl:   •  ascorbic acid (VITAMIN C) 500 mg tablet, Take 1,000 mg by mouth daily., Disp: , Rfl:   •  aspirin 81 mg enteric coated tablet, Take 81 mg by mouth daily., Disp: , Rfl:   •  b complex vitamins capsule, Take 1 capsule by mouth daily., Disp: , Rfl:   •  busPIRone (BUSPAR) 15 mg tablet, Take 1 tablet (15 mg total) by mouth 2 (two) times a day., Disp: 60 tablet, Rfl: 0  •  cholecalciferol, vitamin D3, 1,000 unit (25 mcg) tablet, Take 1,000 Units by mouth daily., Disp: , Rfl:   •  diclofenac (VOLTAREN) 50 mg EC tablet, Take 50 mg by mouth 2 (two) times a day as needed.  , Disp: , Rfl:   •  escitalopram (LEXAPRO) 20 mg tablet, Take 1 tablet (20 mg total) by mouth once daily., Disp: 30 tablet, Rfl: 0  •  LORazepam (ATIVAN) 1 mg tablet, Take 1 tablet (1 mg total) by mouth nightly as needed (insomnia)., Disp: 30 tablet, Rfl: 0  •  omeprazole (PriLOSEC) 20 mg capsule, daily as needed.  , Disp: , Rfl:   •  QUEtiapine (SEROquel) 50 mg tablet, Take 1 tab daily at bedtime. Also ok to take 1 tab up to three times daily as needed for anxiety, Disp: 90 tablet, Rfl: 0  •  RANEXA 500 mg 12 hr tablet, , Disp: , Rfl:   •  rosuvastatin (CRESTOR) 5 mg tablet, Take 5 mg by mouth daily. Every other day, Disp: , Rfl:       BP Readings from Last 3 Encounters:   09/02/20 126/80   08/04/20 130/82   03/20/20 120/80       Recent Lab results:  Lab Results   Component Value Date    CHOL 186 09/21/2019   ,   Lab Results   Component Value Date    HDL 76 09/21/2019   ,   Lab Results   Component Value Date    LDLCALC 101 (H) 09/21/2019   ,   Lab Results   Component Value  Date    TRIG 46 09/21/2019        Lab Results   Component Value Date    GLUCOSE 115 (H) 07/31/2020   , No results found for: HGBA1C      Lab Results   Component Value Date    CREATININE 0.9 07/31/2020       Lab Results   Component Value Date    TSH 2.230 09/21/2019

## 2020-10-20 RX ORDER — ESCITALOPRAM OXALATE 20 MG/1
TABLET ORAL
Qty: 90 TABLET | Refills: 1 | Status: SHIPPED | OUTPATIENT
Start: 2020-10-20 | End: 2021-04-15

## 2020-10-22 ENCOUNTER — TELEMEDICINE (OUTPATIENT)
Dept: PSYCHIATRY | Facility: HOSPITAL | Age: 60
End: 2020-10-22
Attending: COUNSELOR
Payer: COMMERCIAL

## 2020-10-22 DIAGNOSIS — F43.23 ADJUSTMENT DISORDER WITH MIXED ANXIETY AND DEPRESSED MOOD: Primary | ICD-10-CM

## 2020-10-22 DIAGNOSIS — F33.0 MILD EPISODE OF RECURRENT MAJOR DEPRESSIVE DISORDER (CMS/HCC): ICD-10-CM

## 2020-10-22 PROCEDURE — 90853 GROUP PSYCHOTHERAPY: CPT | Mod: 95 | Performed by: COUNSELOR

## 2020-10-22 NOTE — GROUP NOTE
Request for Consent  Patient provided verbal consent to treat via telemedicine. Clinician introduced the secure telemedicine platform that we are utilizing to provide care during the COVID-19 pandemic. Patient understands the session will be billed to their insurance or patient directly.  Patient was informed only the group patients  and the clinician are permitted on?the video conference, sessions are not recorded by the clinician, and the patient is not permitted to record the session.? Patient was provided clinician's unique meeting ID prior to session, patient was asked to arrive to virtual session on time just as patient would if we were in the office. Clinician confirmed identification of patient by name and birthdate, provider name, location of patient and clinician, and callback number in case disconnected. Patient consents to behavioral health treatment    Patient Response to Request for Consent: Yes  Visit Type performed: Audio and VideoDate:  10/22/2020  Start Time:   5:30 PM  End Time:   7:00 PM    Billie Frank, YOB: 1960,  was an active group participant.    Group Focus: Self-Compassion & Gratitude   Goal of group was to establish and build social support, review coping skills, normalize the struggles of being human, and increase self awareness and self compassion.  This evening, 2 [facility] Alumna joined online for group and spoke very candidly about their mental health journeys and some of the things they have successfully overcome. [facility] listened supportively and facilitated a conversation on the 5 basic pillars of self-compassion and finding ways to be mindful and grateful in life.     About the Group: 2 members attended group today. Group code of conduct was reviewed and members were instructed to sign in for their attendance. LPC built group rapport by reminding everyone even if they do not know each other all members present are there due to common experiences and emotions.  Members were  "informed about group being offered between both locations of the [facility] and were encouraged to sign up for upcoming groups. Brief introductions including name,highs and lows of the week, and what the member needed from group tonight.  Group engaged in active and supportive discussion. Topics discussed included self-compassion and gratitude. Group members were able to offer insightful and supportive feedback and suggestions about how to manage difficult situations. Group members were provided with an article/resource: \"5 Steps to Develop Self-Compassion & Overcome Your Inner Critic\" courtesy of positive psychology.com as well as a link to a guided meditation from Calm via you tube. [facility] also provided a link to a self-compassion journal exercise by Dr. Caridad Cox.   Group members were encouraged to prioritize their self care and well being and to reach out to the [facility] with any needs.        Mental Status:       Patients reaction:  This evening was the Pt's first time in IOP Alumni group. LPC warmly welcomed the Pt to group and introduced her to fellow Alumni group member. The two ladies hit it off and were happy to meet and get to know one another. Pt did a great job in acclimating to the outpatient group and shared aspects of her journey that include a recent marital separation that has really taken a significant emotional toll on her, \"About 4 months ago my marriage of 40 years really blew up. It has not been easy and I did not see it coming. Pt was vocal and transparent about her recent stay in [place] and the journey's peaks and valleys. LPC and fellow group member listened empathically with great attunement and support. Pt felt comfortable to discuss mental health hurdles and the fact her  is getting his own treatment at this time. Pt stated that she is hopeful, maybe they can move past this current challenge. Pt did a lang job cultivating an attitude and presence of gratitude. She named several things she is " very grateful for and her gratitude was contagious. Pt enjoyed the articles shared along with the guided meditation and song of inspiration by Cara Wheeler. LPC wished Pt and her fellow group member a healthy and happy, self-compassion filled week ahead.     Visit Diagnosis:      ICD-10-CM ICD-9-CM   1. Adjustment disorder with mixed anxiety and depressed mood  F43.23 309.28   2. Mild episode of recurrent major depressive disorder (CMS/HCC)  F33.0 296.31       Plan:  Please see treatment plan for details. F/U with Weekly  group with LCSW. Pt to F/U with local ED/call 911 should SI/HI arises.     Dov Melvin, KAIDEN

## 2020-10-29 ENCOUNTER — TELEMEDICINE (OUTPATIENT)
Dept: PSYCHIATRY | Facility: HOSPITAL | Age: 60
End: 2020-10-29
Attending: COUNSELOR
Payer: COMMERCIAL

## 2020-10-29 DIAGNOSIS — F43.23 ADJUSTMENT DISORDER WITH MIXED ANXIETY AND DEPRESSED MOOD: Primary | ICD-10-CM

## 2020-10-29 DIAGNOSIS — F33.0 MILD EPISODE OF RECURRENT MAJOR DEPRESSIVE DISORDER (CMS/HCC): ICD-10-CM

## 2020-10-29 PROCEDURE — 90853 GROUP PSYCHOTHERAPY: CPT | Mod: 95 | Performed by: COUNSELOR

## 2020-10-29 ASSESSMENT — COGNITIVE AND FUNCTIONAL STATUS - GENERAL
JUDGEMENT: GOOD
AFFECT: FULL RANGE
MOOD: EUTHYMIC (NORMAL)
EYE_CONTACT: WNL
THOUGHT_PROCESS: WNL
PSYCHOMOTOR FUNCTIONING: WNL
INSIGHT: INTACT
APPEARANCE: WELL GROOMED
POSITIVE INVOLVEMENT: INTERESTED

## 2020-10-29 NOTE — GROUP NOTE
Request for Consent  Patient provided verbal consent to treat via telemedicine. Clinician introduced the secure telemedicine platform that we are utilizing to provide care during the COVID-19 pandemic. Patient understands the session will be billed to their insurance or patient directly.  Patient was informed only the group patients  and the clinician are permitted on?the video conference, sessions are not recorded by the clinician, and the patient is not permitted to record the session.? Patient was provided clinician's unique meeting ID prior to session, patient was asked to arrive to virtual session on time just as patient would if we were in the office. Clinician confirmed identification of patient by name and birthdate, provider name, location of patient and clinician, and callback number in case disconnected. Patient consents to behavioral health treatment    Patient Response to Request for Consent: Yes  Visit Type performed: Audio and VideoDate:  10/29/2020  Start Time:   5:30 PM  End Time:   7:00 PM    Billie Frank, YOB: 1960,  was an active group participant.    M3  Session 9: Learning how to Become a Wise-Minded Witness  11 members attended group today.  LPC introduced the format and expectations for participating in the Mindfulness, Meditation and Movement Group, advising all that the flow of the group is designed to introduce members to movement practices, guided meditations and mindfulness-based psycho-education topics LPC shared that the focus of group today is to explore how the use of movement, meditation and mindfulness skills can assist in increasing our ability to detach from our emotional experience and become the witness of what is going on around us.  Group member practiced the “felt sense” sensing technique from Floyd Hernadez, PhD and learned the concept of  emotional mind, logical/rational mind and the desired combination of both: gibbs mind from Ashleigh Collado, PhD in an  effort to increase frustration tolerance and decrease emotional reactivity and interpersonal conflict in our lives.  Group was then led through a series of mindful movements for 20 minutes to assist in building distress tolerance skills and were encouraged to practice mindful breathing consciously as they moved through each pose.  Group was concluded with the practice of a guided progressive relaxation exercise to build inner awareness of how the body feels when “distressed” as well as when “relaxed.”                    Mental Status:  Findings  Mood: Euthymic (normal)  Affect: Full Range  Rapport: Appropriate  Eye Contact: WNL  Appearance: Well Groomed  Psychomotor Functioning: WNL  Thought Process: WNL  Positive Involvement: Interested  Judgment: Good  Insight: Intact    Patients reaction:  Pt shared that she attended the group once a few weeks ago. Pt shared that she is glad to be back in the group. Pt shared that she feels grateful to have the opportunity to be with women in a group support. Pt was engaged in the meditation and movement portion of group. Pt displayed non verbal cues that she related with other women in the group.     Visit Diagnosis:  No diagnosis found.    Plan:  Please see treatment plan for details. F/U with Weekly  group with LCSW. Pt to F/U with local ED/call 911 should SI/HI arises.     Koko Mason, LPC

## 2020-10-30 ENCOUNTER — TELEPHONE (OUTPATIENT)
Dept: PSYCHIATRY | Facility: HOSPITAL | Age: 60
End: 2020-10-30

## 2020-10-30 NOTE — TELEPHONE ENCOUNTER
LVM to let know that she is already scheduled for ALU on 11/5 so she wouldn't be able to also do M3 that night

## 2020-11-05 ENCOUNTER — TELEMEDICINE (OUTPATIENT)
Dept: PSYCHIATRY | Facility: HOSPITAL | Age: 60
End: 2020-11-05
Attending: COUNSELOR
Payer: COMMERCIAL

## 2020-11-05 DIAGNOSIS — F43.23 ADJUSTMENT DISORDER WITH MIXED ANXIETY AND DEPRESSED MOOD: Primary | ICD-10-CM

## 2020-11-05 DIAGNOSIS — F33.0 MILD EPISODE OF RECURRENT DEPRESSIVE DISORDER (CMS/HCC): ICD-10-CM

## 2020-11-05 PROCEDURE — 90853 GROUP PSYCHOTHERAPY: CPT | Mod: 95 | Performed by: COUNSELOR

## 2020-11-05 ASSESSMENT — COGNITIVE AND FUNCTIONAL STATUS - GENERAL
MOOD: DEPRESSED;MOTIVATED;HOPEFUL
EYE_CONTACT: WNL
PSYCHOMOTOR FUNCTIONING: WNL
AFFECT: FULL RANGE
INSIGHT: INTACT;IMPAIRED, MINIMALLY
APPEARANCE: WELL GROOMED
THOUGHT_PROCESS: WORRY
JUDGEMENT: EXCELLENT

## 2020-11-06 NOTE — GROUP NOTE
Request for Consent  Patient provided verbal consent to treat via telemedicine. Clinician introduced the secure telemedicine platform that we are utilizing to provide care during the COVID-19 pandemic. Patient understands the session will be billed to their insurance or patient directly.  Patient was informed only the group patients  and the clinician are permitted on?the video conference, sessions are not recorded by the clinician, and the patient is not permitted to record the session.? Patient was provided clinician's unique meeting ID prior to session, patient was asked to arrive to virtual session on time just as patient would if we were in the office. Clinician confirmed identification of patient by name and birthdate, provider name, location of patient and clinician, and callback number in case disconnected. Patient consents to behavioral health treatment    Patient Response to Request for Consent: Yes  Visit Type performed: Audio and VideoDate:  11/5/2020  Start Time:   5:30 PM  End Time:   7:00 PM    Billie Frank, YOB: 1960,  was an active group participant.    Group Focus: Purpose and Life Goals   Goal of group was to establish and build social support, review coping skills, normalize the struggles of being human, and increase self awareness and self compassion. This evening the women in the Alumni group became acquainted with one another, discussed and assessed their highs and lows.  A common theme of the group conversation dealt with the collective angst surrounding the pending presidential election. The women all agreed this state of not knowing who won the election is difficult and really nerve wracking. 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 LPC used a video and article that highlighted 10 points to uncover one's life purpose.     About the Group: 3 members attended group today. Group code of conduct was  "reviewed and members were instructed to sign in for their attendancek. LCSW built group rapport by reminding everyone even if they do not know each other all members present are there due to common experiences and emotions.  Members were informed about group being offered between both locations of the [facility] and were encouraged to sign up for upcoming groups. Brief introductions including name, progress on their mental health journey, and what the member needed from group tonight.  Group engaged in active and supportive discussion. Topics discussed included highs and lows of the week, namely the presidential election and how to start over in terms of discovering and honing life's purpose. Group members were able to offer insightful and supportive feedback and suggestions about how to manage difficult situations. Group members were provided with an article/resource: \"10 Life Purpose Tips to Help You Find Your Passion.\"  Group members were encouraged to prioritize their self care and well being and to reach out to the [facility] with any needs.        Mental Status:  Findings  Mood: Depressed, Motivated, Hopeful  Affect: Full Range  Rapport: Appropriate, Attentive  Eye Contact: WNL  Appearance: Well Groomed  Psychomotor Functioning: WNL  Thought Process: Worry  Positive Involvement: Emotional, Intellectual, Self-Aware, Empathetic, Open, Objective, Easilty Engaged, Interested, Relevant  Judgment: Excellent  Insight: Intact, Impaired, minimally    Patients reaction:  This evening the Pt was engaged, vocal, and an active participant in the group. Pt spoke to the theme of purpose and was very transparent about her life, career and having to start over at times. Pt continues to serve as a positive, personable, and stable member of the group. LPC appreciates her point of view and support of her fellow group members. Pt did a lang job validating her peers and making parallels with the psychoeducational content that the group " covered together.     Visit Diagnosis:      ICD-10-CM ICD-9-CM   1. Adjustment disorder with mixed anxiety and depressed mood  F43.23 309.28   2. Mild episode of recurrent depressive disorder (CMS/HCC)  F33.0 296.31       Plan:  Please see treatment plan for details. F/U with Biweekly  group with LCSW. Pt to F/U with local ED/call 911 should SI/HI arises.     Dov Melvin, LPC

## 2020-11-12 ENCOUNTER — TELEMEDICINE (OUTPATIENT)
Dept: PSYCHIATRY | Facility: HOSPITAL | Age: 60
End: 2020-11-12
Attending: COUNSELOR
Payer: COMMERCIAL

## 2020-11-12 DIAGNOSIS — F43.23 ADJUSTMENT DISORDER WITH MIXED ANXIETY AND DEPRESSED MOOD: Primary | ICD-10-CM

## 2020-11-12 DIAGNOSIS — F33.0 MILD EPISODE OF RECURRENT DEPRESSIVE DISORDER (CMS/HCC): ICD-10-CM

## 2020-11-12 PROCEDURE — 90853 GROUP PSYCHOTHERAPY: CPT | Mod: 95 | Performed by: COUNSELOR

## 2020-11-12 ASSESSMENT — COGNITIVE AND FUNCTIONAL STATUS - GENERAL
INSIGHT: INTACT
APPEARANCE: WELL GROOMED
THOUGHT_PROCESS: WNL
PSYCHOMOTOR FUNCTIONING: WNL
AFFECT: FULL RANGE
POSITIVE INVOLVEMENT: INTERESTED
JUDGEMENT: GOOD
EYE_CONTACT: WNL
MOOD: EUTHYMIC (NORMAL)

## 2020-11-13 NOTE — GROUP NOTE
Request for Consent  Patient provided verbal consent to treat via telemedicine. Clinician introduced the secure telemedicine platform that we are utilizing to provide care during the COVID-19 pandemic. Patient understands the session will be billed to their insurance or patient directly.  Patient was informed only the group patients  and the clinician are permitted on?the video conference, sessions are not recorded by the clinician, and the patient is not permitted to record the session.? Patient was provided clinician's unique meeting ID prior to session, patient was asked to arrive to virtual session on time just as patient would if we were in the office. Clinician confirmed identification of patient by name and birthdate, provider name, location of patient and clinician, and callback number in case disconnected. Patient consents to behavioral health treatment    Patient Response to Request for Consent: Yes  Visit Type performed: Audio and VideoDate:  11/12/2020  Start Time:   5:30 PM  End Time:   7:00 PM    Billie Frank, YOB: 1960,  was an active group participant.    M3  Session 11: Mindfulness Review/Where to go from here  10 members attended group today.  LPC introduced the format and expectations for participating in the Mindfulness, Meditation and Movement Group, advising all that the flow of the group is designed to introduce members to movement practices, guided meditations and mindfulness-based psycho-education topics.  LPC shared that the focus of group today is to explore how the use of movement, meditation and mindfulness skills can help women build a personalized self-care plan.  Group members were introduced to the theory of “Motivational Interviewing” and “the Cycle of Change” by Procdustina and Mariana as a means of better understanding/offering ourselves compassion when experiencing difficulty maintaining positive changes in our lives.  Group were also educated and received a  handout on Maslow’s hierarchy of needs as a means of better understanding how certain basic and psychological needs have to be met before we can work on the goal of achieving our own full potential/ability to create.  Group members were encouraged to reflect on ways to take breaks throughout their day and encouraged to share what works for them currently.  Group members were led through 30 minutes of mindful movements with a focus on increasing the ability to interocept or watch what physical experiences occurred within their bodies throughout the practice.  Group members were then led through a safe place/calm place guided practice at the end.  Group members were encouraged to process and share anything they noticed during the movement and guided meditation parts of the group and encouraged to share with each other what practices of mindfulness/meditation and movement have changed their lives/helped them in an effort to collaborate together in a safe place.                      Mental Status:  Findings  Mood: Euthymic (normal)  Affect: Full Range  Rapport: Appropriate  Eye Contact: WNL  Appearance: Well Groomed  Psychomotor Functioning: WNL  Thought Process: WNL  Positive Involvement: Interested  Judgment: Good  Insight: Intact    Patients reaction:  Pt was engaged in group session. Pt participated in movement and meditation practice this evening. Pt seems to benefit from the group and connection with women.     Visit Diagnosis:  No diagnosis found.    Plan:  Please see treatment plan for details. F/U with Weekly  group with LCSW. Pt to F/U with local ED/call 911 should SI/HI arises.     Koko Mason, LPC

## 2020-11-19 ENCOUNTER — TELEMEDICINE (OUTPATIENT)
Dept: PSYCHIATRY | Facility: HOSPITAL | Age: 60
End: 2020-11-19
Attending: COUNSELOR
Payer: COMMERCIAL

## 2020-11-19 DIAGNOSIS — F43.23 ADJUSTMENT DISORDER WITH MIXED ANXIETY AND DEPRESSED MOOD: Primary | ICD-10-CM

## 2020-11-19 DIAGNOSIS — F33.0 MILD EPISODE OF RECURRENT DEPRESSIVE DISORDER (CMS/HCC): ICD-10-CM

## 2020-11-19 PROCEDURE — 90853 GROUP PSYCHOTHERAPY: CPT | Mod: 95 | Performed by: COUNSELOR

## 2020-11-19 ASSESSMENT — COGNITIVE AND FUNCTIONAL STATUS - GENERAL
PSYCHOMOTOR FUNCTIONING: WNL
POSITIVE INVOLVEMENT: INTERESTED
EYE_CONTACT: WNL
AFFECT: FULL RANGE
APPEARANCE: WELL GROOMED
JUDGEMENT: GOOD
INSIGHT: INTACT
THOUGHT_PROCESS: WNL
MOOD: EUTHYMIC (NORMAL)

## 2020-11-20 NOTE — GROUP NOTE
Request for Consent  Patient provided verbal consent to treat via telemedicine. Clinician introduced the secure telemedicine platform that we are utilizing to provide care during the COVID-19 pandemic. Patient understands the session will be billed to their insurance or patient directly.  Patient was informed only the group patients  and the clinician are permitted on?the video conference, sessions are not recorded by the clinician, and the patient is not permitted to record the session.? Patient was provided clinician's unique meeting ID prior to session, patient was asked to arrive to virtual session on time just as patient would if we were in the office. Clinician confirmed identification of patient by name and birthdate, provider name, location of patient and clinician, and callback number in case disconnected. Patient consents to behavioral health treatment    Patient Response to Request for Consent: Yes  Visit Type performed: Audio and VideoDate:  11/19/2020  Start Time:   5:30 PM  End Time:   7:00 PM    Billie Frank, YOB: 1960,  was an active group participant.    M3  Session 1: Brain and Body 101  10 members attended group today.  LPC introduced the format and expectations for participating in the Mindfulness, Meditation and Movement Group, advising all that the flow of the group is designed to introduce members to movement practices, guided meditations and mindfulness-based psychoeducation topics.  LPC shared that the focus of group today is to explore and grow awareness about how mindfulness can assist with anxiety and depression as well as how the sympathetic and parasympathetic nervous system can perpetuate the fight/flight/freeze reaction through our posture, how we breathe and ways we think.  Group members were introduced to the concept of interoception as a skill to increase awareness of moods/emotions.  Group members were introduced to the work of John Quintanilla’s work in Trauma  Sensitive Yoga and were educated that interoception includes: the visceral experience of feeling something in your body (muscles stretching, heart beating, stomach growling).  Additionally, interoceptive focus can include examining the motivation/movements involved in acting out whatever the visceral feeling activates or triggers.  Finally, group processed the effect of our visceral experience and movements/actions on our moods/emotions.  Group members were introduced to several therapeutic mindful movements and encouraged to begin exploring their own interoceptive capabilities in the group setting for 20 minutes.  Group members were then encouraged to engage in a guided meditation which consisted of a 10 minute breath oriented guided meditation by Jung TyGroup members were supportive of one another and offered encouragement and support throughout.  Members were given the opportunity to sign up for the next M3 Group.  Group members were provided handouts with suggested materials to review at their own pace..                    Mental Status:  Findings  Mood: Euthymic (normal)  Affect: Full Range  Rapport: Appropriate  Eye Contact: WNL  Appearance: Well Groomed  Psychomotor Functioning: WNL  Thought Process: WNL  Positive Involvement: Interested  Judgment: Good  Insight: Intact    Patients reaction:  Pt shared that she has been attending this group for a few weeks and finds it helpful. Pt shared that she is working to embrace mindfulness. Pt seemed engaged in movement and meditation.     Visit Diagnosis:  No diagnosis found.    Plan:  Please see treatment plan for details. F/U with Weekly  group with LCSW. Pt to F/U with local ED/call 911 should SI/HI arises.     Koko Mason, LPC

## 2020-12-03 ENCOUNTER — TELEMEDICINE (OUTPATIENT)
Dept: PSYCHIATRY | Facility: HOSPITAL | Age: 60
End: 2020-12-03
Attending: COUNSELOR
Payer: COMMERCIAL

## 2020-12-03 DIAGNOSIS — F43.23 ADJUSTMENT DISORDER WITH MIXED ANXIETY AND DEPRESSED MOOD: Primary | ICD-10-CM

## 2020-12-03 DIAGNOSIS — F33.0 MILD EPISODE OF RECURRENT DEPRESSIVE DISORDER (CMS/HCC): ICD-10-CM

## 2020-12-03 PROCEDURE — 90853 GROUP PSYCHOTHERAPY: CPT | Mod: 95 | Performed by: COUNSELOR

## 2020-12-03 ASSESSMENT — COGNITIVE AND FUNCTIONAL STATUS - GENERAL
INSIGHT: INTACT
MOOD: EUTHYMIC (NORMAL)
POSITIVE INVOLVEMENT: OPEN
APPEARANCE: WELL GROOMED
AFFECT: FULL RANGE
THOUGHT_PROCESS: WNL
EYE_CONTACT: WNL
JUDGEMENT: GOOD
PSYCHOMOTOR FUNCTIONING: WNL

## 2020-12-04 NOTE — GROUP NOTE
Request for Consent  Patient provided verbal consent to treat via telemedicine. Clinician introduced the secure telemedicine platform that we are utilizing to provide care during the COVID-19 pandemic. Patient understands the session will be billed to their insurance or patient directly.  Patient was informed only the group patients  and the clinician are permitted on?the video conference, sessions are not recorded by the clinician, and the patient is not permitted to record the session.? Patient was provided clinician's unique meeting ID prior to session, patient was asked to arrive to virtual session on time just as patient would if we were in the office. Clinician confirmed identification of patient by name and birthdate, provider name, location of patient and clinician, and callback number in case disconnected. Patient consents to behavioral health treatment    Patient Response to Request for Consent: Yes  Visit Type performed: Audio and VideoDate:  12/3/2020  Start Time:   5:30 PM  End Time:   7:00 PM    Billie Frank, YOB: 1960,  was an active group participant.    M3   Session 2: Saying Yes to Imperfection  9 members attended group today.  LPC introduced the format and expectations for participating in the Mindfulness, Meditation and Movement Group, advising all that the flow of the group is designed to introduce members to movement practices, guided meditations and mindfulness-based psychoeducation topics. Group content included review of the following articles: Breathing: The Little Known Secret to Peace of Mind by Linda Alvares, PhD and Breathing and Your Brain: 5 Reasons to Grab the Controls by John Hicks.  Group members were educated about the Autonomic Nervous system which houses the Sympathetic, Parasympathetic and Enteric nervous systems.  Group were educated that the PNS system conserves energy in our bodies and is responsible for ongoing, mellow, steady state.  Group were then  educated that the SNS and PNS are activated whenever we breathe in/breathe out.  Group members were educated about the impact of chronic SNS activation and resultant physical/emotional/psychological side effects.  Group members were educated about how the breath can be an anchor to the present moment which can alleviate anxiety and depressive based thoughts/belief systems.  Group members were then led through the 4 count/square breathing exercise and taught the efficacy of using this breath work to experience decreased agitation, anxiety and overwhelmed emotions.  Group members were introduced to the concept of interoception as a skill to increase awareness of moods/emotions.  Group members were introduced to several therapeutic mindful movements and encouraged to begin exploring their own interoceptive capabilities in the group setting for 35 minutes.  Group members were then encouraged to engage in a guided meditation which consisted of a 10-15 minute Body Scan completed by KAIDEN.    Group members were provided handouts with suggested materials to review at their own pace..                    Mental Status:  Findings  Mood: Euthymic (normal)  Affect: Full Range  Rapport: Appropriate  Eye Contact: WNL  Appearance: Well Groomed  Psychomotor Functioning: WNL  Thought Process: WNL  Positive Involvement: Open  Judgment: Good  Insight: Intact    Patients reaction:  Pt shared that she has been attending this group for a few weeks. Pt shared that she benefited from the mindfulness and connection with other women. Pt shared that she has learned many life savings things for her through PHP tx and IOP tx at [facility]. Pt shared that reframing negative thinking has been very helpful for her.     Visit Diagnosis:  No diagnosis found.    Plan:  Please see treatment plan for details. F/U with Weekly  group with LCSW. Pt to F/U with local ED/call 911 should SI/HI arises.     Koko Mason LPC

## 2020-12-10 ENCOUNTER — APPOINTMENT (EMERGENCY)
Dept: RADIOLOGY | Facility: HOSPITAL | Age: 60
End: 2020-12-10
Attending: EMERGENCY MEDICINE
Payer: COMMERCIAL

## 2020-12-10 ENCOUNTER — TELEPHONE (OUTPATIENT)
Dept: PRIMARY CARE | Facility: CLINIC | Age: 60
End: 2020-12-10

## 2020-12-10 ENCOUNTER — HOSPITAL ENCOUNTER (EMERGENCY)
Facility: HOSPITAL | Age: 60
Discharge: HOME | End: 2020-12-10
Attending: EMERGENCY MEDICINE
Payer: COMMERCIAL

## 2020-12-10 VITALS
HEART RATE: 81 BPM | BODY MASS INDEX: 23.93 KG/M2 | HEIGHT: 68 IN | TEMPERATURE: 98.1 F | WEIGHT: 157.9 LBS | DIASTOLIC BLOOD PRESSURE: 75 MMHG | SYSTOLIC BLOOD PRESSURE: 123 MMHG | RESPIRATION RATE: 20 BRPM | OXYGEN SATURATION: 99 %

## 2020-12-10 DIAGNOSIS — M71.9 BURSITIS, UNSPECIFIED SITE: Primary | ICD-10-CM

## 2020-12-10 LAB
ATRIAL RATE: 81
P AXIS: 55
PR INTERVAL: 158
QRS DURATION: 78
QT INTERVAL: 392
QTC CALCULATION(BAZETT): 455
R AXIS: 9
T WAVE AXIS: 48
VENTRICULAR RATE: 81

## 2020-12-10 PROCEDURE — 93010 ELECTROCARDIOGRAM REPORT: CPT | Performed by: INTERNAL MEDICINE

## 2020-12-10 PROCEDURE — 63600000 HC DRUGS/DETAIL CODE

## 2020-12-10 PROCEDURE — 99283 EMERGENCY DEPT VISIT LOW MDM: CPT | Mod: 25

## 2020-12-10 PROCEDURE — 73030 X-RAY EXAM OF SHOULDER: CPT | Mod: LT

## 2020-12-10 PROCEDURE — 93005 ELECTROCARDIOGRAM TRACING: CPT | Performed by: EMERGENCY MEDICINE

## 2020-12-10 RX ORDER — NAPROXEN 500 MG/1
500 TABLET ORAL 2 TIMES DAILY WITH MEALS
Qty: 20 TABLET | Refills: 0 | Status: SHIPPED | OUTPATIENT
Start: 2020-12-10 | End: 2021-04-22 | Stop reason: ALTCHOICE

## 2020-12-10 RX ORDER — KETOROLAC TROMETHAMINE 15 MG/ML
15 INJECTION, SOLUTION INTRAMUSCULAR; INTRAVENOUS ONCE
Status: COMPLETED | OUTPATIENT
Start: 2020-12-10 | End: 2020-12-10

## 2020-12-10 RX ORDER — HYDROCODONE BITARTRATE AND ACETAMINOPHEN 5; 325 MG/1; MG/1
1 TABLET ORAL EVERY 6 HOURS PRN
Qty: 12 TABLET | Refills: 0 | Status: SHIPPED | OUTPATIENT
Start: 2020-12-10 | End: 2021-04-22 | Stop reason: ALTCHOICE

## 2020-12-10 RX ORDER — HYDROCODONE BITARTRATE AND ACETAMINOPHEN 5; 325 MG/1; MG/1
1 TABLET ORAL EVERY 6 HOURS PRN
Qty: 12 TABLET | Refills: 0 | Status: SHIPPED | OUTPATIENT
Start: 2020-12-10 | End: 2020-12-10 | Stop reason: SDUPTHER

## 2020-12-10 RX ORDER — NAPROXEN 500 MG/1
500 TABLET ORAL 2 TIMES DAILY WITH MEALS
Qty: 20 TABLET | Refills: 0 | Status: SHIPPED | OUTPATIENT
Start: 2020-12-10 | End: 2020-12-10 | Stop reason: SDUPTHER

## 2020-12-10 RX ORDER — KETOROLAC TROMETHAMINE 15 MG/ML
INJECTION, SOLUTION INTRAMUSCULAR; INTRAVENOUS
Status: COMPLETED
Start: 2020-12-10 | End: 2020-12-10

## 2020-12-10 RX ADMIN — KETOROLAC TROMETHAMINE 15 MG: 15 INJECTION, SOLUTION INTRAMUSCULAR; INTRAVENOUS at 06:15

## 2020-12-10 ASSESSMENT — ENCOUNTER SYMPTOMS
COUGH: 0
SHORTNESS OF BREATH: 0
SORE THROAT: 0
VOMITING: 0
DIARRHEA: 0
FEVER: 0
ARTHRALGIAS: 1
NUMBNESS: 0
COLOR CHANGE: 0
WEAKNESS: 0
ABDOMINAL PAIN: 0

## 2020-12-10 NOTE — ED PROVIDER NOTES
HPI     Chief Complaint   Patient presents with   • Shoulder Pain       Patient is a 60-year-old female comes to the emergency department department complaining of left arm pain.  Patient states that she had her second shingles shot yesterday.  She notes that when the needle was inserted she had a very sudden severe pain, different from previous injection.  She also reports that when the injection occurred, she also had another sudden severe pain in her left shoulder.  Patient states that she has had pain since.  She reports that the pain goes back into her shoulder, up into her neck.  She also reports that she has some upper chest discomfort, mostly associated with proximity to her shoulder joint.  She states movement makes it worse, certain positions alleviate her discomfort.  She has no redness she has no swelling.  She took Advil at home without any relief of her symptoms.           Patient History     Past Medical History:   Diagnosis Date   • Angina pectoris syndrome (CMS/HCC) 2/12/2019    exercise-induced   • Arthritis    • Chronic back pain     herniated disks, L4 & L5   • Family history of ischemic heart disease 2/12/2019   • Generalized anxiety disorder 2/12/2019   • GERD (gastroesophageal reflux disease)    • Hyperlipidemia    • Major depressive disorder, recurrent episode, mild (CMS/HCC) 2/12/2019   • Moderate acquired hearing loss 8/14/2019   • Persistent disorder of initiating or maintaining sleep 2/12/2019   • Polyp of colon 2/12/2019   • Schatzki's ring 2/4/2020   • Symptomatic menopausal or female climacteric states 2/12/2019       Past Surgical History:   Procedure Laterality Date   • BLADDER REPAIR  2009    sling   • OVARIAN CYST SURGERY         Family History   Problem Relation Age of Onset   • Breast cancer Biological Mother    • Depression Biological Mother    • COPD Biological Father    • Coronary artery disease Biological Father    • Depression Biological Father    • Depression Maternal  Grandmother    • Alcohol abuse Paternal Grandfather    • Suicide Attempts Other        Social History     Tobacco Use   • Smoking status: Never Smoker   • Smokeless tobacco: Never Used   Substance Use Topics   • Alcohol use: Yes     Alcohol/week: 2.0 standard drinks     Types: 2 Glasses of wine per week     Frequency: 2-3 times a week     Drinks per session: 1 or 2   • Drug use: Never       Systems Reviewed from Nursing Triage:          Review of Systems     Review of Systems   Constitutional: Negative for fever.   HENT: Negative for sore throat.    Respiratory: Negative for cough and shortness of breath.    Cardiovascular: Negative for chest pain.   Gastrointestinal: Negative for abdominal pain, diarrhea and vomiting.   Musculoskeletal: Positive for arthralgias.   Skin: Negative for color change.   Neurological: Negative for weakness and numbness.        Physical Exam     ED Vitals    Date/Time Temp Pulse Resp BP SpO2 [facility]   12/10/20 0506 36.7 °C (98.1 °F) 81 20 123/75 99 % MA          Pulse Ox %: 99 % (12/10/20 0518)  Pulse Ox Interpretation: Normal (12/10/20 0518)  Heart Rate: 81 (12/10/20 0518)  Rhythm Strip Interpretation: Normal Sinus Rhythm (12/10/20 0518)                                       Physical Exam  Vitals signs and nursing note reviewed.   Constitutional:       Appearance: Normal appearance. She is well-developed.   HENT:      Head: Normocephalic and atraumatic.      Nose: Nose normal.   Eyes:      Conjunctiva/sclera: Conjunctivae normal.      Pupils: Pupils are equal, round, and reactive to light.   Neck:      Musculoskeletal: Normal range of motion and neck supple.   Cardiovascular:      Rate and Rhythm: Normal rate and regular rhythm.      Pulses: Normal pulses.      Heart sounds: Normal heart sounds.   Pulmonary:      Effort: Pulmonary effort is normal.      Breath sounds: Normal breath sounds.   Musculoskeletal:         General: Tenderness present. No swelling or deformity.      Left shoulder:  She exhibits decreased range of motion, tenderness and pain. She exhibits no bony tenderness, no swelling, no crepitus, no deformity, no laceration and normal pulse.        Arms:    Lymphadenopathy:      Cervical: No cervical adenopathy.   Skin:     General: Skin is warm and dry.   Neurological:      Mental Status: She is alert and oriented to person, place, and time.      Sensory: No sensory deficit.   Psychiatric:         Behavior: Behavior normal.              Procedures    Results     None          Imaging Results          X-RAY SHOULDER LEFT 2+ VIEWS (Preliminary result)  Result time 12/10/20 06:27:02    ED Interpretation    No gross bony abnormality; ?  Widened joint space                              ECG 12 lead   ED Interpretation   Normal sinus rhythm, no STEMI                        ED Course & MDM     MDM  Number of Diagnoses or Management Options     Amount and/or Complexity of Data Reviewed  Independent visualization of images, tracings, or specimens: yes             ED Course as of Dec 10 0726   Thu Dec 10, 2020   0715 Patient possibly received intra-articular injection in 7 over vaccination, potentially causing a traumatic bursitis.  Patient has no outward swelling, no erythema, no other evidence of insult or injury.  Symptoms improved with the sling and Toradol.  Plan to discharge patient and encourage close outpatient follow-up with orthopedics.  Patient also advised to return to the ER for worsening or other concerns.    [RP]      ED Course User Index  [RP] Carmen Mujica DO         Clinical Impressions as of Dec 10 0726   Bursitis, unspecified site        Carmen Mujica, DO  12/10/20 0726

## 2020-12-16 ENCOUNTER — TELEMEDICINE (OUTPATIENT)
Dept: PRIMARY CARE | Facility: CLINIC | Age: 60
End: 2020-12-16
Payer: COMMERCIAL

## 2020-12-16 DIAGNOSIS — M75.52 ACUTE BURSITIS OF LEFT SHOULDER: Primary | ICD-10-CM

## 2020-12-16 PROCEDURE — 99213 OFFICE O/P EST LOW 20 MIN: CPT | Mod: 95 | Performed by: NURSE PRACTITIONER

## 2020-12-16 ASSESSMENT — ENCOUNTER SYMPTOMS
INABILITY TO BEAR WEIGHT: 0
NUMBNESS: 0
TINGLING: 0
LIMITED RANGE OF MOTION: 1
FEVER: 0
STIFFNESS: 1
JOINT LOCKING: 1

## 2020-12-16 NOTE — ASSESSMENT & PLAN NOTE
S/p Shingrix vaccine  [place] following.  Much better now and has a follow up today.  Follow up as needed.

## 2020-12-16 NOTE — PROGRESS NOTES
Verification of Patient Location:  The patient affirms they are currently located in the following state: [place]    Request for Consent:    Video Encounter   Hello, my name is ALBERT Bowen.  Before we proceed, can you please verify your identification by telling me your full name and date of birth?  Can you tell me who is in the room with you?    You and I are about to have a telemedicine check-in or visit because you have requested it.  This is a live video-conference.  I am a real person, speaking to you in real time.  There is no one else with me on the video-conference.  However, when we use ([employer], [employer], etc) it is important for you to know that the video-conference may not be secure or private.  I am not recording this conversation and I am asking you not to record it.  This telemedicine visit will be billed to your health insurance or you, if you are self-insured.  You understand you will be responsible for any copayments or coinsurances that apply to your telemedicine visit.  Communication platform used for this encounter:  Integrated Zoom via [employer] Video Visit     Before starting our telemedicine visit, I am required to get your consent for this virtual check-in or visit by telemedicine. Do you consent?      Patient Response to Request for Consent:  Yes    Patient Active Problem List   Diagnosis   • Angina pectoris syndrome (CMS/HCC)   • Family history of ischemic heart disease   • Generalized anxiety disorder   • Hyperlipidemia   • Major depressive disorder, recurrent episode, mild (CMS/HCC)   • Symptomatic menopausal or female climacteric states   • Persistent disorder of initiating or maintaining sleep   • Polyp of colon   • Moderate acquired hearing loss   • Schatzki's ring   • Acute bursitis of left shoulder     Current Outpatient Medications on File Prior to Visit   Medication Sig Dispense Refill   • AMABELZ 0.5-0.1 mg per tablet 1 tablet 3 (three) times a week (Mon, Wed, Fri).  TID weekly, due tomorrow Saturday 8/1/2020      • ascorbic acid (VITAMIN C) 500 mg tablet Take 1,000 mg by mouth daily.     • aspirin 81 mg enteric coated tablet Take 81 mg by mouth daily.     • b complex vitamins capsule Take 1 capsule by mouth daily.     • cholecalciferol, vitamin D3, 1,000 unit (25 mcg) tablet Take 1,000 Units by mouth daily.     • diclofenac (VOLTAREN) 50 mg EC tablet Take 50 mg by mouth 2 (two) times a day as needed.       • escitalopram (LEXAPRO) 20 mg tablet TAKE 1 TABLET ONCE DAILY 90 tablet 1   • HYDROcodone-acetaminophen (NORCO) 5-325 mg per tablet Take 1 tablet by mouth every 6 (six) hours as needed for moderate pain. Initial treatment. No driving / operating heavy machinery if taking. 12 tablet 0   • LORazepam (ATIVAN) 1 mg tablet Take 1 tablet (1 mg total) by mouth nightly as needed (insomnia). 30 tablet 0   • naproxen (NAPROSYN) 500 mg tablet Take 1 tablet (500 mg total) by mouth 2 (two) times a day with meals. As needed for pain. 20 tablet 0   • omeprazole (PriLOSEC) 20 mg capsule daily as needed.       • QUEtiapine (SEROquel) 50 mg tablet Take 1 tab daily at bedtime. Also ok to take 1 tab up to three times daily as needed for anxiety 90 tablet 0   • RANEXA 500 mg 12 hr tablet      • rosuvastatin (CRESTOR) 5 mg tablet Take 5 mg by mouth daily. Every other day       No current facility-administered medications on file prior to visit.      Allergies   Allergen Reactions   • Bee Sting [Bee Venom Protein (Honey Bee)] Anaphylaxis   • Iodinated Contrast Media Hives   • Spinach GI intolerance     Raw Spinach only   • Penicillins Rash   • Sulfa (Sulfonamide Antibiotics) Rash     Social History     Socioeconomic History   • Marital status:      Spouse name: Not on file   • Number of children: Not on file   • Years of education: Not on file   • Highest education level: Not on file   Occupational History   • Not on file   Social Needs   • Financial resource strain: Not on file   • Food  insecurity     Worry: Not on file     Inability: Not on file   • Transportation needs     Medical: Not on file     Non-medical: Not on file   Tobacco Use   • Smoking status: Never Smoker   • Smokeless tobacco: Never Used   Substance and Sexual Activity   • Alcohol use: Yes     Alcohol/week: 2.0 standard drinks     Types: 2 Glasses of wine per week     Frequency: 2-3 times a week     Drinks per session: 1 or 2   • Drug use: Never   • Sexual activity: Yes     Partners: Male   Lifestyle   • Physical activity     Days per week: Not on file     Minutes per session: Not on file   • Stress: Not on file   Relationships   • Social connections     Talks on phone: Not on file     Gets together: Not on file     Attends Restoration service: Not on file     Active member of club or organization: Not on file     Attends meetings of clubs or organizations: Not on file     Relationship status: Not on file   • Intimate partner violence     Fear of current or ex partner: Not on file     Emotionally abused: Not on file     Physically abused: Not on file     Forced sexual activity: Not on file   Other Topics Concern   • Not on file   Social History Narrative   • Not on file     Health Maintenance   Topic Date Due   • DTaP, Tdap, and Td Vaccines (1 - Tdap) 12/15/2021 (Originally 10/2/1979)   • Mammogram  08/18/2022   • Colonoscopy  01/18/2024   • Cervical Cancer Screening  01/29/2025   • Zoster Vaccine  Completed   • Influenza Vaccine  Completed   • Hepatitis C Screening  Completed   • Meningococcal ACWY  Aged Out   • HIB Vaccines  Aged Out   • IPV Vaccines  Aged Out   • HPV Vaccines  Aged Out   • Pneumococcal  Aged Out   • Varicella Vaccines  Discontinued   • HIV Screening  Discontinued       Visit Documentation:  Subjective     Patient ID: Billie Frank is a 60 y.o. female.  1960      Given Shingrix vaccine at pharmacy. [employer].  Severe pain with vaccine.  Could not move her arm and could not stand the pain.  Went to ER  [place].  Given Toradol for pain.  Seen at [facility].  CT done  Injection went into shoulder joint not the muscle.  Given Cortisone injection.  Has 80% mobility.    Shoulder Pain   The pain is present in the left shoulder. This is a new problem. The current episode started 1 to 4 weeks ago. The problem has been gradually improving. The quality of the pain is described as aching and dull. The pain is moderate. Associated symptoms include joint locking, a limited range of motion and stiffness. Pertinent negatives include no fever, inability to bear weight, itching, joint swelling, numbness or tingling. The symptoms are aggravated by activity. She has tried acetaminophen for the symptoms. The treatment provided moderate relief.       The following have been reviewed and updated as appropriate in this visit:       Review of Systems   Constitutional: Negative for fever.   Musculoskeletal: Positive for stiffness.   Skin: Negative for itching.   Neurological: Negative for tingling and numbness.     Physical Exam  Constitutional:       General: She is not in acute distress.     Appearance: Normal appearance. She is not diaphoretic.   Pulmonary:      Effort: Pulmonary effort is normal. No respiratory distress.   Musculoskeletal:      Left shoulder: She exhibits decreased range of motion, tenderness and pain. She exhibits no bony tenderness and no deformity.   Neurological:      Mental Status: She is alert and oriented to person, place, and time.   Psychiatric:         Mood and Affect: Mood and affect normal.         Speech: Speech normal.         Behavior: Behavior normal.           Assessment/Plan   Diagnoses and all orders for this visit:    Acute bursitis of left shoulder (Primary)  Assessment & Plan:  S/p Shingrix vaccine  [facility] following.  Much better now and has a follow up today.  Follow up as needed.          Time Spent in Medical Discussion During This Encounter:     20 minutes

## 2020-12-17 ENCOUNTER — TELEMEDICINE (OUTPATIENT)
Dept: PSYCHIATRY | Facility: HOSPITAL | Age: 60
End: 2020-12-17
Attending: COUNSELOR
Payer: COMMERCIAL

## 2020-12-17 ENCOUNTER — TELEPHONE (OUTPATIENT)
Dept: PRIMARY CARE | Facility: CLINIC | Age: 60
End: 2020-12-17

## 2020-12-17 DIAGNOSIS — F33.0 MILD EPISODE OF RECURRENT DEPRESSIVE DISORDER (CMS/HCC): ICD-10-CM

## 2020-12-17 DIAGNOSIS — N30.00 ACUTE CYSTITIS WITHOUT HEMATURIA: Primary | ICD-10-CM

## 2020-12-17 DIAGNOSIS — F43.23 ADJUSTMENT DISORDER WITH MIXED ANXIETY AND DEPRESSED MOOD: Primary | ICD-10-CM

## 2020-12-17 PROCEDURE — 90853 GROUP PSYCHOTHERAPY: CPT | Mod: 95 | Performed by: COUNSELOR

## 2020-12-17 RX ORDER — NITROFURANTOIN 25; 75 MG/1; MG/1
100 CAPSULE ORAL 2 TIMES DAILY
Qty: 14 CAPSULE | Refills: 0 | Status: SHIPPED | OUTPATIENT
Start: 2020-12-17 | End: 2020-12-24

## 2020-12-17 ASSESSMENT — COGNITIVE AND FUNCTIONAL STATUS - GENERAL
PSYCHOMOTOR FUNCTIONING: WNL
APPEARANCE: WELL GROOMED
EYE_CONTACT: WNL
MOOD: EUTHYMIC (NORMAL)
JUDGEMENT: GOOD
POSITIVE INVOLVEMENT: OPEN
INSIGHT: INTACT
AFFECT: FULL RANGE
THOUGHT_PROCESS: WNL

## 2020-12-17 NOTE — TELEPHONE ENCOUNTER
Last night at 11pm started with bladder infection.  Feels like she needs to go but can't.  Will Mariel send prescription to [facility] in [place]?    Patient had telemed yesterday w/Mariel

## 2020-12-18 NOTE — GROUP NOTE
Request for Consent  Patient provided verbal consent to treat via telemedicine. Clinician introduced the secure telemedicine platform that we are utilizing to provide care during the COVID-19 pandemic. Patient understands the session will be billed to their insurance or patient directly.  Patient was informed only the group patients  and the clinician are permitted on?the video conference, sessions are not recorded by the clinician, and the patient is not permitted to record the session.? Patient was provided clinician's unique meeting ID prior to session, patient was asked to arrive to virtual session on time just as patient would if we were in the office. Clinician confirmed identification of patient by name and birthdate, provider name, location of patient and clinician, and callback number in case disconnected. Patient consents to behavioral health treatment    Patient Response to Request for Consent: Yes  Visit Type performed: Audio and VideoDate:  12/17/2020  Start Time:   5:30 PM  End Time:   7:00 PM    Billie Frank, YOB: 1960,  was an active group participant.    M3 Session 3: Examining the Internal Experience with Compassion  9 members attended group today.  LPC introduced the format and expectations for participating in the Mindfulness, Meditation and Movement Group, advising all that the flow of the group is designed to introduce members to movement practices, guided meditations and mindfulness-based psycho-education topics.    LPC shared that the focus of group today is to explore and grow awareness about negative core beliefs that perpetuate symptoms of anxiety and depression and how mindfulness-based techniques, including meditation and movement, can assist in decreasing the impact, intensity and frequency of our negative internal voice.  Group members were led through a series of mindful movements to assist with release and strengthening of the psoas muscle.  Group members were  educated about the psoas muscle and provided including The Muscle of the Soul may be triggering your Fear and Anxiety; Check your hips for Back Pain and How to Stretch and Strengthen the Psoas. The group also reviewed a checklist of 10 Cognitive distortions (Hughes, Feeling Good: the New Mood Therapy (1980) and discussed the impact of their own tape(s) on their lives.  Additionally, the group was introduced to the work of Julissa Nolasco, PhD and the practice of self-compassion through the use of the Acronym “RAIN” 1. Recognize what is going on 2. A: allow the experience to be there, just as it is, I: Investigate with Kindness and N: Natural awareness that comes from not identifying with the experience/emotions.  Group then transitioned into the meditation portion of the group with a self-compassion/lovingkindness guided meditation conducted by LPC with focus on offering oneself compassion with intension to better modulate the inner experience of emotions and the breath moving in the body.                      Mental Status:  Findings  Mood: Euthymic (normal)  Affect: Full Range  Rapport: Appropriate  Eye Contact: WNL  Appearance: Well Groomed  Psychomotor Functioning: WNL  Thought Process: WNL  Positive Involvement: Open  Judgment: Good  Insight: Intact    Patients reaction:  Pt shared that she enjoys this group and finds it to be very helpful for her. Pt provided positive feedback to peers. Pt was engaged in the group.     Visit Diagnosis:  No diagnosis found.    Plan:  Please see treatment plan for details. F/U with Weekly  group with LCSW. Pt to F/U with local ED/call 911 should SI/HI arises.     Koko Mason LPC

## 2020-12-25 ENCOUNTER — TELEPHONE (OUTPATIENT)
Dept: PRIMARY CARE | Facility: CLINIC | Age: 60
End: 2020-12-25

## 2020-12-25 RX ORDER — CEPHALEXIN 500 MG/1
500 CAPSULE ORAL 2 TIMES DAILY
Qty: 10 CAPSULE | Refills: 0 | Status: SHIPPED | OUTPATIENT
Start: 2020-12-25 | End: 2020-12-30

## 2020-12-25 NOTE — TELEPHONE ENCOUNTER
On call provider.  Last week UTI.  Treated with macrobid.   Got better  This morning, similar symptoms.  Difficulty urinating, pain with urinating, cloudy flour odor. No vaginal discharge.  No systemic symptoms.   Will send keflex to pharmacy for uncomplicated UTI  If recurrent symptoms, will need in person visit at urgent care or out offices.

## 2021-01-07 ENCOUNTER — TELEMEDICINE (OUTPATIENT)
Dept: PSYCHIATRY | Facility: HOSPITAL | Age: 61
End: 2021-01-07
Attending: COUNSELOR
Payer: COMMERCIAL

## 2021-01-07 DIAGNOSIS — F33.0 MILD EPISODE OF RECURRENT DEPRESSIVE DISORDER (CMS/HCC): ICD-10-CM

## 2021-01-07 DIAGNOSIS — F43.23 ADJUSTMENT DISORDER WITH MIXED ANXIETY AND DEPRESSED MOOD: Primary | ICD-10-CM

## 2021-01-07 PROCEDURE — 90853 GROUP PSYCHOTHERAPY: CPT | Mod: 95 | Performed by: COUNSELOR

## 2021-01-07 ASSESSMENT — COGNITIVE AND FUNCTIONAL STATUS - GENERAL
POSITIVE INVOLVEMENT: INTERESTED
THOUGHT_PROCESS: WNL
APPEARANCE: WELL GROOMED
EYE_CONTACT: WNL
JUDGEMENT: GOOD
AFFECT: FULL RANGE
INSIGHT: INTACT
MOOD: EUTHYMIC (NORMAL)
PSYCHOMOTOR FUNCTIONING: WNL

## 2021-01-08 NOTE — GROUP NOTE
Request for Consent  Patient provided verbal consent to treat via telemedicine. Clinician introduced the secure telemedicine platform that we are utilizing to provide care during the COVID-19 pandemic. Patient understands the session will be billed to their insurance or patient directly.  Patient was informed only the group patients  and the clinician are permitted on?the video conference, sessions are not recorded by the clinician, and the patient is not permitted to record the session.? Patient was provided clinician's unique meeting ID prior to session, patient was asked to arrive to virtual session on time just as patient would if we were in the office. Clinician confirmed identification of patient by name and birthdate, provider name, location of patient and clinician, and callback number in case disconnected. Patient consents to behavioral health treatment    Patient Response to Request for Consent: Yes  Visit Type performed: Audio and VideoDate:  1/7/2021  Start Time:   5:30 PM  End Time:   7:00 PM    Billie Frank, YOB: 1960,  was an active group participant.    M3    Session 4: The Anxiety Spiral  8 members attended group today.  LPC introduced the format and expectations for participating in the Mindfulness, Meditation and Movement Group, advising all that the flow of the group is designed to introduce members to movement practices, guided meditations and mindfulness-based psycho-education topics.  LCSW shared that the focus of group today was increased understanding of the impact and intensity of the “anxiety spiral” on our thoughts, physiology and behavior.   Group were reminded of the benefits of mindful movement and why the use of focused attention on the body and its internal experience (interoception) helps highly anxious individuals who may experience difficulty with traditional meditation and/or psychotherapy due to difficulty accessing the frontal lobe.   During the psycho-education  portion of the group; the group discussed the connections between anxiety and the enteric nervous system resulting in gastrointestinal disorders including IBS, GERD, nausea, & functional dyspepsia.  Group then discussed the link between anxiety and chronic respiratory disorders, heart disease and migraines and reviewed the article, Anxiety and Physical Illness from [place] [facility] (7/2008).  They were reminded that those with high anxiety may have difficulty verbally and cognitively processing stressors.  They were educated of the benefits of regular internal focus to increase body awareness when experiencing distressing emotions/thoughts in future stressful situations.  Group was then educated about various forms of anxiety including social anxiety and reviewed the Social Anxiety spiral taken from Managing Social Anxiety: CBT Therapy Approach by Gabbi Walls (2010).  Group was also provided The Emotional Guidance Scale taken from Ask and It Is Given by Briana Owen to better understand the spiraling thoughts that occur as stress increases in our life.  Group also reviewed the link of cognitions, physiology/feelings and Behaviors/movements in managing and better understanding anxiety.  Group was also led through mindfulness practice for anxiety by using the breath as an anchor to manage stress.  LPC led group through Alternate Nostril breath and Breath of paul with focus on increasing focus and attention to sensations, temperature changes, pressure, color, level of agitation/energy & lethargy.  Group members were then led through a 20 minute mindful movement exercise with focus of interoception and growing a relationship with the inner workings/experience of their bodies during the exercise.   Group was then led though a guided mindfulness based body scan meditation by KAIDEN, with the focus on the experience of anxiety within the body.                    Mental Status:  Findings  Mood: Euthymic  (normal)  Affect: Full Range  Rapport: Appropriate  Eye Contact: WNL  Appearance: Well Groomed  Psychomotor Functioning: WNL  Thought Process: WNL  Positive Involvement: Interested  Judgment: Good  Insight: Intact    Patients reaction:  Pt shared that she enjoys coming to the group and finds it helpful for her. Pt was actively engaged in movement and meditation. Pt was welcoming of new members to the group.     Visit Diagnosis:  No diagnosis found.    Plan:  F/U with Weekly  group with LCSW.   Pt to F/U with treatment goals as outlined in treatment plan.  Pt to F/U with local ED/call 911 should SI/HI arises.   Koko Mason, LPC   Started first trimester

## 2021-01-14 ENCOUNTER — TELEMEDICINE (OUTPATIENT)
Dept: PSYCHIATRY | Facility: HOSPITAL | Age: 61
End: 2021-01-14
Attending: COUNSELOR
Payer: COMMERCIAL

## 2021-01-14 DIAGNOSIS — F33.0 MILD EPISODE OF RECURRENT DEPRESSIVE DISORDER (CMS/HCC): ICD-10-CM

## 2021-01-14 DIAGNOSIS — F43.23 ADJUSTMENT DISORDER WITH MIXED ANXIETY AND DEPRESSED MOOD: Primary | ICD-10-CM

## 2021-01-14 PROCEDURE — 90853 GROUP PSYCHOTHERAPY: CPT | Mod: 95 | Performed by: COUNSELOR

## 2021-01-14 ASSESSMENT — COGNITIVE AND FUNCTIONAL STATUS - GENERAL
PSYCHOMOTOR FUNCTIONING: WNL
EYE_CONTACT: WNL
APPEARANCE: WELL GROOMED
JUDGEMENT: GOOD
INSIGHT: INTACT
THOUGHT_PROCESS: WNL
AFFECT: FULL RANGE
MOOD: EUTHYMIC (NORMAL)
POSITIVE INVOLVEMENT: INTERESTED;OPEN

## 2021-01-15 NOTE — GROUP NOTE
Request for Consent  Patient provided verbal consent to treat via telemedicine. Clinician introduced the secure telemedicine platform that we are utilizing to provide care during the COVID-19 pandemic. Patient understands the session will be billed to their insurance or patient directly.  Patient was informed only the group patients  and the clinician are permitted on?the video conference, sessions are not recorded by the clinician, and the patient is not permitted to record the session.? Patient was provided clinician's unique meeting ID prior to session, patient was asked to arrive to virtual session on time just as patient would if we were in the office. Clinician confirmed identification of patient by name and birthdate, provider name, location of patient and clinician, and callback number in case disconnected. Patient consents to behavioral health treatment    Patient Response to Request for Consent: Yes  Visit Type performed: Audio and VideoDate:  1/14/2021  Start Time:   5:30 PM  End Time:   7:00 PM    Billie Frank, YOB: 1960,  was an active group participant.    M3 Session 5: The Depression Spiral  11 members attended group today.  LPC introduced the format and expectations for participating in the Mindfulness, Meditation and Movement Group, advising all that the flow of the group is designed to introduce members to movement practices, guided meditations and mindfulness-based psycho-education topics.  LPC shared that the focus of group today was increased understanding of the impact and intensity of the “depression spiral” on our thoughts, physiology and behavior. During the psycho-education portion of the group; group members were educated about the impacts of depression on the body.  Group members learned about the impact to the Central nervous system which leads to increased likelihood of addictions, difficulty sleeping, decreased interest in pleasurable activities, headaches, chronic pain  and body aches.  Group were also educated about the relationship between the digestive system and depression including “Stuffing” feelings through the use of overeating/binging which can lead to conditions such as NIDDM, loss of appetite/interest in eating healthy foods, stomach aches, constipation and malnutrition.  Group members were educated about the impact on the cardiovascular system and the immune system with increased evidence linking decreased immune functioning in chronically depressed individuals.   Group were then educated about the link between depressive cognitions which leads to physiology/feeling changes and behavior/movement changes.  Group reviewed the concept of the three “P”’s of Permanence, Pervasiveness, and Personalization by Branden Ames, PhD.   Group were encouraged to begin identifying ruminative depressed thinking and having a low mood as part of their experience, not part of their core being.  Group were educated of the link between Depression and benefits of practicing loving-kindness meditations.  Group members were led through a 20 minute mindful movement exercise with focus of interoception and growing a relationship with the inner workings/experience of their bodies during the exercise.  Group was reminded of the benefits of mindful movement and why the use of focused attention on the body and its internal experience (interoception) helps highly depressed individuals.  Group were led through a mindful “meta” (loving-kindness) meditation                    Mental Status:  Findings  Mood: Euthymic (normal)  Affect: Full Range  Rapport: Appropriate  Eye Contact: WNL  Appearance: Well Groomed  Psychomotor Functioning: WNL  Thought Process: WNL  Positive Involvement: Interested, Open  Judgment: Good  Insight: Intact    Patients reaction:  Pt shared that she has been attending this group for months and has found it helpful. Pt also provided some support and feedback to other members and  encouraged them things go get better.     Visit Diagnosis:  No diagnosis found.    Plan:  F/U with Weekly  group with LCSW.   Pt to F/U with treatment goals as outlined in treatment plan.  Pt to F/U with local ED/call 911 should SI/HI arises.   Koko Mason, LPC

## 2021-01-21 ENCOUNTER — TELEMEDICINE (OUTPATIENT)
Dept: PSYCHIATRY | Facility: HOSPITAL | Age: 61
End: 2021-01-21
Attending: COUNSELOR
Payer: COMMERCIAL

## 2021-01-21 DIAGNOSIS — F43.23 ADJUSTMENT DISORDER WITH MIXED ANXIETY AND DEPRESSED MOOD: Primary | ICD-10-CM

## 2021-01-21 DIAGNOSIS — F33.0 MILD EPISODE OF RECURRENT DEPRESSIVE DISORDER (CMS/HCC): ICD-10-CM

## 2021-01-21 PROCEDURE — 90853 GROUP PSYCHOTHERAPY: CPT | Mod: 95 | Performed by: COUNSELOR

## 2021-01-21 ASSESSMENT — COGNITIVE AND FUNCTIONAL STATUS - GENERAL
POSITIVE INVOLVEMENT: INTERESTED
APPEARANCE: WELL GROOMED
AFFECT: FULL RANGE
PSYCHOMOTOR FUNCTIONING: WNL
THOUGHT_PROCESS: WNL
JUDGEMENT: GOOD
EYE_CONTACT: WNL
MOOD: EUTHYMIC (NORMAL)
INSIGHT: INTACT

## 2021-01-22 NOTE — GROUP NOTE
Request for Consent  Patient provided verbal consent to treat via telemedicine. Clinician introduced the secure telemedicine platform that we are utilizing to provide care during the COVID-19 pandemic. Patient understands the session will be billed to their insurance or patient directly.  Patient was informed only the group patients  and the clinician are permitted on?the video conference, sessions are not recorded by the clinician, and the patient is not permitted to record the session.? Patient was provided clinician's unique meeting ID prior to session, patient was asked to arrive to virtual session on time just as patient would if we were in the office. Clinician confirmed identification of patient by name and birthdate, provider name, location of patient and clinician, and callback number in case disconnected. Patient consents to behavioral health treatment    Patient Response to Request for Consent: Yes  Visit Type performed: Audio and VideoDate:  1/21/2021  Start Time:   5:30 PM  End Time:   7:00 PM    Billie Frank, YOB: 1960,  was an active group participant.    M3 Session 6: Mindful Communication  13 members attended group today.  LPC introduced the format and expectations for participating in the Mindfulness, Meditation and Movement Group, advising all that the flow of the group is designed to introduce members to movement practices, guided meditations and mindfulness-based psycho-education topics.  LPC shared that the focus of today’s group was to explore mind-ful communication patterns vs. mind-less communication patterns.  During the psycho-education portion of today’s group, [facility] educated members about the concept of projection and the shadow self and how our lack of tendency/ability to look within to better understand our “shadow selves” exhibits itself through “mindless” communication patterns driven by projections of the negative belief systems we have applied to ourselves.   Group  "then reviewed various communication styles and differences between aggressive, passive, passive-aggressive and assertive communication styles.  Group were educated and reviewed role playing mindful communication patterns with others including the script of “I feel ____ when you ____ because ___” and how this can lead to improved relationship conflict resolution skills and improved ability to listen and be present with others. Group members were led through mindful movements to increase the ability to interocept and help determine what the body’s experience is in the moment.   Group were then led through a silent meditation for 10 minutes.                     Mental Status:  Findings  Mood: Euthymic (normal)  Affect: Full Range  Rapport: Appropriate  Eye Contact: WNL  Appearance: Well Groomed  Psychomotor Functioning: WNL  Thought Process: WNL  Positive Involvement: Interested  Judgment: Good  Insight: Intact    Patients reaction:  Pt shared that she likes the \"observe\" component for the STOP technique because it allows her to notice what is happening within her mind and body. Pt shared that this has been helpful for her to work on her reactions and patience. Pt shared about losing patience with work meetings and feeling rushed.     Visit Diagnosis:  No diagnosis found.    Plan:  F/U with Weekly  group with LCSW.   Pt to F/U with treatment goals as outlined in treatment plan.  Pt to F/U with local ED/call 911 should SI/HI arises.   Koko Mason LPC  "

## 2021-01-28 ENCOUNTER — TELEMEDICINE (OUTPATIENT)
Dept: PSYCHIATRY | Facility: HOSPITAL | Age: 61
End: 2021-01-28
Attending: COUNSELOR
Payer: COMMERCIAL

## 2021-01-28 DIAGNOSIS — F43.23 ADJUSTMENT DISORDER WITH MIXED ANXIETY AND DEPRESSED MOOD: Primary | ICD-10-CM

## 2021-01-28 DIAGNOSIS — F33.0 MILD EPISODE OF RECURRENT DEPRESSIVE DISORDER (CMS/HCC): ICD-10-CM

## 2021-01-28 PROCEDURE — 90853 GROUP PSYCHOTHERAPY: CPT | Mod: 95 | Performed by: COUNSELOR

## 2021-01-28 ASSESSMENT — COGNITIVE AND FUNCTIONAL STATUS - GENERAL
POSITIVE INVOLVEMENT: INTERESTED
PSYCHOMOTOR FUNCTIONING: WNL
AFFECT: FULL RANGE
EYE_CONTACT: WNL
JUDGEMENT: GOOD
INSIGHT: INTACT
MOOD: EUTHYMIC (NORMAL)
APPEARANCE: WELL GROOMED
THOUGHT_PROCESS: WNL

## 2021-01-29 NOTE — GROUP NOTE
Request for Consent  Patient provided verbal consent to treat via telemedicine. Clinician introduced the secure telemedicine platform that we are utilizing to provide care during the COVID-19 pandemic. Patient understands the session will be billed to their insurance or patient directly.  Patient was informed only the group patients  and the clinician are permitted on?the video conference, sessions are not recorded by the clinician, and the patient is not permitted to record the session.? Patient was provided clinician's unique meeting ID prior to session, patient was asked to arrive to virtual session on time just as patient would if we were in the office. Clinician confirmed identification of patient by name and birthdate, provider name, location of patient and clinician, and callback number in case disconnected. Patient consents to behavioral health treatment    Patient Response to Request for Consent: Yes  Visit Type performed: Audio and VideoDate:  1/28/2021  Start Time:   5:30 PM  End Time:   7:00 PM    Billie Frank, YOB: 1960,  was an active group participant.    M3 Session 7: Mindful Boundaries  12 members attended group today.  LPC introduced the format and expectations for participating in the Mindfulness, Meditation and Movement Group, advising all that the flow of the group is designed to introduce members to movement practices, guided meditations and mindfulness-based psycho-education topics.  LPC shared that the focus of today’s group will explore the connection between internal boundaries and internal communication vs. boundaries in relationships and how we relate to others.  Group members were provided an analogy of understanding boundary systems as an apple from Setting Healthy Boundaries by Cristian Guthrie.   Group then reviewed the concept of empathy fatigue, hyper-empathy and what relationships with others who have any of these conditions could look like.  Group members were encouraged  to share about their own relationships and experiences with setting (or having difficulty setting) boundaries in their own lives.  Group members were educated between the differences/characteristics of rigid, permissive and healthy boundaried relationships.  Group members were led through mindful movement practice today for 20-30 minutes.  Group members were then led through a 20 minute loving-kindness/Tonglen meditation practice.                      Mental Status:  Findings  Mood: Euthymic (normal)  Affect: Full Range  Rapport: Appropriate  Eye Contact: WNL  Appearance: Well Groomed  Psychomotor Functioning: WNL  Thought Process: WNL  Positive Involvement: Interested  Judgment: Good  Insight: Intact    Patients reaction:  Pt shared that she has been attending this group for a few months now. Pt shared that she finds it beneficial and likes connecting with other women. Pt was engaged in movement and meditation.     Visit Diagnosis:  No diagnosis found.    Plan:  F/U with Weekly  group with LCSW.   Pt to F/U with treatment goals as outlined in treatment plan.  Pt to F/U with local ED/call 911 should SI/HI arises.   Koko Mason, LPC

## 2021-02-04 ENCOUNTER — TELEMEDICINE (OUTPATIENT)
Dept: PSYCHIATRY | Facility: HOSPITAL | Age: 61
End: 2021-02-04
Attending: COUNSELOR
Payer: COMMERCIAL

## 2021-02-04 DIAGNOSIS — F43.23 ADJUSTMENT DISORDER WITH MIXED ANXIETY AND DEPRESSED MOOD: Primary | ICD-10-CM

## 2021-02-04 DIAGNOSIS — F33.0 MILD EPISODE OF RECURRENT MAJOR DEPRESSIVE DISORDER (CMS/HCC): ICD-10-CM

## 2021-02-04 PROCEDURE — 90853 GROUP PSYCHOTHERAPY: CPT | Mod: 95 | Performed by: COUNSELOR

## 2021-02-04 ASSESSMENT — COGNITIVE AND FUNCTIONAL STATUS - GENERAL
JUDGEMENT: GOOD
PSYCHOMOTOR FUNCTIONING: WNL
POSITIVE INVOLVEMENT: INTERESTED
INSIGHT: INTACT
THOUGHT_PROCESS: WNL
EYE_CONTACT: WNL
APPEARANCE: WELL GROOMED
MOOD: EUTHYMIC (NORMAL)
AFFECT: FULL RANGE

## 2021-02-05 NOTE — GROUP NOTE
Request for Consent  Patient provided verbal consent to treat via telemedicine. Clinician introduced the secure telemedicine platform that we are utilizing to provide care during the COVID-19 pandemic. Patient understands the session will be billed to their insurance or patient directly.  Patient was informed only the group patients  and the clinician are permitted on?the video conference, sessions are not recorded by the clinician, and the patient is not permitted to record the session.? Patient was provided clinician's unique meeting ID prior to session, patient was asked to arrive to virtual session on time just as patient would if we were in the office. Clinician confirmed identification of patient by name and birthdate, provider name, location of patient and clinician, and callback number in case disconnected. Patient consents to behavioral health treatment    Patient Response to Request for Consent: Yes  Visit Type performed: Audio and VideoDate:  2/4/2021  Start Time:   5:30 PM  End Time:   7:00 PM    Billie Frank, YOB: 1960,  was an active group participant.    M3 Session 8: Attachments   10 members attended group today.  LPC introduced the format and expectations for participating in the Mindfulness, Meditation and Movement Group, advising all that the flow of the group is designed to introduce members to movement practices, guided meditations and mindfulness-based psycho-education topics.  LPC shared that the focus of today’s group will explore how our attachments to routines and impulsive needs such as substances, food, unhealthy people, activities and even ritual movements and behaviors increase our experience of suffering and emotional/physical pain.  Group was then encouraged to identify their own coping skills/attachments and how they could be impacting and increasing their experience of emotional and physical pain.  Group members were educated about the experience of a wanting mind  through the work of Julissa Nolasco in [employer] and were educated about our innate ability to mindfully watch our attachments, drives, urges and impulses which is improved significantly through practices of mindfulness, meditation and movement.  Group members discussed/ encouraged to focus on noticing ways we use our attachments to routine/”trance like” impulses such as our use of sugar, substances, sex, chaotic relationships and even ritual ways we hold our body in an effort to avoid truly feeling the emotions we are experiencing in the moment. Group were introduced to a video on the Biochemistry of attachment to sugar and its effects on the body’s health/wellness by watching the following video:  http://ed.[employer]/lessons/how-sugar-affects-the-brain-brigido-aman.  Group members were encouraged to notice what the price of their own attachments are and encouraged to grow awareness of the emotions underlying those attachments.  Group was led through and encouraged to hold mindful movements in an effort to watch “what comes up” when the group members were encouraged to hold a pose for up to 3-5 mins at a time.  Group members were then led through a mindful eating exercise/meditation and encouraged to focus on the judgments/thoughts/impulses that came up during this exercise.                     Mental Status:  Findings  Mood: Euthymic (normal)  Affect: Full Range  Rapport: Appropriate  Eye Contact: WNL  Appearance: Well Groomed  Psychomotor Functioning: WNL  Thought Process: WNL  Positive Involvement: Interested  Judgment: Good  Insight: Intact    Patients reaction:  Pt shared that she finds the group helpful to connect with other women. Pt shared that she looks forward to this group all week for relaxation and self care. Pt offered supportive feedback to new group members.     Visit Diagnosis:  No diagnosis found.    Plan:  F/U with Weekly  group with LCSW.   Pt to F/U with treatment goals as outlined in  treatment plan.  Pt to F/U with local ED/call 911 should SI/HI arises.   Koko Mason, LPC

## 2021-02-11 ENCOUNTER — TELEMEDICINE (OUTPATIENT)
Dept: PSYCHIATRY | Facility: HOSPITAL | Age: 61
End: 2021-02-11
Attending: COUNSELOR
Payer: COMMERCIAL

## 2021-02-11 DIAGNOSIS — F33.0 MILD EPISODE OF RECURRENT MAJOR DEPRESSIVE DISORDER (CMS/HCC): ICD-10-CM

## 2021-02-11 DIAGNOSIS — F43.23 ADJUSTMENT DISORDER WITH MIXED ANXIETY AND DEPRESSED MOOD: Primary | ICD-10-CM

## 2021-02-11 PROCEDURE — 90853 GROUP PSYCHOTHERAPY: CPT | Mod: 95 | Performed by: COUNSELOR

## 2021-02-12 ASSESSMENT — COGNITIVE AND FUNCTIONAL STATUS - GENERAL
APPEARANCE: WELL GROOMED
THOUGHT_PROCESS: WNL
JUDGEMENT: GOOD
AFFECT: FULL RANGE
PSYCHOMOTOR FUNCTIONING: WNL
MOOD: EUTHYMIC (NORMAL)
POSITIVE INVOLVEMENT: INTERESTED;OPEN
EYE_CONTACT: WNL
INSIGHT: INTACT

## 2021-02-12 NOTE — GROUP NOTE
Request for Consent  Patient provided verbal consent to treat via telemedicine. Clinician introduced the secure telemedicine platform that we are utilizing to provide care during the COVID-19 pandemic. Patient understands the session will be billed to their insurance or patient directly.  Patient was informed only the group patients  and the clinician are permitted on?the video conference, sessions are not recorded by the clinician, and the patient is not permitted to record the session.? Patient was provided clinician's unique meeting ID prior to session, patient was asked to arrive to virtual session on time just as patient would if we were in the office. Clinician confirmed identification of patient by name and birthdate, provider name, location of patient and clinician, and callback number in case disconnected. Patient consents to behavioral health treatment    Patient Response to Request for Consent: Yes  Visit Type performed: Audio and VideoDate:  2/11/2021  Start Time:   5:30 PM  End Time:   7:00 PM    Billie Frank, YOB: 1960,  was an active group participant.    M3  Session 9: Learning how to Become a Wise-Minded Witness  5 members attended group today.  LPC introduced the format and expectations for participating in the Mindfulness, Meditation and Movement Group, advising all that the flow of the group is designed to introduce members to movement practices, guided meditations and mindfulness-based psycho-education topics LPC shared that the focus of group today is to explore how the use of movement, meditation and mindfulness skills can assist in increasing our ability to detach from our emotional experience and become the witness of what is going on around us.  Group member practiced the “felt sense” sensing technique from Floyd Hernadez, PhD and learned the concept of  emotional mind, logical/rational mind and the desired combination of both: gibbs mind from Ashleigh Collado, PhD in an effort  to increase frustration tolerance and decrease emotional reactivity and interpersonal conflict in our lives.  Group was then led through a series of mindful movements for 20 minutes to assist in building distress tolerance skills and were encouraged to practice mindful breathing consciously as they moved through each pose.  Group was concluded with the practice of a guided progressive relaxation exercise to build inner awareness of how the body feels when “distressed” as well as when “relaxed.”                    Mental Status:  Findings  Mood: Euthymic (normal)  Affect: Full Range  Rapport: Appropriate  Eye Contact: WNL  Appearance: Well Groomed  Psychomotor Functioning: WNL  Thought Process: WNL  Positive Involvement: Interested, Open  Judgment: Good  Insight: Intact    Patients reaction:  Pt shared about a situation at work this week where she allowed her emotional mind to get the best of her. Pt shared that she was able to use some coping skills and make amends where needed. Pt was engaged in group session and seems to be utilizing mindfulness in daily life.     Visit Diagnosis:      ICD-10-CM ICD-9-CM   1. Adjustment disorder with mixed anxiety and depressed mood  F43.23 309.28   2. Mild episode of recurrent major depressive disorder (CMS/HCC)  F33.0 296.31       Plan:  F/U with Weekly  group with LCSW.   Pt to F/U with treatment goals as outlined in treatment plan.  Pt to F/U with local ED/call 911 should SI/HI arises.   Koko Mason, KAIDEN

## 2021-02-18 ENCOUNTER — TELEMEDICINE (OUTPATIENT)
Dept: PSYCHIATRY | Facility: HOSPITAL | Age: 61
End: 2021-02-18
Attending: COUNSELOR
Payer: COMMERCIAL

## 2021-02-18 DIAGNOSIS — F33.0 MILD EPISODE OF RECURRENT MAJOR DEPRESSIVE DISORDER (CMS/HCC): ICD-10-CM

## 2021-02-18 DIAGNOSIS — F43.23 ADJUSTMENT DISORDER WITH MIXED ANXIETY AND DEPRESSED MOOD: Primary | ICD-10-CM

## 2021-02-18 PROCEDURE — 90853 GROUP PSYCHOTHERAPY: CPT | Mod: 95 | Performed by: COUNSELOR

## 2021-02-18 ASSESSMENT — COGNITIVE AND FUNCTIONAL STATUS - GENERAL
POSITIVE INVOLVEMENT: OPEN
AFFECT: FULL RANGE
PSYCHOMOTOR FUNCTIONING: WNL
EYE_CONTACT: WNL
MOOD: EUTHYMIC (NORMAL)
APPEARANCE: WELL GROOMED
INSIGHT: INTACT
THOUGHT_PROCESS: WNL
JUDGEMENT: GOOD

## 2021-02-19 NOTE — GROUP NOTE
Request for Consent  Patient provided verbal consent to treat via telemedicine. Clinician introduced the secure telemedicine platform that we are utilizing to provide care during the COVID-19 pandemic. Patient understands the session will be billed to their insurance or patient directly.  Patient was informed only the group patients  and the clinician are permitted on?the video conference, sessions are not recorded by the clinician, and the patient is not permitted to record the session.? Patient was provided clinician's unique meeting ID prior to session, patient was asked to arrive to virtual session on time just as patient would if we were in the office. Clinician confirmed identification of patient by name and birthdate, provider name, location of patient and clinician, and callback number in case disconnected. Patient consents to behavioral health treatment    Patient Response to Request for Consent: Yes  Visit Type performed: Audio and VideoDate:  2/18/2021  Start Time:   5:30 PM  End Time:   7:00 PM    Billie Frank, YOB: 1960,  was an active group participant.    M3  Session 10: Grief, Loss, and Gratitude  11 members attended group today.  LPC introduced the format and expectations for participating in the Mindfulness, Meditation and Movement Group, advising all that the flow of the group is designed to introduce members to movement practices, guided meditations and mindfulness-based psycho-education topics.    LPC shared that the focus of group today is to explore how the use of movement, meditation and mindfulness skills can increase our ability to notice pain, suffering, grief and loss.   Group was educated about the concept of attachments and how our belief in “how things should be” impacts our ability to cope with what is.  Group were provided information from [employer] about what the emotion and experience of fear does to the body physically.  Group then discussed the concept of  gratitude and how gratitude can be used to combat the experience of loss in our lives.  Group reviewed the definition of gratitude as expounded by Georges Rob in Gratitude Works! And reviewed, journaled during class/practiced identifying gratitudes in their lives and received scientific information regarding the efficacy of starting a gratitude practice in their own lives.  Group completed an activity encouraging group members to share what gratitude they see in their own lives and were encouraged to follow up with 5 gratitude building exercises/practices including daily journaling, nature experiences/walks; meditation; collages/art and eating mindfully.  Group were then led through movements which encouraged group members to explore poses that may cause increased strain/energy discharge and how those poses impacted their thoughts in the moment.  Group was then led through a 20 minute loving-kindness meditation to begin offering compassion to the self that may be hurting.                      Mental Status:  Findings  Mood: Euthymic (normal)  Affect: Full Range  Rapport: Appropriate  Eye Contact: WNL  Appearance: Well Groomed  Psychomotor Functioning: WNL  Thought Process: WNL  Positive Involvement: Open  Judgment: Good  Insight: Intact    Patients reaction:  Pt shared that she has found gratitude to be very helpful for her over the last year. Pt was actively engaged in movement and meditation practice.     Visit Diagnosis:  No diagnosis found.    Plan:  F/U with Weekly  group with LCSW.   Pt to F/U with treatment goals as outlined in treatment plan.  Pt to F/U with local ED/call 911 should SI/HI arises.   Koko Mason, LPC

## 2021-03-04 ENCOUNTER — TELEMEDICINE (OUTPATIENT)
Dept: PSYCHIATRY | Facility: HOSPITAL | Age: 61
End: 2021-03-04
Attending: COUNSELOR
Payer: COMMERCIAL

## 2021-03-04 DIAGNOSIS — F33.0 MILD EPISODE OF RECURRENT MAJOR DEPRESSIVE DISORDER (CMS/HCC): ICD-10-CM

## 2021-03-04 DIAGNOSIS — F43.23 ADJUSTMENT DISORDER WITH MIXED ANXIETY AND DEPRESSED MOOD: Primary | ICD-10-CM

## 2021-03-04 PROCEDURE — 90853 GROUP PSYCHOTHERAPY: CPT | Mod: 95 | Performed by: COUNSELOR

## 2021-03-04 ASSESSMENT — COGNITIVE AND FUNCTIONAL STATUS - GENERAL
PSYCHOMOTOR FUNCTIONING: WNL
THOUGHT_PROCESS: WNL
MOOD: EUTHYMIC (NORMAL)
AFFECT: FULL RANGE
JUDGEMENT: GOOD
EYE_CONTACT: WNL
INSIGHT: INTACT
POSITIVE INVOLVEMENT: OPEN
APPEARANCE: WELL GROOMED

## 2021-03-05 NOTE — GROUP NOTE
Request for Consent  Patient provided verbal consent to treat via telemedicine. Clinician introduced the secure telemedicine platform that we are utilizing to provide care during the COVID-19 pandemic. Patient understands the session will be billed to their insurance or patient directly.  Patient was informed only the group patients  and the clinician are permitted on?the video conference, sessions are not recorded by the clinician, and the patient is not permitted to record the session.? Patient was provided clinician's unique meeting ID prior to session, patient was asked to arrive to virtual session on time just as patient would if we were in the office. Clinician confirmed identification of patient by name and birthdate, provider name, location of patient and clinician, and callback number in case disconnected. Patient consents to behavioral health treatment    Patient Response to Request for Consent: Yes  Visit Type performed: Audio and VideoDate:  3/4/2021  Start Time:   5:30 PM  End Time:   7:00 PM    Billie Frank, YOB: 1960,  was an active group participant.    M3  Session 11: Mindfulness Review/Where to go from here  9 members attended group today.  LPC introduced the format and expectations for participating in the Mindfulness, Meditation and Movement Group, advising all that the flow of the group is designed to introduce members to movement practices, guided meditations and mindfulness-based psycho-education topics.  LPC shared that the focus of group today is to explore how the use of movement, meditation and mindfulness skills can help women build a personalized self-care plan.  Group members were introduced to the theory of “Motivational Interviewing” and “the Cycle of Change” by Procdustina and Mariana as a means of better understanding/offering ourselves compassion when experiencing difficulty maintaining positive changes in our lives.  Group were also educated and received a handout  on Maslow’s hierarchy of needs as a means of better understanding how certain basic and psychological needs have to be met before we can work on the goal of achieving our own full potential/ability to create.  Group members were encouraged to reflect on ways to take breaks throughout their day and encouraged to share what works for them currently.  Group members were led through 30 minutes of mindful movements with a focus on increasing the ability to interocept or watch what physical experiences occurred within their bodies throughout the practice.  Group members were then led through a safe place/calm place guided practice at the end.  Group members were encouraged to process and share anything they noticed during the movement and guided meditation parts of the group and encouraged to share with each other what practices of mindfulness/meditation and movement have changed their lives/helped them in an effort to collaborate together in a safe place.                      Mental Status:  Findings  Mood: Euthymic (normal)  Affect: Full Range  Rapport: Appropriate  Eye Contact: WNL  Appearance: Well Groomed  Psychomotor Functioning: WNL  Thought Process: WNL  Positive Involvement: Open  Judgment: Good  Insight: Intact    Patients reaction:  Pt shared that she had a stressful week and is looking forward to group session for stress management. Pt shared that she has a lot of time on her hands now and finds herself feeling empty or lonely at times even though she is keeping busy with activities.     Visit Diagnosis:  No diagnosis found.    Plan:  F/U with Weekly  group with LCSW.   Pt to F/U with treatment goals as outlined in treatment plan.  Pt to F/U with local ED/call 911 should SI/HI arises.   Koko Mason, KAIDEN

## 2021-03-11 ENCOUNTER — TELEMEDICINE (OUTPATIENT)
Dept: PSYCHIATRY | Facility: HOSPITAL | Age: 61
End: 2021-03-11
Attending: COUNSELOR
Payer: COMMERCIAL

## 2021-03-11 ENCOUNTER — TELEPHONE (OUTPATIENT)
Dept: PRIMARY CARE | Facility: CLINIC | Age: 61
End: 2021-03-11

## 2021-03-11 DIAGNOSIS — F43.23 ADJUSTMENT DISORDER WITH MIXED ANXIETY AND DEPRESSED MOOD: Primary | ICD-10-CM

## 2021-03-11 DIAGNOSIS — F33.0 MILD EPISODE OF RECURRENT MAJOR DEPRESSIVE DISORDER (CMS/HCC): ICD-10-CM

## 2021-03-11 PROCEDURE — 90853 GROUP PSYCHOTHERAPY: CPT | Mod: 95 | Performed by: COUNSELOR

## 2021-03-11 ASSESSMENT — COGNITIVE AND FUNCTIONAL STATUS - GENERAL
EYE_CONTACT: WNL
POSITIVE INVOLVEMENT: INTERESTED;OPEN
JUDGEMENT: GOOD
MOOD: EUTHYMIC (NORMAL)
PSYCHOMOTOR FUNCTIONING: WNL
INSIGHT: INTACT
THOUGHT_PROCESS: WNL
AFFECT: FULL RANGE
APPEARANCE: WELL GROOMED

## 2021-03-11 NOTE — TELEPHONE ENCOUNTER
Problem: Falls - Risk of  Goal: *Absence of Falls  Description: Document Jacinta Blood Fall Risk and appropriate interventions in the flowsheet.   Outcome: Progressing Towards Goal  Note: Fall Risk Interventions:  Mobility Interventions: Bed/chair exit alarm    Mentation Interventions: Bed/chair exit alarm, Reorient patient    Medication Interventions: Bed/chair exit alarm    Elimination Interventions: Bed/chair exit alarm    History of Falls Interventions: Bed/chair exit alarm, Room close to nurse's station Sw pt she is really not having any symptoms feels fine but will monitor and keep an eye on it only got tested for work and this past Monday was told

## 2021-03-11 NOTE — TELEPHONE ENCOUNTER
She got tested for covid Monday and it came back positive last night . She thought it was allergy since the Fall ..  Just NITAI

## 2021-03-12 NOTE — GROUP NOTE
Request for Consent  Patient provided verbal consent to treat via telemedicine. Clinician introduced the secure telemedicine platform that we are utilizing to provide care during the COVID-19 pandemic. Patient understands the session will be billed to their insurance or patient directly.  Patient was informed only the group patients  and the clinician are permitted on?the video conference, sessions are not recorded by the clinician, and the patient is not permitted to record the session.? Patient was provided clinician's unique meeting ID prior to session, patient was asked to arrive to virtual session on time just as patient would if we were in the office. Clinician confirmed identification of patient by name and birthdate, provider name, location of patient and clinician, and callback number in case disconnected. Patient consents to behavioral health treatment    Patient Response to Request for Consent: Yes  Visit Type performed: Audio and VideoDate:  3/11/2021  Start Time:   5:30 PM  End Time:   7:00 PM    Billie Frank, YOB: 1960,  was an active group participant.    M3  Session 1: Brain and Body 101  8 members attended group today.  LPC introduced the format and expectations for participating in the Mindfulness, Meditation and Movement Group, advising all that the flow of the group is designed to introduce members to movement practices, guided meditations and mindfulness-based psychoeducation topics.  LPC shared that the focus of group today is to explore and grow awareness about how mindfulness can assist with anxiety and depression as well as how the sympathetic and parasympathetic nervous system can perpetuate the fight/flight/freeze reaction through our posture, how we breathe and ways we think.  Group members were introduced to the concept of interoception as a skill to increase awareness of moods/emotions.  Group members were introduced to the work of John Quintanilla’s work in Trauma  Sensitive Yoga and were educated that interoception includes: the visceral experience of feeling something in your body (muscles stretching, heart beating, stomach growling).  Additionally, interoceptive focus can include examining the motivation/movements involved in acting out whatever the visceral feeling activates or triggers.  Finally, group processed the effect of our visceral experience and movements/actions on our moods/emotions.  Group members were introduced to several therapeutic mindful movements and encouraged to begin exploring their own interoceptive capabilities in the group setting for 20 minutes.  Group members were then encouraged to engage in a guided meditation which consisted of a 10 minute breath oriented guided meditation by Jung TyGroup members were supportive of one another and offered encouragement and support throughout.  Members were given the opportunity to sign up for the next M3 Group.  Group members were provided handouts with suggested materials to review at their own pace..                    Mental Status:  Findings  Mood: Euthymic (normal)  Affect: Full Range  Rapport: Appropriate  Eye Contact: WNL  Appearance: Well Groomed  Psychomotor Functioning: WNL  Thought Process: WNL  Positive Involvement: Interested, Open  Judgment: Good  Insight: Intact    Patients reaction:  Pt shared that she was tested for COVID through work and turned out to be positive. Pt shared that she is asymptomatic currently. Pt shared that she is worried about being isolated for 10 days. Pt received support from [facility] members and was able to engage in group session.     Visit Diagnosis:  No diagnosis found.    Plan:  F/U with Weekly  group with LCSW.   Pt to F/U with treatment goals as outlined in treatment plan.  Pt to F/U with local ED/call 911 should SI/HI arises.   Koko Mason, LPC

## 2021-03-15 NOTE — TELEPHONE ENCOUNTER
Called pt back and  She had covid and wondered if she should get  Covid Vaccine. I infod her that she should go on CDC website and follow the guidelines

## 2021-03-25 ENCOUNTER — TELEMEDICINE (OUTPATIENT)
Dept: PSYCHIATRY | Facility: HOSPITAL | Age: 61
End: 2021-03-25
Attending: COUNSELOR
Payer: COMMERCIAL

## 2021-03-25 DIAGNOSIS — F43.23 ADJUSTMENT DISORDER WITH MIXED ANXIETY AND DEPRESSED MOOD: Primary | ICD-10-CM

## 2021-03-25 DIAGNOSIS — F33.0 MILD EPISODE OF RECURRENT MAJOR DEPRESSIVE DISORDER (CMS/HCC): ICD-10-CM

## 2021-03-25 PROCEDURE — 90853 GROUP PSYCHOTHERAPY: CPT | Mod: 95 | Performed by: COUNSELOR

## 2021-03-25 ASSESSMENT — COGNITIVE AND FUNCTIONAL STATUS - GENERAL
THOUGHT_PROCESS: WNL
MOOD: EUTHYMIC (NORMAL)
POSITIVE INVOLVEMENT: INTERESTED
PSYCHOMOTOR FUNCTIONING: WNL
JUDGEMENT: GOOD
AFFECT: FULL RANGE
EYE_CONTACT: WNL
INSIGHT: INTACT
APPEARANCE: WELL GROOMED

## 2021-03-25 NOTE — GROUP NOTE
Request for Consent  Patient provided verbal consent to treat via telemedicine. Clinician introduced the secure telemedicine platform that we are utilizing to provide care during the COVID-19 pandemic. Patient understands the session will be billed to their insurance or patient directly.  Patient was informed only the group patients  and the clinician are permitted on?the video conference, sessions are not recorded by the clinician, and the patient is not permitted to record the session.? Patient was provided clinician's unique meeting ID prior to session, patient was asked to arrive to virtual session on time just as patient would if we were in the office. Clinician confirmed identification of patient by name and birthdate, provider name, location of patient and clinician, and callback number in case disconnected. Patient consents to behavioral health treatment    Patient Response to Request for Consent: Yes  Visit Type performed: Audio and VideoDate:  3/25/2021  Start Time:   5:30 PM  End Time:   7:00 PM    Billie Frank, YOB: 1960,  was an active group participant.    M3 Session 3: Examining the Internal Experience with Compassion  8 members attended group today.  LPC introduced the format and expectations for participating in the Mindfulness, Meditation and Movement Group, advising all that the flow of the group is designed to introduce members to movement practices, guided meditations and mindfulness-based psycho-education topics.    LPC shared that the focus of group today is to explore and grow awareness about negative core beliefs that perpetuate symptoms of anxiety and depression and how mindfulness-based techniques, including meditation and movement, can assist in decreasing the impact, intensity and frequency of our negative internal voice.  Group members were led through a series of mindful movements to assist with release and strengthening of the psoas muscle.  Group members were educated  about the psoas muscle and provided including The Muscle of the Soul may be triggering your Fear and Anxiety; Check your hips for Back Pain and How to Stretch and Strengthen the Psoas. The group also reviewed a checklist of 10 Cognitive distortions (Hughes, Feeling Good: the New Mood Therapy (1980) and discussed the impact of their own tape(s) on their lives.  Additionally, the group was introduced to the work of Julissa Nolasco, PhD and the practice of self-compassion through the use of the Acronym “RAIN” 1. Recognize what is going on 2. A: allow the experience to be there, just as it is, I: Investigate with Kindness and N: Natural awareness that comes from not identifying with the experience/emotions.  Group then transitioned into the meditation portion of the group with a self-compassion/lovingkindness guided meditation conducted by KAIDEN with focus on offering oneself compassion with intension to better modulate the inner experience of emotions and the breath moving in the body.                      Mental Status:  Findings  Mood: Euthymic (normal)  Affect: Full Range  Rapport: Appropriate  Eye Contact: WNL  Appearance: Well Groomed  Psychomotor Functioning: WNL  Thought Process: WNL  Positive Involvement: Interested  Judgment: Good  Insight: Intact    Patients reaction:  Pt shared that work is stressful currently. Pt related with other group members about having poor work-life balance this week. Pt shared that she looks forward to this group for her own self care and stress management.     Visit Diagnosis:  No diagnosis found.    Plan:  F/U with Weekly  group with LCSW.   Pt to F/U with treatment goals as outlined in treatment plan.  Pt to F/U with local ED/call 911 should SI/HI arises.   Koko Mason LPC

## 2021-04-01 ENCOUNTER — TELEMEDICINE (OUTPATIENT)
Dept: PSYCHIATRY | Facility: HOSPITAL | Age: 61
End: 2021-04-01
Attending: COUNSELOR
Payer: COMMERCIAL

## 2021-04-01 DIAGNOSIS — F43.23 ADJUSTMENT DISORDER WITH MIXED ANXIETY AND DEPRESSED MOOD: Primary | ICD-10-CM

## 2021-04-01 DIAGNOSIS — F33.0 MILD EPISODE OF RECURRENT MAJOR DEPRESSIVE DISORDER (CMS/HCC): ICD-10-CM

## 2021-04-01 PROCEDURE — 90853 GROUP PSYCHOTHERAPY: CPT | Mod: 95 | Performed by: COUNSELOR

## 2021-04-01 ASSESSMENT — COGNITIVE AND FUNCTIONAL STATUS - GENERAL
INSIGHT: INTACT
PSYCHOMOTOR FUNCTIONING: WNL
JUDGEMENT: GOOD
AFFECT: FULL RANGE
MOOD: EUTHYMIC (NORMAL)
THOUGHT_PROCESS: WNL
EYE_CONTACT: WNL
POSITIVE INVOLVEMENT: INTERESTED
APPEARANCE: WELL GROOMED

## 2021-04-01 NOTE — GROUP NOTE
Request for Consent  Patient provided verbal consent to treat via telemedicine. Clinician introduced the secure telemedicine platform that we are utilizing to provide care during the COVID-19 pandemic. Patient understands the session will be billed to their insurance or patient directly.  Patient was informed only the group patients  and the clinician are permitted on?the video conference, sessions are not recorded by the clinician, and the patient is not permitted to record the session.? Patient was provided clinician's unique meeting ID prior to session, patient was asked to arrive to virtual session on time just as patient would if we were in the office. Clinician confirmed identification of patient by name and birthdate, provider name, location of patient and clinician, and callback number in case disconnected. Patient consents to behavioral health treatment    Patient Response to Request for Consent: Yes  Visit Type performed: Audio and VideoDate:  4/1/2021  Start Time:   5:30 PM  End Time:   7:00 PM    Billie Frank, YOB: 1960,  was an active group participant.    M3    Session 4: The Anxiety Spiral  11 members attended group today.  LPC introduced the format and expectations for participating in the Mindfulness, Meditation and Movement Group, advising all that the flow of the group is designed to introduce members to movement practices, guided meditations and mindfulness-based psycho-education topics.  LPC shared that the focus of group today was increased understanding of the impact and intensity of the “anxiety spiral” on our thoughts, physiology and behavior.   Group were reminded of the benefits of mindful movement and why the use of focused attention on the body and its internal experience (interoception) helps highly anxious individuals who may experience difficulty with traditional meditation and/or psychotherapy due to difficulty accessing the frontal lobe.   During the psycho-education  portion of the group; the group discussed the connections between anxiety and the enteric nervous system resulting in gastrointestinal disorders including IBS, GERD, nausea, & functional dyspepsia.  Group then discussed the link between anxiety and chronic respiratory disorders, heart disease and migraines and reviewed the article, Anxiety and Physical Illness from [place] [employer] (7/2008).  They were reminded that those with high anxiety may have difficulty verbally and cognitively processing stressors.  They were educated of the benefits of regular internal focus to increase body awareness when experiencing distressing emotions/thoughts in future stressful situations.  Group was then educated about various forms of anxiety including social anxiety and reviewed the Social Anxiety spiral taken from Managing Social Anxiety: CBT Therapy Approach by Gabbi Walls (2010).  Group was also provided The Emotional Guidance Scale taken from Ask and It Is Given by Briana Owen to better understand the spiraling thoughts that occur as stress increases in our life.  Group also reviewed the link of cognitions, physiology/feelings and Behaviors/movements in managing and better understanding anxiety.  Group was also led through mindfulness practice for anxiety by using the breath as an anchor to manage stress.  LCSW led group through Alternate Nostril breath and Breath of paul with focus on increasing focus and attention to sensations, temperature changes, pressure, color, level of agitation/energy & lethargy.  Group members were then led through a 20 minute mindful movement exercise with focus of interoception and growing a relationship with the inner workings/experience of their bodies during the exercise.   Group was then led though a guided mindfulness based body scan meditation by KAIDEN, with the focus on the experience of anxiety within the body.                    Mental Status:  Findings  Mood: Euthymic  (normal)  Affect: Full Range  Rapport: Appropriate  Eye Contact: WNL  Appearance: Well Groomed  Psychomotor Functioning: WNL  Thought Process: WNL  Positive Involvement: Interested  Judgment: Good  Insight: Intact    Patients reaction:  Pt shared that this group has given her tools to use when she is feeling anxious. Pt shared that this was feeling anxious this week and was able to utilize tools to help cope. Pt shared that she relates with other women in the group about anxiety and wanting to cope with sx.     Visit Diagnosis:  No diagnosis found.    Plan:  F/U with Weekly  group with LCSW.   Pt to F/U with treatment goals as outlined in treatment plan.  Pt to F/U with local ED/call 911 should SI/HI arises.   Koko Mason, LPC

## 2021-04-08 ENCOUNTER — TELEMEDICINE (OUTPATIENT)
Dept: PSYCHIATRY | Facility: HOSPITAL | Age: 61
End: 2021-04-08
Attending: COUNSELOR
Payer: COMMERCIAL

## 2021-04-08 DIAGNOSIS — F43.23 ADJUSTMENT DISORDER WITH MIXED ANXIETY AND DEPRESSED MOOD: Primary | ICD-10-CM

## 2021-04-08 DIAGNOSIS — F33.0 MILD EPISODE OF RECURRENT MAJOR DEPRESSIVE DISORDER (CMS/HCC): ICD-10-CM

## 2021-04-08 PROCEDURE — 90853 GROUP PSYCHOTHERAPY: CPT | Mod: 95 | Performed by: COUNSELOR

## 2021-04-08 ASSESSMENT — COGNITIVE AND FUNCTIONAL STATUS - GENERAL
APPEARANCE: WELL GROOMED
MOOD: EUTHYMIC (NORMAL)
AFFECT: FULL RANGE
EYE_CONTACT: WNL
JUDGEMENT: GOOD
POSITIVE INVOLVEMENT: INTERESTED;OPEN
PSYCHOMOTOR FUNCTIONING: WNL
INSIGHT: INTACT
THOUGHT_PROCESS: WNL

## 2021-04-08 NOTE — GROUP NOTE
Request for Consent  Patient provided verbal consent to treat via telemedicine. Clinician introduced the secure telemedicine platform that we are utilizing to provide care during the COVID-19 pandemic. Patient understands the session will be billed to their insurance or patient directly.  Patient was informed only the group patients  and the clinician are permitted on?the video conference, sessions are not recorded by the clinician, and the patient is not permitted to record the session.? Patient was provided clinician's unique meeting ID prior to session, patient was asked to arrive to virtual session on time just as patient would if we were in the office. Clinician confirmed identification of patient by name and birthdate, provider name, location of patient and clinician, and callback number in case disconnected. Patient consents to behavioral health treatment    Patient Response to Request for Consent: Yes  Visit Type performed: Audio and VideoDate:  4/8/2021  Start Time:   5:30 PM  End Time:   7:00 PM    Billie Frank, YOB: 1960,  was an active group participant.    M3 Session 5: The Depression Spiral  7 members attended group today.  LPC introduced the format and expectations for participating in the Mindfulness, Meditation and Movement Group, advising all that the flow of the group is designed to introduce members to movement practices, guided meditations and mindfulness-based psycho-education topics.  LPC shared that the focus of group today was increased understanding of the impact and intensity of the “depression spiral” on our thoughts, physiology and behavior. During the psycho-education portion of the group; group members were educated about the impacts of depression on the body.  Group members learned about the impact to the Central nervous system which leads to increased likelihood of addictions, difficulty sleeping, decreased interest in pleasurable activities, headaches, chronic pain  and body aches.  Group were also educated about the relationship between the digestive system and depression including “Stuffing” feelings through the use of overeating/binging which can lead to conditions such as NIDDM, loss of appetite/interest in eating healthy foods, stomach aches, constipation and malnutrition.  Group members were educated about the impact on the cardiovascular system and the immune system with increased evidence linking decreased immune functioning in chronically depressed individuals.   Group were then educated about the link between depressive cognitions which leads to physiology/feeling changes and behavior/movement changes.  Group reviewed the concept of the three “P”’s of Permanence, Pervasiveness, and Personalization by Branden Ames, PhD.   Group were encouraged to begin identifying ruminative depressed thinking and having a low mood as part of their experience, not part of their core being.  Group were educated of the link between Depression and benefits of practicing loving-kindness meditations.  Group members were led through a 20 minute mindful movement exercise with focus of interoception and growing a relationship with the inner workings/experience of their bodies during the exercise.  Group was reminded of the benefits of mindful movement and why the use of focused attention on the body and its internal experience (interoception) helps highly depressed individuals.  Group were led through a mindful “meta” (loving-kindness) meditation                    Mental Status:  Findings  Mood: Euthymic (normal)  Affect: Full Range  Rapport: Appropriate  Eye Contact: WNL  Appearance: Well Groomed  Psychomotor Functioning: WNL  Thought Process: WNL  Positive Involvement: Interested, Open  Judgment: Good  Insight: Intact    Patients reaction:  Pt shared that she had difficulty getting to group this evening because multiple stressors happened at work toward the end of the day. Pt shared that  she made the choice to come to group because it is helpful for her. Pt also shared about the importance of gratitude in her life and how helpful it is for her to focus on gratitude.     Visit Diagnosis:  No diagnosis found.    Plan:  F/U with Weekly  group with LCSW.   Pt to F/U with treatment goals as outlined in treatment plan.  Pt to F/U with local ED/call 911 should SI/HI arises.   Koko Mason, LPC

## 2021-04-15 ENCOUNTER — TELEMEDICINE (OUTPATIENT)
Dept: PSYCHIATRY | Facility: HOSPITAL | Age: 61
End: 2021-04-15
Attending: COUNSELOR
Payer: COMMERCIAL

## 2021-04-15 DIAGNOSIS — F43.23 ADJUSTMENT DISORDER WITH MIXED ANXIETY AND DEPRESSED MOOD: Primary | ICD-10-CM

## 2021-04-15 DIAGNOSIS — F33.0 MILD EPISODE OF RECURRENT MAJOR DEPRESSIVE DISORDER (CMS/HCC): ICD-10-CM

## 2021-04-15 PROCEDURE — 90853 GROUP PSYCHOTHERAPY: CPT | Mod: 95 | Performed by: COUNSELOR

## 2021-04-15 ASSESSMENT — COGNITIVE AND FUNCTIONAL STATUS - GENERAL
APPEARANCE: WELL GROOMED
THOUGHT_PROCESS: WNL
AFFECT: FULL RANGE
EYE_CONTACT: WNL
JUDGEMENT: GOOD
POSITIVE INVOLVEMENT: INTERESTED;OPEN
PSYCHOMOTOR FUNCTIONING: WNL
INSIGHT: INTACT
MOOD: EUTHYMIC (NORMAL)

## 2021-04-15 NOTE — GROUP NOTE
Date:  4/15/2021  Start Time:   5:30 PM  End Time:   7:00 PM    Billie Frank, YOB: 1960,  was an active group participant.    M3 Session 6: Mindful Communication  9 members attended group today.  LPC introduced the format and expectations for participating in the Mindfulness, Meditation and Movement Group, advising all that the flow of the group is designed to introduce members to movement practices, guided meditations and mindfulness-based psycho-education topics.  LCSW shared that the focus of today’s group was to explore mind-ful communication patterns vs. mind-less communication patterns.  During the psycho-education portion of today’s group, LPC educated members about the concept of projection and the shadow self and how our lack of tendency/ability to look within to better understand our “shadow selves” exhibits itself through “mindless” communication patterns driven by projections of the negative belief systems we have applied to ourselves.   Group then reviewed various communication styles and differences between aggressive, passive, passive-aggressive and assertive communication styles.  Group were educated and reviewed role playing mindful communication patterns with others including the script of “I feel ____ when you ____ because ___” and how this can lead to improved relationship conflict resolution skills and improved ability to listen and be present with others. Group members were led through mindful movements to increase the ability to interocept and help determine what the body’s experience is in the moment.   Group were then led through a silent meditation for 10 minutes.       Request for Consent  Patient provided verbal consent to treat via telemedicine. Clinician introduced the secure telemedicine platform that we are utilizing to provide care during the COVID-19 pandemic. Patient understands the session will be billed to their insurance or patient directly.  Patient was informed  "only the group patients  and the clinician are permitted on?the video conference, sessions are not recorded by the clinician, and the patient is not permitted to record the session.? Patient was provided clinician's unique meeting ID prior to session, patient was asked to arrive to virtual session on time just as patient would if we were in the office. Clinician confirmed identification of patient by name and birthdate, provider name, location of patient and clinician, and callback number in case disconnected. Patient consents to behavioral health treatment    Patient Response to Request for Consent: Yes  Visit Type performed: Audio and Video              Mental Status:  Findings  Mood: Euthymic (normal)  Affect: Full Range  Rapport: Appropriate  Eye Contact: WNL  Appearance: Well Groomed  Psychomotor Functioning: WNL  Thought Process: WNL  Positive Involvement: Interested, Open  Judgment: Good  Insight: Intact    Patients reaction:  Pt shared that she too has felt stress at work and knows that it will be a stressful at work for the next month. LPC encouraged pt to find moments of self care and relaxation throughout the stressful days. Pt shared that she she can also use the mantra that \"this too will pass\" and it will calm down after a month.     Visit Diagnosis:  No diagnosis found.    Plan:  F/U with Weekly  group with LCSW.   Pt to F/U with treatment goals as outlined in treatment plan.  Pt to F/U with local ED/call 911 should SI/HI arises.   Koko Mason LPC    "

## 2021-04-22 ENCOUNTER — TELEMEDICINE (OUTPATIENT)
Dept: PSYCHIATRY | Facility: HOSPITAL | Age: 61
End: 2021-04-22
Attending: COUNSELOR
Payer: COMMERCIAL

## 2021-04-22 ENCOUNTER — OFFICE VISIT (OUTPATIENT)
Dept: PRIMARY CARE | Facility: CLINIC | Age: 61
End: 2021-04-22
Payer: COMMERCIAL

## 2021-04-22 VITALS
HEIGHT: 68 IN | DIASTOLIC BLOOD PRESSURE: 72 MMHG | SYSTOLIC BLOOD PRESSURE: 120 MMHG | OXYGEN SATURATION: 97 % | RESPIRATION RATE: 16 BRPM | WEIGHT: 152 LBS | HEART RATE: 72 BPM | BODY MASS INDEX: 23.04 KG/M2 | TEMPERATURE: 97.4 F

## 2021-04-22 DIAGNOSIS — F33.0 MILD EPISODE OF RECURRENT MAJOR DEPRESSIVE DISORDER (CMS/HCC): ICD-10-CM

## 2021-04-22 DIAGNOSIS — F33.0 MAJOR DEPRESSIVE DISORDER, RECURRENT EPISODE, MILD (CMS/HCC): Primary | ICD-10-CM

## 2021-04-22 DIAGNOSIS — E78.00 PURE HYPERCHOLESTEROLEMIA: Chronic | ICD-10-CM

## 2021-04-22 DIAGNOSIS — F43.23 ADJUSTMENT DISORDER WITH MIXED ANXIETY AND DEPRESSED MOOD: Primary | ICD-10-CM

## 2021-04-22 DIAGNOSIS — N95.1 SYMPTOMATIC MENOPAUSAL OR FEMALE CLIMACTERIC STATES: ICD-10-CM

## 2021-04-22 DIAGNOSIS — Z00.00 PREVENTATIVE HEALTH CARE: ICD-10-CM

## 2021-04-22 DIAGNOSIS — F41.1 GENERALIZED ANXIETY DISORDER: ICD-10-CM

## 2021-04-22 PROBLEM — M75.52 ACUTE BURSITIS OF LEFT SHOULDER: Status: RESOLVED | Noted: 2020-12-16 | Resolved: 2021-04-22

## 2021-04-22 PROCEDURE — 3008F BODY MASS INDEX DOCD: CPT | Performed by: NURSE PRACTITIONER

## 2021-04-22 PROCEDURE — 90853 GROUP PSYCHOTHERAPY: CPT | Mod: 95 | Performed by: COUNSELOR

## 2021-04-22 PROCEDURE — 99214 OFFICE O/P EST MOD 30 MIN: CPT | Performed by: NURSE PRACTITIONER

## 2021-04-22 ASSESSMENT — COGNITIVE AND FUNCTIONAL STATUS - GENERAL
THOUGHT_PROCESS: WNL
JUDGEMENT: GOOD
EYE_CONTACT: WNL
INSIGHT: INTACT
AFFECT: FULL RANGE
APPEARANCE: WELL GROOMED
MOOD: EUTHYMIC (NORMAL)
POSITIVE INVOLVEMENT: INTERESTED;OPEN
PSYCHOMOTOR FUNCTIONING: WNL

## 2021-04-22 ASSESSMENT — ENCOUNTER SYMPTOMS
PALPITATIONS: 0
NERVOUS/ANXIOUS: 1
FOCAL SENSORY LOSS: 0
ANOREXIA: 0
SHORTNESS OF BREATH: 0
DEPRESSED MOOD: 0
HEADACHES: 0
FATIGUE: 0
MYALGIAS: 0
LEG PAIN: 0
DECREASED CONCENTRATION: 0
FOCAL WEAKNESS: 0
ARTHRALGIAS: 0
INSOMNIA: 1
PANIC: 0

## 2021-04-22 NOTE — ASSESSMENT & PLAN NOTE
Improved.  Continue Lexapro.  Use Ativan sparingly  Can get meds here as long as stable and is not using benzo daily.  Follow up 6 mo.

## 2021-04-22 NOTE — PROGRESS NOTES
[facility] at [address]  [place] [address]  [phone]  Fax [phone]      Patient ID: Billie Frank                              : 1960    Visit Date: 2021    Chief Complaint: Follow-up         Patient ID: Billie Frank is a 60 y.o. female.    Patient Active Problem List   Diagnosis   • Angina pectoris syndrome (CMS/HCC)   • Family history of ischemic heart disease   • Generalized anxiety disorder   • Hyperlipidemia   • Major depressive disorder, recurrent episode, mild (CMS/HCC)   • Symptomatic menopausal or female climacteric states   • Persistent disorder of initiating or maintaining sleep   • Polyp of colon   • Moderate acquired hearing loss   • Schatzki's ring         Current Outpatient Medications:   •  AMABELZ 0.5-0.1 mg per tablet, 1 tablet 3 (three) times a week (Mon, Wed, Fri). TID weekly, due tomorrow 2020 , Disp: , Rfl:   •  ascorbic acid (VITAMIN C) 500 mg tablet, Take 1,000 mg by mouth daily., Disp: , Rfl:   •  aspirin 81 mg enteric coated tablet, Take 81 mg by mouth daily., Disp: , Rfl:   •  b complex vitamins capsule, Take 1 capsule by mouth daily., Disp: , Rfl:   •  cholecalciferol, vitamin D3, 1,000 unit (25 mcg) tablet, Take 1,000 Units by mouth daily., Disp: , Rfl:   •  diclofenac (VOLTAREN) 50 mg EC tablet, Take 50 mg by mouth 2 (two) times a day as needed.  , Disp: , Rfl:   •  escitalopram (LEXAPRO) 20 mg tablet, TAKE 1 TABLET ONCE DAILY, Disp: 90 tablet, Rfl: 0  •  LORazepam (ATIVAN) 1 mg tablet, Take 1 tablet (1 mg total) by mouth nightly as needed (insomnia)., Disp: 30 tablet, Rfl: 0  •  QUEtiapine (SEROquel) 50 mg tablet, Take 1 tab daily at bedtime. Also ok to take 1 tab up to three times daily as needed for anxiety, Disp: 90 tablet, Rfl: 0  •  RANEXA 500 mg 12 hr tablet, , Disp: , Rfl:   •  rosuvastatin (CRESTOR) 5 mg tablet, Take 5 mg by mouth daily. Every other day, Disp: , Rfl:      Allergies   Allergen Reactions   • Bee Sting [Bee Venom Protein (Honey Bee)] Anaphylaxis   • Penicillins Hives   • Iodinated Contrast Media Hives   • Spinach GI intolerance     Raw Spinach only   • Sulfa (Sulfonamide Antibiotics) Rash       Social History     Tobacco Use   • Smoking status: Never Smoker   • Smokeless tobacco: Never Used   Substance Use Topics   • Alcohol use: Yes     Alcohol/week: 2.0 standard drinks     Types: 2 Glasses of wine per week   • Drug use: Never       Health Maintenance   Topic Date Due   • DTaP, Tdap, and Td Vaccines (1 - Tdap) 12/15/2021 (Originally 10/2/1979)   • Mammogram  08/18/2022   • Colonoscopy  01/18/2024   • Cervical Cancer Screening  01/29/2025   • Zoster Vaccine  Completed   • Influenza Vaccine  Completed   • Hepatitis C Screening  Completed   • COVID-19 Vaccine  Completed   • Meningococcal ACWY  Aged Out   • HIB Vaccines  Aged Out   • IPV Vaccines  Aged Out   • HPV Vaccines  Aged Out   • Pneumococcal  Aged Out   • Varicella Vaccines  Discontinued   • HIV Screening  Discontinued       HPI  Routine follow up  Lexapro refill.  Has been seeing psych.  PCP writes for Lexapro.  Has been stable and may have PCP do all meds now.  Overall feeling much better and  Life has been less hectic/crazy.    Anxiety  Presents for follow-up visit. Symptoms include insomnia and nervous/anxious behavior. Patient reports no chest pain, decreased concentration, depressed mood, excessive worry, palpitations, panic, shortness of breath or suicidal ideas. Symptoms occur occasionally. The severity of symptoms is moderate. The quality of sleep is good. Nighttime awakenings: occasional.     Compliance with medications is % (on Lexapro. PRN Ativan). Treatment side effects: None.   Depression  This is a chronic problem. The current episode started more than 1 year ago. Progression since onset: improved. Pertinent negatives include no anorexia, arthralgias, chest pain, fatigue, headaches or  "myalgias. Exacerbated by: life stressors. Treatments tried: Lexapro. The treatment provided significant relief.   Hyperlipidemia  This is a chronic problem. The current episode started more than 1 year ago. The problem is controlled. Recent lipid tests were reviewed and are normal. She has no history of chronic renal disease, diabetes, hypothyroidism, liver disease, obesity or nephrotic syndrome. There are no known factors aggravating her hyperlipidemia. Pertinent negatives include no chest pain, focal sensory loss, focal weakness, leg pain, myalgias or shortness of breath. Current antihyperlipidemic treatment includes statins. The current treatment provides significant improvement of lipids. There are no compliance problems.        The following have been reviewed and updated as appropriate in this visit:         Review of System  Review of Systems   Constitutional: Negative for fatigue.   Respiratory: Negative for shortness of breath.    Cardiovascular: Negative for chest pain and palpitations.   Gastrointestinal: Negative for anorexia.   Musculoskeletal: Negative for arthralgias and myalgias.   Neurological: Negative for focal weakness and headaches.   Psychiatric/Behavioral: Negative for decreased concentration and suicidal ideas. The patient is nervous/anxious and has insomnia.        Objective     Vitals  Vitals:    04/22/21 1017   BP: 120/72   Pulse: 72   Resp: 16   Temp: 36.3 °C (97.4 °F)   SpO2: 97%   Weight: 68.9 kg (152 lb)   Height: 1.727 m (5' 8\")     Body mass index is 23.11 kg/m².    Physical Exam  Physical Exam  Vitals reviewed.   Constitutional:       General: She is not in acute distress.     Appearance: Normal appearance. She is not diaphoretic.   Cardiovascular:      Rate and Rhythm: Normal rate and regular rhythm.      Heart sounds: No murmur heard.   No friction rub. No gallop.    Pulmonary:      Effort: Pulmonary effort is normal.      Breath sounds: Normal breath sounds. No wheezing, rhonchi " or rales.   Musculoskeletal:      Right lower leg: No edema.      Left lower leg: No edema.   Neurological:      Mental Status: She is alert and oriented to person, place, and time.   Psychiatric:         Mood and Affect: Mood and affect normal.         Speech: Speech normal.         Behavior: Behavior normal.         Thought Content: Thought content does not include suicidal ideation. Thought content does not include suicidal plan.         Assessment/Plan     Problem List Items Addressed This Visit     Hyperlipidemia (Chronic)     Labs ordered.  Continue statin.         Generalized anxiety disorder     Improved.  Continue Lexapro.  Use Ativan sparingly  Can get meds here as long as stable and is not using benzo daily.  Follow up 6 mo.         Major depressive disorder, recurrent episode, mild (CMS/HCC) - Primary     Much improved.  Situation with  has improved much.  Continue Lexapro.  Follow up 6 mo.         Symptomatic menopausal or female climacteric states     On HRT  GYN managing.           Other Visit Diagnoses     Preventative health care        Relevant Orders    CBC and Differential    Comprehensive metabolic panel    Lipid panel    TSH 3rd Generation    Urinalysis with Reflex Culture (ED and Outpatient only)              ALBERT Bowen  4/22/2021

## 2021-04-22 NOTE — GROUP NOTE
Date:  4/22/2021  Start Time:   5:30 PM  End Time:   7:00 PM    Billie Frank, YOB: 1960,  was an active group participant.    M3 Session 7: Mindful Boundaries  12 members attended group today.  LCSW introduced the format and expectations for participating in the Mindfulness, Meditation and Movement Group, advising all that the flow of the group is designed to introduce members to movement practices, guided meditations and mindfulness-based psycho-education topics.  LCSW shared that the focus of today’s group will explore the connection between internal boundaries and internal communication vs. boundaries in relationships and how we relate to others.  Group members were provided an analogy of understanding boundary systems as an apple from Setting Healthy Boundaries by Cristian Guthrie.   Group then reviewed the concept of empathy fatigue, hyper-empathy and what relationships with others who have any of these conditions could look like.  Group members were encouraged to share about their own relationships and experiences with setting (or having difficulty setting) boundaries in their own lives.  Group members were educated between the differences/characteristics of rigid, permissive and healthy boundaried relationships.  Group members were led through mindful movement practice today for 20-30 minutes.  Group members were then led through a 20 minute loving-kindness/Tonglen meditation practice.      Request for Consent  Patient provided verbal consent to treat via telemedicine. Clinician introduced the secure telemedicine platform that we are utilizing to provide care during the COVID-19 pandemic. Patient understands the session will be billed to their insurance or patient directly.  Patient was informed only the group patients  and the clinician are permitted on?the video conference, sessions are not recorded by the clinician, and the patient is not permitted to record the session.? Patient was provided  clinician's unique meeting ID prior to session, patient was asked to arrive to virtual session on time just as patient would if we were in the office. Clinician confirmed identification of patient by name and birthdate, provider name, location of patient and clinician, and callback number in case disconnected. Patient consents to behavioral health treatment    Patient Response to Request for Consent: Yes  Visit Type performed: Audio and Video                  Mental Status:  Findings  Mood: Euthymic (normal)  Affect: Full Range  Rapport: Appropriate  Eye Contact: WNL  Appearance: Well Groomed  Psychomotor Functioning: WNL  Thought Process: WNL  Positive Involvement: Interested, Open  Judgment: Good  Insight: Intact    Patients reaction:  Pt shared that she feels tired and stressed from work. Pt shared about a personal experience that she had at work with someone who was disrespectful with her. Pt shared that she addressed the person and they were able to resolve the issue. Pt was actively engaged in movement and meditation practice.     Visit Diagnosis:  No diagnosis found.    Plan:  F/U with Weekly  group with LCSW.   Pt to F/U with treatment goals as outlined in treatment plan.  Pt to F/U with local ED/call 911 should SI/HI arises.   Koko Mason, LPC

## 2021-04-29 ENCOUNTER — TELEMEDICINE (OUTPATIENT)
Dept: PSYCHIATRY | Facility: HOSPITAL | Age: 61
End: 2021-04-29
Attending: COUNSELOR
Payer: COMMERCIAL

## 2021-04-29 DIAGNOSIS — F43.23 ADJUSTMENT DISORDER WITH MIXED ANXIETY AND DEPRESSED MOOD: Primary | ICD-10-CM

## 2021-04-29 DIAGNOSIS — F33.0 MILD EPISODE OF RECURRENT MAJOR DEPRESSIVE DISORDER (CMS/HCC): ICD-10-CM

## 2021-04-29 PROCEDURE — 90853 GROUP PSYCHOTHERAPY: CPT | Mod: 95 | Performed by: COUNSELOR

## 2021-04-29 ASSESSMENT — COGNITIVE AND FUNCTIONAL STATUS - GENERAL
INSIGHT: INTACT
PSYCHOMOTOR FUNCTIONING: WNL
MOOD: EUTHYMIC (NORMAL)
APPEARANCE: WELL GROOMED
AFFECT: FULL RANGE
EYE_CONTACT: WNL
POSITIVE INVOLVEMENT: INTERESTED
JUDGEMENT: GOOD
THOUGHT_PROCESS: WNL

## 2021-04-29 NOTE — GROUP NOTE
Date:  4/29/2021  Start Time:   5:30 PM  End Time:   7:00 PM    Billie Frank, YOB: 1960,  was an active group participant.    M3 Session 8: Attachments   12 members attended group today.  LPC introduced the format and expectations for participating in the Mindfulness, Meditation and Movement Group, advising all that the flow of the group is designed to introduce members to movement practices, guided meditations and mindfulness-based psycho-education topics.  LPC shared that the focus of today’s group will explore how our attachments to routines and impulsive needs such as substances, food, unhealthy people, activities and even ritual movements and behaviors increase our experience of suffering and emotional/physical pain.  Group was then encouraged to identify their own coping skills/attachments and how they could be impacting and increasing their experience of emotional and physical pain.  Group members were educated about the experience of a wanting mind through the work of Julissa Nolasco in [employer] and were educated about our innate ability to mindfully watch our attachments, drives, urges and impulses which is improved significantly through practices of mindfulness, meditation and movement.  Group members discussed/ encouraged to focus on noticing ways we use our attachments to routine/”trance like” impulses such as our use of sugar, substances, sex, chaotic relationships and even ritual ways we hold our body in an effort to avoid truly feeling the emotions we are experiencing in the moment. Group were introduced to a video on the Biochemistry of attachment to sugar and its effects on the body’s health/wellness by watching the following video:  http://ed.[employer]/lessons/how-sugar-affects-the-brain-brigido-aman.  Group members were encouraged to notice what the price of their own attachments are and encouraged to grow awareness of the emotions underlying those attachments.  Group was led  through and encouraged to hold mindful movements in an effort to watch “what comes up” when the group members were encouraged to hold a pose for up to 3-5 mins at a time.  Group members were then led through a mindful eating exercise/meditation and encouraged to focus on the judgments/thoughts/impulses that came up during this exercise.     Request for Consent  Patient provided verbal consent to treat via telemedicine. Clinician introduced the secure telemedicine platform that we are utilizing to provide care during the COVID-19 pandemic. Patient understands the session will be billed to their insurance or patient directly.  Patient was informed only the group patients  and the clinician are permitted on?the video conference, sessions are not recorded by the clinician, and the patient is not permitted to record the session.? Patient was provided clinician's unique meeting ID prior to session, patient was asked to arrive to virtual session on time just as patient would if we were in the office. Clinician confirmed identification of patient by name and birthdate, provider name, location of patient and clinician, and callback number in case disconnected. Patient consents to behavioral health treatment    Patient Response to Request for Consent: Yes  Visit Type performed: Audio and Video                    Mental Status:  Findings  Mood: Euthymic (normal)  Affect: Full Range  Rapport: Appropriate  Eye Contact: WNL  Appearance: Well Groomed  Psychomotor Functioning: WNL  Thought Process: WNL  Positive Involvement: Interested  Judgment: Good  Insight: Intact    Patients reaction:  Pt related with group members about routine and having to adjust when change occurs. Pt shared that she feels uncomfortable when her routine has to change. Pt shared that she sees this as an opportunity to grow. Pt also related with a group member who lost their parents and is an only child as pt had a similar experience.     Visit Diagnosis:   No diagnosis found.    Plan:  F/U with Biweekly  group with LCSW.   Pt to F/U with treatment goals as outlined in treatment plan.  Pt to F/U with local ED/call 911 should SI/HI arises.   Koko aMson, LPC

## 2021-05-06 ENCOUNTER — TELEMEDICINE (OUTPATIENT)
Dept: PSYCHIATRY | Facility: HOSPITAL | Age: 61
End: 2021-05-06
Attending: COUNSELOR
Payer: COMMERCIAL

## 2021-05-06 DIAGNOSIS — F33.0 MILD EPISODE OF RECURRENT MAJOR DEPRESSIVE DISORDER (CMS/HCC): ICD-10-CM

## 2021-05-06 DIAGNOSIS — F43.23 ADJUSTMENT DISORDER WITH MIXED ANXIETY AND DEPRESSED MOOD: Primary | ICD-10-CM

## 2021-05-06 PROCEDURE — 90853 GROUP PSYCHOTHERAPY: CPT | Mod: 95 | Performed by: COUNSELOR

## 2021-05-06 ASSESSMENT — COGNITIVE AND FUNCTIONAL STATUS - GENERAL
PSYCHOMOTOR FUNCTIONING: WNL
MOOD: EUTHYMIC (NORMAL)
POSITIVE INVOLVEMENT: INTERESTED
JUDGEMENT: GOOD
INSIGHT: INTACT
AFFECT: FULL RANGE
EYE_CONTACT: WNL
APPEARANCE: WELL GROOMED
THOUGHT_PROCESS: WNL

## 2021-05-06 NOTE — GROUP NOTE
Date:  5/6/2021  Start Time:   5:30 PM  End Time:   7:00 PM    Billie Frank, YOB: 1960,  was an active group participant.    M3  Session 9: Learning how to Become a Wise-Minded Witness  10 members attended group today.  LPC introduced the format and expectations for participating in the Mindfulness, Meditation and Movement Group, advising all that the flow of the group is designed to introduce members to movement practices, guided meditations and mindfulness-based psycho-education topics LPC shared that the focus of group today is to explore how the use of movement, meditation and mindfulness skills can assist in increasing our ability to detach from our emotional experience and become the witness of what is going on around us.  Group member practiced the “felt sense” sensing technique from Floyd Hernadez, PhD and learned the concept of  emotional mind, logical/rational mind and the desired combination of both: gibbs mind from Ashleigh Collado, PhD in an effort to increase frustration tolerance and decrease emotional reactivity and interpersonal conflict in our lives.  Group was then led through a series of mindful movements for 20 minutes to assist in building distress tolerance skills and were encouraged to practice mindful breathing consciously as they moved through each pose.  Group was concluded with the practice of a guided progressive relaxation exercise to build inner awareness of how the body feels when “distressed” as well as when “relaxed.”    Request for Consent  Patient provided verbal consent to treat via telemedicine. Clinician introduced the secure telemedicine platform that we are utilizing to provide care during the COVID-19 pandemic. Patient understands the session will be billed to their insurance or patient directly.  Patient was informed only the group patients  and the clinician are permitted on?the video conference, sessions are not recorded by the clinician, and the patient is  not permitted to record the session.? Patient was provided clinician's unique meeting ID prior to session, patient was asked to arrive to virtual session on time just as patient would if we were in the office. Clinician confirmed identification of patient by name and birthdate, provider name, location of patient and clinician, and callback number in case disconnected. Patient consents to behavioral health treatment    Patient Response to Request for Consent: Yes  Visit Type performed: Audio and Video                    Mental Status:  Findings  Mood: Euthymic (normal)  Affect: Full Range  Rapport: Appropriate  Eye Contact: WNL  Appearance: Well Groomed  Psychomotor Functioning: WNL  Thought Process: WNL  Positive Involvement: Interested  Judgment: Good  Insight: Intact    Patients reaction:  Pt shared that this group has felt like an anchor for her weekly over the past 9 months. Pt was engaged in movement and meditation practice.     Visit Diagnosis:      ICD-10-CM ICD-9-CM   1. Adjustment disorder with mixed anxiety and depressed mood  F43.23 309.28   2. Mild episode of recurrent major depressive disorder (CMS/HCC)  F33.0 296.31       Plan:  F/U with Biweekly  group with LCSW.   Pt to F/U with treatment goals as outlined in treatment plan.  Pt to F/U with local ED/call 911 should SI/HI arises.   Koko Mason, KAIDEN

## 2021-05-18 ENCOUNTER — TELEMEDICINE (OUTPATIENT)
Dept: PSYCHIATRY | Facility: HOSPITAL | Age: 61
End: 2021-05-18
Attending: COUNSELOR
Payer: COMMERCIAL

## 2021-05-18 DIAGNOSIS — F33.0 MILD EPISODE OF RECURRENT MAJOR DEPRESSIVE DISORDER (CMS/HCC): ICD-10-CM

## 2021-05-18 DIAGNOSIS — F43.23 ADJUSTMENT DISORDER WITH MIXED ANXIETY AND DEPRESSED MOOD: Primary | ICD-10-CM

## 2021-05-18 PROCEDURE — 90853 GROUP PSYCHOTHERAPY: CPT | Mod: 95 | Performed by: COUNSELOR

## 2021-05-18 ASSESSMENT — COGNITIVE AND FUNCTIONAL STATUS - GENERAL
JUDGEMENT: GOOD
PSYCHOMOTOR FUNCTIONING: WNL
INSIGHT: INTACT
MOOD: EUTHYMIC (NORMAL)
EYE_CONTACT: WNL
APPEARANCE: WELL GROOMED
POSITIVE INVOLVEMENT: INTERESTED;OPEN
AFFECT: FULL RANGE

## 2021-05-18 NOTE — GROUP NOTE
Date:  5/18/2021  Start Time:   5:30 PM  End Time:   7:00 PM    Billie Frank, YOB: 1960,  was an active group participant.    M3  Session 10: Grief, Loss, and Gratitude  8 members attended group today.  LPC introduced the format and expectations for participating in the Mindfulness, Meditation and Movement Group, advising all that the flow of the group is designed to introduce members to movement practices, guided meditations and mindfulness-based psycho-education topics.    LPC shared that the focus of group today is to explore how the use of movement, meditation and mindfulness skills can increase our ability to notice pain, suffering, grief and loss.   Group was educated about the concept of attachments and how our belief in “how things should be” impacts our ability to cope with what is.  Group were provided information from [employer] about what the emotion and experience of fear does to the body physically.  Group then discussed the concept of gratitude and how gratitude can be used to combat the experience of loss in our lives.  Group reviewed the definition of gratitude as expounded by Georges Rob in Gratitude Works! And reviewed, journaled during class/practiced identifying gratitudes in their lives and received scientific information regarding the efficacy of starting a gratitude practice in their own lives.  Group completed an activity encouraging group members to share what gratitude they see in their own lives and were encouraged to follow up with 5 gratitude building exercises/practices including daily journaling, nature experiences/walks; meditation; collages/art and eating mindfully.  Group were then led through movements which encouraged group members to explore poses that may cause increased strain/energy discharge and how those poses impacted their thoughts in the moment.  Group was then led through a 20 minute loving-kindness meditation to begin offering compassion to  the self that may be hurting.      Request for Consent  Patient provided verbal consent to treat via telemedicine. Clinician introduced the secure telemedicine platform that we are utilizing to provide care during the COVID-19 pandemic. Patient understands the session will be billed to their insurance or patient directly.  Patient was informed only the group patients  and the clinician are permitted on?the video conference, sessions are not recorded by the clinician, and the patient is not permitted to record the session.? Patient was provided clinician's unique meeting ID prior to session, patient was asked to arrive to virtual session on time just as patient would if we were in the office. Clinician confirmed identification of patient by name and birthdate, provider name, location of patient and clinician, and callback number in case disconnected. Patient consents to behavioral health treatment    Patient Response to Request for Consent: Yes  Visit Type performed: Audio and Video                  Mental Status:  Findings  Mood: Euthymic (normal)  Affect: Full Range  Rapport: Appropriate  Eye Contact: WNL  Appearance: Well Groomed  Psychomotor Functioning: WNL  Positive Involvement: Interested, Open  Judgment: Good  Insight: Intact    Patients reaction:  Pt shared that work is very stressful currently. Pt shared that she practiced mindfulness a few times today to help with stress. Pt shared that she knows that the stress is temporary but she feels herself feeling drained. LPC validated pt's experience and encouraged pt to find ways to detach from the chaos around her and ground herself so that she can remain calm in her own mind and body.     Visit Diagnosis:      ICD-10-CM ICD-9-CM   1. Adjustment disorder with mixed anxiety and depressed mood  F43.23 309.28   2. Mild episode of recurrent major depressive disorder (CMS/HCC)  F33.0 296.31       Plan:  F/U with Biweekly  group with LCSW.   Pt to F/U with treatment  goals as outlined in treatment plan.  Pt to F/U with local ED/call 911 should SI/HI arises.   Koko Mason, LPC

## 2021-05-27 LAB
ALBUMIN SERPL-MCNC: 4.4 G/DL (ref 3.8–4.8)
ALBUMIN/GLOB SERPL: 1.9 {RATIO} (ref 1.2–2.2)
ALP SERPL-CCNC: 54 IU/L (ref 48–121)
ALT SERPL-CCNC: 15 IU/L (ref 0–32)
AST SERPL-CCNC: 18 IU/L (ref 0–40)
BASOPHILS # BLD AUTO: 0 X10E3/UL (ref 0–0.2)
BASOPHILS NFR BLD AUTO: 1 %
BILIRUB SERPL-MCNC: 0.4 MG/DL (ref 0–1.2)
BUN SERPL-MCNC: 15 MG/DL (ref 8–27)
BUN/CREAT SERPL: 16 (ref 12–28)
CALCIUM SERPL-MCNC: 9.2 MG/DL (ref 8.7–10.3)
CHLORIDE SERPL-SCNC: 102 MMOL/L (ref 96–106)
CHOLEST SERPL-MCNC: 188 MG/DL (ref 100–199)
CO2 SERPL-SCNC: 21 MMOL/L (ref 20–29)
CREAT SERPL-MCNC: 0.92 MG/DL (ref 0.57–1)
EOSINOPHIL # BLD AUTO: 0.2 X10E3/UL (ref 0–0.4)
EOSINOPHIL NFR BLD AUTO: 4 %
ERYTHROCYTE [DISTWIDTH] IN BLOOD BY AUTOMATED COUNT: 12.4 % (ref 11.7–15.4)
GLOBULIN SER CALC-MCNC: 2.3 G/DL (ref 1.5–4.5)
GLUCOSE SERPL-MCNC: 98 MG/DL (ref 65–99)
HCT VFR BLD AUTO: 40.5 % (ref 34–46.6)
HDLC SERPL-MCNC: 73 MG/DL
HGB BLD-MCNC: 13.5 G/DL (ref 11.1–15.9)
IMM GRANULOCYTES # BLD AUTO: 0 X10E3/UL (ref 0–0.1)
IMM GRANULOCYTES NFR BLD AUTO: 0 %
LAB CORP EGFR IF AFRICN AM: 78 ML/MIN/1.73
LAB CORP EGFR IF NONAFRICN AM: 67 ML/MIN/1.73
LDLC SERPL CALC-MCNC: 101 MG/DL (ref 0–99)
LYMPHOCYTES # BLD AUTO: 1.7 X10E3/UL (ref 0.7–3.1)
LYMPHOCYTES NFR BLD AUTO: 34 %
MCH RBC QN AUTO: 31.3 PG (ref 26.6–33)
MCHC RBC AUTO-ENTMCNC: 33.3 G/DL (ref 31.5–35.7)
MCV RBC AUTO: 94 FL (ref 79–97)
MONOCYTES # BLD AUTO: 0.3 X10E3/UL (ref 0.1–0.9)
MONOCYTES NFR BLD AUTO: 6 %
NEUTROPHILS # BLD AUTO: 2.7 X10E3/UL (ref 1.4–7)
NEUTROPHILS NFR BLD AUTO: 55 %
PLATELET # BLD AUTO: 217 X10E3/UL (ref 150–450)
POTASSIUM SERPL-SCNC: 4.2 MMOL/L (ref 3.5–5.2)
PROT SERPL-MCNC: 6.7 G/DL (ref 6–8.5)
RBC # BLD AUTO: 4.32 X10E6/UL (ref 3.77–5.28)
SODIUM SERPL-SCNC: 138 MMOL/L (ref 134–144)
TRIGL SERPL-MCNC: 74 MG/DL (ref 0–149)
VLDLC SERPL CALC-MCNC: 14 MG/DL (ref 5–40)
WBC # BLD AUTO: 4.9 X10E3/UL (ref 3.4–10.8)

## 2021-05-28 ENCOUNTER — APPOINTMENT (OUTPATIENT)
Dept: URBAN - METROPOLITAN AREA CLINIC 200 | Age: 61
Setting detail: DERMATOLOGY
End: 2021-05-29

## 2021-05-28 DIAGNOSIS — D485 NEOPLASM OF UNCERTAIN BEHAVIOR OF SKIN: ICD-10-CM

## 2021-05-28 PROBLEM — D48.5 NEOPLASM OF UNCERTAIN BEHAVIOR OF SKIN: Status: ACTIVE | Noted: 2021-05-28

## 2021-05-28 LAB — TSH SERPL DL<=0.005 MIU/L-ACNC: 2.75 UIU/ML (ref 0.45–4.5)

## 2021-05-28 PROCEDURE — OTHER TELEHEALTH RECOMMENDATIONS: OTHER

## 2021-05-28 PROCEDURE — OTHER TELEHEALTH ASSESSMENT: OTHER

## 2021-05-28 PROCEDURE — OTHER REASSURANCE: OTHER

## 2021-05-28 PROCEDURE — OTHER CONSENT FOR TELEMEDICINE VISIT OBTAINED: OTHER

## 2021-05-28 PROCEDURE — 99212 OFFICE O/P EST SF 10 MIN: CPT | Mod: 95

## 2021-05-28 ASSESSMENT — LOCATION SIMPLE DESCRIPTION DERM
LOCATION SIMPLE: LEFT BREAST
LOCATION SIMPLE: CHEST

## 2021-05-28 ASSESSMENT — LOCATION ZONE DERM: LOCATION ZONE: TRUNK

## 2021-05-28 ASSESSMENT — LOCATION DETAILED DESCRIPTION DERM
LOCATION DETAILED: LEFT MEDIAL BREAST 11-12:00 REGION
LOCATION DETAILED: LEFT MEDIAL SUPERIOR CHEST

## 2021-05-28 ASSESSMENT — PAIN INTENSITY VAS: HOW INTENSE IS YOUR PAIN 0 BEING NO PAIN, 10 BEING THE MOST SEVERE PAIN POSSIBLE?: NO PAIN

## 2021-05-28 NOTE — PROCEDURE: REASSURANCE
Detail Level: Detailed
Hide Additional Notes?: No
Additional Notes (Optional): Patient to come in for bx - R/O bcc

## 2021-05-29 LAB
APPEARANCE UR: ABNORMAL
BACTERIA #/AREA URNS HPF: ABNORMAL /[HPF]
BACTERIA UR CULT: NORMAL
BACTERIA UR CULT: NORMAL
BILIRUB UR QL STRIP: NEGATIVE
COLOR UR: YELLOW
EPI CELLS #/AREA URNS HPF: >10 /HPF (ref 0–10)
GLUCOSE UR QL: NEGATIVE
HGB UR QL STRIP: NEGATIVE
KETONES UR QL STRIP: NEGATIVE
LAB CORP URINALYSIS REFLEX: ABNORMAL
LEUKOCYTE ESTERASE UR QL STRIP: ABNORMAL
MICRO URNS: ABNORMAL
MUCOUS THREADS URNS QL MICRO: PRESENT
NITRITE UR QL STRIP: NEGATIVE
PH UR STRIP: 6 [PH] (ref 5–7.5)
PROT UR QL STRIP: ABNORMAL
RBC #/AREA URNS HPF: ABNORMAL /HPF (ref 0–2)
SP GR UR: 1.02 (ref 1–1.03)
UROBILINOGEN UR STRIP-MCNC: 0.2 MG/DL (ref 0.2–1)
WBC #/AREA URNS HPF: ABNORMAL /HPF (ref 0–5)

## 2021-06-11 ENCOUNTER — APPOINTMENT (OUTPATIENT)
Dept: URBAN - METROPOLITAN AREA CLINIC 200 | Age: 61
Setting detail: DERMATOLOGY
End: 2021-06-14

## 2021-06-11 DIAGNOSIS — L57.0 ACTINIC KERATOSIS: ICD-10-CM

## 2021-06-11 PROCEDURE — 17000 DESTRUCT PREMALG LESION: CPT

## 2021-06-11 PROCEDURE — OTHER LIQUID NITROGEN: OTHER

## 2021-06-11 ASSESSMENT — LOCATION SIMPLE DESCRIPTION DERM: LOCATION SIMPLE: CHEST

## 2021-06-11 ASSESSMENT — LOCATION DETAILED DESCRIPTION DERM: LOCATION DETAILED: LEFT MEDIAL SUPERIOR CHEST

## 2021-06-11 ASSESSMENT — LOCATION ZONE DERM: LOCATION ZONE: TRUNK

## 2021-06-11 NOTE — PROCEDURE: LIQUID NITROGEN
Render Post-Care Instructions In Note?: no
Post-Care Instructions: I reviewed with the patient in detail post-care instructions. Patient is to wear sunprotection, and avoid picking at any of the treated lesions. Pt may apply Vaseline to crusted or scabbing areas.
Detail Level: Detailed
Consent: The patient's consent was obtained including but not limited to risks of crusting, scabbing, blistering, scarring, darker or lighter pigmentary change, recurrence, incomplete removal and infection.
Duration Of Freeze Thaw-Cycle (Seconds): 2
Number Of Freeze-Thaw Cycles: 1 freeze-thaw cycle

## 2021-06-15 ENCOUNTER — TELEMEDICINE (OUTPATIENT)
Dept: PSYCHIATRY | Facility: HOSPITAL | Age: 61
End: 2021-06-15
Attending: COUNSELOR
Payer: COMMERCIAL

## 2021-06-15 DIAGNOSIS — F33.0 MILD EPISODE OF RECURRENT MAJOR DEPRESSIVE DISORDER (CMS/HCC): ICD-10-CM

## 2021-06-15 DIAGNOSIS — F43.23 ADJUSTMENT DISORDER WITH MIXED ANXIETY AND DEPRESSED MOOD: Primary | ICD-10-CM

## 2021-06-15 PROCEDURE — 90853 GROUP PSYCHOTHERAPY: CPT | Mod: 95 | Performed by: COUNSELOR

## 2021-06-15 ASSESSMENT — COGNITIVE AND FUNCTIONAL STATUS - GENERAL
MOOD: EUTHYMIC (NORMAL)
EYE_CONTACT: WNL
THOUGHT_PROCESS: WNL
AFFECT: FULL RANGE
POSITIVE INVOLVEMENT: INTERESTED;OPEN
APPEARANCE: WELL GROOMED
INSIGHT: INTACT
JUDGEMENT: GOOD
PSYCHOMOTOR FUNCTIONING: WNL

## 2021-06-15 NOTE — GROUP NOTE
Date:  6/15/2021  Start Time:   5:30 PM  End Time:   7:00 PM    Billie Frank, YOB: 1960,  was an active group participant.    M3  Session 1: Brain and Body 101  8 members attended group today.  Clinician introduced the format and expectations for participating in the Mindfulness, Meditation and Movement Group, advising all that the flow of the group is designed to introduce members to movement practices, guided meditations and mindfulness-based psychoeducation topics.  Clinician shared that the focus of group today is to explore and grow awareness about how mindfulness can assist with anxiety and depression as well as how the sympathetic and parasympathetic nervous system can perpetuate the fight/flight/freeze reaction through our posture, how we breathe and ways we think.  Group members were introduced to the concept of interoception as a skill to increase awareness of moods/emotions.  Group members were introduced to the work of John Quintanilla’s work in Trauma Sensitive Yoga and were educated that interoception includes: the visceral experience of feeling something in your body (muscles stretching, heart beating, stomach growling).  Additionally, interoceptive focus can include examining the motivation/movements involved in acting out whatever the visceral feeling activates or triggers.  Finally, group processed the effect of our visceral experience and movements/actions on our moods/emotions.  Group members were introduced to several therapeutic mindful movements and encouraged to begin exploring their own interoceptive capabilities in the group setting for 20 minutes.  Group members were then encouraged to engage in a guided meditation which consisted of a 10 minute breath oriented guided meditation by Jung TyGroup members were supportive of one another and offered encouragement and support throughout.  Members were given the opportunity to sign up for the next M3 Group.  Group members were  provided handouts with suggested materials to review at their own pace..    Request for Consent  Patient provided verbal consent to treat via telemedicine. Clinician introduced the secure telemedicine platform that we are utilizing to provide care during the COVID-19 pandemic. Patient understands the session will be billed to their insurance or patient directly.  Patient was informed only the group patients  and the clinician are permitted on?the video conference, sessions are not recorded by the clinician, and the patient is not permitted to record the session.? Patient was provided clinician's unique meeting ID prior to session, patient was asked to arrive to virtual session on time just as patient would if we were in the office. Clinician confirmed identification of patient by name and birthdate, provider name, location of patient and clinician, and callback number in case disconnected. Patient consents to behavioral health treatment    Patient Response to Request for Consent: Yes  Visit Type performed: Audio and Video                    Mental Status:  Findings  Mood: Euthymic (normal)  Affect: Full Range  Rapport: Appropriate  Eye Contact: WNL  Appearance: Well Groomed  Psychomotor Functioning: WNL  Thought Process: WNL  Positive Involvement: Interested, Open  Judgment: Good  Insight: Intact    Patients reaction:  Pt shared that she has been attending this group since last Fall and has found it to be helpful for mood and stress management. Pt shared about her last few weeks being busy and chaotic with work. Pt shared that she has tried to use mindfulness to help with stress. Pt also discussed a moment this morning where she reframed her thoughts and used mindfulness to help reroute her mood for the day.     Visit Diagnosis:      ICD-10-CM ICD-9-CM   1. Adjustment disorder with mixed anxiety and depressed mood  F43.23 309.28   2. Mild episode of recurrent major depressive disorder (CMS/[facility])  F33.0 296.31        Plan:  F/U with Biweekly  group with LCSW.   Pt to F/U with treatment goals as outlined in treatment plan.  Pt to F/U with local ED/call 911 should SI/HI arises.   Koko Mason, LPC

## 2021-06-29 ENCOUNTER — TELEMEDICINE (OUTPATIENT)
Dept: PSYCHIATRY | Facility: HOSPITAL | Age: 61
End: 2021-06-29
Attending: COUNSELOR
Payer: COMMERCIAL

## 2021-06-29 DIAGNOSIS — F43.23 ADJUSTMENT DISORDER WITH MIXED ANXIETY AND DEPRESSED MOOD: Primary | ICD-10-CM

## 2021-06-29 DIAGNOSIS — F33.0 MILD EPISODE OF RECURRENT MAJOR DEPRESSIVE DISORDER (CMS/HCC): ICD-10-CM

## 2021-06-29 PROCEDURE — 90853 GROUP PSYCHOTHERAPY: CPT | Mod: 95 | Performed by: COUNSELOR

## 2021-06-29 ASSESSMENT — COGNITIVE AND FUNCTIONAL STATUS - GENERAL
INSIGHT: INTACT
POSITIVE INVOLVEMENT: INTERESTED
EYE_CONTACT: WNL
AFFECT: FULL RANGE
PSYCHOMOTOR FUNCTIONING: WNL
MOOD: EUTHYMIC (NORMAL);ANXIOUS
APPEARANCE: WELL GROOMED
THOUGHT_PROCESS: WNL
JUDGEMENT: GOOD

## 2021-06-29 NOTE — GROUP NOTE
Date:  6/29/2021  Start Time:   5:30 PM  End Time:   7:00 PM    Billie Frank, YOB: 1960,  was an active group participant.    M3 Session 3: Examining the Internal Experience with Compassion  11 members attended group today.  Clinician introduced the format and expectations for participating in the Mindfulness, Meditation and Movement Group, advising all that the flow of the group is designed to introduce members to movement practices, guided meditations and mindfulness-based psycho-education topics.    Clinician shared that the focus of group today is to explore and grow awareness about negative core beliefs that perpetuate symptoms of anxiety and depression and how mindfulness-based techniques, including meditation and movement, can assist in decreasing the impact, intensity and frequency of our negative internal voice.  Group members were led through a series of mindful movements to assist with release and strengthening of the psoas muscle.  Group members were educated about the psoas muscle and provided including The Muscle of the Soul may be triggering your Fear and Anxiety; Check your hips for Back Pain and How to Stretch and Strengthen the Psoas. The group also reviewed a checklist of 10 Cognitive distortions (Hughes, Feeling Good: the New Mood Therapy (1980) and discussed the impact of their own tape(s) on their lives.  Additionally, the group was introduced to the work of Julissa Nolasco, PhD and the practice of self-compassion through the use of the Acronym “RAIN” 1. Recognize what is going on 2. A: allow the experience to be there, just as it is, I: Investigate with Kindness and N: Natural awareness that comes from not identifying with the experience/emotions.  Group then transitioned into the meditation portion of the group with a self-compassion/lovingkindness guided meditation conducted by Clinician with focus on offering oneself compassion with intension to better modulate the inner  experience of emotions and the breath moving in the body.      Request for Consent  Patient provided verbal consent to treat via telemedicine. Clinician introduced the secure telemedicine platform that we are utilizing to provide care during the COVID-19 pandemic. Patient understands the session will be billed to their insurance or patient directly.  Patient was informed only the group patients  and the clinician are permitted on?the video conference, sessions are not recorded by the clinician, and the patient is not permitted to record the session.? Patient was provided clinician's unique meeting ID prior to session, patient was asked to arrive to virtual session on time just as patient would if we were in the office. Clinician confirmed identification of patient by name and birthdate, provider name, location of patient and clinician, and callback number in case disconnected. Patient consents to behavioral health treatment    Patient Response to Request for Consent: Yes  Visit Type performed: Audio and Video                    Mental Status:  Findings  Mood: Euthymic (normal), Anxious  Affect: Full Range  Rapport: Appropriate  Eye Contact: WNL  Appearance: Well Groomed  Psychomotor Functioning: WNL  Thought Process: WNL  Positive Involvement: Interested  Judgment: Good  Insight: Intact    Patients reaction:  Pt shared that she has been feeling anxious lately due to one year anniversary of a big stressor in her life. Pt shared that she did not expect the anniversary of this to affect her the way that it has. LPC spoke with pt and group about resourcing and grounding as well as reminding ourself that trauma or big events are not currently happening in this moment and that she is safe. Pt was appreciative of the feedback and support. Pt shared that she enjoys this group because she is able to connect with others and to utilize mindfulness and movement.     Visit Diagnosis:      ICD-10-CM ICD-9-CM   1. Adjustment  disorder with mixed anxiety and depressed mood  F43.23 309.28   2. Mild episode of recurrent major depressive disorder (CMS/HCC)  F33.0 296.31       Plan:  F/U with Biweekly  group with LCSW.   Pt to F/U with treatment goals as outlined in treatment plan.  Pt to F/U with local ED/call 911 should SI/HI arises.   Koko Mason, LPC

## 2021-07-14 DIAGNOSIS — F33.0 MAJOR DEPRESSIVE DISORDER, RECURRENT EPISODE, MILD (CMS/HCC): ICD-10-CM

## 2021-07-14 RX ORDER — ESCITALOPRAM OXALATE 20 MG/1
TABLET ORAL
Qty: 90 TABLET | Refills: 0 | OUTPATIENT
Start: 2021-07-14

## 2021-07-20 ENCOUNTER — TELEMEDICINE (OUTPATIENT)
Dept: PSYCHIATRY | Facility: HOSPITAL | Age: 61
End: 2021-07-20
Attending: COUNSELOR
Payer: COMMERCIAL

## 2021-07-20 DIAGNOSIS — F43.23 ADJUSTMENT DISORDER WITH MIXED ANXIETY AND DEPRESSED MOOD: Primary | ICD-10-CM

## 2021-07-20 DIAGNOSIS — F33.0 MILD EPISODE OF RECURRENT MAJOR DEPRESSIVE DISORDER (CMS/HCC): ICD-10-CM

## 2021-07-20 PROCEDURE — 90853 GROUP PSYCHOTHERAPY: CPT | Mod: 95 | Performed by: COUNSELOR

## 2021-07-20 RX ORDER — ESCITALOPRAM OXALATE 20 MG/1
20 TABLET ORAL
Qty: 90 TABLET | Refills: 0 | Status: SHIPPED | OUTPATIENT
Start: 2021-07-20 | End: 2021-10-05

## 2021-07-20 ASSESSMENT — COGNITIVE AND FUNCTIONAL STATUS - GENERAL
AFFECT: FULL RANGE
THOUGHT_PROCESS: WNL
MOOD: EUTHYMIC (NORMAL)
EYE_CONTACT: WNL
INSIGHT: INTACT
POSITIVE INVOLVEMENT: OPEN;INTERESTED
PSYCHOMOTOR FUNCTIONING: WNL
JUDGEMENT: GOOD
APPEARANCE: WELL GROOMED

## 2021-07-20 NOTE — TELEPHONE ENCOUNTER
Medicine Refill Request    Last Office Visit: 04/22/2021  Last Telemedicine Visit: 6/18/2020 Mariel Lee CRNP    Next Office Visit: Visit date not found  Next Telemedicine Visit: Visit date not found         Current Outpatient Medications:   •  AMABELZ 0.5-0.1 mg per tablet, 1 tablet 3 (three) times a week (Mon, Wed, Fri). TID weekly, due tomorrow Saturday 8/1/2020 , Disp: , Rfl:   •  ascorbic acid (VITAMIN C) 500 mg tablet, Take 1,000 mg by mouth daily., Disp: , Rfl:   •  aspirin 81 mg enteric coated tablet, Take 81 mg by mouth daily., Disp: , Rfl:   •  b complex vitamins capsule, Take 1 capsule by mouth daily., Disp: , Rfl:   •  cholecalciferol, vitamin D3, 1,000 unit (25 mcg) tablet, Take 1,000 Units by mouth daily., Disp: , Rfl:   •  diclofenac (VOLTAREN) 50 mg EC tablet, Take 50 mg by mouth 2 (two) times a day as needed.  , Disp: , Rfl:   •  escitalopram (LEXAPRO) 20 mg tablet, TAKE 1 TABLET ONCE DAILY, Disp: 90 tablet, Rfl: 0  •  LORazepam (ATIVAN) 1 mg tablet, Take 1 tablet (1 mg total) by mouth nightly as needed (insomnia)., Disp: 30 tablet, Rfl: 0  •  QUEtiapine (SEROquel) 50 mg tablet, Take 1 tab daily at bedtime. Also ok to take 1 tab up to three times daily as needed for anxiety, Disp: 90 tablet, Rfl: 0  •  RANEXA 500 mg 12 hr tablet, , Disp: , Rfl:   •  rosuvastatin (CRESTOR) 5 mg tablet, Take 5 mg by mouth daily. Every other day, Disp: , Rfl:       BP Readings from Last 3 Encounters:   04/22/21 120/72   12/10/20 123/75   09/02/20 126/80       Recent Lab results:  Lab Results   Component Value Date    CHOL 188 05/27/2021   ,   Lab Results   Component Value Date    HDL 73 05/27/2021   ,   Lab Results   Component Value Date    LDLCALC 101 (H) 05/27/2021   ,   Lab Results   Component Value Date    TRIG 74 05/27/2021        Lab Results   Component Value Date    GLUCOSE 98 05/27/2021   , No results found for: HGBA1C      Lab Results   Component Value Date    CREATININE 0.92 05/27/2021       Lab Results    Component Value Date    TSH 2.750 05/27/2021

## 2021-07-21 NOTE — GROUP NOTE
Date:  7/20/2021  Start Time:   5:30 PM  End Time:   7:00 PM    Billie Frank, YOB: 1960,  was an active group participant.    M3    Session 4: The Anxiety Spiral  10 members attended group today.  Clinician introduced the format and expectations for participating in the Mindfulness, Meditation and Movement Group, advising all that the flow of the group is designed to introduce members to movement practices, guided meditations and mindfulness-based psycho-education topics.  Clinician shared that the focus of group today was increased understanding of the impact and intensity of the “anxiety spiral” on our thoughts, physiology and behavior.   Group were reminded of the benefits of mindful movement and why the use of focused attention on the body and its internal experience (interoception) helps highly anxious individuals who may experience difficulty with traditional meditation and/or psychotherapy due to difficulty accessing the frontal lobe.   During the psycho-education portion of the group; the group discussed the connections between anxiety and the enteric nervous system resulting in gastrointestinal disorders including IBS, GERD, nausea, & functional dyspepsia.  Group then discussed the link between anxiety and chronic respiratory disorders, heart disease and migraines and reviewed the article, Anxiety and Physical Illness from [place] [facility] (7/2008).  They were reminded that those with high anxiety may have difficulty verbally and cognitively processing stressors.  They were educated of the benefits of regular internal focus to increase body awareness when experiencing distressing emotions/thoughts in future stressful situations.  Group was then educated about various forms of anxiety including social anxiety and reviewed the Social Anxiety spiral taken from Managing Social Anxiety: CBT Therapy Approach by Gabbi Walls (2010).  Group was also provided The Emotional Guidance Scale  taken from Ask and It Is Given by Briana Owen to better understand the spiraling thoughts that occur as stress increases in our life.  Group also reviewed the link of cognitions, physiology/feelings and Behaviors/movements in managing and better understanding anxiety.  Group was also led through mindfulness practice for anxiety by using the breath as an anchor to manage stress.  Clinician led group through Alternate Nostril breath and Breath of paul with focus on increasing focus and attention to sensations, temperature changes, pressure, color, level of agitation/energy & lethargy.  Group members were then led through a 20 minute mindful movement exercise with focus of interoception and growing a relationship with the inner workings/experience of their bodies during the exercise.   Group was then led though a guided mindfulness based body scan meditation by Clinician, with the focus on the experience of anxiety within the body.    Request for Consent  Patient provided verbal consent to treat via telemedicine. Clinician introduced the secure telemedicine platform that we are utilizing to provide care during the COVID-19 pandemic. Patient understands the session will be billed to their insurance or patient directly.  Patient was informed only the group patients  and the clinician are permitted on?the video conference, sessions are not recorded by the clinician, and the patient is not permitted to record the session.? Patient was provided clinician's unique meeting ID prior to session, patient was asked to arrive to virtual session on time just as patient would if we were in the office. Clinician confirmed identification of patient by name and birthdate, provider name, location of patient and clinician, and callback number in case disconnected. Patient consents to behavioral health treatment    Patient Response to Request for Consent: Yes  Visit Type performed: Audio and Video                  Mental  Status:  Findings  Mood: Euthymic (normal)  Affect: Full Range  Rapport: Appropriate  Eye Contact: WNL  Appearance: Well Groomed  Psychomotor Functioning: WNL  Thought Process: WNL  Positive Involvement: Open, Interested  Judgment: Good  Insight: Intact    Patients reaction:  Pt shared about all that she has gained from being in M3 group over the past year. Pt welcomed new group members and shared about helpful tools that she has learned to cope with anxiety.     Visit Diagnosis:      ICD-10-CM ICD-9-CM   1. Adjustment disorder with mixed anxiety and depressed mood  F43.23 309.28   2. Mild episode of recurrent major depressive disorder (CMS/HCC)  F33.0 296.31       Plan:  F/U with Weekly  group with LCSW.   Pt to F/U with treatment goals as outlined in treatment plan.  Pt to F/U with local ED/call 911 should SI/HI arises.   Koko Mason, LPC

## 2021-07-26 DIAGNOSIS — T63.441A ALLERGIC REACTION TO BEE STING: Primary | ICD-10-CM

## 2021-07-26 RX ORDER — EPINEPHRINE 0.3 MG/.3ML
1 INJECTION SUBCUTANEOUS AS NEEDED
Qty: 1 EACH | Refills: 1 | Status: SHIPPED | OUTPATIENT
Start: 2021-07-26 | End: 2021-08-25

## 2021-07-26 NOTE — TELEPHONE ENCOUNTER
Medicine Refill Request    Last Office Visit: 4/22/2021  Last Telemedicine Visit: 12/16/2020 Mariel Lee CRNP    Next Office Visit: Visit date not found  Next Telemedicine Visit: Visit date not found         Current Outpatient Medications:   •  AMABELZ 0.5-0.1 mg per tablet, 1 tablet 3 (three) times a week (Mon, Wed, Fri). TID weekly, due tomorrow Saturday 8/1/2020 , Disp: , Rfl:   •  ascorbic acid (VITAMIN C) 500 mg tablet, Take 1,000 mg by mouth daily., Disp: , Rfl:   •  aspirin 81 mg enteric coated tablet, Take 81 mg by mouth daily., Disp: , Rfl:   •  b complex vitamins capsule, Take 1 capsule by mouth daily., Disp: , Rfl:   •  cholecalciferol, vitamin D3, 1,000 unit (25 mcg) tablet, Take 1,000 Units by mouth daily., Disp: , Rfl:   •  diclofenac (VOLTAREN) 50 mg EC tablet, Take 50 mg by mouth 2 (two) times a day as needed.  , Disp: , Rfl:   •  escitalopram (LEXAPRO) 20 mg tablet, Take 1 tablet (20 mg total) by mouth once daily., Disp: 90 tablet, Rfl: 0  •  LORazepam (ATIVAN) 1 mg tablet, Take 1 tablet (1 mg total) by mouth nightly as needed (insomnia)., Disp: 30 tablet, Rfl: 0  •  QUEtiapine (SEROquel) 50 mg tablet, Take 1 tab daily at bedtime. Also ok to take 1 tab up to three times daily as needed for anxiety, Disp: 90 tablet, Rfl: 0  •  RANEXA 500 mg 12 hr tablet, , Disp: , Rfl:   •  rosuvastatin (CRESTOR) 5 mg tablet, Take 5 mg by mouth daily. Every other day, Disp: , Rfl:       BP Readings from Last 3 Encounters:   04/22/21 120/72   12/10/20 123/75   09/02/20 126/80       Recent Lab results:  Lab Results   Component Value Date    CHOL 188 05/27/2021   ,   Lab Results   Component Value Date    HDL 73 05/27/2021   ,   Lab Results   Component Value Date    LDLCALC 101 (H) 05/27/2021   ,   Lab Results   Component Value Date    TRIG 74 05/27/2021        Lab Results   Component Value Date    GLUCOSE 98 05/27/2021   , No results found for: HGBA1C      Lab Results   Component Value Date    CREATININE 0.92  05/27/2021       Lab Results   Component Value Date    TSH 2.750 05/27/2021

## 2021-07-27 ENCOUNTER — TELEMEDICINE (OUTPATIENT)
Dept: PSYCHIATRY | Facility: HOSPITAL | Age: 61
End: 2021-07-27
Attending: COUNSELOR
Payer: COMMERCIAL

## 2021-07-27 DIAGNOSIS — F43.23 ADJUSTMENT DISORDER WITH MIXED ANXIETY AND DEPRESSED MOOD: Primary | ICD-10-CM

## 2021-07-27 PROCEDURE — 90853 GROUP PSYCHOTHERAPY: CPT | Mod: 95 | Performed by: COUNSELOR

## 2021-07-27 ASSESSMENT — COGNITIVE AND FUNCTIONAL STATUS - GENERAL
AFFECT: FULL RANGE
PSYCHOMOTOR FUNCTIONING: WNL
JUDGEMENT: GOOD
APPEARANCE: WELL GROOMED
INSIGHT: INTACT
MOOD: EUTHYMIC (NORMAL);HOPEFUL
THOUGHT_PROCESS: WNL
EYE_CONTACT: WNL

## 2021-07-28 ENCOUNTER — APPOINTMENT (OUTPATIENT)
Dept: URBAN - METROPOLITAN AREA CLINIC 200 | Age: 61
Setting detail: DERMATOLOGY
End: 2021-07-29

## 2021-07-28 DIAGNOSIS — L57.0 ACTINIC KERATOSIS: ICD-10-CM

## 2021-07-28 DIAGNOSIS — L57.8 OTHER SKIN CHANGES DUE TO CHRONIC EXPOSURE TO NONIONIZING RADIATION: ICD-10-CM

## 2021-07-28 DIAGNOSIS — Z11.52 ENCOUNTER FOR SCREENING FOR COVID-19: ICD-10-CM

## 2021-07-28 DIAGNOSIS — L82.1 OTHER SEBORRHEIC KERATOSIS: ICD-10-CM

## 2021-07-28 PROCEDURE — OTHER REASSURANCE: OTHER

## 2021-07-28 PROCEDURE — OTHER COUNSELING: OTHER

## 2021-07-28 PROCEDURE — OTHER SCREENING FOR COVID-19: OTHER

## 2021-07-28 PROCEDURE — 99213 OFFICE O/P EST LOW 20 MIN: CPT

## 2021-07-28 PROCEDURE — OTHER SUNSCREEN RECOMMENDATIONS: OTHER

## 2021-07-28 ASSESSMENT — LOCATION SIMPLE DESCRIPTION DERM
LOCATION SIMPLE: RIGHT CALF
LOCATION SIMPLE: CHEST
LOCATION SIMPLE: ABDOMEN
LOCATION SIMPLE: LEFT CALF
LOCATION SIMPLE: RIGHT ANKLE
LOCATION SIMPLE: LEFT PRETIBIAL REGION
LOCATION SIMPLE: LEFT ANKLE
LOCATION SIMPLE: RIGHT PRETIBIAL REGION

## 2021-07-28 ASSESSMENT — LOCATION DETAILED DESCRIPTION DERM
LOCATION DETAILED: RIGHT PROXIMAL PRETIBIAL REGION
LOCATION DETAILED: LEFT LATERAL SUPERIOR CHEST
LOCATION DETAILED: RIGHT PROXIMAL CALF
LOCATION DETAILED: LEFT PROXIMAL MEDIAL CALF
LOCATION DETAILED: LEFT LATERAL POSTERIOR ANKLE
LOCATION DETAILED: RIGHT DISTAL PRETIBIAL REGION
LOCATION DETAILED: LEFT PROXIMAL PRETIBIAL REGION
LOCATION DETAILED: PERIUMBILICAL SKIN
LOCATION DETAILED: RIGHT POSTERIOR ANKLE

## 2021-07-28 ASSESSMENT — LOCATION ZONE DERM
LOCATION ZONE: TRUNK
LOCATION ZONE: LEG

## 2021-07-28 NOTE — PROCEDURE: REASSURANCE
Hide Additional Notes?: No
Include Location In Plan?: Yes
Detail Level: Simple
Additional Notes (Optional): Appears resolved

## 2021-07-28 NOTE — GROUP NOTE
Date:  7/27/2021  Start Time:   5:30 PM  End Time:   7:00 PM    Billie Frank, YOB: 1960,  was an active group participant.    M3 Session 5: The Depression Spiral  13 members attended group today.  Clinician introduced the format and expectations for participating in the Mindfulness, Meditation and Movement Group, advising all that the flow of the group is designed to introduce members to movement practices, guided meditations and mindfulness-based psycho-education topics.  Clinician shared that the focus of group today was increased understanding of the impact and intensity of the “depression spiral” on our thoughts, physiology and behavior. During the psycho-education portion of the group; group members were educated about the impacts of depression on the body.  Group members learned about the impact to the Central nervous system which leads to increased likelihood of addictions, difficulty sleeping, decreased interest in pleasurable activities, headaches, chronic pain and body aches.  Group were also educated about the relationship between the digestive system and depression including “Stuffing” feelings through the use of overeating/binging which can lead to conditions such as NIDDM, loss of appetite/interest in eating healthy foods, stomach aches, constipation and malnutrition.  Group members were educated about the impact on the cardiovascular system and the immune system with increased evidence linking decreased immune functioning in chronically depressed individuals.   Group were then educated about the link between depressive cognitions which leads to physiology/feeling changes and behavior/movement changes.  Group reviewed the concept of the three “P”’s of Permanence, Pervasiveness, and Personalization by Branden Ames, PhD.   Group were encouraged to begin identifying ruminative depressed thinking and having a low mood as part of their experience, not part of their core being.  Group were  educated of the link between Depression and benefits of practicing loving-kindness meditations.  Group members were led through a 20 minute mindful movement exercise with focus of interoception and growing a relationship with the inner workings/experience of their bodies during the exercise.  Group was reminded of the benefits of mindful movement and why the use of focused attention on the body and its internal experience (interoception) helps highly depressed individuals.  Group were led through a mindful “meta” (loving-kindness) meditation      Request for Consent  Patient provided verbal consent to treat via telemedicine. Clinician introduced the secure telemedicine platform that we are utilizing to provide care during the COVID-19 pandemic. Patient understands the session will be billed to their insurance or patient directly.  Patient was informed only the group patients  and the clinician are permitted on?the video conference, sessions are not recorded by the clinician, and the patient is not permitted to record the session.? Patient was provided clinician's unique meeting ID prior to session, patient was asked to arrive to virtual session on time just as patient would if we were in the office. Clinician confirmed identification of patient by name and birthdate, provider name, location of patient and clinician, and callback number in case disconnected. Patient consents to behavioral health treatment    Patient Response to Request for Consent: Yes  Visit Type performed: Audio and Video              Mental Status:  Findings  Mood: Euthymic (normal), Hopeful  Affect: Full Range  Rapport: Appropriate  Eye Contact: WNL  Appearance: Well Groomed  Psychomotor Functioning: WNL  Thought Process: WNL  Positive Involvement: Open, Interested, Easilty Engaged, Empathetic, Relevant  Judgment: Good  Insight: Intact    Patients reaction:  Pt shared about her experience in this group and noted how she has learned to change her  perspective on approaching problems in her life.  Pt left the group a few minutes early due to a work conflict.    Visit Diagnosis:      ICD-10-CM ICD-9-CM   1. Adjustment disorder with mixed anxiety and depressed mood  F43.23 309.28       Plan:  F/U with Weekly  group with LCSW.   Pt to F/U with treatment goals as outlined in treatment plan.  Pt to F/U with local ED/call 911 should SI/HI arises.   Renate Bailey, LPC

## 2021-07-28 NOTE — GROUP NOTE
Date:  7/27/2021  Start Time:   5:30 PM  End Time:   7:00 PM    Billie Frank, YOB: 1960,  { Participation:[id number]}    M3 Session 5: The Depression Spiral  13 members attended group today.  Clinician introduced the format and expectations for participating in the Mindfulness, Meditation and Movement Group, advising all that the flow of the group is designed to introduce members to movement practices, guided meditations and mindfulness-based psycho-education topics.  Clinician shared that the focus of group today was increased understanding of the impact and intensity of the “depression spiral” on our thoughts, physiology and behavior. During the psycho-education portion of the group; group members were educated about the impacts of depression on the body.  Group members learned about the impact to the Central nervous system which leads to increased likelihood of addictions, difficulty sleeping, decreased interest in pleasurable activities, headaches, chronic pain and body aches.  Group were also educated about the relationship between the digestive system and depression including “Stuffing” feelings through the use of overeating/binging which can lead to conditions such as NIDDM, loss of appetite/interest in eating healthy foods, stomach aches, constipation and malnutrition.  Group members were educated about the impact on the cardiovascular system and the immune system with increased evidence linking decreased immune functioning in chronically depressed individuals.   Group were then educated about the link between depressive cognitions which leads to physiology/feeling changes and behavior/movement changes.  Group reviewed the concept of the three “P”’s of Permanence, Pervasiveness, and Personalization by Branden Ames, PhD.   Group were encouraged to begin identifying ruminative depressed thinking and having a low mood as part of their experience, not part of their core being.  Group were  educated of the link between Depression and benefits of practicing loving-kindness meditations.  Group members were led through a 20 minute mindful movement exercise with focus of interoception and growing a relationship with the inner workings/experience of their bodies during the exercise.  Group was reminded of the benefits of mindful movement and why the use of focused attention on the body and its internal experience (interoception) helps highly depressed individuals.  Group were led through a mindful “meta” (loving-kindness) meditation      Request for Consent  Patient provided verbal consent to treat via telemedicine. Clinician introduced the secure telemedicine platform that we are utilizing to provide care during the COVID-19 pandemic. Patient understands the session will be billed to their insurance or patient directly.  Patient was informed only the group patients  and the clinician are permitted on?the video conference, sessions are not recorded by the clinician, and the patient is not permitted to record the session.? Patient was provided clinician's unique meeting ID prior to session, patient was asked to arrive to virtual session on time just as patient would if we were in the office. Clinician confirmed identification of patient by name and birthdate, provider name, location of patient and clinician, and callback number in case disconnected. Patient consents to behavioral health treatment    Patient Response to Request for Consent: Yes  Visit Type performed: Audio and Video              Mental Status:       Patients reaction:  ***    Visit Diagnosis:  No diagnosis found.    Plan:  F/U with {[facility] WEEKLY PLAN:00184}  group with LCSW.   Pt to F/U with treatment goals as outlined in treatment plan.  Pt to F/U with local ED/call 911 should SI/HI arises.   Renate Bailey, LPC

## 2021-08-02 ENCOUNTER — TELEMEDICINE (OUTPATIENT)
Dept: PRIMARY CARE | Facility: CLINIC | Age: 61
End: 2021-08-02
Payer: COMMERCIAL

## 2021-08-02 DIAGNOSIS — J02.9 PHARYNGITIS, UNSPECIFIED ETIOLOGY: Primary | ICD-10-CM

## 2021-08-02 DIAGNOSIS — J30.1 SEASONAL ALLERGIC RHINITIS DUE TO POLLEN: ICD-10-CM

## 2021-08-02 PROCEDURE — 99213 OFFICE O/P EST LOW 20 MIN: CPT | Mod: 95 | Performed by: NURSE PRACTITIONER

## 2021-08-02 ASSESSMENT — ENCOUNTER SYMPTOMS
SHORTNESS OF BREATH: 0
SWOLLEN GLANDS: 0
HEADACHES: 0
SORE THROAT: 1
COUGH: 0
TROUBLE SWALLOWING: 0

## 2021-08-02 ASSESSMENT — PATIENT HEALTH QUESTIONNAIRE - PHQ9: SUM OF ALL RESPONSES TO PHQ9 QUESTIONS 1 & 2: 0

## 2021-08-02 NOTE — PROGRESS NOTES
Verification of Patient Location:  The patient affirms they are currently located in the following state: [place]    Request for Consent:    Audio and Video Encounter   Hello, my name is ALBERT Bowen.  Before we proceed, can you please verify your identification by telling me your full name and date of birth?  Can you tell me who is in the room with you?    You and I are about to have a telemedicine check-in or visit because you have requested it.  This is a live video-conference.  I am a real person, speaking to you in real time.  There is no one else with me on the video-conference.  However, when we use ([employer], [employer], etc) it is important for you to know that the video-conference may not be secure or private.  I am not recording this conversation and I am asking you not to record it.  This telemedicine visit will be billed to your health insurance or you, if you are self-insured.  You understand you will be responsible for any copayments or coinsurances that apply to your telemedicine visit.  Communication platform used for this encounter:  [employer] App Video Visit (no Zoom)     Before starting our telemedicine visit, I am required to get your consent for this virtual check-in or visit by telemedicine. Do you consent?      Patient Response to Request for Consent:  Yes    Patient Active Problem List   Diagnosis   • Angina pectoris syndrome (CMS/HCC)   • Family history of ischemic heart disease   • Generalized anxiety disorder   • Hyperlipidemia   • Major depressive disorder, recurrent episode, mild (CMS/HCC)   • Symptomatic menopausal or female climacteric states   • Persistent disorder of initiating or maintaining sleep   • Polyp of colon   • Moderate acquired hearing loss   • Schatzki's ring   • Pharyngitis   • Seasonal allergic rhinitis due to pollen     Current Outpatient Medications on File Prior to Visit   Medication Sig Dispense Refill   • AMABELZ 0.5-0.1 mg per tablet 1 tablet 3 (three) times a  week (Mon, Wed, Fri). TID weekly, due tomorrow Saturday 8/1/2020      • ascorbic acid (VITAMIN C) 500 mg tablet Take 1,000 mg by mouth daily.     • aspirin 81 mg enteric coated tablet Take 81 mg by mouth daily.     • b complex vitamins capsule Take 1 capsule by mouth daily.     • cholecalciferol, vitamin D3, 1,000 unit (25 mcg) tablet Take 1,000 Units by mouth daily.     • diclofenac (VOLTAREN) 50 mg EC tablet Take 50 mg by mouth 2 (two) times a day as needed.       • EPINEPHrine (EPIPEN) 0.3 mg/0.3 mL injection syringe Inject 0.3 mL (0.3 mg total) into the thigh as needed for anaphylaxis. 1 each 1   • escitalopram (LEXAPRO) 20 mg tablet Take 1 tablet (20 mg total) by mouth once daily. 90 tablet 0   • LORazepam (ATIVAN) 1 mg tablet Take 1 tablet (1 mg total) by mouth nightly as needed (insomnia). 30 tablet 0   • QUEtiapine (SEROquel) 50 mg tablet Take 1 tab daily at bedtime. Also ok to take 1 tab up to three times daily as needed for anxiety 90 tablet 0   • RANEXA 500 mg 12 hr tablet      • rosuvastatin (CRESTOR) 5 mg tablet Take 5 mg by mouth daily. Every other day       No current facility-administered medications on file prior to visit.     Allergies   Allergen Reactions   • Bee Sting [Bee Venom Protein (Honey Bee)] Anaphylaxis   • Penicillins Hives   • Iodinated Contrast Media Hives   • Spinach GI intolerance     Raw Spinach only   • Sulfa (Sulfonamide Antibiotics) Rash     Social History     Tobacco Use   • Smoking status: Never Smoker   • Smokeless tobacco: Never Used   Substance Use Topics   • Alcohol use: Yes     Alcohol/week: 2.0 standard drinks     Types: 2 Glasses of wine per week   • Drug use: Never     Health Maintenance   Topic Date Due   • Influenza Vaccine (1) 08/01/2021   • DTaP, Tdap, and Td Vaccines (1 - Tdap) 12/15/2021 (Originally 10/2/1979)   • Mammogram  08/18/2022   • Colonoscopy  01/18/2024   • Cervical Cancer Screening  01/29/2025   • Zoster Vaccine  Completed   • Hepatitis C Screening   Completed   • COVID-19 Vaccine  Completed   • Meningococcal ACWY  Aged Out   • HIB Vaccines  Aged Out   • IPV Vaccines  Aged Out   • HPV Vaccines  Aged Out   • Pneumococcal  Aged Out   • Varicella Vaccines  Discontinued   • HIV Screening  Discontinued       Visit Documentation:  Subjective     Patient ID: Billie Frank is a 60 y.o. female.  1960      Pt stayed home from work today because she had a scratchy throat and her work does not want anyone ill going in.  She feels fine overall but has questions about COVID variant.  She has been fully vaccinated and had COVID in March.  She denies fever, chills, flu like symptoms, CP, SOB.  May have some allergies.  Took tylenol with complete relief.  Wants advice about returning to work.  No recent COVID exposure.    Sore Throat   This is a new problem. The current episode started in the past 7 days. The problem has been gradually improving. There has been no fever. The pain is mild. Pertinent negatives include no congestion, coughing, ear discharge, ear pain, headaches, shortness of breath, swollen glands or trouble swallowing. She has tried acetaminophen, cool liquids and gargles for the symptoms. The treatment provided moderate relief.       The following have been reviewed and updated as appropriate in this visit:  Allergies  Meds  Problems       Review of Systems   HENT: Positive for sore throat. Negative for congestion, ear discharge, ear pain and trouble swallowing.    Respiratory: Negative for cough and shortness of breath.    Neurological: Negative for headaches.     Physical Exam  Constitutional:       General: She is not in acute distress.     Appearance: Normal appearance. She is not ill-appearing, toxic-appearing or diaphoretic.   HENT:      Nose: No congestion or rhinorrhea.   Pulmonary:      Effort: Pulmonary effort is normal. No respiratory distress.      Breath sounds: No stridor. No wheezing.   Neurological:      Mental Status: She is alert and  oriented to person, place, and time.   Psychiatric:         Mood and Affect: Mood and affect normal.         Speech: Speech normal.         Behavior: Behavior normal.             Assessment/Plan   Diagnoses and all orders for this visit:    Pharyngitis, unspecified etiology (Primary)  Assessment & Plan:  Mild symptoms most likely related to allergies/post nasal drip.  Pt getting relief with Tylenol.  Salt water gargles.  Saline NS  OTC antihistamine PRN  May return to work--advised to wear mask until symptoms resolved.      Seasonal allergic rhinitis due to pollen  Assessment & Plan:  See plan under pharyngitis.          Time Spent:  I spent 21 minutes on this date of service performing the following activities: obtaining history, performing examination, documenting, preparing for visit, obtaining / reviewing records and providing counseling and education.

## 2021-08-03 ENCOUNTER — TELEMEDICINE (OUTPATIENT)
Dept: PSYCHIATRY | Facility: HOSPITAL | Age: 61
End: 2021-08-03
Attending: COUNSELOR
Payer: COMMERCIAL

## 2021-08-03 DIAGNOSIS — F43.23 ADJUSTMENT DISORDER WITH MIXED ANXIETY AND DEPRESSED MOOD: Primary | ICD-10-CM

## 2021-08-03 DIAGNOSIS — F33.0 MILD EPISODE OF RECURRENT MAJOR DEPRESSIVE DISORDER (CMS/HCC): ICD-10-CM

## 2021-08-03 PROBLEM — J02.9 PHARYNGITIS: Status: ACTIVE | Noted: 2021-08-03

## 2021-08-03 PROBLEM — J30.1 SEASONAL ALLERGIC RHINITIS DUE TO POLLEN: Status: ACTIVE | Noted: 2021-08-03

## 2021-08-03 PROCEDURE — 90853 GROUP PSYCHOTHERAPY: CPT | Mod: 95 | Performed by: COUNSELOR

## 2021-08-03 ASSESSMENT — COGNITIVE AND FUNCTIONAL STATUS - GENERAL
JUDGEMENT: GOOD
PSYCHOMOTOR FUNCTIONING: WNL
THOUGHT_PROCESS: WNL
INSIGHT: INTACT
AFFECT: FULL RANGE
APPEARANCE: WELL GROOMED
MOOD: EUTHYMIC (NORMAL);HOPEFUL;MOTIVATED
EYE_CONTACT: WNL

## 2021-08-03 NOTE — GROUP NOTE
Date:  8/3/2021  Start Time:   5:30 PM  End Time:   7:00 PM    Billie Frank, YOB: 1960,  was an active group participant.    M3 Session 6: Mindful Communication  7 members attended group today.  Clinician introduced the format and expectations for participating in the Mindfulness, Meditation and Movement Group, advising all that the flow of the group is designed to introduce members to movement practices, guided meditations and mindfulness-based psycho-education topics.  Clinician shared that the focus of today’s group was to explore mind-ful communication patterns vs. mind-less communication patterns.  During the psycho-education portion of today’s group, Clinician educated members about the concept of projection and the shadow self and how our lack of tendency/ability to look within to better understand our “shadow selves” exhibits itself through “mindless” communication patterns driven by projections of the negative belief systems we have applied to ourselves.   Group then reviewed various communication styles and differences between aggressive, passive, passive-aggressive and assertive communication styles.  Group were educated and reviewed role playing mindful communication patterns with others including the script of “I feel ____ when you ____ because ___” and how this can lead to improved relationship conflict resolution skills and improved ability to listen and be present with others. Group members were led through mindful movements to increase the ability to interocept and help determine what the body’s experience is in the moment.   Group were then led through a silent meditation for 10 minutes.           Request for Consent  Patient provided verbal consent to treat via telemedicine. Clinician introduced the secure telemedicine platform that we are utilizing to provide care during the COVID-19 pandemic. Patient understands the session will be billed to their insurance or patient directly.   Patient was informed only the group patients  and the clinician are permitted on?the video conference, sessions are not recorded by the clinician, and the patient is not permitted to record the session.? Patient was provided clinician's unique meeting ID prior to session, patient was asked to arrive to virtual session on time just as patient would if we were in the office. Clinician confirmed identification of patient by name and birthdate, provider name, location of patient and clinician, and callback number in case disconnected. Patient consents to behavioral health treatment    Patient Response to Request for Consent: Yes  Visit Type performed: Audio and Video           Mental Status:  Findings  Mood: Euthymic (normal), Hopeful, Motivated  Affect: Full Range  Rapport: Appropriate, Attentive  Eye Contact: WNL  Appearance: Well Groomed  Psychomotor Functioning: WNL  Thought Process: WNL  Positive Involvement: Open, Interested, Easilty Engaged, Empathetic, Relevant  Judgment: Good  Insight: Intact    Patients reaction:  Pt shared her experience with this group and noted how mindfulness has helped decrease stress in her life.  Pt also empathized with another group member and related to her experience.  LPC and Pt reviewed treatment plan after group ended.  Goals to continue and pt will continue attending M3 group.    Visit Diagnosis:      ICD-10-CM ICD-9-CM   1. Adjustment disorder with mixed anxiety and depressed mood  F43.23 309.28   2. Mild episode of recurrent major depressive disorder (CMS/HCC)  F33.0 296.31       Plan:  F/U with Weekly  group with LCSW.   Pt to F/U with treatment goals as outlined in treatment plan.  Pt to F/U with local ED/call 911 should SI/HI arises.   Renate Bailey LPC

## 2021-08-03 NOTE — ASSESSMENT & PLAN NOTE
Mild symptoms most likely related to allergies/post nasal drip.  Pt getting relief with Tylenol.  Salt water gargles.  Saline NS  OTC antihistamine PRN  May return to work--advised to wear mask until symptoms resolved.

## 2021-08-10 ENCOUNTER — TELEMEDICINE (OUTPATIENT)
Dept: PSYCHIATRY | Facility: HOSPITAL | Age: 61
End: 2021-08-10
Attending: COUNSELOR
Payer: COMMERCIAL

## 2021-08-10 DIAGNOSIS — F43.23 ADJUSTMENT DISORDER WITH MIXED ANXIETY AND DEPRESSED MOOD: Primary | ICD-10-CM

## 2021-08-10 DIAGNOSIS — F33.0 MILD EPISODE OF RECURRENT MAJOR DEPRESSIVE DISORDER (CMS/HCC): ICD-10-CM

## 2021-08-10 PROCEDURE — 90853 GROUP PSYCHOTHERAPY: CPT | Mod: 95 | Performed by: COUNSELOR

## 2021-08-10 ASSESSMENT — COGNITIVE AND FUNCTIONAL STATUS - GENERAL
THOUGHT_PROCESS: WNL
AFFECT: FULL RANGE
EYE_CONTACT: WNL
APPEARANCE: WELL GROOMED
PSYCHOMOTOR FUNCTIONING: WNL
JUDGEMENT: GOOD
MOOD: EUTHYMIC (NORMAL);HOPEFUL;MOTIVATED
INSIGHT: INTACT

## 2021-08-10 NOTE — GROUP NOTE
Date:  8/10/2021  Start Time:   5:30 PM  End Time:   7:00 PM    Billie Frank, YOB: 1960,  was an active group participant.    M3 Session 7: Mindful Boundaries  9 members attended group today.  Clinician introduced the format and expectations for participating in the Mindfulness, Meditation and Movement Group, advising all that the flow of the group is designed to introduce members to movement practices, guided meditations and mindfulness-based psycho-education topics.  Clinician shared that the focus of today’s group will explore the connection between internal boundaries and internal communication vs. boundaries in relationships and how we relate to others.  Group members were provided an analogy of understanding boundary systems as an apple from Setting Healthy Boundaries by Cristian Guthrie.   Group then reviewed the concept of empathy fatigue, hyper-empathy and what relationships with others who have any of these conditions could look like.  Group members were encouraged to share about their own relationships and experiences with setting (or having difficulty setting) boundaries in their own lives.  Group members were educated between the differences/characteristics of rigid, permissive and healthy boundaried relationships.  Group members were led through mindful movement practice today for 20-30 minutes.  Group members were then led through a 20 minute loving-kindness/Tonglen meditation practice.      Request for Consent  Patient provided verbal consent to treat via telemedicine. Clinician introduced the secure telemedicine platform that we are utilizing to provide care during the COVID-19 pandemic. Patient understands the session will be billed to their insurance or patient directly.  Patient was informed only the group patients  and the clinician are permitted on?the video conference, sessions are not recorded by the clinician, and the patient is not permitted to record the session.? Patient was  "provided clinician's unique meeting ID prior to session, patient was asked to arrive to virtual session on time just as patient would if we were in the office. Clinician confirmed identification of patient by name and birthdate, provider name, location of patient and clinician, and callback number in case disconnected. Patient consents to behavioral health treatment    Patient Response to Request for Consent: Yes  Visit Type performed: Audio and Video                 Mental Status:  Findings  Mood: Euthymic (normal), Hopeful, Motivated  Affect: Full Range  Rapport: Appropriate, Attentive  Eye Contact: WNL  Appearance: Well Groomed  Psychomotor Functioning: WNL  Thought Process: WNL  Positive Involvement: Open, Interested, Easilty Engaged, Empathetic, Relevant  Judgment: Good  Insight: Intact    Patients reaction:  Pt shared that she was back in her office for the full day and noted that it \"feels good\" to be in person.  Pt was engaged in the boundaries discussion and was able to provide relevant feedback to group member looking for support.  Pt participated in the movement and meditation portion of group.    Visit Diagnosis:      ICD-10-CM ICD-9-CM   1. Adjustment disorder with mixed anxiety and depressed mood  F43.23 309.28   2. Mild episode of recurrent major depressive disorder (CMS/HCC)  F33.0 296.31       Plan:  F/U with Weekly  group with LCSW.   Pt to F/U with treatment goals as outlined in treatment plan.  Pt to F/U with local ED/call 911 should SI/HI arises.   Renate Bailey, LPC    "

## 2021-08-11 ENCOUNTER — TELEPHONE (OUTPATIENT)
Dept: PRIMARY CARE | Facility: CLINIC | Age: 61
End: 2021-08-11

## 2021-08-11 DIAGNOSIS — R53.83 FATIGUE, UNSPECIFIED TYPE: Primary | ICD-10-CM

## 2021-08-11 NOTE — TELEPHONE ENCOUNTER
Pt cld - she is fatigued all the time now. Reports that after her covid dx the only side effect was fatigue, and that is the only side effect she had from the vaccine. She is wondering if maybe she should have her thyroid checked?

## 2021-08-13 NOTE — TELEPHONE ENCOUNTER
Pt had covid in March (tested positive at work)  Really no sxs than some severe fatigue for about 3 days.  Symptoms resolved and she felt much better.  Had covid vaccines in April and May (pfizer).  After second vaccine, experienced severe fatigue again for about 24 and was fine afterward.  Now a few times a week, gets some bad fatigue - there are some times when she feels she needs to nap! She usually has a lot of energy.  Takes a multivitamin, take vit B and D and C.  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 - will be 4 days a week by next week.    Is there anything in the medical literature for recommendations for people with fatigue post covid?  Any supplements she could take?  Also maybe she SHOULD get her thyroid and vit B and D checked even though the thyroid was normal in may?  Please advise

## 2021-08-13 NOTE — TELEPHONE ENCOUNTER
I will have pt repeat labs to evaluate the fatigue. I have not seen fatigue recurring later after resolving with COVID.

## 2021-08-17 ENCOUNTER — TELEMEDICINE (OUTPATIENT)
Dept: PSYCHIATRY | Facility: HOSPITAL | Age: 61
End: 2021-08-17
Attending: COUNSELOR
Payer: COMMERCIAL

## 2021-08-17 DIAGNOSIS — F43.23 ADJUSTMENT DISORDER WITH MIXED ANXIETY AND DEPRESSED MOOD: Primary | ICD-10-CM

## 2021-08-17 DIAGNOSIS — F33.0 MILD EPISODE OF RECURRENT MAJOR DEPRESSIVE DISORDER (CMS/HCC): ICD-10-CM

## 2021-08-17 PROCEDURE — 90853 GROUP PSYCHOTHERAPY: CPT | Mod: 95 | Performed by: COUNSELOR

## 2021-08-17 ASSESSMENT — COGNITIVE AND FUNCTIONAL STATUS - GENERAL
PSYCHOMOTOR FUNCTIONING: WNL
JUDGEMENT: GOOD
THOUGHT_PROCESS: WNL
MOOD: DEPRESSED;ANXIOUS
EYE_CONTACT: WNL
AFFECT: FULL RANGE;TEARFUL
APPEARANCE: WELL GROOMED
INSIGHT: INTACT

## 2021-08-17 NOTE — GROUP NOTE
Date:  8/17/2021  Start Time:   5:30 PM  End Time:   7:00 PM    Billie Frank, YOB: 1960,  was an active group participant.    M3 Session 8: Attachments   7 members attended group today.  Clinician introduced the format and expectations for participating in the Mindfulness, Meditation and Movement Group, advising all that the flow of the group is designed to introduce members to movement practices, guided meditations and mindfulness-based psycho-education topics.  Clinician shared that the focus of today’s group will explore how our attachments to routines and impulsive needs such as substances, food, unhealthy people, activities and even ritual movements and behaviors increase our experience of suffering and emotional/physical pain.  Group was then encouraged to identify their own coping skills/attachments and how they could be impacting and increasing their experience of emotional and physical pain.  Group members were educated about the experience of a wanting mind through the work of Julissa Nolasco in [employer] and were educated about our innate ability to mindfully watch our attachments, drives, urges and impulses which is improved significantly through practices of mindfulness, meditation and movement.  Group members discussed/ encouraged to focus on noticing ways we use our attachments to routine/”trance like” impulses such as our use of sugar, substances, sex, chaotic relationships and even ritual ways we hold our body in an effort to avoid truly feeling the emotions we are experiencing in the moment. Group were introduced to a video on the Biochemistry of attachment to sugar and its effects on the body’s health/wellness by watching the following video:  http://ed.[employer]onsify/lessons/how-sugar-affects-the-brain-brigido-aman.  Group members were encouraged to notice what the price of their own attachments are and encouraged to grow awareness of the emotions underlying those attachments.  Group  "was led through and encouraged to hold mindful movements in an effort to watch “what comes up” when the group members were encouraged to hold a pose for up to 3-5 mins at a time.  Group members were then led through a mindful eating exercise/meditation and encouraged to focus on the judgments/thoughts/impulses that came up during this exercise.     Request for Consent  Patient provided verbal consent to treat via telemedicine. Clinician introduced the secure telemedicine platform that we are utilizing to provide care during the COVID-19 pandemic. Patient understands the session will be billed to their insurance or patient directly.  Patient was informed only the group patients  and the clinician are permitted on?the video conference, sessions are not recorded by the clinician, and the patient is not permitted to record the session.? Patient was provided clinician's unique meeting ID prior to session, patient was asked to arrive to virtual session on time just as patient would if we were in the office. Clinician confirmed identification of patient by name and birthdate, provider name, location of patient and clinician, and callback number in case disconnected. Patient consents to behavioral health treatment    Patient Response to Request for Consent: Yes  Visit Type performed: Audio and Video       Mental Status:  Findings  Mood: Depressed, Anxious  Affect: Full Range, Tearful  Rapport: Appropriate, Attentive  Eye Contact: WNL  Appearance: Well Groomed  Psychomotor Functioning: WNL  Thought Process: WNL  Positive Involvement: Open, Easilty Engaged, Empathetic, Relevant, Emotional  Judgment: Good  Insight: Intact    Patients reaction:  Pt came to group and was tearful regarding a situation with her daughter.  Pt shared she is really struggling with this and feels \"the worst she has on one year.\"  Pt was able to find support and hope in the group topic of letting go of attachments.  Pt received support and empathy from " the other group members and reported she feels better after coming to group and is grateful she decided to come.    Visit Diagnosis:      ICD-10-CM ICD-9-CM   1. Adjustment disorder with mixed anxiety and depressed mood  F43.23 309.28   2. Mild episode of recurrent major depressive disorder (CMS/HCC)  F33.0 296.31       Plan:  F/U with Weekly  group with LCSW.   Pt to F/U with treatment goals as outlined in treatment plan.  Pt to F/U with local ED/call 911 should SI/HI arises.   Renate Bailey, LPC

## 2021-08-19 LAB
BASOPHILS # BLD AUTO: 0 X10E3/UL (ref 0–0.2)
BASOPHILS NFR BLD AUTO: 1 %
EOSINOPHIL # BLD AUTO: 0.2 X10E3/UL (ref 0–0.4)
EOSINOPHIL NFR BLD AUTO: 4 %
ERYTHROCYTE [DISTWIDTH] IN BLOOD BY AUTOMATED COUNT: 12.6 % (ref 11.7–15.4)
HCT VFR BLD AUTO: 41.7 % (ref 34–46.6)
HGB BLD-MCNC: 14.1 G/DL (ref 11.1–15.9)
IMM GRANULOCYTES # BLD AUTO: 0 X10E3/UL (ref 0–0.1)
IMM GRANULOCYTES NFR BLD AUTO: 0 %
LYMPHOCYTES # BLD AUTO: 1.6 X10E3/UL (ref 0.7–3.1)
LYMPHOCYTES NFR BLD AUTO: 32 %
MCH RBC QN AUTO: 31.4 PG (ref 26.6–33)
MCHC RBC AUTO-ENTMCNC: 33.8 G/DL (ref 31.5–35.7)
MCV RBC AUTO: 93 FL (ref 79–97)
MONOCYTES # BLD AUTO: 0.3 X10E3/UL (ref 0.1–0.9)
MONOCYTES NFR BLD AUTO: 5 %
NEUTROPHILS # BLD AUTO: 2.9 X10E3/UL (ref 1.4–7)
NEUTROPHILS NFR BLD AUTO: 58 %
PLATELET # BLD AUTO: 218 X10E3/UL (ref 150–450)
RBC # BLD AUTO: 4.49 X10E6/UL (ref 3.77–5.28)
WBC # BLD AUTO: 4.9 X10E3/UL (ref 3.4–10.8)

## 2021-08-20 LAB
25(OH)D3+25(OH)D2 SERPL-MCNC: 40.7 NG/ML (ref 30–100)
ALBUMIN SERPL-MCNC: 4.7 G/DL (ref 3.8–4.8)
ALBUMIN/GLOB SERPL: 2.1 {RATIO} (ref 1.2–2.2)
ALP SERPL-CCNC: 52 IU/L (ref 48–121)
ALT SERPL-CCNC: 16 IU/L (ref 0–32)
AST SERPL-CCNC: 19 IU/L (ref 0–40)
BILIRUB SERPL-MCNC: 0.4 MG/DL (ref 0–1.2)
BUN SERPL-MCNC: 11 MG/DL (ref 8–27)
BUN/CREAT SERPL: 13 (ref 12–28)
CALCIUM SERPL-MCNC: 9 MG/DL (ref 8.7–10.3)
CHLORIDE SERPL-SCNC: 105 MMOL/L (ref 96–106)
CO2 SERPL-SCNC: 21 MMOL/L (ref 20–29)
CREAT SERPL-MCNC: 0.82 MG/DL (ref 0.57–1)
FERRITIN SERPL-MCNC: 83 NG/ML (ref 15–150)
GLOBULIN SER CALC-MCNC: 2.2 G/DL (ref 1.5–4.5)
GLUCOSE SERPL-MCNC: 103 MG/DL (ref 65–99)
IRON SATN MFR SERPL: 26 % (ref 15–55)
IRON SERPL-MCNC: 90 UG/DL (ref 27–139)
LAB CORP EGFR IF AFRICN AM: 89 ML/MIN/1.73
LAB CORP EGFR IF NONAFRICN AM: 77 ML/MIN/1.73
POTASSIUM SERPL-SCNC: 4.2 MMOL/L (ref 3.5–5.2)
PROT SERPL-MCNC: 6.9 G/DL (ref 6–8.5)
SODIUM SERPL-SCNC: 141 MMOL/L (ref 134–144)
TIBC SERPL-MCNC: 348 UG/DL (ref 250–450)
TSH SERPL DL<=0.005 MIU/L-ACNC: 2.22 UIU/ML (ref 0.45–4.5)
UIBC SERPL-MCNC: 258 UG/DL (ref 118–369)
VIT B12 SERPL-MCNC: 802 PG/ML (ref 232–1245)

## 2021-08-24 ENCOUNTER — TELEMEDICINE (OUTPATIENT)
Dept: PSYCHIATRY | Facility: HOSPITAL | Age: 61
End: 2021-08-24
Attending: COUNSELOR
Payer: COMMERCIAL

## 2021-08-24 ENCOUNTER — TELEMEDICINE (OUTPATIENT)
Dept: PRIMARY CARE | Facility: CLINIC | Age: 61
End: 2021-08-24
Payer: COMMERCIAL

## 2021-08-24 DIAGNOSIS — R53.82 CHRONIC FATIGUE: ICD-10-CM

## 2021-08-24 DIAGNOSIS — F33.0 MILD EPISODE OF RECURRENT MAJOR DEPRESSIVE DISORDER (CMS/HCC): ICD-10-CM

## 2021-08-24 DIAGNOSIS — R73.01 IMPAIRED FASTING GLUCOSE: Primary | ICD-10-CM

## 2021-08-24 DIAGNOSIS — F43.23 ADJUSTMENT DISORDER WITH MIXED ANXIETY AND DEPRESSED MOOD: Primary | ICD-10-CM

## 2021-08-24 PROBLEM — J02.9 PHARYNGITIS: Status: RESOLVED | Noted: 2021-08-03 | Resolved: 2021-08-24

## 2021-08-24 PROCEDURE — 90853 GROUP PSYCHOTHERAPY: CPT | Mod: 95 | Performed by: COUNSELOR

## 2021-08-24 PROCEDURE — 99213 OFFICE O/P EST LOW 20 MIN: CPT | Mod: 95 | Performed by: NURSE PRACTITIONER

## 2021-08-24 ASSESSMENT — COGNITIVE AND FUNCTIONAL STATUS - GENERAL
AFFECT: FULL RANGE
EYE_CONTACT: WNL
INSIGHT: INTACT
THOUGHT_PROCESS: WNL
APPEARANCE: WELL GROOMED
PSYCHOMOTOR FUNCTIONING: WNL
MOOD: EUTHYMIC (NORMAL);HOPEFUL;MOTIVATED
JUDGEMENT: GOOD

## 2021-08-24 ASSESSMENT — ENCOUNTER SYMPTOMS
ANOREXIA: 0
HEADACHES: 0
FATIGUE: 1
MYALGIAS: 0
NAUSEA: 0
ARTHRALGIAS: 0
FEVER: 0

## 2021-08-24 NOTE — PROGRESS NOTES
Verification of Patient Location:  The patient affirms they are currently located in the following state: [place]    Request for Consent:    Audio and Video Encounter   Hello, my name is ALBERT Bowen.  Before we proceed, can you please verify your identification by telling me your full name and date of birth?  Can you tell me who is in the room with you?    You and I are about to have a telemedicine check-in or visit because you have requested it.  This is a live video-conference.  I am a real person, speaking to you in real time.  There is no one else with me on the video-conference.  However, when we use ([employer], [employer], etc) it is important for you to know that the video-conference may not be secure or private.  I am not recording this conversation and I am asking you not to record it.  This telemedicine visit will be billed to your health insurance or you, if you are self-insured.  You understand you will be responsible for any copayments or coinsurances that apply to your telemedicine visit.  Communication platform used for this encounter:  SKURA Video Visit (no Zoom)     Before starting our telemedicine visit, I am required to get your consent for this virtual check-in or visit by telemedicine. Do you consent?      Patient Response to Request for Consent:  Yes    Patient Active Problem List   Diagnosis   • Angina pectoris syndrome (CMS/HCC)   • Family history of ischemic heart disease   • Generalized anxiety disorder   • Hyperlipidemia   • Major depressive disorder, recurrent episode, mild (CMS/HCC)   • Symptomatic menopausal or female climacteric states   • Persistent disorder of initiating or maintaining sleep   • Polyp of colon   • Moderate acquired hearing loss   • Schatzki's ring   • Seasonal allergic rhinitis due to pollen   • Chronic fatigue   • Impaired fasting glucose     Current Outpatient Medications on File Prior to Visit   Medication Sig Dispense Refill   • AMABELZ 0.5-0.1 mg per  tablet 1 tablet 3 (three) times a week (Mon, Wed, Fri). TID weekly, due tomorrow Saturday 8/1/2020      • ascorbic acid (VITAMIN C) 500 mg tablet Take 1,000 mg by mouth daily.     • aspirin 81 mg enteric coated tablet Take 81 mg by mouth daily.     • b complex vitamins capsule Take 1 capsule by mouth daily.     • cholecalciferol, vitamin D3, 1,000 unit (25 mcg) tablet Take 1,000 Units by mouth daily.     • diclofenac (VOLTAREN) 50 mg EC tablet Take 50 mg by mouth 2 (two) times a day as needed.       • EPINEPHrine (EPIPEN) 0.3 mg/0.3 mL injection syringe Inject 0.3 mL (0.3 mg total) into the thigh as needed for anaphylaxis. 1 each 1   • escitalopram (LEXAPRO) 20 mg tablet Take 1 tablet (20 mg total) by mouth once daily. 90 tablet 0   • LORazepam (ATIVAN) 1 mg tablet Take 1 tablet (1 mg total) by mouth nightly as needed (insomnia). 30 tablet 0   • RANEXA 500 mg 12 hr tablet      • rosuvastatin (CRESTOR) 5 mg tablet Take 5 mg by mouth daily. Every other day       No current facility-administered medications on file prior to visit.     Allergies   Allergen Reactions   • Bee Sting [Bee Venom Protein (Honey Bee)] Anaphylaxis   • Penicillins Hives   • Iodinated Contrast Media Hives   • Spinach GI intolerance     Raw Spinach only   • Sulfa (Sulfonamide Antibiotics) Rash     Social History     Tobacco Use   • Smoking status: Never Smoker   • Smokeless tobacco: Never Used   Substance Use Topics   • Alcohol use: Yes     Alcohol/week: 2.0 standard drinks     Types: 2 Glasses of wine per week   • Drug use: Never     Health Maintenance   Topic Date Due   • Influenza Vaccine (1) 08/01/2021   • DTaP, Tdap, and Td Vaccines (1 - Tdap) 12/15/2021 (Originally 10/2/1979)   • Mammogram  08/18/2022   • Colonoscopy  01/18/2024   • Cervical Cancer Screening  01/29/2025   • Zoster Vaccine  Completed   • Hepatitis C Screening  Completed   • COVID-19 Vaccine  Completed   • Meningococcal ACWY  Aged Out   • HIB Vaccines  Aged Out   • IPV  Vaccines  Aged Out   • HPV Vaccines  Aged Out   • Pneumococcal  Aged Out   • HIV Screening  Discontinued       Visit Documentation:  Subjective     Patient ID: Billie Frank is a 60 y.o. female.  1960      Bouts of severe fatigue since COVID in January and COVID vaccine in spring.  Gets so tired that she has to take a short nap to function  Recent labs ordered to evaluate.  No major abnls fond  Glucose was 103. She was concerned about this because she has been eating very healthy and stopped all alcohol.  Had an episode of fatigue the other day where she is the grocery store and felt so tired that she had to go sit in her car and nap before she could finish shopping.  Psychiatrist is managing her depression meds. They have not changed in the past year.    Fatigue  This is a new problem. The current episode started more than 1 month ago. The problem occurs intermittently. The problem has been unchanged. Associated symptoms include fatigue. Pertinent negatives include no anorexia, arthralgias, chest pain, fever, headaches, myalgias or nausea. Exacerbated by: activity. She has tried sleep and rest for the symptoms. The treatment provided significant relief.       The following have been reviewed and updated as appropriate in this visit:  Allergies  Meds  Problems       Review of Systems   Constitutional: Positive for fatigue. Negative for fever.   Cardiovascular: Negative for chest pain.   Gastrointestinal: Negative for anorexia and nausea.   Musculoskeletal: Negative for arthralgias and myalgias.   Neurological: Negative for headaches.     Physical Exam  Vitals reviewed.   Constitutional:       General: She is not in acute distress.     Appearance: Normal appearance. She is not ill-appearing, toxic-appearing or diaphoretic.   Pulmonary:      Effort: Pulmonary effort is normal. No respiratory distress.   Neurological:      Mental Status: She is alert and oriented to person, place, and time.   Psychiatric:          Mood and Affect: Mood normal.         Speech: Speech normal.         Behavior: Behavior normal.         Thought Content: Thought content does not include suicidal ideation. Thought content does not include suicidal plan.         Assessment/Plan   Diagnoses and all orders for this visit:    Impaired fasting glucose (Primary)  Assessment & Plan:  Check A1C.    Orders:  -     Hemoglobin A1c; Future    Chronic fatigue  Assessment & Plan:  CBC CMP B12 TSH D iron studies--all NL except mildly elevated glucose @ 103  Check A1C.  Discuss Lexapro dose with psych--may need to try to lower to reduce fatigue.  Suggest decrease to 15mg and then 10mg if tolerated.  Also consider a sleep consult.  Pt agrees with plan.        Time Spent:  I spent 20 minutes on this date of service performing the following activities: obtaining history, performing examination, entering orders, documenting, preparing for visit, obtaining / reviewing records and providing counseling and education.

## 2021-08-24 NOTE — ASSESSMENT & PLAN NOTE
CBC CMP B12 TSH D iron studies--all NL except mildly elevated glucose @ 103  Check A1C.  Discuss Lexapro dose with psych--may need to try to lower to reduce fatigue.  Suggest decrease to 15mg and then 10mg if tolerated.  Also consider a sleep consult.  Pt agrees with plan.

## 2021-08-24 NOTE — GROUP NOTE
Date:  8/24/2021  Start Time:   5:30 PM  End Time:   7:00 PM    Billie Frank, YOB: 1960,  was an active group participant.    M3  Session 9: Learning how to Become a Wise-Minded Witness  9 members attended group today.  Clinician introduced the format and expectations for participating in the Mindfulness, Meditation and Movement Group, advising all that the flow of the group is designed to introduce members to movement practices, guided meditations and mindfulness-based psycho-education topics Clinician shared that the focus of group today is to explore how the use of movement, meditation and mindfulness skills can assist in increasing our ability to detach from our emotional experience and become the witness of what is going on around us.  Group member practiced the “felt sense” sensing technique from Floyd Hernadez, PhD and learned the concept of  emotional mind, logical/rational mind and the desired combination of both: gibbs mind from Ashleigh Collado, PhD in an effort to increase frustration tolerance and decrease emotional reactivity and interpersonal conflict in our lives.  Group was then led through a series of mindful movements for 20 minutes to assist in building distress tolerance skills and were encouraged to practice mindful breathing consciously as they moved through each pose.  Group was concluded with the practice of a guided progressive relaxation exercise to build inner awareness of how the body feels when “distressed” as well as when “relaxed.”    Request for Consent  Patient provided verbal consent to treat via telemedicine. Clinician introduced the secure telemedicine platform that we are utilizing to provide care during the COVID-19 pandemic. Patient understands the session will be billed to their insurance or patient directly.  Patient was informed only the group patients  and the clinician are permitted on?the video conference, sessions are not recorded by the clinician, and the  patient is not permitted to record the session.? Patient was provided clinician's unique meeting ID prior to session, patient was asked to arrive to virtual session on time just as patient would if we were in the office. Clinician confirmed identification of patient by name and birthdate, provider name, location of patient and clinician, and callback number in case disconnected. Patient consents to behavioral health treatment    Patient Response to Request for Consent: Yes  Visit Type performed: Audio and Video              Mental Status:  Findings  Mood: Euthymic (normal), Hopeful, Motivated  Affect: Full Range  Rapport: Appropriate, Attentive  Eye Contact: WNL  Appearance: Well Groomed  Psychomotor Functioning: WNL  Thought Process: WNL  Positive Involvement: Open, Easilty Engaged, Empathetic, Relevant, Emotional, Self-Aware, Interested  Judgment: Good  Insight: Intact    Patients reaction:  Pt shared feelings of gratitude for group support last week when she was feeling more emotional.  Pt also provided supportive feedback to several group members.  Pt participated fully in meditation and movement portion of group.    Visit Diagnosis:      ICD-10-CM ICD-9-CM   1. Adjustment disorder with mixed anxiety and depressed mood  F43.23 309.28   2. Mild episode of recurrent major depressive disorder (CMS/HCC)  F33.0 296.31       Plan:  F/U with Weekly  group with Clinician.   Pt to F/U with treatment goals as outlined in treatment plan.  Pt to F/U with local ED/call 911 should SI/HI arises.   Renate Bailey, LPC

## 2021-08-31 ENCOUNTER — TELEMEDICINE (OUTPATIENT)
Dept: PSYCHIATRY | Facility: HOSPITAL | Age: 61
End: 2021-08-31
Attending: COUNSELOR
Payer: COMMERCIAL

## 2021-08-31 DIAGNOSIS — F43.23 ADJUSTMENT DISORDER WITH MIXED ANXIETY AND DEPRESSED MOOD: Primary | ICD-10-CM

## 2021-08-31 PROCEDURE — 90853 GROUP PSYCHOTHERAPY: CPT | Mod: 95 | Performed by: COUNSELOR

## 2021-09-07 ENCOUNTER — TELEMEDICINE (OUTPATIENT)
Dept: PSYCHIATRY | Facility: HOSPITAL | Age: 61
End: 2021-09-07
Attending: COUNSELOR
Payer: COMMERCIAL

## 2021-09-07 DIAGNOSIS — F43.23 ADJUSTMENT DISORDER WITH MIXED ANXIETY AND DEPRESSED MOOD: Primary | ICD-10-CM

## 2021-09-07 DIAGNOSIS — F33.0 MILD EPISODE OF RECURRENT MAJOR DEPRESSIVE DISORDER (CMS/HCC): ICD-10-CM

## 2021-09-07 PROCEDURE — 90853 GROUP PSYCHOTHERAPY: CPT | Mod: 95 | Performed by: COUNSELOR

## 2021-09-07 ASSESSMENT — COGNITIVE AND FUNCTIONAL STATUS - GENERAL
PSYCHOMOTOR FUNCTIONING: WNL
JUDGEMENT: GOOD
INSIGHT: INTACT
AFFECT: FULL RANGE
MOOD: EUTHYMIC (NORMAL);HOPEFUL;MOTIVATED
EYE_CONTACT: WNL
APPEARANCE: WELL GROOMED
THOUGHT_PROCESS: WNL

## 2021-09-07 NOTE — GROUP NOTE
Date:  9/7/2021  Start Time:   5:30 PM  End Time:   7:00 PM    Billie Frank, YOB: 1960,  was an active group participant.    M3  Session 11: Mindfulness Review/Where to go from here  4 members attended group today.  Clinician introduced the format and expectations for participating in the Mindfulness, Meditation and Movement Group, advising all that the flow of the group is designed to introduce members to movement practices, guided meditations and mindfulness-based psycho-education topics.  Clinician shared that the focus of group today is to explore how the use of movement, meditation and mindfulness skills can help women build a personalized self-care plan.  Group members were introduced to the theory of “Motivational Interviewing” and “the Cycle of Change” by [place] and [place] as a means of better understanding/offering ourselves compassion when experiencing difficulty maintaining positive changes in our lives.  Group were also educated and received a handout on Maslow’s hierarchy of needs as a means of better understanding how certain basic and psychological needs have to be met before we can work on the goal of achieving our own full potential/ability to create.  Group members were encouraged to reflect on ways to take breaks throughout their day and encouraged to share what works for them currently.  Group members were led through 30 minutes of mindful movements with a focus on increasing the ability to interocept or watch what physical experiences occurred within their bodies throughout the practice.  Group members were then led through a safe place/calm place guided practice at the end.  Group members were encouraged to process and share anything they noticed during the movement and guided meditation parts of the group and encouraged to share with each other what practices of mindfulness/meditation and movement have changed their lives/helped them in an effort to collaborate together  "in a safe place.      Request for Consent  Patient provided verbal consent to treat via telemedicine. Clinician introduced the secure telemedicine platform that we are utilizing to provide care during the COVID-19 pandemic. Patient understands the session will be billed to their insurance or patient directly.  Patient was informed only the group patients  and the clinician are permitted on?the video conference, sessions are not recorded by the clinician, and the patient is not permitted to record the session.? Patient was provided clinician's unique meeting ID prior to session, patient was asked to arrive to virtual session on time just as patient would if we were in the office. Clinician confirmed identification of patient by name and birthdate, provider name, location of patient and clinician, and callback number in case disconnected. Patient consents to behavioral health treatment    Patient Response to Request for Consent: Yes  Visit Type performed: Audio and Video          Mental Status:  Findings  Mood: Euthymic (normal), Hopeful, Motivated  Affect: Full Range  Rapport: Appropriate, Attentive  Eye Contact: WNL  Appearance: Well Groomed  Psychomotor Functioning: WNL  Thought Process: WNL  Positive Involvement: Easilty Engaged, Empathetic, Relevant, Emotional, Self-Aware, Interested, Open  Judgment: Good  Insight: Intact    Patients reaction:  Pt shared she is \"having a good day\" and reported she had a wonderful and rejuvenating weekend in [place] with her old neighbors at a wedding.   Pt also was very supportive of another group member who is stepping to a higher level of care.  Pt shared insight and was open about her experience in the higher level of care.     Visit Diagnosis:      ICD-10-CM ICD-9-CM   1. Adjustment disorder with mixed anxiety and depressed mood  F43.23 309.28   2. Mild episode of recurrent major depressive disorder (CMS/HCC)  F33.0 296.31       Plan:  F/U with Weekly  group with " Clinician.   Pt to F/U with treatment goals as outlined in treatment plan.  Pt to F/U with local ED/call 911 should SI/HI arises.   Renate Bailey, LPC

## 2021-09-14 ENCOUNTER — TELEMEDICINE (OUTPATIENT)
Dept: PSYCHIATRY | Facility: HOSPITAL | Age: 61
End: 2021-09-14
Attending: COUNSELOR
Payer: COMMERCIAL

## 2021-09-14 DIAGNOSIS — F43.23 ADJUSTMENT DISORDER WITH MIXED ANXIETY AND DEPRESSED MOOD: Primary | ICD-10-CM

## 2021-09-14 DIAGNOSIS — F33.0 MILD EPISODE OF RECURRENT MAJOR DEPRESSIVE DISORDER (CMS/HCC): ICD-10-CM

## 2021-09-14 PROCEDURE — S9480 INTENSIVE OUTPATIENT PSYCHIA: HCPCS | Mod: 95 | Performed by: COUNSELOR

## 2021-09-14 ASSESSMENT — COGNITIVE AND FUNCTIONAL STATUS - GENERAL
THOUGHT_PROCESS: WNL
EYE_CONTACT: WNL
AFFECT: FULL RANGE
APPEARANCE: WELL GROOMED
MOOD: EUTHYMIC (NORMAL);HOPEFUL;MOTIVATED
PSYCHOMOTOR FUNCTIONING: WNL
INSIGHT: INTACT
JUDGEMENT: GOOD

## 2021-09-14 NOTE — GROUP NOTE
Date:  9/14/2021  Start Time:   5:30 PM  End Time:   7:00 PM    Billie Frank, YOB: 1960,  was an active group participant.    M3  Session 1: Brain and Body 101  2 members attended group today.  Clinician introduced the format and expectations for participating in the Mindfulness, Meditation and Movement Group, advising all that the flow of the group is designed to introduce members to movement practices, guided meditations and mindfulness-based psychoeducation topics.  Clinician shared that the focus of group today is to explore and grow awareness about how mindfulness can assist with anxiety and depression as well as how the sympathetic and parasympathetic nervous system can perpetuate the fight/flight/freeze reaction through our posture, how we breathe and ways we think.  Group members were introduced to the concept of interoception as a skill to increase awareness of moods/emotions.  Group members were introduced to the work of John Quintanilla’s work in Trauma Sensitive Yoga and were educated that interoception includes: the visceral experience of feeling something in your body (muscles stretching, heart beating, stomach growling).  Additionally, interoceptive focus can include examining the motivation/movements involved in acting out whatever the visceral feeling activates or triggers.  Finally, group processed the effect of our visceral experience and movements/actions on our moods/emotions.  Group members were introduced to several therapeutic mindful movements and encouraged to begin exploring their own interoceptive capabilities in the group setting for 20 minutes.  Group members were then encouraged to engage in a guided meditation which consisted of a 10 minute breath oriented guided meditation by Jung TyGroup members were supportive of one another and offered encouragement and support throughout.  Members were given the opportunity to sign up for the next M3 Group.  Group members were  provided handouts with suggested materials to review at their own pace..      Request for Consent  Patient provided verbal consent to treat via telemedicine. Clinician introduced the secure telemedicine platform that we are utilizing to provide care during the COVID-19 pandemic. Patient understands the session will be billed to their insurance or patient directly.  Patient was informed only the group patients  and the clinician are permitted on?the video conference, sessions are not recorded by the clinician, and the patient is not permitted to record the session.? Patient was provided clinician's unique meeting ID prior to session, patient was asked to arrive to virtual session on time just as patient would if we were in the office. Clinician confirmed identification of patient by name and birthdate, provider name, location of patient and clinician, and callback number in case disconnected. Patient consents to behavioral health treatment    Patient Response to Request for Consent: Yes  Visit Type performed: Audio and Video            Mental Status:  Findings  Mood: Euthymic (normal), Hopeful, Motivated  Affect: Full Range  Rapport: Appropriate, Attentive  Eye Contact: WNL  Appearance: Well Groomed  Psychomotor Functioning: WNL  Thought Process: WNL  Positive Involvement: Easilty Engaged, Empathetic, Relevant, Emotional, Self-Aware, Interested, Open  Judgment: Good  Insight: Intact    Patients reaction:  Pt shared her continued stress related to work.  Pt also shared her progress on practicing nonattachment and letting go related to a situation with her daughter.  Pt was able to relate with other group member and was empathetic.  Pt continues to practice mindfulness through recognizing her feelings in the moment and not allowing herself to be swept up by them.  Pt participated fully in experiential portion of group.    Visit Diagnosis:      ICD-10-CM ICD-9-CM   1. Adjustment disorder with mixed anxiety and depressed  mood  F43.23 309.28   2. Mild episode of recurrent major depressive disorder (CMS/HCC)  F33.0 296.31       Plan:  F/U with Weekly  group with Clinician.   Pt to F/U with treatment goals as outlined in treatment plan.  Pt to F/U with local ED/call 911 should SI/HI arises.   Rentae Bailey, LPC

## 2021-09-17 LAB — HBA1C MFR BLD: 5.3 % (ref 4.8–5.6)

## 2021-09-21 ENCOUNTER — TELEMEDICINE (OUTPATIENT)
Dept: PSYCHIATRY | Facility: HOSPITAL | Age: 61
End: 2021-09-21
Attending: COUNSELOR
Payer: COMMERCIAL

## 2021-09-21 DIAGNOSIS — F43.23 ADJUSTMENT DISORDER WITH MIXED ANXIETY AND DEPRESSED MOOD: Primary | ICD-10-CM

## 2021-09-21 DIAGNOSIS — F33.41 RECURRENT MAJOR DEPRESSIVE DISORDER, IN PARTIAL REMISSION (CMS/HCC): ICD-10-CM

## 2021-09-21 PROCEDURE — 90853 GROUP PSYCHOTHERAPY: CPT | Mod: 95 | Performed by: COUNSELOR

## 2021-09-21 ASSESSMENT — COGNITIVE AND FUNCTIONAL STATUS - GENERAL
AFFECT: FULL RANGE
APPEARANCE: WELL GROOMED
EYE_CONTACT: WNL
INSIGHT: INTACT
THOUGHT_PROCESS: WNL
PSYCHOMOTOR FUNCTIONING: WNL
JUDGEMENT: GOOD
MOOD: EUTHYMIC (NORMAL);HOPEFUL

## 2021-09-21 NOTE — GROUP NOTE
Date:  9/21/2021  Start Time:   5:30 PM   End Time:   7:00 PM    Billie Frank, YOB: 1960,  was an active group participant.    M3   Session 2: Saying Yes to Imperfection  8 members attended group today.  Clinician introduced the format and expectations for participating in the Mindfulness, Meditation and Movement Group, advising all that the flow of the group is designed to introduce members to movement practices, guided meditations and mindfulness-based psychoeducation topics. Group content included review of the following articles: Breathing: The Little Known Secret to Peace of Mind by Linda Alvares, PhD and Breathing and Your Brain: 5 Reasons to Grab the Controls by John Hicks.  Group members were educated about the Autonomic Nervous system which houses the Sympathetic, Parasympathetic and Enteric nervous systems.  Group were educated that the PNS system conserves energy in our bodies and is responsible for ongoing, mellow, steady state.  Group were then educated that the SNS and PNS are activated whenever we breathe in/breathe out.  Group members were educated about the impact of chronic SNS activation and resultant physical/emotional/psychological side effects.  Group members were educated about how the breath can be an anchor to the present moment which can alleviate anxiety and depressive based thoughts/belief systems.  Group members were then led through the 4 count/square breathing exercise and taught the efficacy of using this breath work to experience decreased agitation, anxiety and overwhelmed emotions.  Group members were introduced to the concept of interoception as a skill to increase awareness of moods/emotions.  Group members were introduced to several therapeutic mindful movements and encouraged to begin exploring their own interoceptive capabilities in the group setting for 35 minutes.  Group members were then encouraged to engage in a guided meditation which consisted of a  "10-15 minute Body Scan completed by Clinician. Group members were provided handouts with suggested materials to review at their own pace.    Request for Consent  Patient provided verbal consent to treat via telemedicine. Clinician introduced the secure telemedicine platform that we are utilizing to provide care during the COVID-19 pandemic. Patient understands the session will be billed to their insurance or patient directly.  Patient was informed only the group patients  and the clinician are permitted on?the video conference, sessions are not recorded by the clinician, and the patient is not permitted to record the session.? Patient was provided clinician's unique meeting ID prior to session, patient was asked to arrive to virtual session on time just as patient would if we were in the office. Clinician confirmed identification of patient by name and birthdate, provider name, location of patient and clinician, and callback number in case disconnected. Patient consents to behavioral health treatment    Patient Response to Request for Consent: Yes  Visit Type performed: Audio and Video                  Mental Status:  Findings  Mood: Euthymic (normal), Hopeful  Affect: Full Range  Rapport: Appropriate, Attentive  Eye Contact: WNL  Appearance: Well Groomed  Psychomotor Functioning: WNL  Thought Process: WNL  Positive Involvement: Easilty Engaged, Empathetic, Relevant, Emotional, Self-Aware, Interested, Open  Judgment: Good  Insight: Intact    Patients reaction:  Pt shared she is \"in pretty good shape\" this week.  She is feeling content and offered her support to other group members this week.  Pt fully participated in experiential portion of group.    Visit Diagnosis:      ICD-10-CM ICD-9-CM   1. Adjustment disorder with mixed anxiety and depressed mood  F43.23 309.28   2. Recurrent major depressive disorder, in partial remission (CMS/[facility])  F33.41 296.35       Plan:  F/U with Weekly  group with Clinician.   Pt to F/U " with treatment goals as outlined in treatment plan.  Pt to F/U with local ED/call 911 should SI/HI arises.   Renate Bailey, LPC

## 2021-09-28 ENCOUNTER — TELEMEDICINE (OUTPATIENT)
Dept: PSYCHIATRY | Facility: HOSPITAL | Age: 61
End: 2021-09-28
Attending: NURSE ANESTHETIST, CERTIFIED REGISTERED
Payer: COMMERCIAL

## 2021-09-28 DIAGNOSIS — F33.41 RECURRENT MAJOR DEPRESSIVE DISORDER, IN PARTIAL REMISSION (CMS/HCC): ICD-10-CM

## 2021-09-28 DIAGNOSIS — F43.23 ADJUSTMENT DISORDER WITH MIXED ANXIETY AND DEPRESSED MOOD: Primary | ICD-10-CM

## 2021-09-28 PROCEDURE — 90853 GROUP PSYCHOTHERAPY: CPT | Mod: 95 | Performed by: COUNSELOR

## 2021-09-28 ASSESSMENT — COGNITIVE AND FUNCTIONAL STATUS - GENERAL
JUDGEMENT: GOOD
EYE_CONTACT: WNL
APPEARANCE: WELL GROOMED
THOUGHT_PROCESS: WNL
AFFECT: FULL RANGE
INSIGHT: INTACT
PSYCHOMOTOR FUNCTIONING: WNL
MOOD: EUTHYMIC (NORMAL);HOPEFUL

## 2021-09-28 NOTE — GROUP NOTE
Date:  9/28/2021  Start Time:   5:30 PM  End Time:   7:00 PM    Billie Frank, YOB: 1960,  was an active group participant.    M3 Session 3: Examining the Internal Experience with Compassion  4 members attended group today.  Clinician introduced the format and expectations for participating in the Mindfulness, Meditation and Movement Group, advising all that the flow of the group is designed to introduce members to movement practices, guided meditations and mindfulness-based psycho-education topics.    Clinician shared that the focus of group today is to explore and grow awareness about negative core beliefs that perpetuate symptoms of anxiety and depression and how mindfulness-based techniques, including meditation and movement, can assist in decreasing the impact, intensity and frequency of our negative internal voice.  Group members were led through a series of mindful movements to assist with release and strengthening of the psoas muscle.  Group members were educated about the psoas muscle and provided including The Muscle of the Soul may be triggering your Fear and Anxiety; Check your hips for Back Pain and How to Stretch and Strengthen the Psoas. The group also reviewed a checklist of 10 Cognitive distortions (Hughes, Feeling Good: the New Mood Therapy (1980) and discussed the impact of their own tape(s) on their lives.  Additionally, the group was introduced to the work of Julissa Nolasco, PhD and the practice of self-compassion through the use of the Acronym “RAIN” 1. Recognize what is going on 2. A: allow the experience to be there, just as it is, I: Investigate with Kindness and N: Natural awareness that comes from not identifying with the experience/emotions.  Group then transitioned into the meditation portion of the group with a self-compassion/lovingkindness guided meditation conducted by Clinician with focus on offering oneself compassion with intension to better modulate the inner experience  "of emotions and the breath moving in the body.          Request for Consent  Patient provided verbal consent to treat via telemedicine. Clinician introduced the secure telemedicine platform that we are utilizing to provide care during the COVID-19 pandemic. Patient understands the session will be billed to their insurance or patient directly.  Patient was informed only the group patients  and the clinician are permitted on?the video conference, sessions are not recorded by the clinician, and the patient is not permitted to record the session.? Patient was provided clinician's unique meeting ID prior to session, patient was asked to arrive to virtual session on time just as patient would if we were in the office. Clinician confirmed identification of patient by name and birthdate, provider name, location of patient and clinician, and callback number in case disconnected. Patient consents to behavioral health treatment    Patient Response to Request for Consent: Yes  Visit Type performed: Audio and Video              Mental Status:  Findings  Mood: Euthymic (normal), Hopeful  Affect: Full Range  Rapport: Appropriate, Attentive  Eye Contact: WNL  Appearance: Well Groomed  Psychomotor Functioning: WNL  Thought Process: WNL  Positive Involvement: Easilty Engaged, Empathetic, Relevant, Emotional, Self-Aware, Interested, Open  Judgment: Good  Insight: Intact    Patients reaction:  Pt shared \"there are good things and bad things happening this week.\"  Pt discussed an incident she experienced yesterday morning involving a man who was potentially dead outside her place of work.  Pt reported she called the police, but did not know what happened to him and felt the whole experience was very jarring.  Pt also resonated with other group members around feeling out of her Window of Tolerance.  Pt was grateful for the group support and use of movement and meditation.     Visit Diagnosis:      ICD-10-CM ICD-9-CM   1. Adjustment " disorder with mixed anxiety and depressed mood  F43.23 309.28   2. Recurrent major depressive disorder, in partial remission (CMS/HCC)  F33.41 296.35       Plan:  F/U with Weekly  group with Clinician.   Pt to F/U with treatment goals as outlined in treatment plan.  Pt to F/U with local ED/call 911 should SI/HI arises.   Renate Bailey, LPC

## 2021-09-29 ENCOUNTER — TELEPHONE (OUTPATIENT)
Dept: FAMILY MEDICINE | Facility: CLINIC | Age: 61
End: 2021-09-29

## 2021-09-29 DIAGNOSIS — Z23 FLU VACCINE NEED: Primary | ICD-10-CM

## 2021-09-30 ENCOUNTER — CLINICAL SUPPORT (OUTPATIENT)
Dept: PRIMARY CARE | Facility: CLINIC | Age: 61
End: 2021-09-30
Payer: COMMERCIAL

## 2021-09-30 DIAGNOSIS — Z23 FLU VACCINE NEED: ICD-10-CM

## 2021-09-30 PROCEDURE — 90686 IIV4 VACC NO PRSV 0.5 ML IM: CPT | Performed by: NURSE PRACTITIONER

## 2021-09-30 PROCEDURE — 90471 IMMUNIZATION ADMIN: CPT | Performed by: NURSE PRACTITIONER

## 2021-10-05 ENCOUNTER — TELEMEDICINE (OUTPATIENT)
Dept: PSYCHIATRY | Facility: HOSPITAL | Age: 61
End: 2021-10-05
Attending: COUNSELOR
Payer: COMMERCIAL

## 2021-10-05 DIAGNOSIS — F33.41 RECURRENT MAJOR DEPRESSIVE DISORDER, IN PARTIAL REMISSION (CMS/HCC): ICD-10-CM

## 2021-10-05 DIAGNOSIS — F33.0 MAJOR DEPRESSIVE DISORDER, RECURRENT EPISODE, MILD (CMS/HCC): ICD-10-CM

## 2021-10-05 DIAGNOSIS — F43.23 ADJUSTMENT DISORDER WITH MIXED ANXIETY AND DEPRESSED MOOD: Primary | ICD-10-CM

## 2021-10-05 PROCEDURE — 90853 GROUP PSYCHOTHERAPY: CPT | Mod: 95 | Performed by: COUNSELOR

## 2021-10-05 RX ORDER — ESCITALOPRAM OXALATE 20 MG/1
TABLET ORAL
Qty: 90 TABLET | Refills: 0 | Status: SHIPPED | OUTPATIENT
Start: 2021-10-05 | End: 2021-12-29

## 2021-10-05 ASSESSMENT — COGNITIVE AND FUNCTIONAL STATUS - GENERAL
MOOD: EUTHYMIC (NORMAL);HOPEFUL
EYE_CONTACT: WNL
PSYCHOMOTOR FUNCTIONING: WNL
THOUGHT_PROCESS: WNL
INSIGHT: INTACT
APPEARANCE: WELL GROOMED
AFFECT: FULL RANGE
JUDGEMENT: GOOD

## 2021-10-05 NOTE — TELEPHONE ENCOUNTER
Medicine Refill Request    Last Office Visit: 3/20/2020  Last Telemedicine Visit: 6/18/2020 Mariel Lee CRNP    Next Office Visit: Visit date not found  Next Telemedicine Visit: Visit date not found         Current Outpatient Medications:   •  AMABELZ 0.5-0.1 mg per tablet, 1 tablet 3 (three) times a week (Mon, Wed, Fri). TID weekly, due tomorrow Saturday 8/1/2020 , Disp: , Rfl:   •  ascorbic acid (VITAMIN C) 500 mg tablet, Take 1,000 mg by mouth daily., Disp: , Rfl:   •  aspirin 81 mg enteric coated tablet, Take 81 mg by mouth daily., Disp: , Rfl:   •  b complex vitamins capsule, Take 1 capsule by mouth daily., Disp: , Rfl:   •  cholecalciferol, vitamin D3, 1,000 unit (25 mcg) tablet, Take 1,000 Units by mouth daily., Disp: , Rfl:   •  diclofenac (VOLTAREN) 50 mg EC tablet, Take 50 mg by mouth 2 (two) times a day as needed.  , Disp: , Rfl:   •  escitalopram (LEXAPRO) 20 mg tablet, Take 1 tablet (20 mg total) by mouth once daily., Disp: 90 tablet, Rfl: 0  •  LORazepam (ATIVAN) 1 mg tablet, Take 1 tablet (1 mg total) by mouth nightly as needed (insomnia)., Disp: 30 tablet, Rfl: 0  •  RANEXA 500 mg 12 hr tablet, , Disp: , Rfl:   •  rosuvastatin (CRESTOR) 5 mg tablet, Take 5 mg by mouth daily. Every other day, Disp: , Rfl:       BP Readings from Last 3 Encounters:   04/22/21 120/72   12/10/20 123/75   09/02/20 126/80       Recent Lab results:  Lab Results   Component Value Date    CHOL 188 05/27/2021   ,   Lab Results   Component Value Date    HDL 73 05/27/2021   ,   Lab Results   Component Value Date    LDLCALC 101 (H) 05/27/2021   ,   Lab Results   Component Value Date    TRIG 74 05/27/2021        Lab Results   Component Value Date    GLUCOSE 103 (H) 08/19/2021   ,   Lab Results   Component Value Date    HGBA1C 5.3 09/16/2021         Lab Results   Component Value Date    CREATININE 0.82 08/19/2021       Lab Results   Component Value Date    TSH 2.220 08/19/2021

## 2021-10-05 NOTE — GROUP NOTE
Date:  10/5/2021  Start Time:   5:30 PM  End Time:   7:00 PM    Billie Frank, YOB: 1960,  was an active group participant.    M3    Session 4: The Anxiety Spiral  6 members attended group today.  Clinician introduced the format and expectations for participating in the Mindfulness, Meditation and Movement Group, advising all that the flow of the group is designed to introduce members to movement practices, guided meditations and mindfulness-based psycho-education topics.  Clinician shared that the focus of group today was increased understanding of the impact and intensity of the “anxiety spiral” on our thoughts, physiology and behavior.   Group were reminded of the benefits of mindful movement and why the use of focused attention on the body and its internal experience (interoception) helps highly anxious individuals who may experience difficulty with traditional meditation and/or psychotherapy due to difficulty accessing the frontal lobe.   During the psycho-education portion of the group; the group discussed the connections between anxiety and the enteric nervous system resulting in gastrointestinal disorders including IBS, GERD, nausea, & functional dyspepsia.  Group then discussed the link between anxiety and chronic respiratory disorders, heart disease and migraines and reviewed the article, Anxiety and Physical Illness from [employer] (7/2008).  They were reminded that those with high anxiety may have difficulty verbally and cognitively processing stressors.  They were educated of the benefits of regular internal focus to increase body awareness when experiencing distressing emotions/thoughts in future stressful situations.  Group was then educated about various forms of anxiety including social anxiety and reviewed the Social Anxiety spiral taken from Managing Social Anxiety: CBT Therapy Approach by Gabbi Walls (2010).  Group was also provided The Emotional Guidance Scale  taken from Ask and It Is Given by Briana Owen to better understand the spiraling thoughts that occur as stress increases in our life.  Group also reviewed the link of cognitions, physiology/feelings and Behaviors/movements in managing and better understanding anxiety.  Group was also led through mindfulness practice for anxiety by using the breath as an anchor to manage stress.  Clinician led group through Alternate Nostril breath and Breath of paul with focus on increasing focus and attention to sensations, temperature changes, pressure, color, level of agitation/energy & lethargy.  Group members were then led through a 20 minute mindful movement exercise with focus of interoception and growing a relationship with the inner workings/experience of their bodies during the exercise.   Group was then led though a guided mindfulness based body scan meditation by Clinician, with the focus on the experience of anxiety within the body.    Request for Consent  Patient provided verbal consent to treat via telemedicine. Clinician introduced the secure telemedicine platform that we are utilizing to provide care during the COVID-19 pandemic. Patient understands the session will be billed to their insurance or patient directly.  Patient was informed only the group patients  and the clinician are permitted on?the video conference, sessions are not recorded by the clinician, and the patient is not permitted to record the session.? Patient was provided clinician's unique meeting ID prior to session, patient was asked to arrive to virtual session on time just as patient would if we were in the office. Clinician confirmed identification of patient by name and birthdate, provider name, location of patient and clinician, and callback number in case disconnected. Patient consents to behavioral health treatment    Patient Response to Request for Consent: Yes  Visit Type performed: Audio and Video                  Mental  Status:  Findings  Mood: Euthymic (normal), Hopeful  Affect: Full Range  Rapport: Appropriate, Attentive  Eye Contact: WNL  Appearance: Well Groomed  Psychomotor Functioning: WNL  Thought Process: WNL  Positive Involvement: Easilty Engaged, Empathetic, Relevant, Emotional, Self-Aware, Interested, Open  Judgment: Good  Insight: Intact    Patients reaction:  Pt shared her reflection that yesterday was one year since she graduated from [employer].  Pt was encouraged and congratulated for her progress.  Pt was able to acknowledge herself for the hard work she has done and for the continued use of mindfulness.  Pt expressed gratitude to the group for ongoing support.    Visit Diagnosis:      ICD-10-CM ICD-9-CM   1. Adjustment disorder with mixed anxiety and depressed mood  F43.23 309.28   2. Recurrent major depressive disorder, in partial remission (CMS/HCC)  F33.41 296.35       Plan:  F/U with Weekly  group with Clinician.   Pt to F/U with treatment goals as outlined in treatment plan.  Pt to F/U with local ED/call 911 should SI/HI arises.   Renate Bailey, LPC

## 2021-10-12 ENCOUNTER — TELEMEDICINE (OUTPATIENT)
Dept: PSYCHIATRY | Facility: HOSPITAL | Age: 61
End: 2021-10-12
Attending: COUNSELOR
Payer: COMMERCIAL

## 2021-10-12 DIAGNOSIS — F33.41 RECURRENT MAJOR DEPRESSIVE DISORDER, IN PARTIAL REMISSION (CMS/HCC): ICD-10-CM

## 2021-10-12 DIAGNOSIS — F43.23 ADJUSTMENT DISORDER WITH MIXED ANXIETY AND DEPRESSED MOOD: Primary | ICD-10-CM

## 2021-10-12 PROCEDURE — 90853 GROUP PSYCHOTHERAPY: CPT | Mod: 95 | Performed by: COUNSELOR

## 2021-10-12 ASSESSMENT — COGNITIVE AND FUNCTIONAL STATUS - GENERAL
INSIGHT: INTACT
EYE_CONTACT: WNL
MOOD: EUTHYMIC (NORMAL)
JUDGEMENT: GOOD
PSYCHOMOTOR FUNCTIONING: WNL
THOUGHT_PROCESS: WNL
AFFECT: FULL RANGE
APPEARANCE: WELL GROOMED

## 2021-10-12 NOTE — GROUP NOTE
Date:  10/12/2021  Start Time:   5:30 PM  End Time:   7:00 PM    Billie Frank, YOB: 1960,  was an active group participant.    M3 Session 5: The Depression Spiral  3 members attended group today.  Clinician introduced the format and expectations for participating in the Mindfulness, Meditation and Movement Group, advising all that the flow of the group is designed to introduce members to movement practices, guided meditations and mindfulness-based psycho-education topics.  Clinician shared that the focus of group today was increased understanding of the impact and intensity of the “depression spiral” on our thoughts, physiology and behavior. During the psycho-education portion of the group; group members were educated about the impacts of depression on the body.  Group members learned about the impact to the Central nervous system which leads to increased likelihood of addictions, difficulty sleeping, decreased interest in pleasurable activities, headaches, chronic pain and body aches.  Group were also educated about the relationship between the digestive system and depression including “Stuffing” feelings through the use of overeating/binging which can lead to conditions such as NIDDM, loss of appetite/interest in eating healthy foods, stomach aches, constipation and malnutrition.  Group members were educated about the impact on the cardiovascular system and the immune system with increased evidence linking decreased immune functioning in chronically depressed individuals.   Group were then educated about the link between depressive cognitions which leads to physiology/feeling changes and behavior/movement changes.  Group reviewed the concept of the three “P”’s of Permanence, Pervasiveness, and Personalization by Branden Ames, PhD.   Group were encouraged to begin identifying ruminative depressed thinking and having a low mood as part of their experience, not part of their core being.  Group were  educated of the link between Depression and benefits of practicing loving-kindness meditations.  Group members were led through a 20 minute mindful movement exercise with focus of interoception and growing a relationship with the inner workings/experience of their bodies during the exercise.  Group was reminded of the benefits of mindful movement and why the use of focused attention on the body and its internal experience (interoception) helps highly depressed individuals.  Group were led through a mindful “meta” (loving-kindness) meditation      Request for Consent  Patient provided verbal consent to treat via telemedicine. Clinician introduced the secure telemedicine platform that we are utilizing to provide care during the COVID-19 pandemic. Patient understands the session will be billed to their insurance or patient directly.  Patient was informed only the group patients  and the clinician are permitted on?the video conference, sessions are not recorded by the clinician, and the patient is not permitted to record the session.? Patient was provided clinician's unique meeting ID prior to session, patient was asked to arrive to virtual session on time just as patient would if we were in the office. Clinician confirmed identification of patient by name and birthdate, provider name, location of patient and clinician, and callback number in case disconnected. Patient consents to behavioral health treatment    Patient Response to Request for Consent: Yes  Visit Type performed: Audio and Videox                Mental Status:  Findings  Mood: Euthymic (normal)  Affect: Full Range  Rapport: Appropriate, Attentive  Eye Contact: WNL  Appearance: Well Groomed  Psychomotor Functioning: WNL  Thought Process: WNL  Positive Involvement: Easilty Engaged, Empathetic, Relevant, Emotional, Self-Aware, Interested, Open  Judgment: Good  Insight: Intact    Patients reaction:  Pt shared her use of mindfulness outside of group over the  last week.  Pt was encouraging and empathetic toward other group members.  Pt participated fully in experiential portion of group.    Visit Diagnosis:      ICD-10-CM ICD-9-CM   1. Adjustment disorder with mixed anxiety and depressed mood  F43.23 309.28   2. Recurrent major depressive disorder, in partial remission (CMS/HCC)  F33.41 296.35       Plan:  F/U with Weekly  group with Clinician.   Pt to F/U with treatment goals as outlined in treatment plan.  Pt to F/U with local ED/call 911 should SI/HI arises.   Renate Bailey, LPC

## 2021-10-19 ENCOUNTER — TELEMEDICINE (OUTPATIENT)
Dept: PSYCHIATRY | Facility: HOSPITAL | Age: 61
End: 2021-10-19
Attending: COUNSELOR
Payer: COMMERCIAL

## 2021-10-19 DIAGNOSIS — F33.41 RECURRENT MAJOR DEPRESSIVE DISORDER, IN PARTIAL REMISSION (CMS/HCC): ICD-10-CM

## 2021-10-19 DIAGNOSIS — F43.23 ADJUSTMENT DISORDER WITH MIXED ANXIETY AND DEPRESSED MOOD: Primary | ICD-10-CM

## 2021-10-19 PROCEDURE — 90853 GROUP PSYCHOTHERAPY: CPT | Mod: 95 | Performed by: COUNSELOR

## 2021-10-19 ASSESSMENT — COGNITIVE AND FUNCTIONAL STATUS - GENERAL
EYE_CONTACT: WNL
AFFECT: FULL RANGE
JUDGEMENT: GOOD
PSYCHOMOTOR FUNCTIONING: WNL
THOUGHT_PROCESS: WNL
APPEARANCE: WELL GROOMED
MOOD: EUTHYMIC (NORMAL);HOPEFUL
INSIGHT: INTACT

## 2021-10-19 NOTE — GROUP NOTE
Date:  10/19/2021  Start Time:   5:30 PM  End Time:   7:00 PM    Billie Frank, YOB: 1960,  was an active group participant.    M3 Session 6: Mindful Communication  5 members attended group today.  Clinician introduced the format and expectations for participating in the Mindfulness, Meditation and Movement Group, advising all that the flow of the group is designed to introduce members to movement practices, guided meditations and mindfulness-based psycho-education topics.  Clinician shared that the focus of today’s group was to explore mind-ful communication patterns vs. mind-less communication patterns.  During the psycho-education portion of today’s group, Clinician educated members about the concept of projection and the shadow self and how our lack of tendency/ability to look within to better understand our “shadow selves” exhibits itself through “mindless” communication patterns driven by projections of the negative belief systems we have applied to ourselves.   Group then reviewed various communication styles and differences between aggressive, passive, passive-aggressive and assertive communication styles.  Group were educated and reviewed role playing mindful communication patterns with others including the script of “I feel ____ when you ____ because ___” and how this can lead to improved relationship conflict resolution skills and improved ability to listen and be present with others. Group members were led through mindful movements to increase the ability to interocept and help determine what the body’s experience is in the moment.   Group were then led through a silent meditation for 10 minutes.     Request for Consent  Patient provided verbal consent to treat via telemedicine. Clinician introduced the secure telemedicine platform that we are utilizing to provide care during the COVID-19 pandemic. Patient understands the session will be billed to their insurance or patient directly.   Patient was informed only the group patients  and the clinician are permitted on?the video conference, sessions are not recorded by the clinician, and the patient is not permitted to record the session.? Patient was provided clinician's unique meeting ID prior to session, patient was asked to arrive to virtual session on time just as patient would if we were in the office. Clinician confirmed identification of patient by name and birthdate, provider name, location of patient and clinician, and callback number in case disconnected. Patient consents to behavioral health treatment    Patient Response to Request for Consent: Yes  Visit Type performed: Audio and Video              Mental Status:  Findings  Mood: Euthymic (normal), Hopeful  Affect: Full Range  Rapport: Appropriate, Attentive  Eye Contact: WNL  Appearance: Well Groomed  Psychomotor Functioning: WNL  Thought Process: WNL  Positive Involvement: Easilty Engaged, Empathetic, Relevant, Emotional, Self-Aware, Interested, Open  Judgment: Good  Insight: Intact    Patients reaction:  Pt shared that things are going well for her recently.  She continues to use mindfulness as a way of understanding her feelings.  Pt was supportive and shared suggestions with other group members.    Visit Diagnosis:      ICD-10-CM ICD-9-CM   1. Adjustment disorder with mixed anxiety and depressed mood  F43.23 309.28   2. Recurrent major depressive disorder, in partial remission (CMS/HCC)  F33.41 296.35       Plan:  F/U with Weekly  group with Clinician.   Pt to F/U with treatment goals as outlined in treatment plan.  Pt to F/U with local ED/call 911 should SI/HI arises.   Renate Bailey, LPC

## 2021-10-26 ENCOUNTER — TELEMEDICINE (OUTPATIENT)
Dept: PSYCHIATRY | Facility: HOSPITAL | Age: 61
End: 2021-10-26
Attending: COUNSELOR
Payer: COMMERCIAL

## 2021-10-26 DIAGNOSIS — F43.23 ADJUSTMENT DISORDER WITH MIXED ANXIETY AND DEPRESSED MOOD: Primary | ICD-10-CM

## 2021-10-26 DIAGNOSIS — F33.41 RECURRENT MAJOR DEPRESSIVE DISORDER, IN PARTIAL REMISSION (CMS/HCC): ICD-10-CM

## 2021-10-26 PROCEDURE — 90853 GROUP PSYCHOTHERAPY: CPT | Mod: 95 | Performed by: COUNSELOR

## 2021-10-26 ASSESSMENT — COGNITIVE AND FUNCTIONAL STATUS - GENERAL
INSIGHT: INTACT
AFFECT: FULL RANGE
THOUGHT_PROCESS: WNL
MOOD: ANXIOUS;EUTHYMIC (NORMAL)
APPEARANCE: WELL GROOMED
EYE_CONTACT: WNL
PSYCHOMOTOR FUNCTIONING: WNL
JUDGEMENT: GOOD

## 2021-10-26 NOTE — GROUP NOTE
Date:  10/26/2021  Start Time:   5:30 PM  End Time:   7:00 PM    Billie Frank, YOB: 1960,  was an active group participant.    M3 Session 7: Mindful Boundaries  4 members attended group today.  Clinician introduced the format and expectations for participating in the Mindfulness, Meditation and Movement Group, advising all that the flow of the group is designed to introduce members to movement practices, guided meditations and mindfulness-based psycho-education topics.  Clinician shared that the focus of today’s group will explore the connection between internal boundaries and internal communication vs. boundaries in relationships and how we relate to others.  Group members were provided an analogy of understanding boundary systems as an apple from Setting Healthy Boundaries by Cristian Guthrie.   Group then reviewed the concept of empathy fatigue, hyper-empathy and what relationships with others who have any of these conditions could look like.  Group members were encouraged to share about their own relationships and experiences with setting (or having difficulty setting) boundaries in their own lives.  Group members were educated between the differences/characteristics of rigid, permissive and healthy boundaried relationships.  Group members were led through mindful movement practice today for 20-30 minutes.  Group members were then led through a 20 minute loving-kindness/Tonglen meditation practice.      Request for Consent  Patient provided verbal consent to treat via telemedicine. Clinician introduced the secure telemedicine platform that we are utilizing to provide care during the COVID-19 pandemic. Patient understands the session will be billed to their insurance or patient directly.  Patient was informed only the group patients  and the clinician are permitted on?the video conference, sessions are not recorded by the clinician, and the patient is not permitted to record the session.? Patient was  "provided clinician's unique meeting ID prior to session, patient was asked to arrive to virtual session on time just as patient would if we were in the office. Clinician confirmed identification of patient by name and birthdate, provider name, location of patient and clinician, and callback number in case disconnected. Patient consents to behavioral health treatment    Patient Response to Request for Consent: Yes  Visit Type performed: Audio and Video                  Mental Status:  Findings  Mood: Anxious,Euthymic (normal)  Affect: Full Range  Rapport: Appropriate,Attentive  Eye Contact: WNL  Appearance: Well Groomed  Psychomotor Functioning: WNL  Thought Process: WNL  Positive Involvement: Easilty Engaged,Empathetic,Relevant,Self-Aware,Interested,Open,Objective  Judgment: Good  Insight: Intact    Patients reaction:  Pt shared that she has felt very anxious this week - \"So bad it is disorienting.\"  Pt acknowledged she may know a few things that are contributing to this.  Pt shared she took a PRN anxiety medication that \"knocked her out\" so she had to leave work early yesterday.  Pt expressed some shame around needing to take this medication since she has not needed it in 6 months.  LPC reminded pt that medication is a tool in our toolbox that is to be used and it does not mean recovery is a failure if medication needs to be used at times.  LPC encouraged pt to speak with psychiatrist about the the impact this particular medication had on her.  Pt also was aware of where she may set some boundaries in her work life so she does not take on too much at this time.    Visit Diagnosis:      ICD-10-CM ICD-9-CM   1. Adjustment disorder with mixed anxiety and depressed mood  F43.23 309.28   2. Recurrent major depressive disorder, in partial remission (CMS/[facility])  F33.41 296.35       Plan:  F/U with Weekly  group with Clinician.   Pt to F/U with treatment goals as outlined in treatment plan.  Pt to F/U with local ED/call 911 " should SI/HI arises.   Renate Bailey, LPC

## 2021-11-02 ENCOUNTER — TELEMEDICINE (OUTPATIENT)
Dept: PSYCHIATRY | Facility: HOSPITAL | Age: 61
End: 2021-11-02
Attending: COUNSELOR
Payer: COMMERCIAL

## 2021-11-02 DIAGNOSIS — F43.22 ADJUSTMENT DISORDER WITH ANXIOUS MOOD: ICD-10-CM

## 2021-11-02 DIAGNOSIS — F33.41 RECURRENT MAJOR DEPRESSIVE DISORDER, IN PARTIAL REMISSION (CMS/HCC): Primary | ICD-10-CM

## 2021-11-02 PROCEDURE — 90853 GROUP PSYCHOTHERAPY: CPT | Mod: 95 | Performed by: COUNSELOR

## 2021-11-02 ASSESSMENT — COGNITIVE AND FUNCTIONAL STATUS - GENERAL
JUDGEMENT: GOOD
THOUGHT_PROCESS: WNL
EYE_CONTACT: WNL
AFFECT: FULL RANGE
MOOD: ANXIOUS
INSIGHT: INTACT
PSYCHOMOTOR FUNCTIONING: WNL
APPEARANCE: WELL GROOMED

## 2021-11-02 NOTE — GROUP NOTE
Date:  11/2/2021  Start Time:   5:30 PM  End Time:   7:00 PM    Billie Frank, YOB: 1960,  was an active group participant.    M3 Session 8: Attachments   5 members attended group today.  Clinician introduced the format and expectations for participating in the Mindfulness, Meditation and Movement Group, advising all that the flow of the group is designed to introduce members to movement practices, guided meditations and mindfulness-based psycho-education topics.  Clinician shared that the focus of today’s group will explore how our attachments to routines and impulsive needs such as substances, food, unhealthy people, activities and even ritual movements and behaviors increase our experience of suffering and emotional/physical pain.  Group was then encouraged to identify their own coping skills/attachments and how they could be impacting and increasing their experience of emotional and physical pain.  Group members were educated about the experience of a wanting mind through the work of Julissa Nolasco in [employer] and were educated about our innate ability to mindfully watch our attachments, drives, urges and impulses which is improved significantly through practices of mindfulness, meditation and movement.  Group members discussed/ encouraged to focus on noticing ways we use our attachments to routine/”trance like” impulses such as our use of sugar, substances, sex, chaotic relationships and even ritual ways we hold our body in an effort to avoid truly feeling the emotions we are experiencing in the moment. Group were introduced to a video on the Biochemistry of attachment to sugar and its effects on the body’s health/wellness by watching the following video:  http://ed.[employer]/lessons/how-sugar-affects-the-brain-brigido-aman.  Group members were encouraged to notice what the price of their own attachments are and encouraged to grow awareness of the emotions underlying those attachments.  Group  was led through and encouraged to hold mindful movements in an effort to watch “what comes up” when the group members were encouraged to hold a pose for up to 3-5 mins at a time.  Group members were then led through a mindful eating exercise/meditation and encouraged to focus on the judgments/thoughts/impulses that came up during this exercise.       Request for Consent  Patient provided verbal consent to treat via telemedicine. Clinician introduced the secure telemedicine platform that we are utilizing to provide care during the COVID-19 pandemic. Patient understands the session will be billed to their insurance or patient directly.  Patient was informed only the group patients  and the clinician are permitted on?the video conference, sessions are not recorded by the clinician, and the patient is not permitted to record the session.? Patient was provided clinician's unique meeting ID prior to session, patient was asked to arrive to virtual session on time just as patient would if we were in the office. Clinician confirmed identification of patient by name and birthdate, provider name, location of patient and clinician, and callback number in case disconnected. Patient consents to behavioral health treatment    Patient Response to Request for Consent: Yes  Visit Type performed: Audio and Video                Mental Status:  Findings  Mood: Anxious  Affect: Full Range  Rapport: Appropriate,Attentive  Eye Contact: WNL  Appearance: Well Groomed  Psychomotor Functioning: WNL  Thought Process: WNL  Positive Involvement: Easilty Engaged,Empathetic,Relevant,Self-Aware,Interested,Open,Objective  Judgment: Good  Insight: Intact    Patients reaction:  Pt shared she is struggling at work with feelings of low self esteem.  Pt noted this is not her typical feelings and is using mindfulness to understand and acknowledge her feelings while also working to use affirmations to support herself.  Pt was engaged in discussion and  supportive to peers.    Visit Diagnosis:      ICD-10-CM ICD-9-CM   1. Recurrent major depressive disorder, in partial remission (CMS/HCC)  F33.41 296.35   2. Adjustment disorder with anxious mood  F43.22 309.24       Plan:  F/U with Weekly  group with Clinician.   Pt to F/U with treatment goals as outlined in treatment plan.  Pt to F/U with local ED/call 911 should SI/HI arises.   Renate Bailey, LPC

## 2021-11-09 ENCOUNTER — TELEMEDICINE (OUTPATIENT)
Dept: PSYCHIATRY | Facility: HOSPITAL | Age: 61
End: 2021-11-09
Attending: COUNSELOR
Payer: COMMERCIAL

## 2021-11-09 DIAGNOSIS — F33.41 RECURRENT MAJOR DEPRESSIVE DISORDER, IN PARTIAL REMISSION (CMS/HCC): Primary | ICD-10-CM

## 2021-11-09 DIAGNOSIS — F43.22 ADJUSTMENT DISORDER WITH ANXIOUS MOOD: ICD-10-CM

## 2021-11-09 PROCEDURE — 90853 GROUP PSYCHOTHERAPY: CPT | Mod: 95 | Performed by: COUNSELOR

## 2021-11-09 ASSESSMENT — COGNITIVE AND FUNCTIONAL STATUS - GENERAL
THOUGHT_PROCESS: WNL
EYE_CONTACT: WNL
INSIGHT: INTACT
MOOD: ANXIOUS;EUTHYMIC (NORMAL)
PSYCHOMOTOR FUNCTIONING: WNL
AFFECT: FULL RANGE
JUDGEMENT: GOOD
APPEARANCE: WELL GROOMED

## 2021-11-10 NOTE — GROUP NOTE
Date:  11/9/2021  Start Time:   5:30 PM  End Time:   7:00 PM    Billie Frank, YOB: 1960,  was an active group participant.    M3  Session 9: Learning how to Become a Wise-Minded Witness  7 members attended group today.  Clinician introduced the format and expectations for participating in the Mindfulness, Meditation and Movement Group, advising all that the flow of the group is designed to introduce members to movement practices, guided meditations and mindfulness-based psycho-education topics Clinician shared that the focus of group today is to explore how the use of movement, meditation and mindfulness skills can assist in increasing our ability to detach from our emotional experience and become the witness of what is going on around us.  Group member practiced the “felt sense” sensing technique from Floyd Hernadez, PhD and learned the concept of  emotional mind, logical/rational mind and the desired combination of both: gibbs mind from Ashleigh Collado, PhD in an effort to increase frustration tolerance and decrease emotional reactivity and interpersonal conflict in our lives.  Group was then led through a series of mindful movements for 20 minutes to assist in building distress tolerance skills and were encouraged to practice mindful breathing consciously as they moved through each pose.  Group was concluded with the practice of a guided progressive relaxation exercise to build inner awareness of how the body feels when “distressed” as well as when “relaxed.”      Request for Consent  Patient provided verbal consent to treat via telemedicine. Clinician introduced the secure telemedicine platform that we are utilizing to provide care during the COVID-19 pandemic. Patient understands the session will be billed to their insurance or patient directly.  Patient was informed only the group patients  and the clinician are permitted on?the video conference, sessions are not recorded by the clinician, and  "the patient is not permitted to record the session.? Patient was provided clinician's unique meeting ID prior to session, patient was asked to arrive to virtual session on time just as patient would if we were in the office. Clinician confirmed identification of patient by name and birthdate, provider name, location of patient and clinician, and callback number in case disconnected. Patient consents to behavioral health treatment    Patient Response to Request for Consent: Yes  Visit Type performed: Audio and Video               Mental Status:  Findings  Mood: Anxious,Euthymic (normal)  Affect: Full Range  Rapport: Appropriate,Attentive  Eye Contact: WNL  Appearance: Well Groomed  Psychomotor Functioning: WNL  Thought Process: WNL  Positive Involvement: Easilty Engaged,Empathetic,Relevant,Self-Aware,Interested,Open,Objective  Judgment: Good  Insight: Intact    Patients reaction:  Pt shared she continues to experience stress at work, but feels she is managing it well.  Pt was supportive and empathetic toward other group members.  Pt shared her own experience with using [employer] and making \"wise choices\" from that place.  Pt also noted she will miss group next week due to another commitment.    Visit Diagnosis:      ICD-10-CM ICD-9-CM   1. Recurrent major depressive disorder, in partial remission (CMS/HCC)  F33.41 296.35   2. Adjustment disorder with anxious mood  F43.22 309.24       Plan:  F/U with Weekly  group with Clinician.   Pt to F/U with treatment goals as outlined in treatment plan.  Pt to F/U with local ED/call 911 should SI/HI arises.   Renate Bailey, LPC    "

## 2021-11-23 ENCOUNTER — TELEMEDICINE (OUTPATIENT)
Dept: PSYCHIATRY | Facility: HOSPITAL | Age: 61
End: 2021-11-23
Attending: COUNSELOR
Payer: COMMERCIAL

## 2021-11-23 DIAGNOSIS — F43.22 ADJUSTMENT DISORDER WITH ANXIOUS MOOD: ICD-10-CM

## 2021-11-23 DIAGNOSIS — F33.41 RECURRENT MAJOR DEPRESSIVE DISORDER, IN PARTIAL REMISSION (CMS/HCC): Primary | ICD-10-CM

## 2021-11-23 PROCEDURE — 90853 GROUP PSYCHOTHERAPY: CPT | Mod: 95 | Performed by: COUNSELOR

## 2021-11-23 ASSESSMENT — COGNITIVE AND FUNCTIONAL STATUS - GENERAL
AFFECT: FULL RANGE
MOOD: EUTHYMIC (NORMAL);HOPEFUL
PSYCHOMOTOR FUNCTIONING: WNL
THOUGHT_PROCESS: WNL
INSIGHT: INTACT
APPEARANCE: WELL GROOMED
EYE_CONTACT: WNL
JUDGEMENT: GOOD

## 2021-11-24 NOTE — GROUP NOTE
Date:  11/23/2021  Start Time:   5:30 PM  End Time:   7:00 PM    Billie Frank, YOB: 1960,  was an active group participant.    M3  Session 11: Mindfulness Review/Where to go from here  4 members attended group today.  Clinician introduced the format and expectations for participating in the Mindfulness, Meditation and Movement Group, advising all that the flow of the group is designed to introduce members to movement practices, guided meditations and mindfulness-based psycho-education topics.  Clinician shared that the focus of group today is to explore how the use of movement, meditation and mindfulness skills can help women build a personalized self-care plan.  Group members were introduced to the theory of “Motivational Interviewing” and “the Cycle of Change” by Kenyon and [place] as a means of better understanding/offering ourselves compassion when experiencing difficulty maintaining positive changes in our lives.  Group were also educated and received a handout on Maslow’s hierarchy of needs as a means of better understanding how certain basic and psychological needs have to be met before we can work on the goal of achieving our own full potential/ability to create.  Group members were encouraged to reflect on ways to take breaks throughout their day and encouraged to share what works for them currently.  Group members were led through 30 minutes of mindful movements with a focus on increasing the ability to interocept or watch what physical experiences occurred within their bodies throughout the practice.  Group members were then led through a safe place/calm place guided practice at the end.  Group members were encouraged to process and share anything they noticed during the movement and guided meditation parts of the group and encouraged to share with each other what practices of mindfulness/meditation and movement have changed their lives/helped them in an effort to collaborate  together in a safe place.      Request for Consent  Patient provided verbal consent to treat via telemedicine. Clinician introduced the secure telemedicine platform that we are utilizing to provide care during the COVID-19 pandemic. Patient understands the session will be billed to their insurance or patient directly.  Patient was informed only the group patients  and the clinician are permitted on?the video conference, sessions are not recorded by the clinician, and the patient is not permitted to record the session.? Patient was provided clinician's unique meeting ID prior to session, patient was asked to arrive to virtual session on time just as patient would if we were in the office. Clinician confirmed identification of patient by name and birthdate, provider name, location of patient and clinician, and callback number in case disconnected. Patient consents to behavioral health treatment    Patient Response to Request for Consent: Yes  Visit Type performed: Audio and Video                  Mental Status:  Findings  Mood: Euthymic (normal),Hopeful  Affect: Full Range  Rapport: Appropriate,Attentive  Eye Contact: WNL  Appearance: Well Groomed  Psychomotor Functioning: WNL  Thought Process: WNL  Positive Involvement: Easilty Engaged,Empathetic,Relevant,Self-Aware,Interested,Open  Judgment: Good  Insight: Intact    Patients reaction:  Pt shared she is doing well and is happy to be here.  Pt noted that her current mindfulness practice looks like pausing throughout her day to notice her surroundings and practice grounding.  She feels this is helpful with staying in the moment and managing anxiety.  Pt was empathetic and supportive toward other group members.      Visit Diagnosis:      ICD-10-CM ICD-9-CM   1. Recurrent major depressive disorder, in partial remission (CMS/[facility])  F33.41 296.35   2. Adjustment disorder with anxious mood  F43.22 309.24       Plan:  F/U with Biweekly  group with Clinician.   Pt to F/U with  treatment goals as outlined in treatment plan.  Pt to F/U with local ED/call 911 should SI/HI arises.   Renate Bailey, LPC

## 2021-12-07 ENCOUNTER — TELEMEDICINE (OUTPATIENT)
Dept: PSYCHIATRY | Facility: HOSPITAL | Age: 61
End: 2021-12-07
Attending: COUNSELOR
Payer: COMMERCIAL

## 2021-12-07 DIAGNOSIS — F43.22 ADJUSTMENT DISORDER WITH ANXIOUS MOOD: ICD-10-CM

## 2021-12-07 DIAGNOSIS — F33.41 RECURRENT MAJOR DEPRESSIVE DISORDER, IN PARTIAL REMISSION (CMS/HCC): Primary | ICD-10-CM

## 2021-12-07 PROCEDURE — 90853 GROUP PSYCHOTHERAPY: CPT | Mod: 95 | Performed by: COUNSELOR

## 2021-12-07 ASSESSMENT — COGNITIVE AND FUNCTIONAL STATUS - GENERAL
MOOD: EUTHYMIC (NORMAL);HOPEFUL;ANXIOUS
INSIGHT: INTACT
PSYCHOMOTOR FUNCTIONING: WNL
AFFECT: FULL RANGE
APPEARANCE: WELL GROOMED
JUDGEMENT: GOOD
EYE_CONTACT: WNL
THOUGHT_PROCESS: WNL

## 2021-12-08 NOTE — GROUP NOTE
Date:  12/7/2021  Start Time:   5:30 PM  End Time:   7:00 PM    Billie Frank, YOB: 1960,  was an active group participant.    M3  Session 1: Brain and Body 101  7 members attended group today.  Clinician introduced the format and expectations for participating in the Mindfulness, Meditation and Movement Group, advising all that the flow of the group is designed to introduce members to movement practices, guided meditations and mindfulness-based psychoeducation topics.  Clinician shared that the focus of group today is to explore and grow awareness about how mindfulness can assist with anxiety and depression as well as how the sympathetic and parasympathetic nervous system can perpetuate the fight/flight/freeze reaction through our posture, how we breathe and ways we think.  Group members were introduced to the concept of interoception as a skill to increase awareness of moods/emotions.  Group members were introduced to the work of John Quintanilla’s work in Trauma Sensitive Yoga and were educated that interoception includes: the visceral experience of feeling something in your body (muscles stretching, heart beating, stomach growling).  Additionally, interoceptive focus can include examining the motivation/movements involved in acting out whatever the visceral feeling activates or triggers.  Finally, group processed the effect of our visceral experience and movements/actions on our moods/emotions.  Group members were introduced to several therapeutic mindful movements and encouraged to begin exploring their own interoceptive capabilities in the group setting for 20 minutes.  Group members were then encouraged to engage in a guided meditation which consisted of a 10 minute breath oriented guided meditation by Jung TyGroup members were supportive of one another and offered encouragement and support throughout.  Members were given the opportunity to sign up for the next M3 Group.  Group members were  provided handouts with suggested materials to review at their own pace..        Request for Consent  Patient provided verbal consent to treat via telemedicine. Clinician introduced the secure telemedicine platform that we are utilizing to provide care during the COVID-19 pandemic. Patient understands the session will be billed to their insurance or patient directly.  Patient was informed only the group patients  and the clinician are permitted on?the video conference, sessions are not recorded by the clinician, and the patient is not permitted to record the session.? Patient was provided clinician's unique meeting ID prior to session, patient was asked to arrive to virtual session on time just as patient would if we were in the office. Clinician confirmed identification of patient by name and birthdate, provider name, location of patient and clinician, and callback number in case disconnected. Patient consents to behavioral health treatment    Patient Response to Request for Consent: Yes  Visit Type performed: Audio and Video              Mental Status:  Findings  Mood: Euthymic (normal),Hopeful,Anxious  Affect: Full Range  Rapport: Appropriate,Attentive  Eye Contact: WNL  Appearance: Well Groomed  Psychomotor Functioning: WNL  Thought Process: WNL  Positive Involvement: Easilty Engaged,Empathetic,Relevant,Self-Aware,Interested,Open  Judgment: Good  Insight: Intact    Patients reaction:  Pt shared that she is experiencing some big stressors, but she took today off to get some things done for the holidays.  Pt reports concern for her son in [place] as well as worry and sadness related to the murder of a [employer] student.  Pt works at [employer] very near to where the murder happened.  Pt is grateful for M3 group as well as many other blessings in her life which she reports is helping to keep things in perspective.    Visit Diagnosis:      ICD-10-CM ICD-9-CM   1. Recurrent major depressive disorder, in partial remission  (CMS/[facility])  F33.41 296.35   2. Adjustment disorder with anxious mood  F43.22 309.24       Plan:  F/U with Biweekly  group with Clinician.   Pt to F/U with treatment goals as outlined in treatment plan.  Pt to F/U with local ED/call 911 should SI/HI arises.   Renate Bailey, LPC

## 2021-12-21 ENCOUNTER — TELEMEDICINE (OUTPATIENT)
Dept: PSYCHIATRY | Facility: HOSPITAL | Age: 61
End: 2021-12-21
Attending: COUNSELOR
Payer: COMMERCIAL

## 2021-12-21 DIAGNOSIS — F43.22 ADJUSTMENT DISORDER WITH ANXIOUS MOOD: ICD-10-CM

## 2021-12-21 DIAGNOSIS — F33.41 RECURRENT MAJOR DEPRESSIVE DISORDER, IN PARTIAL REMISSION (CMS/HCC): Primary | ICD-10-CM

## 2021-12-21 PROCEDURE — 90853 GROUP PSYCHOTHERAPY: CPT | Mod: 95 | Performed by: COUNSELOR

## 2021-12-21 ASSESSMENT — COGNITIVE AND FUNCTIONAL STATUS - GENERAL
PSYCHOMOTOR FUNCTIONING: WNL
JUDGEMENT: GOOD
APPEARANCE: WELL GROOMED
INSIGHT: INTACT
EYE_CONTACT: WNL
AFFECT: FULL RANGE
MOOD: EUTHYMIC (NORMAL);HOPEFUL;ANXIOUS
THOUGHT_PROCESS: WNL

## 2021-12-22 NOTE — GROUP NOTE
Date:  12/21/2021  Start Time:   5:30 PM  End Time:   7:00 PM    Billie Frank, YOB: 1960,  was an active group participant.    M3   Session 2: Saying Yes to Imperfection  6 members attended group today.  Clinician introduced the format and expectations for participating in the Mindfulness, Meditation and Movement Group, advising all that the flow of the group is designed to introduce members to movement practices, guided meditations and mindfulness-based psychoeducation topics. Group content included review of the following articles: Breathing: The Little Known Secret to Peace of Mind by Linda Alvares, PhD and Breathing and Your Brain: 5 Reasons to Grab the Controls by John Hicks.  Group members were educated about the Autonomic Nervous system which houses the Sympathetic, Parasympathetic and Enteric nervous systems.  Group were educated that the PNS system conserves energy in our bodies and is responsible for ongoing, mellow, steady state.  Group were then educated that the SNS and PNS are activated whenever we breathe in/breathe out.  Group members were educated about the impact of chronic SNS activation and resultant physical/emotional/psychological side effects.  Group members were educated about how the breath can be an anchor to the present moment which can alleviate anxiety and depressive based thoughts/belief systems.  Group members were then led through the 4 count/square breathing exercise and taught the efficacy of using this breath work to experience decreased agitation, anxiety and overwhelmed emotions.  Group members were introduced to the concept of interoception as a skill to increase awareness of moods/emotions.  Group members were introduced to several therapeutic mindful movements and encouraged to begin exploring their own interoceptive capabilities in the group setting for 35 minutes.  Group members were then encouraged to engage in a guided meditation which consisted of a  10-15 minute Body Scan completed by Clinician. Group members were provided handouts with suggested materials to review at their own pace.        Request for Consent  Patient provided verbal consent to treat via telemedicine. Clinician introduced the secure telemedicine platform that we are utilizing to provide care during the COVID-19 pandemic. Patient understands the session will be billed to their insurance or patient directly.  Patient was informed only the group patients  and the clinician are permitted on?the video conference, sessions are not recorded by the clinician, and the patient is not permitted to record the session.? Patient was provided clinician's unique meeting ID prior to session, patient was asked to arrive to virtual session on time just as patient would if we were in the office. Clinician confirmed identification of patient by name and birthdate, provider name, location of patient and clinician, and callback number in case disconnected. Patient consents to behavioral health treatment    Patient Response to Request for Consent: Yes  Visit Type performed: Audio and Video             Mental Status:  Findings  Mood: Euthymic (normal),Hopeful,Anxious  Affect: Full Range  Rapport: Appropriate,Attentive  Eye Contact: WNL  Appearance: Well Groomed  Psychomotor Functioning: WNL  Thought Process: WNL  Positive Involvement: Easilty Engaged,Empathetic,Relevant,Self-Aware,Interested,Open  Judgment: Good  Insight: Intact    Patients reaction:  Pt initially shared she is doing fine this week and then later shared that upon further reflections she is feeling upset about the holidays and is worried for her son's trip home from [place].  Pt acknowledged her worries are out of her control, but as a mother it is something she will always deal with.  Pt was encouraged to meet her feelings as they are and focus on what is within her control.  Pt left group before yoga practice due to a conflict.    Visit Diagnosis:       ICD-10-CM ICD-9-CM   1. Recurrent major depressive disorder, in partial remission (CMS/HCC)  F33.41 296.35   2. Adjustment disorder with anxious mood  F43.22 309.24       Plan:  F/U with Biweekly  group with Clinician.   Pt to F/U with treatment goals as outlined in treatment plan.  Pt to F/U with local ED/call 911 should SI/HI arises.   Renate Bailey, LPC

## 2021-12-29 DIAGNOSIS — F33.0 MAJOR DEPRESSIVE DISORDER, RECURRENT EPISODE, MILD (CMS/HCC): ICD-10-CM

## 2021-12-29 RX ORDER — ESCITALOPRAM OXALATE 20 MG/1
TABLET ORAL
Qty: 90 TABLET | Refills: 0 | Status: SHIPPED | OUTPATIENT
Start: 2021-12-29 | End: 2022-04-20 | Stop reason: SDUPTHER

## 2021-12-29 NOTE — TELEPHONE ENCOUNTER
Medicine Refill Request    Last Office: 4/22/2021   Last Consult Visit: Visit date not found  Last Telemedicine Visit: 6/18/2020 Mariel Lee CRNP    Next Appointment: Visit date not found      Current Outpatient Medications:   •  AMABELZ 0.5-0.1 mg per tablet, 1 tablet 3 (three) times a week (Mon, Wed, Fri). TID weekly, due tomorrow Saturday 8/1/2020 , Disp: , Rfl:   •  ascorbic acid (VITAMIN C) 500 mg tablet, Take 1,000 mg by mouth daily., Disp: , Rfl:   •  aspirin 81 mg enteric coated tablet, Take 81 mg by mouth daily., Disp: , Rfl:   •  b complex vitamins capsule, Take 1 capsule by mouth daily., Disp: , Rfl:   •  cholecalciferol, vitamin D3, 1,000 unit (25 mcg) tablet, Take 1,000 Units by mouth daily., Disp: , Rfl:   •  diclofenac (VOLTAREN) 50 mg EC tablet, Take 50 mg by mouth 2 (two) times a day as needed.  , Disp: , Rfl:   •  escitalopram (LEXAPRO) 20 mg tablet, TAKE 1 TABLET ONCE DAILY, Disp: 90 tablet, Rfl: 0  •  LORazepam (ATIVAN) 1 mg tablet, Take 1 tablet (1 mg total) by mouth nightly as needed (insomnia)., Disp: 30 tablet, Rfl: 0  •  RANEXA 500 mg 12 hr tablet, , Disp: , Rfl:   •  rosuvastatin (CRESTOR) 5 mg tablet, Take 5 mg by mouth daily. Every other day, Disp: , Rfl:       BP Readings from Last 3 Encounters:   04/22/21 120/72   12/10/20 123/75   09/02/20 126/80       Recent Lab results:  Lab Results   Component Value Date    CHOL 188 05/27/2021   ,   Lab Results   Component Value Date    HDL 73 05/27/2021   ,   Lab Results   Component Value Date    LDLCALC 101 (H) 05/27/2021   ,   Lab Results   Component Value Date    TRIG 74 05/27/2021        Lab Results   Component Value Date    GLUCOSE 103 (H) 08/19/2021   ,   Lab Results   Component Value Date    HGBA1C 5.3 09/16/2021         Lab Results   Component Value Date    CREATININE 0.82 08/19/2021       Lab Results   Component Value Date    TSH 2.220 08/19/2021

## 2022-01-06 ENCOUNTER — OFFICE VISIT (OUTPATIENT)
Dept: PSYCHIATRY | Facility: HOSPITAL | Age: 62
End: 2022-01-06
Attending: COUNSELOR
Payer: COMMERCIAL

## 2022-01-06 DIAGNOSIS — F43.22 ADJUSTMENT DISORDER WITH ANXIOUS MOOD: ICD-10-CM

## 2022-01-06 DIAGNOSIS — F33.41 RECURRENT MAJOR DEPRESSIVE DISORDER, IN PARTIAL REMISSION (CMS/HCC): Primary | ICD-10-CM

## 2022-01-06 PROCEDURE — 90853 GROUP PSYCHOTHERAPY: CPT | Mod: 95 | Performed by: COUNSELOR

## 2022-01-06 ASSESSMENT — COGNITIVE AND FUNCTIONAL STATUS - GENERAL
JUDGEMENT: GOOD
THOUGHT_PROCESS: WNL
EYE_CONTACT: WNL
APPEARANCE: WELL GROOMED
AFFECT: FULL RANGE
MOOD: EUTHYMIC (NORMAL)
PSYCHOMOTOR FUNCTIONING: WNL
INSIGHT: INTACT

## 2022-01-07 NOTE — GROUP NOTE
Date:  1/6/2022  Start Time:   5:30 PM  End Time:   7:00 PM    Billie Frank, YOB: 1960,  was an active group participant.    Request for Consent  Patient provided verbal consent to treat via telemedicine. Clinician introduced the secure telemedicine platform that we are utilizing to provide care during the COVID-19 pandemic. Patient understands the session will be billed to their insurance or patient directly.  Patient was informed only the group patients  and the clinician are permitted on?the video conference, sessions are not recorded by the clinician, and the patient is not permitted to record the session.? Patient was provided clinician's unique meeting ID prior to session, patient was asked to arrive to virtual session on time just as patient would if we were in the office. Clinician confirmed identification of patient by name and birthdate, provider name, location of patient and clinician, and callback number in case disconnected. Patient consents to behavioral health treatment    Patient Response to Request for Consent: Yes  Visit Type performed: Audio and Video    SKILLS FOR WELLNESS: 7 members attended group today.     Topic discussed was “Health and Wellness.” CLINICIAN introduced the format and expectations for participating in the Skills for Wellness (SWEJULIO) group.  CLINICIAN informed members that the focus is on psychoeducation and skill building as discussed in the group flyer/information.  CLINICIAN reviewed group code of conduct; the importance of confidentiality and non-judgment were stressed.  Group members completed check in/ introductions and shared previous experiences with mental health recovery and reasons for joining group.  CLINICIAN summarized what was taught in the last group session.  CLINICIAN shared that the group topic today is “health and wellness”. CLINICIAN started discussion inviting group members to share their experience with health and wellness and provided a  working definition. CLINICIAN described the eight different aspects of wellness using a “wellness wheel” and related videos. Group members were asked to reflect on each of these aspects of their lives including strengths and areas for growth. CLINICIAN then introduced PLEASE from DBT and discussed the importance of maintaining physical health in order to improve mental health, and how not doing so can put us at risk for relapse. CLINICIAN then invited group members to share interest in other topics of wellness to include sleep, tobacco use, medication adherence, etc. and spent time discussing strategies for improvement. CLINICIAN reminded group members of the importance of taking ownership for their own healthcare and suggested self-monitoring and tracking as helpful ways to build awareness. CLINICIAN provided group members with a “Goal-setting Worksheet” and encouraged group members to reflect on areas in which they would like to take “baby steps” in their recovery as it pertains to health and wellness. CLINICIAN also encouraged group members to identify any barriers in their recovery and receive group support to overcome barriers. CLINICIAN encouraged group members to consider any positive affirmations or mantras they can use to achieve their goals. CLINICIAN ended discussion with a brief meditation related to the topic of group. CLINICIAN ended group praising group members for their commitment to their treatment and summarized the discussion. CLINICIAN ended group session with resources and suggested more activities around recovery.  Group members were given the opportunity to sign up for the next SWELL group.        Mental Status:  Findings  Mood: Euthymic (normal)  Affect: Full Range  Rapport: Appropriate  Eye Contact: WNL  Appearance: Well Groomed  Psychomotor Functioning: WNL  Thought Process: WNL  Positive Involvement: Self-Aware,Empathetic,Open,Interested,Easilty Engaged,Relevant,Objective  Judgment:  "Good  Insight: Intact    Patients reaction: Pt was open and engaged in group. She made contributions on the topic of maintaining wellness, acknowledging the increased difficulty with keeping the \"wellness balls in the air\" with the pandemic. She provided supportive feedback around exercise, sleep hygiene and ways to take small steps towards goals.     Visit Diagnosis:      ICD-10-CM ICD-9-CM   1. Recurrent major depressive disorder, in partial remission (CMS/HCC)  F33.41 296.35   2. Adjustment disorder with anxious mood  F43.22 309.24       Plan:  F/U with Biweekly  group with Clinician.   Pt to F/U with treatment goals as outlined in treatment plan.  Pt to F/U with local ED/call 911 should SI/HI arises.   Kathleen Lee, LPC    "

## 2022-01-17 ENCOUNTER — TELEPHONE (OUTPATIENT)
Dept: PRIMARY CARE | Facility: CLINIC | Age: 62
End: 2022-01-17
Payer: COMMERCIAL

## 2022-01-17 DIAGNOSIS — R89.9 ABNORMAL LABORATORY TEST RESULT: Primary | ICD-10-CM

## 2022-01-17 NOTE — TELEPHONE ENCOUNTER
[facility]  Outside Information              Contains abnormal data COMPREHENSIVE METABOLIC PANEL  Specimen:  Blood - Blood specimen (specimen)   Ref Range & Units 2 wk ago Comments   Glucose, Serum 65 - 99 mg/dL 98     BUN 8 - 27 mg/dL 13     Creatinine, Serum 0.57 - 1.00 mg/dL 0.91     eGFR If NonAfricn Am >59 mL/min/1.73 68     eGFR If Africn Am >59 mL/min/1.73 78  **In accordance with recommendations from the NKF-ASN Task force,**     [facility] is in the process of updating its eGFR calculation to the     2021 CKD-EPI creatinine equation that estimates kidney function     without a race variable.   BUN/Creatinine Ratio 12 - 28 14     Sodium, Serum 134 - 144 mmol/L 141     Potassium, Serum 3.5 - 5.2 mmol/L 4.4     Chloride, Serum 96 - 106 mmol/L 105     Carbon Dioxide, Total 20 - 29 mmol/L 24     Calcium, Serum 8.7 - 10.3 mg/dL 8.9     Protein, Total, Serum 6.0 - 8.5 g/dL 6.3     Albumin, Serum 3.8 - 4.8 g/dL 4.6     Globulin, Total 1.5 - 4.5 g/dL 1.7     A/G Ratio 1.2 - 2.2 2.7 High      Bilirubin, Total 0.0 - 1.2 mg/dL 0.3     Alkaline Phosphatase, S 44 - 121 IU/L 54                **Please note reference interval change**   AST (SGOT) 0 - 40 IU/L 21     ALT (SGPT) 0 - 32 IU/L 14     Resulting Agency  [facility]      Narrative  Performed by [facility]  Performed at:   - [address]  [id number]   : Araceli B Reyes MD, Phone:  [phone]  Specimen Collected: 01/03/22 10:53 AM Last Resulted: 01/04/22  7:35 AM   Received From: [facility]  Result Received: 01/06/22  7:48 AM   View Encounter     More Data from [facility]  Related to COMPREHENSIVE METABOLIC PANEL  Component      Glucose, Serum   BUN   Creatinine, Serum   eGFR If NonAfricn Am   eGFR If Africn Am   BUN/Creatinine Ratio   Sodium, Serum   Potassium, Serum   Chloride, Serum   Carbon Dioxide, Total   Calcium, Serum    Protein, Total, Serum   Albumin, Serum   Globulin, Total   A/G Ratio   Bilirubin, Total   Alkaline Phosphatase, S   AST (SGOT)   ALT (SGPT)     Component 01/03/2022       Glucose, Serum 98   BUN 13   Creatinine, Serum 0.91   eGFR If NonAfricn Am 68   eGFR If Africn Am 78   BUN/Creatinine Ratio 14   Sodium, Serum 141   Potassium, Serum 4.4   Chloride, Serum 105   Carbon Dioxide, Total 24   Calcium, Serum 8.9   Protein, Total, Serum 6.3   Albumin, Serum 4.6   Globulin, Total 1.7   A/G Ratio 2.7      Bilirubin, Total 0.3   Alkaline Phosphatase, S 54   AST (SGOT) 21   ALT (SGPT) 14

## 2022-01-17 NOTE — TELEPHONE ENCOUNTER
Pt would like to discuss her 'AG' score of 2.7  She got labs done on 1-6 and went over thm with her cardiologist and they recommended she speak to her pcp about the AG score being high.

## 2022-01-18 ENCOUNTER — TELEMEDICINE (OUTPATIENT)
Dept: PSYCHIATRY | Facility: HOSPITAL | Age: 62
End: 2022-01-18
Attending: COUNSELOR
Payer: COMMERCIAL

## 2022-01-18 DIAGNOSIS — F43.22 ADJUSTMENT DISORDER WITH ANXIOUS MOOD: ICD-10-CM

## 2022-01-18 DIAGNOSIS — F33.41 RECURRENT MAJOR DEPRESSIVE DISORDER, IN PARTIAL REMISSION (CMS/HCC): Primary | ICD-10-CM

## 2022-01-18 PROCEDURE — 90853 GROUP PSYCHOTHERAPY: CPT | Mod: 95 | Performed by: COUNSELOR

## 2022-01-18 ASSESSMENT — COGNITIVE AND FUNCTIONAL STATUS - GENERAL
APPEARANCE: WELL GROOMED
AFFECT: FULL RANGE
PSYCHOMOTOR FUNCTIONING: WNL
MOOD: EUTHYMIC (NORMAL);ANXIOUS;MOTIVATED
JUDGEMENT: GOOD
THOUGHT_PROCESS: WNL
INSIGHT: INTACT
EYE_CONTACT: WNL

## 2022-01-18 NOTE — TELEPHONE ENCOUNTER
We can watch it but usually it is not a concern. We can repeat a CMP in 3 months and see how it is trending.

## 2022-01-18 NOTE — TELEPHONE ENCOUNTER
S/W pt she was just concerned with the slightly elevated AG ratio of 2.7. she stated she read up alittle on what an elevated AG ratio is and she read it could mean stomach problems, just wanted to make sure everything was fine and no need to worry about anything

## 2022-01-19 NOTE — GROUP NOTE
Date:  1/18/2022  Start Time:   5:30 PM  End Time:   7:00 PM    Billie Frank, YOB: 1960,  was an active group participant.    M3    Session 4: The Anxiety Spiral  7 members attended group today.  Clinician introduced the format and expectations for participating in the Mindfulness, Meditation and Movement Group, advising all that the flow of the group is designed to introduce members to movement practices, guided meditations and mindfulness-based psycho-education topics.  Clinician shared that the focus of group today was increased understanding of the impact and intensity of the “anxiety spiral” on our thoughts, physiology and behavior.   Group were reminded of the benefits of mindful movement and why the use of focused attention on the body and its internal experience (interoception) helps highly anxious individuals who may experience difficulty with traditional meditation and/or psychotherapy due to difficulty accessing the frontal lobe.   During the psycho-education portion of the group; the group discussed the connections between anxiety and the enteric nervous system resulting in gastrointestinal disorders including IBS, GERD, nausea, & functional dyspepsia.  Group then discussed the link between anxiety and chronic respiratory disorders, heart disease and migraines and reviewed the article, Anxiety and Physical Illness from [facility] (7/2008).  They were reminded that those with high anxiety may have difficulty verbally and cognitively processing stressors.  They were educated of the benefits of regular internal focus to increase body awareness when experiencing distressing emotions/thoughts in future stressful situations.  Group was then educated about various forms of anxiety including social anxiety and reviewed the Social Anxiety spiral taken from Managing Social Anxiety: CBT Therapy Approach by Gabbi Walls (2010).  Group was also provided The Emotional Guidance Scale  taken from Ask and It Is Given by Briana Owen to better understand the spiraling thoughts that occur as stress increases in our life.  Group also reviewed the link of cognitions, physiology/feelings and Behaviors/movements in managing and better understanding anxiety.  Group was also led through mindfulness practice for anxiety by using the breath as an anchor to manage stress.  Clinician led group through Alternate Nostril breath and Breath of paul with focus on increasing focus and attention to sensations, temperature changes, pressure, color, level of agitation/energy & lethargy.  Group members were then led through a 20 minute mindful movement exercise with focus of interoception and growing a relationship with the inner workings/experience of their bodies during the exercise.   Group was then led though a guided mindfulness based body scan meditation by Clinician, with the focus on the experience of anxiety within the body.      Request for Consent  Patient provided verbal consent to treat via telemedicine. Clinician introduced the secure telemedicine platform that we are utilizing to provide care during the COVID-19 pandemic. Patient understands the session will be billed to their insurance or patient directly.  Patient was informed only the group patients  and the clinician are permitted on?the video conference, sessions are not recorded by the clinician, and the patient is not permitted to record the session.? Patient was provided clinician's unique meeting ID prior to session, patient was asked to arrive to virtual session on time just as patient would if we were in the office. Clinician confirmed identification of patient by name and birthdate, provider name, location of patient and clinician, and callback number in case disconnected. Patient consents to behavioral health treatment    Patient Response to Request for Consent: Yes  Visit Type performed: Audio and Video          Mental  Status:  Findings  Mood: Euthymic (normal),Anxious,Motivated  Affect: Full Range  Rapport: Appropriate  Eye Contact: WNL  Appearance: Well Groomed  Psychomotor Functioning: WNL  Thought Process: WNL  Positive Involvement: Self-Aware,Empathetic,Open,Interested,Easilty Engaged,Relevant  Judgment: Good  Insight: Intact    Patients reaction:  Pt shared her paul that her son has returned from [place].  Pt observed that she had been holding in so much stress around that situation that is not coming out in frequent crying episodes.  The group normalized pt's reaction to this and her relief that her son is safe.  Pt also feels that her PHP skills have become like a second language and she feels herself using them every day very effectively.    Visit Diagnosis:      ICD-10-CM ICD-9-CM   1. Recurrent major depressive disorder, in partial remission (CMS/HCC)  F33.41 296.35   2. Adjustment disorder with anxious mood  F43.22 309.24       Plan:  F/U with Biweekly  group with Clinician.   Pt to F/U with treatment goals as outlined in treatment plan.  Pt to F/U with local ED/call 911 should SI/HI arises.   Renate Bailey, LPC

## 2022-01-26 ENCOUNTER — APPOINTMENT (OUTPATIENT)
Dept: URBAN - METROPOLITAN AREA CLINIC 200 | Age: 62
Setting detail: DERMATOLOGY
End: 2022-01-31

## 2022-01-26 DIAGNOSIS — Z11.52 ENCOUNTER FOR SCREENING FOR COVID-19: ICD-10-CM

## 2022-01-26 DIAGNOSIS — Z85.828 PERSONAL HISTORY OF OTHER MALIGNANT NEOPLASM OF SKIN: ICD-10-CM

## 2022-01-26 DIAGNOSIS — L57.8 OTHER SKIN CHANGES DUE TO CHRONIC EXPOSURE TO NONIONIZING RADIATION: ICD-10-CM

## 2022-01-26 DIAGNOSIS — I78.8 OTHER DISEASES OF CAPILLARIES: ICD-10-CM

## 2022-01-26 PROCEDURE — OTHER CURETTAGE AND DESTRUCTION WITH PATHOLOGY: OTHER

## 2022-01-26 PROCEDURE — 99213 OFFICE O/P EST LOW 20 MIN: CPT | Mod: 25

## 2022-01-26 PROCEDURE — 11100: CPT

## 2022-01-26 PROCEDURE — OTHER PHOTO-DOCUMENTATION: OTHER

## 2022-01-26 PROCEDURE — OTHER SUNSCREEN RECOMMENDATIONS: OTHER

## 2022-01-26 PROCEDURE — OTHER COUNSELING: OTHER

## 2022-01-26 PROCEDURE — OTHER SCREENING FOR COVID-19: OTHER

## 2022-01-26 PROCEDURE — OTHER MIPS QUALITY: OTHER

## 2022-01-26 ASSESSMENT — LOCATION ZONE DERM
LOCATION ZONE: TRUNK
LOCATION ZONE: NECK

## 2022-01-26 ASSESSMENT — LOCATION DETAILED DESCRIPTION DERM
LOCATION DETAILED: LEFT SUPERIOR UPPER BACK
LOCATION DETAILED: PERIUMBILICAL SKIN
LOCATION DETAILED: LEFT INFERIOR ANTERIOR NECK

## 2022-01-26 ASSESSMENT — LOCATION SIMPLE DESCRIPTION DERM
LOCATION SIMPLE: ABDOMEN
LOCATION SIMPLE: LEFT UPPER BACK
LOCATION SIMPLE: LEFT ANTERIOR NECK

## 2022-01-26 NOTE — PROCEDURE: PHOTO-DOCUMENTATION
Details (Free Text): Presumed BCC was ED&C'd and pathology turned out to be a possible hemangioma
Detail Level: Detailed
Photo Preface (Leave Blank If You Do Not Want): Photographs were obtained today

## 2022-01-26 NOTE — PROCEDURE: CURETTAGE AND DESTRUCTION WITH PATHOLOGY
Histology Text: Following the procedure a portion of the curetted material was sent for histologic evaluation.
Additional Information: (Optional): The wound was cleaned, and a pressure dressing was applied.  The patient received detailed post-op instructions.
Bill 35199 For Specimen Handling/Conveyance To Laboratory?: no
Bill As A Line Item Or As Units: Line Item
Size Of Lesion After Curettage: 0.7
Anesthesia Volume In Cc: 2
Consent was obtained from the patient. The risks, benefits and alternatives to therapy were discussed in detail. Specifically, the risks of infection, scarring, bleeding, prolonged wound healing, nerve injury, incomplete removal, allergy to anesthesia and recurrence were addressed. Alternatives to ED&C, such as: surgical removal and XRT were also discussed.  Prior to the procedure, the treatment site was clearly identified and confirmed by the patient. All components of Universal Protocol/PAUSE Rule completed.
What Was Performed First?: Curettage
Anesthesia Type: 1% lidocaine with epinephrine
Post-Care Instructions: I reviewed with the patient in detail post-care instructions. Patient is to keep the area dry for 48 hours, and not to engage in any swimming until the area is healed. Should the patient develop any fevers, chills, bleeding, severe pain patient will contact the office immediately.
Number Of Curettages: 3
Detail Level: Detailed
Cautery Type: electrodesiccation
Billing Type: Third-Party Bill
Biopsy Type: H and E

## 2022-02-01 ENCOUNTER — TELEMEDICINE (OUTPATIENT)
Dept: PSYCHIATRY | Facility: HOSPITAL | Age: 62
End: 2022-02-01
Attending: COUNSELOR
Payer: COMMERCIAL

## 2022-02-01 DIAGNOSIS — F33.41 RECURRENT MAJOR DEPRESSIVE DISORDER, IN PARTIAL REMISSION (CMS/HCC): Primary | ICD-10-CM

## 2022-02-01 DIAGNOSIS — F43.22 ADJUSTMENT DISORDER WITH ANXIOUS MOOD: ICD-10-CM

## 2022-02-01 PROCEDURE — 90853 GROUP PSYCHOTHERAPY: CPT | Mod: 95 | Performed by: COUNSELOR

## 2022-02-01 ASSESSMENT — COGNITIVE AND FUNCTIONAL STATUS - GENERAL
MOOD: EUTHYMIC (NORMAL);ANXIOUS;MOTIVATED
APPEARANCE: WELL GROOMED
INSIGHT: INTACT
THOUGHT_PROCESS: WNL
AFFECT: FULL RANGE
EYE_CONTACT: WNL
JUDGEMENT: GOOD
PSYCHOMOTOR FUNCTIONING: WNL

## 2022-02-02 NOTE — GROUP NOTE
Date:  2/1/2022  Start Time:   5:30 PM  End Time:   7:00 PM    Billie Frank, YOB: 1960,  was an active group participant.    M3 Session 5: The Depression Spiral  6 members attended group today.  Clinician introduced the format and expectations for participating in the Mindfulness, Meditation and Movement Group, advising all that the flow of the group is designed to introduce members to movement practices, guided meditations and mindfulness-based psycho-education topics.  Clinician shared that the focus of group today was increased understanding of the impact and intensity of the “depression spiral” on our thoughts, physiology and behavior. During the psycho-education portion of the group; group members were educated about the impacts of depression on the body.  Group members learned about the impact to the Central nervous system which leads to increased likelihood of addictions, difficulty sleeping, decreased interest in pleasurable activities, headaches, chronic pain and body aches.  Group were also educated about the relationship between the digestive system and depression including “Stuffing” feelings through the use of overeating/binging which can lead to conditions such as NIDDM, loss of appetite/interest in eating healthy foods, stomach aches, constipation and malnutrition.  Group members were educated about the impact on the cardiovascular system and the immune system with increased evidence linking decreased immune functioning in chronically depressed individuals.   Group were then educated about the link between depressive cognitions which leads to physiology/feeling changes and behavior/movement changes.  Group reviewed the concept of the three “P”’s of Permanence, Pervasiveness, and Personalization by Branden Ames, PhD.   Group were encouraged to begin identifying ruminative depressed thinking and having a low mood as part of their experience, not part of their core being.  Group were  educated of the link between Depression and benefits of practicing loving-kindness meditations.  Group members were led through a 20 minute mindful movement exercise with focus of interoception and growing a relationship with the inner workings/experience of their bodies during the exercise.  Group was reminded of the benefits of mindful movement and why the use of focused attention on the body and its internal experience (interoception) helps highly depressed individuals.  Group were led through a mindful “meta” (loving-kindness) meditation        Request for Consent  Patient provided verbal consent to treat via telemedicine. Clinician introduced the secure telemedicine platform that we are utilizing to provide care during the COVID-19 pandemic. Patient understands the session will be billed to their insurance or patient directly.  Patient was informed only the group patients  and the clinician are permitted on?the video conference, sessions are not recorded by the clinician, and the patient is not permitted to record the session.? Patient was provided clinician's unique meeting ID prior to session, patient was asked to arrive to virtual session on time just as patient would if we were in the office. Clinician confirmed identification of patient by name and birthdate, provider name, location of patient and clinician, and callback number in case disconnected. Patient consents to behavioral health treatment    Patient Response to Request for Consent: Yes  Visit Type performed: Audio and Video        Mental Status:  Findings  Mood: Euthymic (normal),Anxious,Motivated  Affect: Full Range  Rapport: Appropriate  Eye Contact: WNL  Appearance: Well Groomed  Psychomotor Functioning: WNL  Thought Process: WNL  Positive Involvement: Self-Aware,Empathetic,Open,Interested,Easilty Engaged,Relevant  Judgment: Good  Insight: Intact    Patients reaction:  Pt shared her reflections on the experience of trauma and how the pandemic  has changed that definition for her.  Pt is interested in learning more about using mindfulness to understand symptoms of trauma.  LPC and pt met after group to review pt's tx plan.  See tx plan for more information.    Visit Diagnosis:      ICD-10-CM ICD-9-CM   1. Recurrent major depressive disorder, in partial remission (CMS/HCC)  F33.41 296.35   2. Adjustment disorder with anxious mood  F43.22 309.24       Plan:  F/U with Biweekly  group with Clinician.   Pt to F/U with treatment goals as outlined in treatment plan.  Pt to F/U with local ED/call 911 should SI/HI arises.   Renate Bailey, KAIDEN

## 2022-02-15 ENCOUNTER — TELEMEDICINE (OUTPATIENT)
Dept: PSYCHIATRY | Facility: HOSPITAL | Age: 62
End: 2022-02-15
Attending: COUNSELOR
Payer: COMMERCIAL

## 2022-02-15 DIAGNOSIS — F43.22 ADJUSTMENT DISORDER WITH ANXIOUS MOOD: ICD-10-CM

## 2022-02-15 DIAGNOSIS — F33.41 RECURRENT MAJOR DEPRESSIVE DISORDER, IN PARTIAL REMISSION (CMS/HCC): Primary | ICD-10-CM

## 2022-02-15 PROCEDURE — 90853 GROUP PSYCHOTHERAPY: CPT | Mod: 95 | Performed by: COUNSELOR

## 2022-02-15 ASSESSMENT — COGNITIVE AND FUNCTIONAL STATUS - GENERAL
PSYCHOMOTOR FUNCTIONING: WNL
EYE_CONTACT: WNL
AFFECT: FULL RANGE
MOOD: EUTHYMIC (NORMAL);ANXIOUS;MOTIVATED;HOPEFUL
INSIGHT: INTACT
JUDGEMENT: GOOD
APPEARANCE: WELL GROOMED
THOUGHT_PROCESS: WNL

## 2022-02-16 NOTE — GROUP NOTE
Date:  2/15/2022  Start Time:   5:30 PM  End Time:   7:00 PM    Billie Frank, YOB: 1960,  was an active group participant.    M3 Session 6: Mindful Communication  11 members attended group today.  Clinician introduced the format and expectations for participating in the Mindfulness, Meditation and Movement Group, advising all that the flow of the group is designed to introduce members to movement practices, guided meditations and mindfulness-based psycho-education topics.  Clinician shared that the focus of today’s group was to explore mind-ful communication patterns vs. mind-less communication patterns.  During the psycho-education portion of today’s group, Clinician educated members about the concept of projection and the shadow self and how our lack of tendency/ability to look within to better understand our “shadow selves” exhibits itself through “mindless” communication patterns driven by projections of the negative belief systems we have applied to ourselves.   Group then reviewed various communication styles and differences between aggressive, passive, passive-aggressive and assertive communication styles.  Group were educated and reviewed role playing mindful communication patterns with others including the script of “I feel ____ when you ____ because ___” and how this can lead to improved relationship conflict resolution skills and improved ability to listen and be present with others. Group members were led through mindful movements to increase the ability to interocept and help determine what the body’s experience is in the moment.   Group were then led through a silent meditation for 10 minutes.     Request for Consent  Patient provided verbal consent to treat via telemedicine. Clinician introduced the secure telemedicine platform that we are utilizing to provide care during the COVID-19 pandemic. Patient understands the session will be billed to their insurance or patient directly.   "Patient was informed only the group patients  and the clinician are permitted on?the video conference, sessions are not recorded by the clinician, and the patient is not permitted to record the session.? Patient was provided clinician's unique meeting ID prior to session, patient was asked to arrive to virtual session on time just as patient would if we were in the office. Clinician confirmed identification of patient by name and birthdate, provider name, location of patient and clinician, and callback number in case disconnected. Patient consents to behavioral health treatment    Patient Response to Request for Consent: Yes  Visit Type performed: Audio and Video                    Mental Status:  Findings  Mood: Euthymic (normal),Anxious,Motivated,Hopeful  Affect: Full Range  Rapport: Appropriate  Eye Contact: WNL  Appearance: Well Groomed  Psychomotor Functioning: WNL  Thought Process: WNL  Positive Involvement: Self-Aware,Empathetic,Open,Interested,Easilty Engaged,Relevant  Judgment: Good  Insight: Intact    Patients reaction:  Pt shared that she is happy to be in group.  She welcomed new members and agreed that it feels like a \"warm hug\" to come and be in community with other women.  Pt participated fully in experiential portion of group.    Visit Diagnosis:      ICD-10-CM ICD-9-CM   1. Recurrent major depressive disorder, in partial remission (CMS/HCC)  F33.41 296.35   2. Adjustment disorder with anxious mood  F43.22 309.24       Plan:  F/U with Biweekly  group with Clinician.   Pt to F/U with treatment goals as outlined in treatment plan.  Pt to F/U with local ED/call 911 should SI/HI arises.   Renate Bailey, LPC    "

## 2022-03-03 ENCOUNTER — TELEMEDICINE (OUTPATIENT)
Dept: PSYCHIATRY | Facility: HOSPITAL | Age: 62
End: 2022-03-03
Attending: COUNSELOR
Payer: COMMERCIAL

## 2022-03-03 DIAGNOSIS — F33.41 RECURRENT MAJOR DEPRESSIVE DISORDER, IN PARTIAL REMISSION (CMS/HCC): Primary | ICD-10-CM

## 2022-03-03 PROCEDURE — 90853 GROUP PSYCHOTHERAPY: CPT | Mod: 95 | Performed by: COUNSELOR

## 2022-03-03 ASSESSMENT — COGNITIVE AND FUNCTIONAL STATUS - GENERAL
AFFECT: FULL RANGE
APPEARANCE: WELL GROOMED
THOUGHT_PROCESS: WNL
INSIGHT: INTACT
MOOD: EUTHYMIC (NORMAL)
PSYCHOMOTOR FUNCTIONING: WNL
EYE_CONTACT: WNL
JUDGEMENT: GOOD

## 2022-03-04 NOTE — GROUP NOTE
Date:  3/3/2022  Start Time:   5:30 PM  End Time:   7:00 PM    Billie Frank, YOB: 1960,  was an active group participant.    Request for Consent  Patient provided verbal consent to treat via telemedicine. Clinician introduced the secure telemedicine platform that we are utilizing to provide care during the COVID-19 pandemic. Patient understands the session will be billed to their insurance or patient directly.  Patient was informed only the group patients  and the clinician are permitted on?the video conference, sessions are not recorded by the clinician, and the patient is not permitted to record the session.? Patient was provided clinician's unique meeting ID prior to session, patient was asked to arrive to virtual session on time just as patient would if we were in the office. Clinician confirmed identification of patient by name and birthdate, provider name, location of patient and clinician, and callback number in case disconnected. Patient consents to behavioral health treatment    Patient Response to Request for Consent: Yes  Visit Type performed: Audio and Video    SKILLS FOR WELLNESS: 10 members attended group today.     Topic discussed was “Building Social Supports.” CLINICIAN introduced the format and expectations for participating in the Skills for Wellness (SWELL) group.  CLINICIAN informed members that the focus is on psychoeducation and skill building as discussed in the group flyer/information.  CLINICIAN reviewed group code of conduct; the importance of confidentiality and non-judgment were stressed.  Group members completed check in/ introductions and shared previous experiences with mental health recovery and reasons for joining group.  CLINICIAN summarized what was taught in the last group session.  CLINICIAN shared that the group topic today is “building social supports”. CLINICIAN started discussion inviting group members to share their experience with using their social  supports in their own recovery. CLINICIAN discussed how social supports are a protective factor in recovery and discussed the different facets of relationships. CLINICIAN provided a worksheet and invited group members to reflect on “what does social support mean to you?”. CLINICIAN then discussed the questions of both 1) how to increase social supports, and 2) how to improve your social support network. CLINICIAN provided several suggestions in both areas and invited group members to consider how they can incorporate pointers into building their social networks. CLINICIAN also facilitated discussion around “how do we improve the quality of our relationships?” CLINICIAN introduced group members to DBT Interpersonal Effective Skills and referenced acronyms of 1) LUCY, 2) GIVE and 3) FAST and discussed application of each. Group members were invited to fill out related worksheet to reflect on application of skills in order to build and improve their social networks. CLINICIAN provided group members with a “Goal-setting Worksheet” and encouraged group members to reflect on areas in which they would like to take “baby steps” in their recovery as it pertains to building social networks. CLINICIAN also encouraged group members to identify any barriers in their recovery and receive group support to overcome barriers. CLINICIAN encouraged group members to consider any positive affirmations or mantras they can use to achieve their goals. CLINICIAN ended discussion with a brief meditation related to the topic of group. CLINICIAN ended group praising group members for their commitment to their treatment and summarized the discussion. CLINICIAN ended group session with resources and suggested more activities around recovery.  Group members were given the opportunity to sign up for the next SWELL group.        Mental Status:  Findings  Mood: Euthymic (normal)  Affect: Full Range  Rapport: Appropriate,Attentive  Eye Contact:  "WNL  Appearance: Well Groomed  Psychomotor Functioning: WNL  Thought Process: WNL  Positive Involvement: Self-Aware,Open,Relevant,Interested  Judgment: Good  Insight: Intact    Patients reaction: Pt was open and engaged in group. She related to other group members around the challenges within relationships. Pt discussed recent difficulty feeling \"over supported\" by others as people have been checking in with her due to her son living in [place] for many years. Pt discussed ways in which she has attempted to assert herself and set boundaries in order to respect her needs.    Visit Diagnosis:      ICD-10-CM ICD-9-CM   1. Recurrent major depressive disorder, in partial remission (CMS/[facility])  F33.41 296.35       Plan:  F/U with Biweekly  group with Clinician.   Pt to F/U with treatment goals as outlined in treatment plan.  Pt to F/U with local ED/call 911 should SI/HI arises.   Kathleen Lee, LPC    "

## 2022-03-17 ENCOUNTER — TELEMEDICINE (OUTPATIENT)
Dept: PSYCHIATRY | Facility: HOSPITAL | Age: 62
End: 2022-03-17
Attending: COUNSELOR
Payer: COMMERCIAL

## 2022-03-17 DIAGNOSIS — F33.41 RECURRENT MAJOR DEPRESSIVE DISORDER, IN PARTIAL REMISSION (CMS/HCC): Primary | ICD-10-CM

## 2022-03-17 DIAGNOSIS — F43.22 ADJUSTMENT DISORDER WITH ANXIOUS MOOD: ICD-10-CM

## 2022-03-17 PROCEDURE — 90853 GROUP PSYCHOTHERAPY: CPT | Mod: 95 | Performed by: COUNSELOR

## 2022-03-17 ASSESSMENT — COGNITIVE AND FUNCTIONAL STATUS - GENERAL
MOOD: EUTHYMIC (NORMAL)
AFFECT: FULL RANGE
JUDGEMENT: GOOD
POSITIVE INVOLVEMENT: SELF-AWARE;EMPATHETIC;OPEN;RELEVANT;EASILTY ENGAGED;INTERESTED
PSYCHOMOTOR FUNCTIONING: WNL
THOUGHT_PROCESS: WNL
INSIGHT: INTACT
EYE_CONTACT: WNL
APPEARANCE: WELL GROOMED

## 2022-03-17 NOTE — GROUP NOTE
Date:  3/17/2022  Start Time:   5:30 PM  End Time:   7:00 PM    Billie Frank, YOB: 1960,  was an active group participant.    Request for Consent  Patient provided verbal consent to treat via telemedicine. Clinician introduced the secure telemedicine platform that we are utilizing to provide care during the COVID-19 pandemic. Patient understands the session will be billed to their insurance or patient directly.  Patient was informed only the group patients  and the clinician are permitted on?the video conference, sessions are not recorded by the clinician, and the patient is not permitted to record the session.? Patient was provided clinician's unique meeting ID prior to session, patient was asked to arrive to virtual session on time just as patient would if we were in the office. Clinician confirmed identification of patient by name and birthdate, provider name, location of patient and clinician, and callback number in case disconnected. Patient consents to behavioral health treatment    Patient Response to Request for Consent: Yes  Visit Type performed: Audio and Video    SKILLS FOR WELLNESS: 10 members attended group today.     Topic discussed was “Self-Care.” CLINICIAN introduced the format and expectations for participating in the Skills for Wellness (SWELL) group.  CLINICIAN informed members that the focus is on psychoeducation and skill building as discussed in the group flyer/information.  CLINICIAN reviewed group code of conduct; the importance of confidentiality and non-judgment were stressed.  Group members completed check in/ introductions and shared previous experiences with mental health recovery and reasons for joining group.  CLINICIAN summarized what was taught in the last group session.  CLINICIAN shared that the group topic today is “self-care”. CLINICIAN started discussion inviting group members to share their experience with using self-care in their own recovery. CLINICIAN  provided a definition of self-care from World Health Organization (WHO) and discussed the importance of self-care in maintaining wellness. CLINICIAN discussed the “seven pillars of self-care” and also discussed how self-care is a significant aspect of preventative measures. CLINICIAN invited group members to complete a “Self-Care Assessment” to reflect on their own self-care practices and ways to better incorporate self-care. CLINICIAN also discussed potential barriers/myths (i.e. time, resources, feelings of guilt) for self-care and ways to overcome barriers. CLINICIAN then invited group members to spend time building a “Mental Health Self-Care Kit” with items selected by each group member. Group members were invited to share about their kits. CLINICIAN provided group members with a “Goal-setting Worksheet” and encouraged group members to reflect on areas in which they would like to take “baby steps” in their recovery as it pertains to self-care. CLINICIAN also encouraged group members to identify any barriers in their recovery and receive group support to overcome barriers. CLINICIAN encouraged group members to consider any positive affirmations or mantras they can use to achieve their goals. CLINICIAN ended discussion with a brief meditation related to the topic of group. CLINICIAN ended group praising group members for their commitment to their treatment and summarized the discussion. CLINICIAN ended group session with resources and suggested more activities around recovery.  Group members were given the opportunity to sign up for the next SWELL group        Mental Status:  Findings  Mood: Euthymic (normal)  Affect: Full Range  Rapport: Appropriate,Attentive  Eye Contact: WNL  Appearance: Well Groomed  Psychomotor Functioning: WNL  Thought Process: WNL  Positive Involvement: Self-Aware,Empathetic,Open,Relevant,Easilty Engaged,Interested  Judgment: Good  Insight: Intact    Patients reaction: Pt was open and  engaged in group. She offered supportive feedback to other group members around the challenges in the post-partum period and raising small children. She discussed ways in which she has incorporated self-care into her lifestyle.     Visit Diagnosis:      ICD-10-CM ICD-9-CM   1. Recurrent major depressive disorder, in partial remission (CMS/HCC)  F33.41 296.35   2. Adjustment disorder with anxious mood  F43.22 309.24       Plan:  F/U with Biweekly  group with Clinician.   Pt to F/U with treatment goals as outlined in treatment plan.  Pt to F/U with local ED/call 911 should SI/HI arises.   Kathleen Lee, LPC

## 2022-03-29 ENCOUNTER — TELEMEDICINE (OUTPATIENT)
Dept: PSYCHIATRY | Facility: HOSPITAL | Age: 62
End: 2022-03-29
Attending: COUNSELOR
Payer: COMMERCIAL

## 2022-03-29 DIAGNOSIS — F33.41 RECURRENT MAJOR DEPRESSIVE DISORDER, IN PARTIAL REMISSION (CMS/HCC): Primary | ICD-10-CM

## 2022-03-29 DIAGNOSIS — F43.22 ADJUSTMENT DISORDER WITH ANXIOUS MOOD: ICD-10-CM

## 2022-03-29 PROCEDURE — 90853 GROUP PSYCHOTHERAPY: CPT | Mod: 95 | Performed by: COUNSELOR

## 2022-03-29 ASSESSMENT — COGNITIVE AND FUNCTIONAL STATUS - GENERAL
POSITIVE INVOLVEMENT: SELF-AWARE;EMPATHETIC;OPEN;RELEVANT;EASILTY ENGAGED;INTERESTED
AFFECT: FULL RANGE
JUDGEMENT: GOOD
EYE_CONTACT: WNL
PSYCHOMOTOR FUNCTIONING: WNL
APPEARANCE: WELL GROOMED
INSIGHT: INTACT
MOOD: EUTHYMIC (NORMAL);MOTIVATED
THOUGHT_PROCESS: WNL

## 2022-03-29 NOTE — GROUP NOTE
Date:  3/29/2022  Start Time:   5:30 PM  End Time:   7:00 PM    Billie Frank, YOB: 1960,  was an active group participant.    M3 Session 8: Attachments   11 members attended group today.  Clinician introduced the format and expectations for participating in the Mindfulness, Meditation and Movement Group, advising all that the flow of the group is designed to introduce members to movement practices, guided meditations and mindfulness-based psycho-education topics.  Clinician shared that the focus of today’s group will explore how our attachments to routines and impulsive needs such as substances, food, unhealthy people, activities and even ritual movements and behaviors increase our experience of suffering and emotional/physical pain.  Group was then encouraged to identify their own coping skills/attachments and how they could be impacting and increasing their experience of emotional and physical pain.  Group members were educated about the experience of a wanting mind through the work of Julissa Nolasco in Radical Acceptance and were educated about our innate ability to mindfully watch our attachments, drives, urges and impulses which is improved significantly through practices of mindfulness, meditation and movement.  Group members discussed/ encouraged to focus on noticing ways we use our attachments to routine/”trance like” impulses such as our use of sugar, substances, sex, chaotic relationships and even ritual ways we hold our body in an effort to avoid truly feeling the emotions we are experiencing in the moment. Group were introduced to a video on the Biochemistry of attachment to sugar and its effects on the body’s health/wellness by watching the following video:  [email or web address]/lessons/how-sugar-affects-the-brain-brigido-aman.  Group members were encouraged to notice what the price of their own attachments are and encouraged to grow awareness of the emotions underlying those attachments.   "Group was led through and encouraged to hold mindful movements in an effort to watch “what comes up” when the group members were encouraged to hold a pose for up to 3-5 mins at a time.  Group members were then led through a mindful eating exercise/meditation and encouraged to focus on the judgments/thoughts/impulses that came up during this exercise.     Request for Consent  Patient provided verbal consent to treat via telemedicine. Clinician introduced the secure telemedicine platform that we are utilizing to provide care during the COVID-19 pandemic. Patient understands the session will be billed to their insurance or patient directly.  Patient was informed only the group patients  and the clinician are permitted on?the video conference, sessions are not recorded by the clinician, and the patient is not permitted to record the session.? Patient was provided clinician's unique meeting ID prior to session, patient was asked to arrive to virtual session on time just as patient would if we were in the office. Clinician confirmed identification of patient by name and birthdate, provider name, location of patient and clinician, and callback number in case disconnected. Patient consents to behavioral health treatment    Patient Response to Request for Consent: Yes  Visit Type performed: Audio and Video                    Mental Status:  Findings  Mood: Euthymic (normal), Motivated  Affect: Full Range  Rapport: Appropriate, Attentive  Eye Contact: WNL  Appearance: Well Groomed  Psychomotor Functioning: WNL  Thought Process: WNL  Positive Involvement: Self-Aware, Empathetic, Open, Relevant, Easilty Engaged, Interested  Judgment: Good  Insight: Intact    Patients reaction:  Pt was an active participant throughout group, sharing her support for peers.  Pt shared her intention for tonight to \"get out of tonight whatever is offered to me.\"  She also shared her experience with using mindfulness to practice letting go of a " difficult situation with her daughter.  Pt has been able to find more peace in this practice of nonattachment.      Visit Diagnosis:      ICD-10-CM ICD-9-CM   1. Recurrent major depressive disorder, in partial remission (CMS/HCC)  F33.41 296.35   2. Adjustment disorder with anxious mood  F43.22 309.24       Plan:  F/U with Biweekly  group with Clinician.   Pt to F/U with treatment goals as outlined in treatment plan.  Pt to F/U with local ED/call 911 should SI/HI arises.   Renate Bailey, LPC

## 2022-04-12 ENCOUNTER — TELEMEDICINE (OUTPATIENT)
Dept: PSYCHIATRY | Facility: HOSPITAL | Age: 62
End: 2022-04-12
Attending: COUNSELOR
Payer: COMMERCIAL

## 2022-04-12 DIAGNOSIS — F33.41 RECURRENT MAJOR DEPRESSIVE DISORDER, IN PARTIAL REMISSION (CMS/HCC): Primary | ICD-10-CM

## 2022-04-12 DIAGNOSIS — F43.22 ADJUSTMENT DISORDER WITH ANXIOUS MOOD: ICD-10-CM

## 2022-04-12 PROCEDURE — 90853 GROUP PSYCHOTHERAPY: CPT | Mod: 95 | Performed by: COUNSELOR

## 2022-04-12 ASSESSMENT — COGNITIVE AND FUNCTIONAL STATUS - GENERAL
INSIGHT: INTACT
EYE_CONTACT: WNL
AFFECT: FULL RANGE
POSITIVE INVOLVEMENT: SELF-AWARE;EMPATHETIC;OPEN;RELEVANT;EASILTY ENGAGED;INTERESTED
APPEARANCE: WELL GROOMED
THOUGHT_PROCESS: WNL
JUDGEMENT: GOOD
PSYCHOMOTOR FUNCTIONING: WNL
MOOD: EUTHYMIC (NORMAL);MOTIVATED;HOPEFUL

## 2022-04-12 NOTE — GROUP NOTE
Date:  4/12/2022  Start Time:   5:30 PM  End Time:   7:00 PM    Billie Frank, YOB: 1960,  was an active group participant.    M3  Session 9: Learning how to Become a Wise-Minded Witness  6 members attended group today.  Clinician introduced the format and expectations for participating in the Mindfulness, Meditation and Movement Group, advising all that the flow of the group is designed to introduce members to movement practices, guided meditations and mindfulness-based psycho-education topics Clinician shared that the focus of group today is to explore how the use of movement, meditation and mindfulness skills can assist in increasing our ability to detach from our emotional experience and become the witness of what is going on around us.  Group member practiced the “felt sense” sensing technique from Floyd Hernadez, PhD and learned the concept of  emotional mind, logical/rational mind and the desired combination of both: gibbs mind from Ashleigh Collado, PhD in an effort to increase frustration tolerance and decrease emotional reactivity and interpersonal conflict in our lives.  Group was then led through a series of mindful movements for 20 minutes to assist in building distress tolerance skills and were encouraged to practice mindful breathing consciously as they moved through each pose.  Group was concluded with the practice of a guided progressive relaxation exercise to build inner awareness of how the body feels when “distressed” as well as when “relaxed.”        Request for Consent  Patient provided verbal consent to treat via telemedicine. Clinician introduced the secure telemedicine platform that we are utilizing to provide care during the COVID-19 pandemic. Patient understands the session will be billed to their insurance or patient directly.  Patient was informed only the group patients  and the clinician are permitted on?the video conference, sessions are not recorded by the clinician, and  the patient is not permitted to record the session.? Patient was provided clinician's unique meeting ID prior to session, patient was asked to arrive to virtual session on time just as patient would if we were in the office. Clinician confirmed identification of patient by name and birthdate, provider name, location of patient and clinician, and callback number in case disconnected. Patient consents to behavioral health treatment    Patient Response to Request for Consent: Yes  Visit Type performed: Audio and Video            Mental Status:  Findings  Mood: Euthymic (normal), Motivated, Hopeful  Affect: Full Range  Rapport: Appropriate, Attentive  Eye Contact: WNL  Appearance: Well Groomed  Psychomotor Functioning: WNL  Thought Process: WNL  Positive Involvement: Self-Aware, Empathetic, Open, Relevant, Easilty Engaged, Interested  Judgment: Good  Insight: Intact    Patients reaction:  Pt shared that she is happy to be in group and is looking to receive what is offered tonight.  Pt was observed as an active listener and participated in experiential portion of group.    Visit Diagnosis:      ICD-10-CM ICD-9-CM   1. Recurrent major depressive disorder, in partial remission (CMS/HCC)  F33.41 296.35   2. Adjustment disorder with anxious mood  F43.22 309.24       Plan:  F/U with Biweekly  group with Clinician.   Pt to F/U with treatment goals as outlined in treatment plan.  Pt to F/U with local ED/call 911 should SI/HI arises.   Renate Bailey, LPC

## 2022-04-14 ENCOUNTER — TELEMEDICINE (OUTPATIENT)
Dept: PSYCHIATRY | Facility: HOSPITAL | Age: 62
End: 2022-04-14
Attending: COUNSELOR
Payer: COMMERCIAL

## 2022-04-14 DIAGNOSIS — F33.41 RECURRENT MAJOR DEPRESSIVE DISORDER, IN PARTIAL REMISSION (CMS/HCC): Primary | ICD-10-CM

## 2022-04-14 PROCEDURE — 90853 GROUP PSYCHOTHERAPY: CPT | Mod: 95 | Performed by: COUNSELOR

## 2022-04-14 ASSESSMENT — COGNITIVE AND FUNCTIONAL STATUS - GENERAL
POSITIVE INVOLVEMENT: SELF-AWARE;EMPATHETIC;OPEN;RELEVANT;EASILTY ENGAGED;INTERESTED
EYE_CONTACT: WNL
JUDGEMENT: GOOD
PSYCHOMOTOR FUNCTIONING: WNL
AFFECT: FULL RANGE
INSIGHT: INTACT
THOUGHT_PROCESS: WNL
APPEARANCE: WELL GROOMED
MOOD: EUTHYMIC (NORMAL)

## 2022-04-14 NOTE — GROUP NOTE
Date:  4/14/2022  Start Time:   5:30 PM  End Time:   7:00 PM    Billie Frank, YOB: 1960,  was an active group participant.    Request for Consent  Patient provided verbal consent to treat via telemedicine. Clinician introduced the secure telemedicine platform that we are utilizing to provide care during the COVID-19 pandemic. Patient understands the session will be billed to their insurance or patient directly.  Patient was informed only the group patients  and the clinician are permitted on?the video conference, sessions are not recorded by the clinician, and the patient is not permitted to record the session.? Patient was provided clinician's unique meeting ID prior to session, patient was asked to arrive to virtual session on time just as patient would if we were in the office. Clinician confirmed identification of patient by name and birthdate, provider name, location of patient and clinician, and callback number in case disconnected. Patient consents to behavioral health treatment    Patient Response to Request for Consent: Yes  Visit Type performed: Audio and Video    SKILLS FOR WELLNESS: 7 members attended group today.     Topic discussed was “Change Process.” CLINICIAN introduced the format and expectations for participating in the Skills for Wellness (SWELL) group.  CLINICIAN informed members that the focus is on psychoeducation and skill building as discussed in the group flyer/information.  CLINICIAN reviewed group code of conduct; the importance of confidentiality and non-judgment were stressed.  Group members completed check in/ introductions and shared previous experiences with mental health recovery and reasons for joining group.  CLINICIAN summarized what was taught in the last group session.  CLINICIAN shared that the group topic today is the “change process” including stages of change. CLINICIAN started discussion by inviting group members to review the “Readiness to Change”  handout, reflecting on their own personal readiness to change and any potential barriers to change. CLINICIAN discussed the “Stages of Change” model and emphasized that change is continuous, involves small steps and relapse in recovery is normal. CLINICIAN also discussed the concept of “ambivalence”, noting that it can often get in the way of making and sustaining change. CLINICIAN encouraged pt’s to reflect on where they are at in the stages of change. CLINICIAN discussed the difference between a “fixed vs growth mindset” using a handout provided. CLINICIAN encouraged pt’s to consider how to shift to a growth mindset in order to improve their recovery outcome. CLINICIAN provided group members with a “Goal-setting Worksheet” and encouraged group members to reflect on areas in which they would like to take “baby steps” in their recovery as it pertains to stages of change. CLINICIAN also encouraged group members to identify any barriers in their recovery and receive group support to overcome barriers. CLINICIAN encouraged group members to consider any positive affirmations or mantras they can use to achieve their goals. CLINICIAN ended discussion with a brief meditation related to the topic of group. CLINICIAN ended group praising group members for their commitment to their treatment and summarized the discussion. CLINICIAN ended group session with resources and suggested more activities around recovery.  Group members were given the opportunity to sign up for the next SWELL group        Mental Status:  Findings  Mood: Euthymic (normal)  Affect: Full Range  Rapport: Appropriate  Eye Contact: WNL  Appearance: Well Groomed  Psychomotor Functioning: WNL  Thought Process: WNL  Positive Involvement: Self-Aware, Empathetic, Open, Relevant, Easilty Engaged, Interested  Judgment: Good  Insight: Intact    Patients reaction: Pt was open and engaged in group. She acknowledged recent concerns surrounding the [place]  conflict and attempts at bringing her son's girlfriend and grandson home. Pt indicated that she is attempting to be grateful the situation is not worse for them, but also expressed heavy heart for the people of [facility]. Pt discussed ways in which she has learned to recognize her own powerlessness and lack of control, which has allowed her to focus her attention on what she can control, such as herself. She offered supportive feedback to other group members on the topic and was invited to share her journey at arriving at a place of radical acceptance with prompting from therapist.     Visit Diagnosis:      ICD-10-CM ICD-9-CM   1. Recurrent major depressive disorder, in partial remission (CMS/HCC)  F33.41 296.35       Plan:  F/U with Biweekly  group with Clinician.   Pt to F/U with treatment goals as outlined in treatment plan.  Pt to F/U with local ED/call 911 should SI/HI arises.   Kathleen Lee, LPC

## 2022-04-17 LAB
ALBUMIN SERPL-MCNC: 4.6 G/DL (ref 3.8–4.8)
ALBUMIN/GLOB SERPL: 2.2 {RATIO} (ref 1.2–2.2)
ALP SERPL-CCNC: 51 IU/L (ref 44–121)
ALT SERPL-CCNC: 14 IU/L (ref 0–32)
AST SERPL-CCNC: 19 IU/L (ref 0–40)
BILIRUB SERPL-MCNC: 0.4 MG/DL (ref 0–1.2)
BUN SERPL-MCNC: 17 MG/DL (ref 8–27)
BUN/CREAT SERPL: 18 (ref 12–28)
CALCIUM SERPL-MCNC: 9 MG/DL (ref 8.7–10.3)
CHLORIDE SERPL-SCNC: 102 MMOL/L (ref 96–106)
CO2 SERPL-SCNC: 20 MMOL/L (ref 20–29)
CREAT SERPL-MCNC: 0.97 MG/DL (ref 0.57–1)
EGFRCR SERPLBLD CKD-EPI 2021: 66 ML/MIN/1.73
GLOBULIN SER CALC-MCNC: 2.1 G/DL (ref 1.5–4.5)
GLUCOSE SERPL-MCNC: 97 MG/DL (ref 65–99)
POTASSIUM SERPL-SCNC: 4.1 MMOL/L (ref 3.5–5.2)
PROT SERPL-MCNC: 6.7 G/DL (ref 6–8.5)
SODIUM SERPL-SCNC: 142 MMOL/L (ref 134–144)

## 2022-04-20 ENCOUNTER — OFFICE VISIT (OUTPATIENT)
Dept: PRIMARY CARE | Facility: CLINIC | Age: 62
End: 2022-04-20
Payer: COMMERCIAL

## 2022-04-20 VITALS
OXYGEN SATURATION: 98 % | HEART RATE: 78 BPM | BODY MASS INDEX: 23.19 KG/M2 | SYSTOLIC BLOOD PRESSURE: 138 MMHG | WEIGHT: 153 LBS | HEIGHT: 68 IN | DIASTOLIC BLOOD PRESSURE: 82 MMHG | TEMPERATURE: 98.2 F | RESPIRATION RATE: 18 BRPM

## 2022-04-20 DIAGNOSIS — F41.1 GENERALIZED ANXIETY DISORDER: ICD-10-CM

## 2022-04-20 DIAGNOSIS — F33.0 MAJOR DEPRESSIVE DISORDER, RECURRENT EPISODE, MILD (CMS/HCC): Primary | ICD-10-CM

## 2022-04-20 DIAGNOSIS — R73.01 IMPAIRED FASTING GLUCOSE: ICD-10-CM

## 2022-04-20 PROCEDURE — 3008F BODY MASS INDEX DOCD: CPT | Performed by: NURSE PRACTITIONER

## 2022-04-20 PROCEDURE — 99213 OFFICE O/P EST LOW 20 MIN: CPT | Performed by: NURSE PRACTITIONER

## 2022-04-20 RX ORDER — ESCITALOPRAM OXALATE 20 MG/1
20 TABLET ORAL
Qty: 90 TABLET | Refills: 1 | Status: SHIPPED | OUTPATIENT
Start: 2022-04-20 | End: 2022-09-30

## 2022-04-20 ASSESSMENT — ENCOUNTER SYMPTOMS
NERVOUS/ANXIOUS: 1
DEPRESSED MOOD: 1
ANOREXIA: 0
PALPITATIONS: 0
SHORTNESS OF BREATH: 0
PANIC: 0
INSOMNIA: 1
FATIGUE: 1

## 2022-04-20 ASSESSMENT — PATIENT HEALTH QUESTIONNAIRE - PHQ9: SUM OF ALL RESPONSES TO PHQ9 QUESTIONS 1 & 2: 0

## 2022-04-20 NOTE — PROGRESS NOTES
[facility] at [address]  [place] [address]  [phone]  Fax [phone]      Patient ID: Billie Frank                              : 1960    Visit Date: 2022    Chief Complaint: Med Management         Patient ID: Billie Frank is a 61 y.o. female.    Patient Active Problem List   Diagnosis   • Angina pectoris syndrome (CMS/HCC)   • Family history of ischemic heart disease   • Generalized anxiety disorder   • Hyperlipidemia   • Major depressive disorder, recurrent episode, mild (CMS/HCC)   • Symptomatic menopausal or female climacteric states   • Persistent disorder of initiating or maintaining sleep   • Polyp of colon   • Moderate acquired hearing loss   • Schatzki's ring   • Seasonal allergic rhinitis due to pollen   • Chronic fatigue   • Impaired fasting glucose         Current Outpatient Medications:   •  AMABELZ 0.5-0.1 mg per tablet, 1 tablet 3 (three) times a week (Mon, Wed, Fri). TID weekly, due tomorrow 2020 , Disp: , Rfl:   •  ascorbic acid (VITAMIN C) 500 mg tablet, Take 1,000 mg by mouth daily., Disp: , Rfl:   •  aspirin 81 mg enteric coated tablet, Take 81 mg by mouth daily., Disp: , Rfl:   •  b complex vitamins capsule, Take 1 capsule by mouth daily., Disp: , Rfl:   •  cholecalciferol, vitamin D3, 1,000 unit (25 mcg) tablet, Take 1,000 Units by mouth daily., Disp: , Rfl:   •  diclofenac (VOLTAREN) 50 mg EC tablet, Take 50 mg by mouth 2 (two) times a day as needed.  , Disp: , Rfl:   •  escitalopram (LEXAPRO) 20 mg tablet, Take 1 tablet (20 mg total) by mouth once daily., Disp: 90 tablet, Rfl: 1  •  RANEXA 500 mg 12 hr tablet, , Disp: , Rfl:   •  rosuvastatin (CRESTOR) 5 mg tablet, Take 5 mg by mouth daily. Every other day, Disp: , Rfl:     Allergies   Allergen Reactions   • Bee Sting [Bee Venom Protein (Honey Bee)] Anaphylaxis   • Penicillins Hives   • Iodinated Contrast Media Hives   • Spinach GI  intolerance     Raw Spinach only   • Sulfa (Sulfonamide Antibiotics) Rash       Social History     Tobacco Use   • Smoking status: Never Smoker   • Smokeless tobacco: Never Used   Substance Use Topics   • Alcohol use: Yes     Alcohol/week: 2.0 standard drinks     Types: 2 Glasses of wine per week   • Drug use: Never       Health Maintenance   Topic Date Due   • DTaP, Tdap, and Td Vaccines (1 - Tdap) 04/19/2023 (Originally 10/2/1979)   • Breast Cancer Screening  08/18/2022   • Colonoscopy  01/18/2024   • Cervical Cancer Screening  01/29/2025   • Zoster Vaccine  Completed   • Influenza Vaccine  Completed   • Hepatitis C Screening  Completed   • COVID-19 Vaccine  Completed   • Meningococcal ACWY  Aged Out   • HIB Vaccines  Aged Out   • IPV Vaccines  Aged Out   • HPV Vaccines  Aged Out   • Pneumococcal  Aged Out   • HIV Screening  Discontinued       HPI  Routine med check.    Depression  This is a chronic problem. The current episode started more than 1 year ago. The problem occurs rarely. Progression since onset: stable. Associated symptoms include fatigue. Pertinent negatives include no anorexia or chest pain. Nothing aggravates the symptoms. Treatments tried: Lexapro. The treatment provided significant relief.   Anxiety  Presents for follow-up visit. Symptoms include depressed mood, excessive worry, insomnia and nervous/anxious behavior. Patient reports no chest pain, palpitations, panic, shortness of breath or suicidal ideas. Symptoms occur rarely. The quality of sleep is good.     Compliance with medications is % (on Lexapro). Treatment side effects: none.       The following have been reviewed and updated as appropriate in this visit:   Allergies  Meds  Problems           Review of System  Review of Systems   Constitutional: Positive for fatigue.   Respiratory: Negative for shortness of breath.    Cardiovascular: Negative for chest pain and palpitations.   Gastrointestinal: Negative for anorexia.  "  Psychiatric/Behavioral: Negative for suicidal ideas. The patient is nervous/anxious and has insomnia.        Objective     Vitals  Vitals:    04/20/22 1552   BP: 138/82   BP Location: Right upper arm   Patient Position: Sitting   Pulse: 78   Resp: 18   Temp: 36.8 °C (98.2 °F)   TempSrc: Oral   SpO2: 98%   Weight: 69.4 kg (153 lb)   Height: 1.727 m (5' 7.99\")     Body mass index is 23.27 kg/m².    Physical Exam  Physical Exam  Vitals reviewed.   Constitutional:       General: She is not in acute distress.     Appearance: Normal appearance. She is not diaphoretic.   Cardiovascular:      Rate and Rhythm: Normal rate and regular rhythm.      Heart sounds: No murmur heard.    No friction rub. No gallop.   Pulmonary:      Effort: Pulmonary effort is normal.      Breath sounds: Normal breath sounds. No wheezing, rhonchi or rales.   Neurological:      Mental Status: She is alert and oriented to person, place, and time.   Psychiatric:         Mood and Affect: Mood and affect normal.         Speech: Speech normal.         Behavior: Behavior normal.         Thought Content: Thought content does not include suicidal ideation. Thought content does not include suicidal plan.         Assessment/Plan     Problem List Items Addressed This Visit     Generalized anxiety disorder     Stable on med.  Uses Ativan PRN--psych prescribes this.           Relevant Medications    escitalopram (LEXAPRO) 20 mg tablet    Major depressive disorder, recurrent episode, mild (CMS/HCC) - Primary     Rare days where she feels down. Has been very stable on Lexapro.  Continue med.  Follow up 6 mo.           Relevant Medications    escitalopram (LEXAPRO) 20 mg tablet    Impaired fasting glucose      Latest Reference Range & Units 04/16/22 09:29   Sodium 134 - 144 mmol/L 142   Potassium 3.5 - 5.2 mmol/L 4.1   Chloride 96 - 106 mmol/L 102   CO2 20 - 29 mmol/L 20   BUN 8 - 27 mg/dL 17   Creatinine 0.57 - 1.00 mg/dL 0.97   Glucose 65 - 99 mg/dL 97   Calcium " 8.7 - 10.3 mg/dL 9.0   AST (SGOT) 0 - 40 IU/L 19   ALT (SGPT) 0 - 32 IU/L 14   Alkaline Phosphatase 44 - 121 IU/L 51   Total Protein 6.0 - 8.5 g/dL 6.7   Albumin 3.8 - 4.8 g/dL 4.6   Bilirubin, Total 0.0 - 1.2 mg/dL 0.4   Bun/Creatinine Ratio 12 - 28  18   Globulin 1.5 - 4.5 g/dL 2.1     Stable labs.  Continue to monitor.                     ALBERT Bowen  4/20/2022

## 2022-04-20 NOTE — ASSESSMENT & PLAN NOTE
Latest Reference Range & Units 04/16/22 09:29   Sodium 134 - 144 mmol/L 142   Potassium 3.5 - 5.2 mmol/L 4.1   Chloride 96 - 106 mmol/L 102   CO2 20 - 29 mmol/L 20   BUN 8 - 27 mg/dL 17   Creatinine 0.57 - 1.00 mg/dL 0.97   Glucose 65 - 99 mg/dL 97   Calcium 8.7 - 10.3 mg/dL 9.0   AST (SGOT) 0 - 40 IU/L 19   ALT (SGPT) 0 - 32 IU/L 14   Alkaline Phosphatase 44 - 121 IU/L 51   Total Protein 6.0 - 8.5 g/dL 6.7   Albumin 3.8 - 4.8 g/dL 4.6   Bilirubin, Total 0.0 - 1.2 mg/dL 0.4   Bun/Creatinine Ratio 12 - 28  18   Globulin 1.5 - 4.5 g/dL 2.1     Stable labs.  Continue to monitor.

## 2022-04-26 ENCOUNTER — TELEMEDICINE (OUTPATIENT)
Dept: PSYCHIATRY | Facility: HOSPITAL | Age: 62
End: 2022-04-26
Attending: COUNSELOR
Payer: COMMERCIAL

## 2022-04-26 DIAGNOSIS — F33.41 RECURRENT MAJOR DEPRESSIVE DISORDER, IN PARTIAL REMISSION (CMS/HCC): Primary | ICD-10-CM

## 2022-04-26 DIAGNOSIS — F43.22 ADJUSTMENT DISORDER WITH ANXIOUS MOOD: ICD-10-CM

## 2022-04-26 PROCEDURE — 90853 GROUP PSYCHOTHERAPY: CPT | Mod: 95 | Performed by: COUNSELOR

## 2022-04-26 ASSESSMENT — COGNITIVE AND FUNCTIONAL STATUS - GENERAL
JUDGEMENT: GOOD
INSIGHT: INTACT
PSYCHOMOTOR FUNCTIONING: WNL
AFFECT: FULL RANGE
THOUGHT_PROCESS: WNL
MOOD: EUTHYMIC (NORMAL)
EYE_CONTACT: WNL
APPEARANCE: WELL GROOMED

## 2022-04-26 NOTE — GROUP NOTE
Date:  4/26/2022  Start Time:   5:30 PM  End Time:   7:00 PM    Billie Frank, YOB: 1960,  was an active group participant.    M3  Session 10: Grief, Loss, and Gratitude  7 members attended group today.  Clinician introduced the format and expectations for participating in the Mindfulness, Meditation and Movement Group, advising all that the flow of the group is designed to introduce members to movement practices, guided meditations and mindfulness-based psycho-education topics.    Clinician shared that the focus of group today is to explore how the use of movement, meditation and mindfulness skills can increase our ability to notice pain, suffering, grief and loss.   Group was educated about the concept of attachments and how our belief in “how things should be” impacts our ability to cope with what is.  Group were provided information from [employer] about what the emotion and experience of fear does to the body physically.  Group then discussed the concept of gratitude and how gratitude can be used to combat the experience of loss in our lives.  Group reviewed the definition of gratitude as expounded by Georges Rob in Gratitude Works! And reviewed, journaled during class/practiced identifying gratitudes in their lives and received scientific information regarding the efficacy of starting a gratitude practice in their own lives.  Group completed an activity encouraging group members to share what gratitude they see in their own lives and were encouraged to follow up with 5 gratitude building exercises/practices including daily journaling, nature experiences/walks; meditation; collages/art and eating mindfully.  Group were then led through movements which encouraged group members to explore poses that may cause increased strain/energy discharge and how those poses impacted their thoughts in the moment.  Group was then led through a 20 minute loving-kindness meditation to begin offering  compassion to the self that may be hurting.      Request for Consent  Patient provided verbal consent to treat via telemedicine. Clinician introduced the secure telemedicine platform that we are utilizing to provide care during the COVID-19 pandemic. Patient understands the session will be billed to their insurance or patient directly.  Patient was informed only the group patients  and the clinician are permitted on?the video conference, sessions are not recorded by the clinician, and the patient is not permitted to record the session.? Patient was provided clinician's unique meeting ID prior to session, patient was asked to arrive to virtual session on time just as patient would if we were in the office. Clinician confirmed identification of patient by name and birthdate, provider name, location of patient and clinician, and callback number in case disconnected. Patient consents to behavioral health treatment    Patient Response to Request for Consent: Yes  Visit Type performed: Audio and Video                    Mental Status:  Findings  Mood: Euthymic (normal)  Affect: Full Range  Rapport: Appropriate  Eye Contact: WNL  Appearance: Well Groomed  Psychomotor Functioning: WNL  Thought Process: WNL  Positive Involvement: Empathetic, Open, Relevant, Easilty Engaged, Interested  Judgment: Good  Insight: Intact    Patients reaction:  Pt shared her intention to find calm from group tonight.  Pt actively listened to peers and shared how gratitude for her journey has been a helpful practice for her.    Visit Diagnosis:      ICD-10-CM ICD-9-CM   1. Recurrent major depressive disorder, in partial remission (CMS/HCC)  F33.41 296.35   2. Adjustment disorder with anxious mood  F43.22 309.24       Plan:  F/U with Biweekly  group with Clinician.   Pt to F/U with treatment goals as outlined in treatment plan.  Pt to F/U with local ED/call 911 should SI/HI arises.   Renate Bailey, LPC

## 2022-04-28 ENCOUNTER — TELEMEDICINE (OUTPATIENT)
Dept: PSYCHIATRY | Facility: HOSPITAL | Age: 62
End: 2022-04-28
Attending: COUNSELOR
Payer: COMMERCIAL

## 2022-04-28 DIAGNOSIS — F43.22 ADJUSTMENT DISORDER WITH ANXIOUS MOOD: ICD-10-CM

## 2022-04-28 DIAGNOSIS — F33.41 RECURRENT MAJOR DEPRESSIVE DISORDER, IN PARTIAL REMISSION (CMS/HCC): Primary | ICD-10-CM

## 2022-04-28 PROCEDURE — 90853 GROUP PSYCHOTHERAPY: CPT | Mod: 95 | Performed by: COUNSELOR

## 2022-04-28 ASSESSMENT — COGNITIVE AND FUNCTIONAL STATUS - GENERAL
PSYCHOMOTOR FUNCTIONING: WNL
MOOD: EUTHYMIC (NORMAL)
EYE_CONTACT: WNL
JUDGEMENT: GOOD
AFFECT: FULL RANGE
INSIGHT: INTACT
THOUGHT_PROCESS: WNL
APPEARANCE: WELL GROOMED

## 2022-04-28 NOTE — GROUP NOTE
Date:  4/28/2022  Start Time:   5:30 PM  End Time:   7:00 PM    Billie Frank, YOB: 1960,  was quiet but attentive.    Request for Consent  Patient provided verbal consent to treat via telemedicine. Clinician introduced the secure telemedicine platform that we are utilizing to provide care during the COVID-19 pandemic. Patient understands the session will be billed to their insurance or patient directly.  Patient was informed only the group patients  and the clinician are permitted on?the video conference, sessions are not recorded by the clinician, and the patient is not permitted to record the session.? Patient was provided clinician's unique meeting ID prior to session, patient was asked to arrive to virtual session on time just as patient would if we were in the office. Clinician confirmed identification of patient by name and birthdate, provider name, location of patient and clinician, and callback number in case disconnected. Patient consents to behavioral health treatment    Patient Response to Request for Consent: Yes  Visit Type performed: Audio and Video    SKILLS FOR WELLNESS: 8 members attended group today.     Topic discussed was “Health and Wellness.” CLINICIAN introduced the format and expectations for participating in the Skills for Wellness (SWEJULIO) group.  CLINICIAN informed members that the focus is on psychoeducation and skill building as discussed in the group flyer/information.  CLINICIAN reviewed group code of conduct; the importance of confidentiality and non-judgment were stressed.  Group members completed check in/ introductions and shared previous experiences with mental health recovery and reasons for joining group.  CLINICIAN summarized what was taught in the last group session.  CLINICIAN shared that the group topic today is “health and wellness”. CLINICIAN started discussion inviting group members to share their experience with health and wellness and provided a working  definition. CLINICIAN described the eight different aspects of wellness using a “wellness wheel” and related videos. Group members were asked to reflect on each of these aspects of their lives including strengths and areas for growth. CLINICIAN then introduced PLEASE from DBT and discussed the importance of maintaining physical health in order to improve mental health, and how not doing so can put us at risk for relapse. CLINICIAN then invited group members to share interest in other topics of wellness to include sleep, tobacco use, medication adherence, etc. and spent time discussing strategies for improvement. CLINICIAN reminded group members of the importance of taking ownership for their own healthcare and suggested self-monitoring and tracking as helpful ways to build awareness. CLINICIAN provided group members with a “Goal-setting Worksheet” and encouraged group members to reflect on areas in which they would like to take “baby steps” in their recovery as it pertains to health and wellness. CLINICIAN also encouraged group members to identify any barriers in their recovery and receive group support to overcome barriers. CLINICIAN encouraged group members to consider any positive affirmations or mantras they can use to achieve their goals. CLINICIAN ended discussion with a brief meditation related to the topic of group. CLINICIAN ended group praising group members for their commitment to their treatment and summarized the discussion. CLINICIAN ended group session with resources and suggested more activities around recovery.  Group members were given the opportunity to sign up for the next SWELL group.        Mental Status:  Findings  Mood: Euthymic (normal)  Affect: Full Range  Rapport: Appropriate, Attentive  Eye Contact: WNL  Appearance: Well Groomed  Psychomotor Functioning: WNL  Thought Process: WNL  Positive Involvement: Empathetic, Open, Relevant, Interested  Judgment: Good  Insight: Intact    Patients  reaction: Pt was quiet but attentive in group aeb maintaining eye contact and making periodic contributions. She offered support to another group member on the topic of postpartum difficulties along with traumatic labor and delivery experiences. Pt discussed ways she has maintained wellness through exercise.    Visit Diagnosis:      ICD-10-CM ICD-9-CM   1. Recurrent major depressive disorder, in partial remission (CMS/HCC)  F33.41 296.35   2. Adjustment disorder with anxious mood  F43.22 309.24       Plan:  F/U with Biweekly  group with Clinician.   Pt to F/U with treatment goals as outlined in treatment plan.  Pt to F/U with local ED/call 911 should SI/HI arises.   Kathleen Lee, LPC

## 2022-05-10 ENCOUNTER — TELEMEDICINE (OUTPATIENT)
Dept: PSYCHIATRY | Facility: HOSPITAL | Age: 62
End: 2022-05-10
Attending: COUNSELOR
Payer: COMMERCIAL

## 2022-05-10 DIAGNOSIS — F33.41 RECURRENT MAJOR DEPRESSIVE DISORDER, IN PARTIAL REMISSION (CMS/HCC): Primary | ICD-10-CM

## 2022-05-10 DIAGNOSIS — F43.22 ADJUSTMENT DISORDER WITH ANXIOUS MOOD: ICD-10-CM

## 2022-05-10 PROCEDURE — 90853 GROUP PSYCHOTHERAPY: CPT | Mod: 95 | Performed by: COUNSELOR

## 2022-05-10 ASSESSMENT — COGNITIVE AND FUNCTIONAL STATUS - GENERAL
CONCENTRATION: WNL
EYE_CONTACT: WNL
IMPULSE CONTROL: INTACT
JUDGEMENT: GOOD
DELUSIONS: NONE OR AGE APPROPRIATE
PSYCHOMOTOR FUNCTIONING: WNL
SLEEP_WAKE_CYCLE: NO CHANGE
ORIENTATION: FULLY ORIENTED
REMOTE MEMORY: WNL
INSIGHT: INTACT
PERCEPTUAL FUNCTION: NORMAL
THOUGHT_PROCESS: WNL
AROUSAL LEVEL: AWAKE
APPEARANCE: WELL GROOMED
THOUGHT_CONTENT: APPROPRIATE
ATTENTION: WNL
SPEECH: REGULAR
APPETITE: DECREASED
EST. PREMORBID INTELLIGENCE: ABOVE AVERAGE
LIBIDO: NO CHANGE
RECENT MEMORY: WNL
MOOD: EUTHYMIC (NORMAL);HOPEFUL
AFFECT: FULL RANGE

## 2022-05-10 NOTE — GROUP NOTE
Date:  5/10/2022  Start Time:   5:30 PM  End Time:   7:00 PM    Billie Frank, YOB: 1960,  was an active group participant.    M3  Session 11: Mindfulness Review/Where to go from here  5 members attended group today.  Clinician introduced the format and expectations for participating in the Mindfulness, Meditation and Movement Group, advising all that the flow of the group is designed to introduce members to movement practices, guided meditations and mindfulness-based psycho-education topics.  Clinician shared that the focus of group today is to explore how the use of movement, meditation and mindfulness skills can help women build a personalized self-care plan.  Group members were introduced to the theory of “Motivational Interviewing” and “the Cycle of Change” by Ken[place] and [place] as a means of better understanding/offering ourselves compassion when experiencing difficulty maintaining positive changes in our lives.  Group were also educated and received a handout on Maslow’s hierarchy of needs as a means of better understanding how certain basic and psychological needs have to be met before we can work on the goal of achieving our own full potential/ability to create.  Group members were encouraged to reflect on ways to take breaks throughout their day and encouraged to share what works for them currently.  Group members were led through 30 minutes of mindful movements with a focus on increasing the ability to interocept or watch what physical experiences occurred within their bodies throughout the practice.  Group members were then led through a safe place/calm place guided practice at the end.  Group members were encouraged to process and share anything they noticed during the movement and guided meditation parts of the group and encouraged to share with each other what practices of mindfulness/meditation and movement have changed their lives/helped them in an effort to collaborate together  "in a safe place.      Request for Consent  Patient provided verbal consent to treat via telemedicine. Clinician introduced the secure telemedicine platform that we are utilizing to provide care during the COVID-19 pandemic. Patient understands the session will be billed to their insurance or patient directly.  Patient was informed only the group patients  and the clinician are permitted on?the video conference, sessions are not recorded by the clinician, and the patient is not permitted to record the session.? Patient was provided clinician's unique meeting ID prior to session, patient was asked to arrive to virtual session on time just as patient would if we were in the office. Clinician confirmed identification of patient by name and birthdate, provider name, location of patient and clinician, and callback number in case disconnected. Patient consents to behavioral health treatment    Patient Response to Request for Consent: Yes  Visit Type performed: Audio and Video                 Mental Status:  Findings  Mood: Euthymic (normal), Hopeful  Affect: Full Range  Rapport: Appropriate, Attentive  Eye Contact: WNL  Appearance: Well Groomed  Psychomotor Functioning: WNL  Thought Process: WNL  Positive Involvement: Empathetic, Open, Relevant, Interested, Easilty Engaged, Self-Aware  Judgment: Good  Insight: Intact    Patients reaction:  Pt shared that she continues to use this group as a \"tool\" in her self care toolbox.  Pt offered support to a peer who was struggling with worries about her son.  Pt also shared during topic discussion that she practices spiritual self care every morning by praying to her parents.      Visit Diagnosis:      ICD-10-CM ICD-9-CM   1. Recurrent major depressive disorder, in partial remission (CMS/[facility])  F33.41 296.35   2. Adjustment disorder with anxious mood  F43.22 309.24       Plan:  F/U with Biweekly  group with Clinician.   Pt to F/U with treatment goals as outlined in treatment " plan.  Pt to F/U with local ED/call 911 should SI/HI arises.   Renate Bailey, LPC

## 2022-06-07 ENCOUNTER — TELEMEDICINE (OUTPATIENT)
Dept: PSYCHIATRY | Facility: HOSPITAL | Age: 62
End: 2022-06-07
Attending: COUNSELOR
Payer: COMMERCIAL

## 2022-06-07 DIAGNOSIS — F43.22 ADJUSTMENT DISORDER WITH ANXIOUS MOOD: ICD-10-CM

## 2022-06-07 DIAGNOSIS — F33.41 RECURRENT MAJOR DEPRESSIVE DISORDER, IN PARTIAL REMISSION (CMS/HCC): Primary | ICD-10-CM

## 2022-06-07 PROCEDURE — 90853 GROUP PSYCHOTHERAPY: CPT | Mod: 95 | Performed by: COUNSELOR

## 2022-06-07 ASSESSMENT — COGNITIVE AND FUNCTIONAL STATUS - GENERAL
INSIGHT: INTACT
MOOD: EUTHYMIC (NORMAL);HOPEFUL;ANXIOUS
PSYCHOMOTOR FUNCTIONING: WNL
THOUGHT_PROCESS: WNL
EYE_CONTACT: WNL
AFFECT: FULL RANGE
APPEARANCE: WELL GROOMED
JUDGEMENT: GOOD

## 2022-06-07 NOTE — GROUP NOTE
Date:  6/7/2022  Start Time:   5:30 PM  End Time:   7:00 PM    iBllie Frank, YOB: 1960,  was an active group participant.    M3  Session 1: Brain and Body 101  8 members attended group today.  Clinician introduced the format and expectations for participating in the Mindfulness, Meditation and Movement Group, advising all that the flow of the group is designed to introduce members to movement practices, guided meditations and mindfulness-based psychoeducation topics.  Clinician shared that the focus of group today is to explore and grow awareness about how mindfulness can assist with anxiety and depression as well as how the sympathetic and parasympathetic nervous system can perpetuate the fight/flight/freeze reaction through our posture, how we breathe and ways we think.  Group members were introduced to the concept of interoception as a skill to increase awareness of moods/emotions.  Group members were introduced to the work of John Quintanilla’s work in Trauma Sensitive Yoga and were educated that interoception includes: the visceral experience of feeling something in your body (muscles stretching, heart beating, stomach growling).  Additionally, interoceptive focus can include examining the motivation/movements involved in acting out whatever the visceral feeling activates or triggers.  Finally, group processed the effect of our visceral experience and movements/actions on our moods/emotions.  Group members were introduced to several therapeutic mindful movements and encouraged to begin exploring their own interoceptive capabilities in the group setting for 20 minutes.  Group members were then encouraged to engage in a guided meditation which consisted of a 10 minute breath oriented guided meditation by Jung TyGroup members were supportive of one another and offered encouragement and support throughout.  Members were given the opportunity to sign up for the next M3 Group.  Group members were  "provided handouts with suggested materials to review at their own pace..      Request for Consent  Patient provided verbal consent to treat via telemedicine. Clinician introduced the secure telemedicine platform that we are utilizing to provide care during the COVID-19 pandemic. Patient understands the session will be billed to their insurance or patient directly.  Patient was informed only the group patients  and the clinician are permitted on?the video conference, sessions are not recorded by the clinician, and the patient is not permitted to record the session.? Patient was provided clinician's unique meeting ID prior to session, patient was asked to arrive to virtual session on time just as patient would if we were in the office. Clinician confirmed identification of patient by name and birthdate, provider name, location of patient and clinician, and callback number in case disconnected. Patient consents to behavioral health treatment    Patient Response to Request for Consent: Yes  Visit Type performed: Audio and Video                Mental Status:  Findings  Mood: Euthymic (normal), Hopeful, Anxious  Affect: Full Range  Rapport: Appropriate, Attentive  Eye Contact: WNL  Appearance: Well Groomed  Psychomotor Functioning: WNL  Thought Process: WNL  Positive Involvement: Empathetic, Open, Relevant, Interested, Easilty Engaged, Self-Aware  Judgment: Good  Insight: Intact    Patients reaction:  Pt shared her gratitude for group as a way of keeping up with her \"mental maintenance.\"  She also shared a mindful moment she had recently with gratitude for electricity, her hair dryer, and a warm breeze from her window.  She was able to fully engage with the physical practice.    Visit Diagnosis:      ICD-10-CM ICD-9-CM   1. Recurrent major depressive disorder, in partial remission (CMS/[facility])  F33.41 296.35   2. Adjustment disorder with anxious mood  F43.22 309.24       Plan:  F/U with Biweekly  group with Clinician.   Pt " to F/U with treatment goals as outlined in treatment plan.  Pt to F/U with local ED/call 911 should SI/HI arises.   Renate Bailey, LPC

## 2022-06-21 ENCOUNTER — TELEMEDICINE (OUTPATIENT)
Dept: PSYCHIATRY | Facility: HOSPITAL | Age: 62
End: 2022-06-21
Attending: COUNSELOR
Payer: COMMERCIAL

## 2022-06-21 DIAGNOSIS — F33.41 RECURRENT MAJOR DEPRESSIVE DISORDER, IN PARTIAL REMISSION (CMS/HCC): Primary | ICD-10-CM

## 2022-06-21 DIAGNOSIS — F43.22 ADJUSTMENT DISORDER WITH ANXIOUS MOOD: ICD-10-CM

## 2022-06-21 PROCEDURE — 90853 GROUP PSYCHOTHERAPY: CPT | Mod: 95 | Performed by: COUNSELOR

## 2022-06-21 ASSESSMENT — COGNITIVE AND FUNCTIONAL STATUS - GENERAL
PSYCHOMOTOR FUNCTIONING: WNL
AFFECT: TENSE
MOOD: ANXIOUS;FRUSTRATED;DEPRESSED
THOUGHT_PROCESS: NEGATIVITY
JUDGEMENT: GOOD
INSIGHT: INTACT
APPEARANCE: WELL GROOMED
EYE_CONTACT: WNL

## 2022-06-21 NOTE — GROUP NOTE
Date:  6/21/2022  Start Time:   5:30 PM  End Time:   7:00 PM    Billie Frank, YOB: 1960,  was an active group participant.    M3   Session 2: Saying Yes to Imperfection  7 members attended group today.  Clinician introduced the format and expectations for participating in the Mindfulness, Meditation and Movement Group, advising all that the flow of the group is designed to introduce members to movement practices, guided meditations and mindfulness-based psychoeducation topics. Group content included review of the following articles: Breathing: The Little Known Secret to Peace of Mind by Linda Alvares, PhD and Breathing and Your Brain: 5 Reasons to Grab the Controls by John Hicks.  Group members were educated about the Autonomic Nervous system which houses the Sympathetic, Parasympathetic and Enteric nervous systems.  Group were educated that the PNS system conserves energy in our bodies and is responsible for ongoing, mellow, steady state.  Group were then educated that the SNS and PNS are activated whenever we breathe in/breathe out.  Group members were educated about the impact of chronic SNS activation and resultant physical/emotional/psychological side effects.  Group members were educated about how the breath can be an anchor to the present moment which can alleviate anxiety and depressive based thoughts/belief systems.  Group members were then led through the 4 count/square breathing exercise and taught the efficacy of using this breath work to experience decreased agitation, anxiety and overwhelmed emotions.  Group members were introduced to the concept of interoception as a skill to increase awareness of moods/emotions.  Group members were introduced to several therapeutic mindful movements and encouraged to begin exploring their own interoceptive capabilities in the group setting for 35 minutes.  Group members were then encouraged to engage in a guided meditation which consisted of a  "10-15 minute Body Scan completed by Clinician. Group members were provided handouts with suggested materials to review at their own pace.      Request for Consent  Patient provided verbal consent to treat via telemedicine. Clinician introduced the secure telemedicine platform that we are utilizing to provide care during the COVID-19 pandemic. Patient understands the session will be billed to their insurance or patient directly.  Patient was informed only the group patients  and the clinician are permitted on?the video conference, sessions are not recorded by the clinician, and the patient is not permitted to record the session.? Patient was provided clinician's unique meeting ID prior to session, patient was asked to arrive to virtual session on time just as patient would if we were in the office. Clinician confirmed identification of patient by name and birthdate, provider name, location of patient and clinician, and callback number in case disconnected. Patient consents to behavioral health treatment    Patient Response to Request for Consent: Yes  Visit Type performed: Audio and Video                Mental Status:  Findings  Mood: Anxious, Frustrated, Depressed  Affect: Tense  Rapport: Appropriate, Attentive  Eye Contact: WNL  Appearance: Well Groomed  Psychomotor Functioning: WNL  Thought Process: Negativity  Positive Involvement: Open, Relevant, Interested, Easilty Engaged  Judgment: Good  Insight: Intact    Patients reaction:  Pt shared today she is feeling very down about herself, her mental health, and stated, \"I am in a weird place this week.\"  Shared she is feeling hatred for her body and said \"I am just getting fat and forgetting stuff.\"  Pt was open to support and validation from peers and was grateful as peers offered thoughts that resonated and lifted pt's spirits.  Pt participated in meditation practice and movement practice.    Visit Diagnosis:      ICD-10-CM ICD-9-CM   1. Recurrent major depressive " disorder, in partial remission (CMS/HCC)  F33.41 296.35   2. Adjustment disorder with anxious mood  F43.22 309.24       Plan:  F/U with Biweekly  group with Clinician.   Pt to F/U with treatment goals as outlined in treatment plan.  Pt to F/U with local ED/call 911 should SI/HI arises.   Renate Bailey, LPC

## 2022-07-05 ENCOUNTER — TELEMEDICINE (OUTPATIENT)
Dept: PSYCHIATRY | Facility: HOSPITAL | Age: 62
End: 2022-07-05
Attending: COUNSELOR
Payer: COMMERCIAL

## 2022-07-05 DIAGNOSIS — F43.22 ADJUSTMENT DISORDER WITH ANXIOUS MOOD: ICD-10-CM

## 2022-07-05 DIAGNOSIS — F33.41 RECURRENT MAJOR DEPRESSIVE DISORDER, IN PARTIAL REMISSION (CMS/HCC): Primary | ICD-10-CM

## 2022-07-05 PROCEDURE — 90853 GROUP PSYCHOTHERAPY: CPT | Mod: 95 | Performed by: COUNSELOR

## 2022-07-05 ASSESSMENT — COGNITIVE AND FUNCTIONAL STATUS - GENERAL
MOOD: ANXIOUS;DEPRESSED
INSIGHT: INTACT
EYE_CONTACT: WNL
JUDGEMENT: GOOD
APPEARANCE: WELL GROOMED
PSYCHOMOTOR FUNCTIONING: WNL
AFFECT: FULL RANGE
THOUGHT_PROCESS: WNL

## 2022-07-05 NOTE — GROUP NOTE
Date:  7/5/2022  Start Time:   5:30 PM  End Time:   7:00 PM    Billie Frank, YOB: 1960,  was an active group participant.    M3 Session 3: Examining the Internal Experience with Compassion  6 members attended group today.  Clinician introduced the format and expectations for participating in the Mindfulness, Meditation and Movement Group, advising all that the flow of the group is designed to introduce members to movement practices, guided meditations and mindfulness-based psycho-education topics.    Clinician shared that the focus of group today is to explore and grow awareness about negative core beliefs that perpetuate symptoms of anxiety and depression and how mindfulness-based techniques, including meditation and movement, can assist in decreasing the impact, intensity and frequency of our negative internal voice.  Group members were led through a series of mindful movements to assist with release and strengthening of the psoas muscle.  Group members were educated about the psoas muscle and provided including The Muscle of the Soul may be triggering your Fear and Anxiety; Check your hips for Back Pain and How to Stretch and Strengthen the Psoas. The group also reviewed a checklist of 10 Cognitive distortions (Hughes, Feeling Good: the New Mood Therapy (1980) and discussed the impact of their own tape(s) on their lives.  Additionally, the group was introduced to the work of Julissa Nolasco, PhD and the practice of self-compassion through the use of the Acronym “RAIN” 1. Recognize what is going on 2. A: allow the experience to be there, just as it is, I: Investigate with Kindness and N: Natural awareness that comes from not identifying with the experience/emotions.  Group then transitioned into the meditation portion of the group with a self-compassion/lovingkindness guided meditation conducted by Clinician with focus on offering oneself compassion with intension to better modulate the inner experience  of emotions and the breath moving in the body.        Request for Consent  Patient provided verbal consent to treat via telemedicine. Clinician introduced the secure telemedicine platform that we are utilizing to provide care during the COVID-19 pandemic. Patient understands the session will be billed to their insurance or patient directly.  Patient was informed only the group patients  and the clinician are permitted on?the video conference, sessions are not recorded by the clinician, and the patient is not permitted to record the session.? Patient was provided clinician's unique meeting ID prior to session, patient was asked to arrive to virtual session on time just as patient would if we were in the office. Clinician confirmed identification of patient by name and birthdate, provider name, location of patient and clinician, and callback number in case disconnected. Patient consents to behavioral health treatment    Patient Response to Request for Consent: Yes  Visit Type performed: Audio and Video                Mental Status:  Findings  Mood: Anxious, Depressed  Affect: Full Range  Rapport: Appropriate, Attentive  Eye Contact: WNL  Appearance: Well Groomed  Psychomotor Functioning: WNL  Thought Process: WNL  Positive Involvement: Open, Relevant, Interested, Easilty Engaged, Empathetic, Self-Aware  Judgment: Good  Insight: Intact    Patients reaction:  Pt shared that she is feeling stress related to her son and his family.  She also feels the stress in her body related to her posture and shared she looks forward to practicing more heart openers to improve her posture.  Pt was empathetic and supportive to a peer today.    Visit Diagnosis:      ICD-10-CM ICD-9-CM   1. Recurrent major depressive disorder, in partial remission (CMS/HCC)  F33.41 296.35   2. Adjustment disorder with anxious mood  F43.22 309.24       Plan:  F/U with Biweekly  group with Clinician.   Pt to F/U with treatment goals as outlined in  treatment plan.  Pt to F/U with local ED/call 911 should SI/HI arises.   Renate Bailey, LPC

## 2022-07-19 ENCOUNTER — TELEMEDICINE (OUTPATIENT)
Dept: PSYCHIATRY | Facility: HOSPITAL | Age: 62
End: 2022-07-19
Attending: COUNSELOR
Payer: COMMERCIAL

## 2022-07-19 DIAGNOSIS — F43.22 ADJUSTMENT DISORDER WITH ANXIOUS MOOD: ICD-10-CM

## 2022-07-19 DIAGNOSIS — F33.41 RECURRENT MAJOR DEPRESSIVE DISORDER, IN PARTIAL REMISSION (CMS/HCC): Primary | ICD-10-CM

## 2022-07-19 PROCEDURE — 90853 GROUP PSYCHOTHERAPY: CPT | Mod: 95 | Performed by: COUNSELOR

## 2022-07-19 ASSESSMENT — COGNITIVE AND FUNCTIONAL STATUS - GENERAL
MOOD: ANXIOUS;DEPRESSED;HOPEFUL
JUDGEMENT: GOOD
THOUGHT_PROCESS: WNL
EYE_CONTACT: WNL
AFFECT: FULL RANGE
INSIGHT: INTACT
PSYCHOMOTOR FUNCTIONING: WNL
APPEARANCE: WELL GROOMED

## 2022-07-19 NOTE — GROUP NOTE
Date:  7/19/2022  Start Time:   5:30 PM  End Time:   7:00 PM    Billie Frank, YOB: 1960,  was an active group participant.    M3    Session 4: The Anxiety Spiral  4 members attended group today.  Clinician introduced the format and expectations for participating in the Mindfulness, Meditation and Movement Group, advising all that the flow of the group is designed to introduce members to movement practices, guided meditations and mindfulness-based psycho-education topics.  Clinician shared that the focus of group today was increased understanding of the impact and intensity of the “anxiety spiral” on our thoughts, physiology and behavior.   Group were reminded of the benefits of mindful movement and why the use of focused attention on the body and its internal experience (interoception) helps highly anxious individuals who may experience difficulty with traditional meditation and/or psychotherapy due to difficulty accessing the frontal lobe.   During the psycho-education portion of the group; the group discussed the connections between anxiety and the enteric nervous system resulting in gastrointestinal disorders including IBS, GERD, nausea, & functional dyspepsia.  Group then discussed the link between anxiety and chronic respiratory disorders, heart disease and migraines and reviewed the article, Anxiety and Physical Illness from [employer] (7/2008).  They were reminded that those with high anxiety may have difficulty verbally and cognitively processing stressors.  They were educated of the benefits of regular internal focus to increase body awareness when experiencing distressing emotions/thoughts in future stressful situations.  Group was then educated about various forms of anxiety including social anxiety and reviewed the Social Anxiety spiral taken from Managing Social Anxiety: CBT Therapy Approach by Gabbi Walls (2010).  Group was also provided The Emotional Guidance Scale  taken from Ask and It Is Given by Briana Owen to better understand the spiraling thoughts that occur as stress increases in our life.  Group also reviewed the link of cognitions, physiology/feelings and Behaviors/movements in managing and better understanding anxiety.  Group was also led through mindfulness practice for anxiety by using the breath as an anchor to manage stress.  Clinician led group through Alternate Nostril breath and Breath of paul with focus on increasing focus and attention to sensations, temperature changes, pressure, color, level of agitation/energy & lethargy.  Group members were then led through a 20 minute mindful movement exercise with focus of interoception and growing a relationship with the inner workings/experience of their bodies during the exercise.   Group was then led though a guided mindfulness based body scan meditation by Clinician, with the focus on the experience of anxiety within the body.    Request for Consent  Patient provided verbal consent to treat via telemedicine. Clinician introduced the secure telemedicine platform that we are utilizing to provide care during the COVID-19 pandemic. Patient understands the session will be billed to their insurance or patient directly.  Patient was informed only the group patients  and the clinician are permitted on?the video conference, sessions are not recorded by the clinician, and the patient is not permitted to record the session.? Patient was provided clinician's unique meeting ID prior to session, patient was asked to arrive to virtual session on time just as patient would if we were in the office. Clinician confirmed identification of patient by name and birthdate, provider name, location of patient and clinician, and callback number in case disconnected. Patient consents to behavioral health treatment    Patient Response to Request for Consent: Yes  Visit Type performed: Audio and Video                  Mental  Status:  Findings  Mood: Anxious, Depressed, Hopeful  Affect: Full Range  Rapport: Appropriate, Attentive  Eye Contact: WNL  Appearance: Well Groomed  Psychomotor Functioning: WNL  Thought Process: WNL  Positive Involvement: Open, Relevant, Interested, Easilty Engaged, Empathetic, Self-Aware  Judgment: Good  Insight: Intact    Patients reaction:  Pt shared openly about increased stress due to a family member's illness.  Pt shared her use of Ride the Wave and deep breathing as mindful coping skills.  Pt was appreciative of feedback and support from peers and also discussed her use of boundary setting, though noted current struggle due to everyone having reasonable requests.     Visit Diagnosis:      ICD-10-CM ICD-9-CM   1. Recurrent major depressive disorder, in partial remission (CMS/HCC)  F33.41 296.35   2. Adjustment disorder with anxious mood  F43.22 309.24       Plan:  F/U with Biweekly  group with Clinician.   Pt to F/U with treatment goals as outlined in treatment plan.  Pt to F/U with local ED/call 911 should SI/HI arises.   Renate Bailey, LPC

## 2022-07-26 ENCOUNTER — APPOINTMENT (OUTPATIENT)
Dept: URBAN - METROPOLITAN AREA CLINIC 203 | Age: 62
Setting detail: DERMATOLOGY
End: 2022-07-30

## 2022-07-26 DIAGNOSIS — Z11.52 ENCOUNTER FOR SCREENING FOR COVID-19: ICD-10-CM

## 2022-07-26 DIAGNOSIS — T07XXXA INSECT BITE, NONVENOMOUS, OF OTHER, MULTIPLE, AND UNSPECIFIED SITES, WITHOUT MENTION OF INFECTION: ICD-10-CM

## 2022-07-26 DIAGNOSIS — L57.8 OTHER SKIN CHANGES DUE TO CHRONIC EXPOSURE TO NONIONIZING RADIATION: ICD-10-CM

## 2022-07-26 PROCEDURE — OTHER PRESCRIPTION MEDICATION MANAGEMENT: OTHER

## 2022-07-26 PROCEDURE — OTHER SCREENING FOR COVID-19: OTHER

## 2022-07-26 PROCEDURE — OTHER COUNSELING: OTHER

## 2022-07-26 PROCEDURE — OTHER SUNSCREEN RECOMMENDATIONS: OTHER

## 2022-07-26 PROCEDURE — 99213 OFFICE O/P EST LOW 20 MIN: CPT

## 2022-07-26 ASSESSMENT — LOCATION DETAILED DESCRIPTION DERM
LOCATION DETAILED: EPIGASTRIC SKIN
LOCATION DETAILED: LEFT MEDIAL BREAST 10-11:00 REGION
LOCATION DETAILED: SUPERIOR THORACIC SPINE
LOCATION DETAILED: LEFT MEDIAL BREAST 11-12:00 REGION

## 2022-07-26 ASSESSMENT — LOCATION ZONE DERM: LOCATION ZONE: TRUNK

## 2022-07-26 ASSESSMENT — LOCATION SIMPLE DESCRIPTION DERM
LOCATION SIMPLE: UPPER BACK
LOCATION SIMPLE: ABDOMEN
LOCATION SIMPLE: LEFT BREAST

## 2022-08-16 ENCOUNTER — TELEMEDICINE (OUTPATIENT)
Dept: PSYCHIATRY | Facility: HOSPITAL | Age: 62
End: 2022-08-16
Attending: COUNSELOR
Payer: COMMERCIAL

## 2022-08-16 DIAGNOSIS — F43.22 ADJUSTMENT DISORDER WITH ANXIOUS MOOD: ICD-10-CM

## 2022-08-16 DIAGNOSIS — F33.41 RECURRENT MAJOR DEPRESSIVE DISORDER, IN PARTIAL REMISSION (CMS/HCC): Primary | ICD-10-CM

## 2022-08-16 PROCEDURE — 90853 GROUP PSYCHOTHERAPY: CPT | Mod: 95 | Performed by: COUNSELOR

## 2022-08-16 ASSESSMENT — COGNITIVE AND FUNCTIONAL STATUS - GENERAL
JUDGEMENT: GOOD
PSYCHOMOTOR FUNCTIONING: WNL
AFFECT: FULL RANGE
APPEARANCE: WELL GROOMED
THOUGHT_PROCESS: WNL
EYE_CONTACT: WNL
MOOD: ANXIOUS;DEPRESSED;HOPEFUL
INSIGHT: INTACT

## 2022-08-16 NOTE — GROUP NOTE
Date:  8/16/2022  Start Time:   5:30 PM  End Time:   7:00 PM    Billie Frank, YOB: 1960,  was an active group participant.    M3 Session 6: Mindful Communication  8 members attended group today.  Clinician introduced the format and expectations for participating in the Mindfulness, Meditation and Movement Group, advising all that the flow of the group is designed to introduce members to movement practices, guided meditations and mindfulness-based psycho-education topics.  Clinician shared that the focus of today’s group was to explore mind-ful communication patterns vs. mind-less communication patterns.  During the psycho-education portion of today’s group, Clinician educated members about the concept of projection and the shadow self and how our lack of tendency/ability to look within to better understand our “shadow selves” exhibits itself through “mindless” communication patterns driven by projections of the negative belief systems we have applied to ourselves.   Group then reviewed various communication styles and differences between aggressive, passive, passive-aggressive and assertive communication styles.  Group were educated and reviewed role playing mindful communication patterns with others including the script of “I feel ____ when you ____ because ___” and how this can lead to improved relationship conflict resolution skills and improved ability to listen and be present with others. Group members were led through mindful movements to increase the ability to interocept and help determine what the body’s experience is in the moment.   Group were then led through a silent meditation for 10 minutes.         Request for Consent  Patient provided verbal consent to treat via telemedicine. Clinician introduced the secure telemedicine platform that we are utilizing to provide care during the COVID-19 pandemic. Patient understands the session will be billed to their insurance or patient directly.   Patient was informed only the group patients  and the clinician are permitted on?the video conference, sessions are not recorded by the clinician, and the patient is not permitted to record the session.? Patient was provided clinician's unique meeting ID prior to session, patient was asked to arrive to virtual session on time just as patient would if we were in the office. Clinician confirmed identification of patient by name and birthdate, provider name, location of patient and clinician, and callback number in case disconnected. Patient consents to behavioral health treatment    Patient Response to Request for Consent: Yes  Visit Type performed: Audio and Video              Mental Status:  Findings  Mood: Anxious, Depressed, Hopeful  Affect: Full Range  Rapport: Appropriate, Attentive  Eye Contact: WNL  Appearance: Well Groomed  Psychomotor Functioning: WNL  Thought Process: WNL  Positive Involvement: Open, Relevant, Interested, Easilty Engaged, Empathetic, Self-Aware  Judgment: Good  Insight: Intact    Patients reaction:  Pt shared that she has been feeling triggered lately by memories of 2020 when she spent time in PHP and IOP.  Pt was validated for her feelings and encouraged to practice self compassion as she navigates this time.  Pt shared her intention to be present for group and to offer what she can to the group as well.  She participated in movement and meditation.  LPC is aware pt's tx plan has  and will work to review with her on next session.    Visit Diagnosis:      ICD-10-CM ICD-9-CM   1. Recurrent major depressive disorder, in partial remission (CMS/HCC)  F33.41 296.35   2. Adjustment disorder with anxious mood  F43.22 309.24       Plan:  F/U with Biweekly  group with Clinician.   Pt to F/U with treatment goals as outlined in treatment plan.  Pt to F/U with local ED/call 911 should SI/HI arises.   Renate Bailey, KAIDEN

## 2022-08-29 ENCOUNTER — TELEPHONE (OUTPATIENT)
Dept: PRIMARY CARE | Facility: CLINIC | Age: 62
End: 2022-08-29
Payer: COMMERCIAL

## 2022-08-29 NOTE — TELEPHONE ENCOUNTER
Message left on results voicemail as follows:    Is it ok to go ahead and get booster? Doctor had told her to wait til fall. Also, any particular ?

## 2022-09-19 ENCOUNTER — TELEPHONE (OUTPATIENT)
Dept: PRIMARY CARE | Facility: CLINIC | Age: 62
End: 2022-09-19
Payer: COMMERCIAL

## 2022-09-19 DIAGNOSIS — Z23 FLU VACCINE NEED: Primary | ICD-10-CM

## 2022-09-22 ENCOUNTER — CLINICAL SUPPORT (OUTPATIENT)
Dept: PRIMARY CARE | Facility: CLINIC | Age: 62
End: 2022-09-22
Payer: COMMERCIAL

## 2022-09-22 DIAGNOSIS — Z23 FLU VACCINE NEED: ICD-10-CM

## 2022-09-22 PROCEDURE — 90471 IMMUNIZATION ADMIN: CPT | Performed by: NURSE PRACTITIONER

## 2022-09-22 PROCEDURE — 90686 IIV4 VACC NO PRSV 0.5 ML IM: CPT | Performed by: NURSE PRACTITIONER

## 2022-09-27 ENCOUNTER — TELEMEDICINE (OUTPATIENT)
Dept: PSYCHIATRY | Facility: HOSPITAL | Age: 62
End: 2022-09-27
Attending: COUNSELOR
Payer: COMMERCIAL

## 2022-09-27 DIAGNOSIS — F33.41 RECURRENT MAJOR DEPRESSIVE DISORDER, IN PARTIAL REMISSION (CMS/HCC): Primary | ICD-10-CM

## 2022-09-27 DIAGNOSIS — F43.22 ADJUSTMENT DISORDER WITH ANXIOUS MOOD: ICD-10-CM

## 2022-09-27 PROCEDURE — 90853 GROUP PSYCHOTHERAPY: CPT | Mod: 95 | Performed by: COUNSELOR

## 2022-09-27 ASSESSMENT — COGNITIVE AND FUNCTIONAL STATUS - GENERAL
THOUGHT_PROCESS: WNL
APPEARANCE: WELL GROOMED
EYE_CONTACT: WNL
JUDGEMENT: GOOD
AFFECT: FULL RANGE
MOOD: HOPEFUL;MOTIVATED;EUTHYMIC (NORMAL)
PSYCHOMOTOR FUNCTIONING: WNL
INSIGHT: INTACT

## 2022-09-30 ENCOUNTER — TELEPHONE (OUTPATIENT)
Dept: PRIMARY CARE | Facility: CLINIC | Age: 62
End: 2022-09-30

## 2022-09-30 ENCOUNTER — CLINICAL SUPPORT (OUTPATIENT)
Dept: PRIMARY CARE | Facility: CLINIC | Age: 62
End: 2022-09-30
Payer: COMMERCIAL

## 2022-09-30 DIAGNOSIS — Z23 NEED FOR COVID-19 VACCINE: ICD-10-CM

## 2022-09-30 DIAGNOSIS — Z23 NEED FOR COVID-19 VACCINE: Primary | ICD-10-CM

## 2022-09-30 PROCEDURE — 91313 MODERNA COVID-19 VACCINE BIVALENT BOOSTER 12 YEARS A: CPT | Performed by: STUDENT IN AN ORGANIZED HEALTH CARE EDUCATION/TRAINING PROGRAM

## 2022-09-30 PROCEDURE — 0134A MODERNA COVID-19 VACCINE BIVALENT BOOSTER 12 YEARS A: CPT | Performed by: STUDENT IN AN ORGANIZED HEALTH CARE EDUCATION/TRAINING PROGRAM

## 2022-10-11 ENCOUNTER — TELEMEDICINE (OUTPATIENT)
Dept: PSYCHIATRY | Facility: HOSPITAL | Age: 62
End: 2022-10-11
Attending: COUNSELOR
Payer: COMMERCIAL

## 2022-10-11 DIAGNOSIS — F33.41 RECURRENT MAJOR DEPRESSIVE DISORDER, IN PARTIAL REMISSION (CMS/HCC): Primary | ICD-10-CM

## 2022-10-11 DIAGNOSIS — F43.22 ADJUSTMENT DISORDER WITH ANXIOUS MOOD: ICD-10-CM

## 2022-10-11 PROCEDURE — 90853 GROUP PSYCHOTHERAPY: CPT | Mod: 95 | Performed by: COUNSELOR

## 2022-10-11 ASSESSMENT — COGNITIVE AND FUNCTIONAL STATUS - GENERAL
THOUGHT_PROCESS: WNL
JUDGEMENT: GOOD
MOOD: ANXIOUS;EUTHYMIC (NORMAL)
EYE_CONTACT: WNL
PSYCHOMOTOR FUNCTIONING: WNL
APPEARANCE: WELL GROOMED
AFFECT: FULL RANGE
INSIGHT: INTACT

## 2022-10-11 NOTE — GROUP NOTE
Date:  10/11/2022  Start Time:   5:30 PM  End Time:   7:00 PM    Billie Frank, YOB: 1960,  was an active group participant.    M3  Session 9: Learning how to Become a Wise-Minded Witness  4 members attended group today.  Clinician introduced the format and expectations for participating in the Mindfulness, Meditation and Movement Group, advising all that the flow of the group is designed to introduce members to movement practices, guided meditations and mindfulness-based psycho-education topics Clinician shared that the focus of group today is to explore how the use of movement, meditation and mindfulness skills can assist in increasing our ability to detach from our emotional experience and become the witness of what is going on around us.  Group member practiced the felt sense sensing technique from Floyd Hernadez, PhD and learned the concept of  emotional mind, logical/rational mind and the desired combination of both: gibbs mind from Ashleigh Collado, PhD in an effort to increase frustration tolerance and decrease emotional reactivity and interpersonal conflict in our lives.  Group was then led through a series of mindful movements for 20 minutes to assist in building distress tolerance skills and were encouraged to practice mindful breathing consciously as they moved through each pose.  Group was concluded with the practice of a guided progressive relaxation exercise to build inner awareness of how the body feels when distressed as well as when relaxed.        Request for Consent  Patient provided verbal consent to treat via telemedicine. Clinician introduced the secure telemedicine platform that we are utilizing to provide care during the COVID-19 pandemic. Patient understands the session will be billed to their insurance or patient directly.  Patient was informed only the group patients  and the clinician are permitted on?the video conference, sessions are not recorded by the clinician, and  the patient is not permitted to record the session.? Patient was provided clinician's unique meeting ID prior to session, patient was asked to arrive to virtual session on time just as patient would if we were in the office. Clinician confirmed identification of patient by name and birthdate, provider name, location of patient and clinician, and callback number in case disconnected. Patient consents to behavioral health treatment    Patient Response to Request for Consent: Yes  Visit Type performed: Audio and Video              Mental Status:  Findings  Mood: Anxious, Euthymic (normal)  Affect: Full Range  Rapport: Appropriate, Attentive  Eye Contact: WNL  Appearance: Well Groomed  Psychomotor Functioning: WNL  Thought Process: WNL  Positive Involvement: Open, Relevant, Interested, Easilty Engaged, Empathetic, Self-Aware  Judgment: Good  Insight: Intact    Patients reaction:  Pt shared she has been feeling disconnected and absent minded lately.  She feels she is on the outside looking in at her life and also described feeling dissociated.  Pt acknowledges several stressors and is grateful for her awareness of these feelings.  Pt continues to use mindfulness and movement to ground her and appreciates the space to do so in M3.  Pt also gained insight to her resistance (at times) to journaling but has found success with self compassion letters.    Visit Diagnosis:      ICD-10-CM ICD-9-CM   1. Recurrent major depressive disorder, in partial remission (CMS/HCC)  F33.41 296.35   2. Adjustment disorder with anxious mood  F43.22 309.24       Plan:  F/U with Biweekly  group with Clinician.   Pt to F/U with treatment goals as outlined in treatment plan.  Pt to F/U with local ED/call 911 should SI/HI arises.   Renate Bailey, LPC

## 2022-10-20 ENCOUNTER — OFFICE VISIT (OUTPATIENT)
Dept: PRIMARY CARE | Facility: CLINIC | Age: 62
End: 2022-10-20
Payer: COMMERCIAL

## 2022-10-20 VITALS
RESPIRATION RATE: 18 BRPM | DIASTOLIC BLOOD PRESSURE: 82 MMHG | WEIGHT: 153.7 LBS | HEART RATE: 71 BPM | BODY MASS INDEX: 23.3 KG/M2 | HEIGHT: 68 IN | OXYGEN SATURATION: 99 % | SYSTOLIC BLOOD PRESSURE: 130 MMHG | TEMPERATURE: 98.1 F

## 2022-10-20 DIAGNOSIS — N95.1 SYMPTOMATIC MENOPAUSAL OR FEMALE CLIMACTERIC STATES: ICD-10-CM

## 2022-10-20 DIAGNOSIS — I20.9 ANGINA PECTORIS SYNDROME (CMS/HCC): Chronic | ICD-10-CM

## 2022-10-20 DIAGNOSIS — R73.01 IMPAIRED FASTING GLUCOSE: ICD-10-CM

## 2022-10-20 DIAGNOSIS — Z00.00 PREVENTATIVE HEALTH CARE: ICD-10-CM

## 2022-10-20 DIAGNOSIS — F33.0 MAJOR DEPRESSIVE DISORDER, RECURRENT EPISODE, MILD (CMS/HCC): ICD-10-CM

## 2022-10-20 DIAGNOSIS — F41.1 GENERALIZED ANXIETY DISORDER: ICD-10-CM

## 2022-10-20 DIAGNOSIS — E78.00 PURE HYPERCHOLESTEROLEMIA: Primary | Chronic | ICD-10-CM

## 2022-10-20 DIAGNOSIS — B37.0 ORAL CANDIDIASIS: ICD-10-CM

## 2022-10-20 DIAGNOSIS — Z78.0 POSTMENOPAUSAL STATUS: ICD-10-CM

## 2022-10-20 PROCEDURE — 99214 OFFICE O/P EST MOD 30 MIN: CPT | Performed by: NURSE PRACTITIONER

## 2022-10-20 PROCEDURE — 3008F BODY MASS INDEX DOCD: CPT | Performed by: NURSE PRACTITIONER

## 2022-10-20 RX ORDER — ROSUVASTATIN CALCIUM 40 MG/1
TABLET, COATED ORAL
COMMUNITY
Start: 2022-09-13

## 2022-10-20 RX ORDER — LORAZEPAM 2 MG/1
2 TABLET ORAL EVERY 6 HOURS PRN
COMMUNITY

## 2022-10-20 RX ORDER — NYSTATIN 100000 [USP'U]/ML
5 SUSPENSION ORAL 3 TIMES DAILY
Qty: 150 ML | Refills: 0 | Status: SHIPPED | OUTPATIENT
Start: 2022-10-20 | End: 2022-10-30

## 2022-10-20 ASSESSMENT — ENCOUNTER SYMPTOMS
RESTLESSNESS: 0
SHORTNESS OF BREATH: 0
DECREASED CONCENTRATION: 0
MYALGIAS: 0
PANIC: 0
NERVOUS/ANXIOUS: 1
DEPRESSED MOOD: 1

## 2022-10-20 ASSESSMENT — PAIN SCALES - GENERAL: PAINLEVEL: 0-NO PAIN

## 2022-10-20 ASSESSMENT — PATIENT HEALTH QUESTIONNAIRE - PHQ9: SUM OF ALL RESPONSES TO PHQ9 QUESTIONS 1 & 2: 0

## 2022-10-20 NOTE — PROGRESS NOTES
[facility] at [address]  [place] [address]  [phone]  Fax [phone]      Patient ID: Billie Frank                              : 1960    Visit Date: 10/20/2022    Chief Complaint: Med Management         Patient ID: Billie Frank is a 62 y.o. female.    Patient Active Problem List   Diagnosis    Angina pectoris syndrome (CMS/HCC)    Family history of ischemic heart disease    Generalized anxiety disorder    Hyperlipidemia    Major depressive disorder, recurrent episode, mild (CMS/HCC)    Symptomatic menopausal or female climacteric states    Persistent disorder of initiating or maintaining sleep    Polyp of colon    Moderate acquired hearing loss    Schatzki's ring    Seasonal allergic rhinitis due to pollen    Chronic fatigue    Impaired fasting glucose    Oral candidiasis         Current Outpatient Medications:     AMABELZ 0.5-0.1 mg per tablet, 1 tablet 3 (three) times a week (Mon, Wed, Fri). TID weekly, due tomorrow 2020 , Disp: , Rfl:     ascorbic acid (VITAMIN C) 500 mg tablet, Take 1,000 mg by mouth daily., Disp: , Rfl:     aspirin 81 mg enteric coated tablet, Take 81 mg by mouth daily., Disp: , Rfl:     b complex vitamins capsule, Take 1 capsule by mouth daily., Disp: , Rfl:     cholecalciferol, vitamin D3, 1,000 unit (25 mcg) tablet, Take 1,000 Units by mouth daily., Disp: , Rfl:     diclofenac (VOLTAREN) 50 mg EC tablet, Take 50 mg by mouth 2 (two) times a day as needed.  , Disp: , Rfl:     escitalopram (LEXAPRO) 20 mg tablet, TAKE 1 TABLET ONCE DAILY, Disp: 90 tablet, Rfl: 0    LORazepam (ATIVAN) 2 mg tablet, Take 2 mg by mouth every 6 (six) hours as needed., Disp: , Rfl:     nystatin (MYCOSTATIN) 100,000 unit/mL suspension, Swish and spit 5 mL (500,000 Units total) 3 (three) times a day for 10 days., Disp: 150 mL, Rfl: 0    RANEXA 500 mg 12 hr tablet, , Disp: , Rfl:     rosuvastatin  (CRESTOR) 40 mg tablet, , Disp: , Rfl:     Allergies   Allergen Reactions    Bee Sting [Bee Venom Protein (Honey Bee)] Anaphylaxis    Penicillins Hives    Iodinated Contrast Media Hives    Spinach GI intolerance     Raw Spinach only    Sulfa (Sulfonamide Antibiotics) Rash       Social History     Tobacco Use    Smoking status: Never    Smokeless tobacco: Never   Substance Use Topics    Alcohol use: Yes     Alcohol/week: 2.0 standard drinks     Types: 2 Glasses of wine per week    Drug use: Never       Health Maintenance   Topic Date Due    Breast Cancer Screening  08/18/2022    DTaP, Tdap, and Td Vaccines (1 - Tdap) 04/19/2023 (Originally 10/2/1978)    Colorectal Cancer Screening  01/18/2024    Cervical Cancer Screening  01/29/2025    Zoster Vaccine  Completed    Influenza Vaccine  Completed    Hepatitis C Screening  Completed    COVID-19 Vaccine  Completed    Meningococcal ACWY  Aged Out    HIB Vaccines  Aged Out    IPV Vaccines  Aged Out    HPV Vaccines  Aged Out    Pneumococcal  Aged Out    HIV Screening  Discontinued       HPI  Routine med check    Anxiety  Presents for follow-up visit. Symptoms include depressed mood, excessive worry and nervous/anxious behavior. Patient reports no chest pain, decreased concentration, panic, restlessness, shortness of breath or suicidal ideas. Symptoms occur rarely. The severity of symptoms is mild. The quality of sleep is good.     Compliance with medications is % (on Lexapro and Lorazepam). Treatment side effects: none.   Hyperlipidemia  This is a chronic problem. The current episode started more than 1 year ago. She has no history of chronic renal disease, diabetes, hypothyroidism, liver disease, obesity or nephrotic syndrome. Pertinent negatives include no chest pain, myalgias or shortness of breath. Current antihyperlipidemic treatment includes statins. There are no compliance problems.        The following have been reviewed and updated as  "appropriate in this visit:   Allergies  Meds  Problems         Review of System  Review of Systems   Respiratory: Negative for shortness of breath.    Cardiovascular: Negative for chest pain.   Musculoskeletal: Negative for myalgias.   Psychiatric/Behavioral: Negative for decreased concentration and suicidal ideas. The patient is nervous/anxious.        Objective     Vitals  Vitals:    10/20/22 1026   BP: 130/82   BP Location: Left upper arm   Patient Position: Sitting   Pulse: 71   Resp: 18   Temp: 36.7 °C (98.1 °F)   TempSrc: Temporal   SpO2: 99%   Weight: 69.7 kg (153 lb 11.2 oz)   Height: 1.727 m (5' 7.99\")     Body mass index is 23.38 kg/m².      Physical Exam  Vitals reviewed.   Constitutional:       General: She is not in acute distress.     Appearance: Normal appearance. She is not diaphoretic.   HENT:      Right Ear: Tympanic membrane, ear canal and external ear normal.      Left Ear: Tympanic membrane, ear canal and external ear normal.      Nose: Nose normal.      Mouth/Throat:      Mouth: Mucous membranes are moist.      Pharynx: Oropharynx is clear. Posterior oropharyngeal erythema present. No oropharyngeal exudate.      Comments: Mild erythema  Cardiovascular:      Rate and Rhythm: Normal rate and regular rhythm.      Heart sounds: No murmur heard.    No friction rub. No gallop.   Pulmonary:      Effort: Pulmonary effort is normal.      Breath sounds: Normal breath sounds. No wheezing, rhonchi or rales.   Musculoskeletal:      Right lower leg: No edema.      Left lower leg: No edema.   Neurological:      Mental Status: She is alert and oriented to person, place, and time.   Psychiatric:         Mood and Affect: Mood and affect normal.         Speech: Speech normal.         Behavior: Behavior normal.         Thought Content: Thought content does not include suicidal ideation. Thought content does not include suicidal plan.         Assessment/Plan     Problem List Items Addressed This Visit     Angina " pectoris syndrome (CMS/HCC) (Chronic)     On Ranexa BID  Cardiology following  Recent stress echo and holter.         Relevant Medications    rosuvastatin (CRESTOR) 40 mg tablet    Hyperlipidemia - Primary (Chronic)     On statin higher dose  Check lipids         Relevant Medications    rosuvastatin (CRESTOR) 40 mg tablet    Generalized anxiety disorder     Stable on Lexapro and Lorazepam  Psych manages the Lorazepam.  Continue med.  Follow up 6 mo.         Relevant Medications    LORazepam (ATIVAN) 2 mg tablet    Major depressive disorder, recurrent episode, mild (CMS/HCC)     See plan under anxiety.         Relevant Medications    LORazepam (ATIVAN) 2 mg tablet    Symptomatic menopausal or female climacteric states     On HRT  Stable.  GYN manages.         Impaired fasting glucose     Check glucose and A1C         Relevant Orders    Hemoglobin A1c    Oral candidiasis     Trial course of treatment due to symptoms x 10 days  Exam appears normal but pt was on recent antibiotics and specialized mouth rinse with endodontist.         Relevant Medications    nystatin (MYCOSTATIN) 100,000 unit/mL suspension   Other Visit Diagnoses     Preventative health care        Relevant Orders    CBC and Differential    Comprehensive metabolic panel    Lipid panel    TSH 3rd Generation    Urinalysis with Reflex Culture (ED and Outpatient only)    Postmenopausal status        Relevant Orders    DEXA BONE DENSITY              ALBERT Bowen  10/20/2022

## 2022-10-20 NOTE — ASSESSMENT & PLAN NOTE
Trial course of treatment due to symptoms x 10 days  Exam appears normal but pt was on recent antibiotics and specialized mouth rinse with endodontist.

## 2022-10-25 ENCOUNTER — TELEPHONE (OUTPATIENT)
Dept: PRIMARY CARE | Facility: CLINIC | Age: 62
End: 2022-10-25
Payer: COMMERCIAL

## 2022-10-25 ENCOUNTER — TELEMEDICINE (OUTPATIENT)
Dept: PSYCHIATRY | Facility: HOSPITAL | Age: 62
End: 2022-10-25
Attending: COUNSELOR
Payer: COMMERCIAL

## 2022-10-25 DIAGNOSIS — F33.41 RECURRENT MAJOR DEPRESSIVE DISORDER, IN PARTIAL REMISSION (CMS/HCC): Primary | ICD-10-CM

## 2022-10-25 DIAGNOSIS — F43.22 ADJUSTMENT DISORDER WITH ANXIOUS MOOD: ICD-10-CM

## 2022-10-25 PROCEDURE — 90853 GROUP PSYCHOTHERAPY: CPT | Mod: 95 | Performed by: COUNSELOR

## 2022-10-25 ASSESSMENT — COGNITIVE AND FUNCTIONAL STATUS - GENERAL
AFFECT: FULL RANGE
THOUGHT_PROCESS: WNL
EYE_CONTACT: WNL
MOOD: ANXIOUS;EUTHYMIC (NORMAL)
APPEARANCE: WELL GROOMED
PSYCHOMOTOR FUNCTIONING: WNL
JUDGEMENT: GOOD
INSIGHT: INTACT

## 2022-10-25 NOTE — TELEPHONE ENCOUNTER
Message left on results voicemail as follows:  Pt states that she went to her cardio follow up and he is not overly concerned but is going to have the report read by a cardio electro physiologist for a possible cardiac mri.  She would also like doctor to know that the nystatin is working well

## 2022-10-25 NOTE — GROUP NOTE
Date:  10/25/2022  Start Time:   5:30 PM  End Time:   7:00 PM    Billie Frank, YOB: 1960,  was an active group participant.    M3  Session 10: Grief, Loss, and Gratitude  6 members attended group today.  Clinician introduced the format and expectations for participating in the Mindfulness, Meditation and Movement Group, advising all that the flow of the group is designed to introduce members to movement practices, guided meditations and mindfulness-based psycho-education topics.    Clinician shared that the focus of group today is to explore how the use of movement, meditation and mindfulness skills can increase our ability to notice pain, suffering, grief and loss.   Group was educated about the concept of attachments and how our belief in how things should be impacts our ability to cope with what is.  Group were provided information from [employer] Accept[employer] about what the emotion and experience of fear does to the body physically.  Group then discussed the concept of gratitude and how gratitude can be used to combat the experience of loss in our lives.  Group reviewed the definition of gratitude as expounded by Georges Rob in Gratitude Works! And reviewed, journaled during class/practiced identifying gratitudes in their lives and received scientific information regarding the efficacy of starting a gratitude practice in their own lives.  Group completed an activity encouraging group members to share what gratitude they see in their own lives and were encouraged to follow up with 5 gratitude building exercises/practices including daily journaling, nature experiences/walks; meditation; collages/art and eating mindfully.  Group were then led through movements which encouraged group members to explore poses that may cause increased strain/energy discharge and how those poses impacted their thoughts in the moment.  Group was then led through a 20 minute loving-kindness meditation to begin offering  "compassion to the self that may be hurting.        Request for Consent  Patient provided verbal consent to treat via telemedicine. Clinician introduced the secure telemedicine platform that we are utilizing to provide care during the COVID-19 pandemic. Patient understands the session will be billed to their insurance or patient directly.  Patient was informed only the group patients  and the clinician are permitted on?the video conference, sessions are not recorded by the clinician, and the patient is not permitted to record the session.? Patient was provided clinician's unique meeting ID prior to session, patient was asked to arrive to virtual session on time just as patient would if we were in the office. Clinician confirmed identification of patient by name and birthdate, provider name, location of patient and clinician, and callback number in case disconnected. Patient consents to behavioral health treatment    Patient Response to Request for Consent: Yes  Visit Type performed: Audio and Video                Mental Status:  Findings  Mood: Anxious, Euthymic (normal)  Affect: Full Range  Rapport: Appropriate, Attentive  Eye Contact: WNL  Appearance: Well Groomed  Psychomotor Functioning: WNL  Thought Process: WNL  Positive Involvement: Open, Relevant, Interested, Easilty Engaged, Empathetic, Self-Aware  Judgment: Good  Insight: Intact    Patients reaction:  Pt shared she is glad to be in group and related to peer who is also going through a move.  She appreciated reminder to take things slow, not haley to unpack things right away.  LPC offered mantra \"don't hurry, don't worry.\"      Visit Diagnosis:      ICD-10-CM ICD-9-CM   1. Recurrent major depressive disorder, in partial remission (CMS/HCC)  F33.41 296.35   2. Adjustment disorder with anxious mood  F43.22 309.24       Plan:  F/U with Biweekly  group with Clinician.   Pt to F/U with treatment goals as outlined in treatment plan.  Pt to F/U with local ED/call " 911 should SI/HI arises.   Renate Bailey, LPC

## 2022-10-25 NOTE — TELEPHONE ENCOUNTER
Please see message below  Cardio following pt for Angina Pectoris Syndrome   Nystatin prescribed 10/20 for Oral candidiasis

## 2022-11-08 ENCOUNTER — TELEMEDICINE (OUTPATIENT)
Dept: PSYCHIATRY | Facility: HOSPITAL | Age: 62
End: 2022-11-08
Attending: COUNSELOR
Payer: COMMERCIAL

## 2022-11-08 DIAGNOSIS — F43.22 ADJUSTMENT DISORDER WITH ANXIOUS MOOD: ICD-10-CM

## 2022-11-08 DIAGNOSIS — F33.41 RECURRENT MAJOR DEPRESSIVE DISORDER, IN PARTIAL REMISSION (CMS/HCC): Primary | ICD-10-CM

## 2022-11-08 PROCEDURE — 90853 GROUP PSYCHOTHERAPY: CPT | Mod: 95 | Performed by: COUNSELOR

## 2022-11-08 ASSESSMENT — COGNITIVE AND FUNCTIONAL STATUS - GENERAL
APPEARANCE: WELL GROOMED
JUDGEMENT: GOOD
INSIGHT: INTACT
PSYCHOMOTOR FUNCTIONING: WNL
MOOD: ANXIOUS;EUTHYMIC (NORMAL)
THOUGHT_PROCESS: WNL
EYE_CONTACT: WNL
AFFECT: FULL RANGE

## 2022-11-09 NOTE — GROUP NOTE
Date:  11/8/2022  Start Time:   5:30 PM  End Time:   7:00 PM    Billie Frank, YOB: 1960,  was an active group participant.    M3  Session 11: Mindfulness Review/Where to go from here  7 members attended group today.  Clinician introduced the format and expectations for participating in the Mindfulness, Meditation and Movement Group, advising all that the flow of the group is designed to introduce members to movement practices, guided meditations and mindfulness-based psycho-education topics.  Clinician shared that the focus of group today is to explore how the use of movement, meditation and mindfulness skills can help women build a personalized self-care plan.  Group members were introduced to the theory of Motivational Interviewing and the Cycle of Change by Prochaska and Mariana as a means of better understanding/offering ourselves compassion when experiencing difficulty maintaining positive changes in our lives.  Group were also educated and received a handout on Maslows hierarchy of needs as a means of better understanding how certain basic and psychological needs have to be met before we can work on the goal of achieving our own full potential/ability to create.  Group members were encouraged to reflect on ways to take breaks throughout their day and encouraged to share what works for them currently.  Group members were led through 30 minutes of mindful movements with a focus on increasing the ability to interocept or watch what physical experiences occurred within their bodies throughout the practice.  Group members were then led through a safe place/calm place guided practice at the end.  Group members were encouraged to process and share anything they noticed during the movement and guided meditation parts of the group and encouraged to share with each other what practices of mindfulness/meditation and movement have changed their lives/helped them in an effort to collaborate together  "in a safe place.          Request for Consent  Patient provided verbal consent to treat via telemedicine. Clinician introduced the secure telemedicine platform that we are utilizing to provide care during the COVID-19 pandemic. Patient understands the session will be billed to their insurance or patient directly.  Patient was informed only the group patients  and the clinician are permitted on?the video conference, sessions are not recorded by the clinician, and the patient is not permitted to record the session.? Patient was provided clinician's unique meeting ID prior to session, patient was asked to arrive to virtual session on time just as patient would if we were in the office. Clinician confirmed identification of patient by name and birthdate, provider name, location of patient and clinician, and callback number in case disconnected. Patient consents to behavioral health treatment    Patient Response to Request for Consent: Yes  Visit Type performed: Audio and Video              Mental Status:  Findings  Mood: Anxious, Euthymic (normal)  Affect: Full Range  Rapport: Appropriate, Attentive  Eye Contact: WNL  Appearance: Well Groomed  Psychomotor Functioning: WNL  Thought Process: WNL  Positive Involvement: Open, Relevant, Interested, Easilty Engaged, Empathetic, Self-Aware  Judgment: Good  Insight: Intact    Patients reaction:  Pt expressed her gratitude for group and noted, \"I am just happy to be here.\"  She also shared quote by DINO that has been her mantra lately - \"The ultimate measure of a man is not where he stands in moments of comfort and convenience, but where he stands at times of challenge and controversy.\"  She was supportive and empathetic towards peers tonight as well.    Visit Diagnosis:      ICD-10-CM ICD-9-CM   1. Recurrent major depressive disorder, in partial remission (CMS/[facility])  F33.41 296.35   2. Adjustment disorder with anxious mood  F43.22 309.24       Plan:  F/U with Biweekly  group with " Clinician.   Pt to F/U with treatment goals as outlined in treatment plan.  Pt to F/U with local ED/call 911 should SI/HI arises.   Renate Bailey, LPC

## 2022-11-10 ENCOUNTER — HOSPITAL ENCOUNTER (OUTPATIENT)
Dept: RADIOLOGY | Facility: HOSPITAL | Age: 62
Discharge: HOME | End: 2022-11-10
Attending: NURSE PRACTITIONER
Payer: COMMERCIAL

## 2022-11-10 DIAGNOSIS — Z78.0 POSTMENOPAUSAL STATUS: ICD-10-CM

## 2022-11-10 PROCEDURE — 77080 DXA BONE DENSITY AXIAL: CPT

## 2022-12-06 ENCOUNTER — TELEMEDICINE (OUTPATIENT)
Dept: PSYCHIATRY | Facility: HOSPITAL | Age: 62
End: 2022-12-06
Attending: COUNSELOR
Payer: COMMERCIAL

## 2022-12-06 DIAGNOSIS — F33.41 RECURRENT MAJOR DEPRESSIVE DISORDER, IN PARTIAL REMISSION (CMS/HCC): ICD-10-CM

## 2022-12-06 DIAGNOSIS — F43.22 ADJUSTMENT DISORDER WITH ANXIOUS MOOD: Primary | ICD-10-CM

## 2022-12-06 PROCEDURE — 90853 GROUP PSYCHOTHERAPY: CPT | Mod: 95 | Performed by: COUNSELOR

## 2022-12-06 ASSESSMENT — COGNITIVE AND FUNCTIONAL STATUS - GENERAL
INSIGHT: INTACT
APPEARANCE: WELL GROOMED
MOOD: ANXIOUS;EUTHYMIC (NORMAL)
JUDGEMENT: GOOD
PSYCHOMOTOR FUNCTIONING: WNL
AFFECT: FULL RANGE
THOUGHT_PROCESS: WNL
EYE_CONTACT: WNL

## 2022-12-07 NOTE — GROUP NOTE
Date:  12/6/2022  Start Time:   5:30 PM  End Time:   7:00 PM    Billie Frank, YOB: 1960,  was an active group participant.    M3  Session 1: Brain and Body 101  9 members attended group today.  Clinician introduced the format and expectations for participating in the Mindfulness, Meditation and Movement Group, advising all that the flow of the group is designed to introduce members to movement practices, guided meditations and mindfulness-based psychoeducation topics.  Clinician shared that the focus of group today is to explore and grow awareness about how mindfulness can assist with anxiety and depression as well as how the sympathetic and parasympathetic nervous system can perpetuate the fight/flight/freeze reaction through our posture, how we breathe and ways we think.  Group members were introduced to the concept of interoception as a skill to increase awareness of moods/emotions.  Group members were introduced to the work of John Quintanilla’s work in Trauma Sensitive Yoga and were educated that interoception includes: the visceral experience of feeling something in your body (muscles stretching, heart beating, stomach growling).  Additionally, interoceptive focus can include examining the motivation/movements involved in acting out whatever the visceral feeling activates or triggers.  Finally, group processed the effect of our visceral experience and movements/actions on our moods/emotions.  Group members were introduced to several therapeutic mindful movements and encouraged to begin exploring their own interoceptive capabilities in the group setting for 20 minutes.  Group members were then encouraged to engage in a guided meditation which consisted of a 10 minute breath oriented guided meditation by Jung TyGroup members were supportive of one another and offered encouragement and support throughout.  Members were given the opportunity to sign up for the next M3 Group.  Group members were  provided handouts with suggested materials to review at their own pace..        Request for Consent  Patient provided consent to treat via telehealth technology.Patient understands the telehealth visit will be billed to their insurance or patient directly.  Patient was informed only the group patients  and the clinician are permitted on the telehealth visit, visits are not recorded by the clinician, and the patient is not permitted to record the visit. Patient was provided information on how to access HIPAA compliant platform. Clinician confirmed identification of patient by name and birthdate, provider name, location of patient in [place], and callback number in case disconnected. Patient informed of the right to choose the form of care delivery, including the right to refuse a telehealth visit.     Patient Response to Request for Consent: Yes  Telehealth Platform utilized: [facility] Licensed Zoom  Visit Type performed: Audio and Video  The patient is at home: Yes  The patient is in [place]:   yes  Those who participated in the encounter: Patient, Provider, and group              Mental Status:  Findings  Mood: Anxious, Euthymic (normal)  Affect: Full Range  Rapport: Appropriate, Attentive  Eye Contact: WNL  Appearance: Well Groomed  Psychomotor Functioning: WNL  Thought Process: WNL  Positive Involvement: Open, Relevant, Interested, Easilty Engaged, Empathetic, Self-Aware  Judgment: Good  Insight: Intact    Patients reaction:  Pt shared about recent move and her choice today to leave work to attend group despite having a crisis going on at work.  Pt received praise and encouragement from peers for prioritizing her self care for the next hour and a half.  She was an active and empathetic participant in discussion and participated in movement practice.    Visit Diagnosis:      ICD-10-CM ICD-9-CM   1. Adjustment disorder with anxious mood  F43.22 309.24   2. Recurrent major depressive disorder, in partial  remission (CMS/HCC)  F33.41 296.35       Plan:  F/U with Biweekly  group with Clinician.   Pt to F/U with treatment goals as outlined in treatment plan.  Pt to F/U with local ED/call 911 should SI/HI arises.   Renate Bailey, LPC

## 2022-12-20 ENCOUNTER — TELEMEDICINE (OUTPATIENT)
Dept: PSYCHIATRY | Facility: HOSPITAL | Age: 62
End: 2022-12-20
Attending: COUNSELOR
Payer: COMMERCIAL

## 2022-12-20 DIAGNOSIS — F43.22 ADJUSTMENT DISORDER WITH ANXIOUS MOOD: Primary | ICD-10-CM

## 2022-12-20 DIAGNOSIS — F33.41 RECURRENT MAJOR DEPRESSIVE DISORDER, IN PARTIAL REMISSION (CMS/HCC): ICD-10-CM

## 2022-12-20 PROCEDURE — 90853 GROUP PSYCHOTHERAPY: CPT | Mod: 95 | Performed by: COUNSELOR

## 2022-12-20 ASSESSMENT — COGNITIVE AND FUNCTIONAL STATUS - GENERAL
AFFECT: FULL RANGE
PSYCHOMOTOR FUNCTIONING: WNL
THOUGHT_PROCESS: WNL
INSIGHT: INTACT
APPEARANCE: WELL GROOMED
MOOD: EUTHYMIC (NORMAL)
EYE_CONTACT: WNL
JUDGEMENT: GOOD

## 2022-12-21 NOTE — GROUP NOTE
Date:  12/20/2022  Start Time:   5:30 PM  End Time:   7:00 PM    Billie Frank, YOB: 1960,  was an active group participant.    M3   Session 2: Saying Yes to Imperfection  7 members attended group today.  Clinician introduced the format and expectations for participating in the Mindfulness, Meditation and Movement Group, advising all that the flow of the group is designed to introduce members to movement practices, guided meditations and mindfulness-based psychoeducation topics. Group content included review of the following articles: Breathing: The Little Known Secret to Peace of Mind by Linda Alvares, PhD and Breathing and Your Brain: 5 Reasons to Grab the Controls by John Hicks.  Group members were educated about the Autonomic Nervous system which houses the Sympathetic, Parasympathetic and Enteric nervous systems.  Group were educated that the PNS system conserves energy in our bodies and is responsible for ongoing, mellow, steady state.  Group were then educated that the SNS and PNS are activated whenever we breathe in/breathe out.  Group members were educated about the impact of chronic SNS activation and resultant physical/emotional/psychological side effects.  Group members were educated about how the breath can be an anchor to the present moment which can alleviate anxiety and depressive based thoughts/belief systems.  Group members were then led through the 4 count/square breathing exercise and taught the efficacy of using this breath work to experience decreased agitation, anxiety and overwhelmed emotions.  Group members were introduced to the concept of interoception as a skill to increase awareness of moods/emotions.  Group members were introduced to several therapeutic mindful movements and encouraged to begin exploring their own interoceptive capabilities in the group setting for 35 minutes.  Group members were then encouraged to engage in a guided meditation which consisted of a  10-15 minute Body Scan completed by Clinician. Group members were provided handouts with suggested materials to review at their own pace.        Request for Consent  Patient provided consent to treat via telehealth technology.Patient understands the telehealth visit will be billed to their insurance or patient directly.  Patient was informed only the group patients  and the clinician are permitted on the telehealth visit, visits are not recorded by the clinician, and the patient is not permitted to record the visit. Patient was provided information on how to access HIPAA compliant platform. Clinician confirmed identification of patient by name and birthdate, provider name, location of patient in [place], and callback number in case disconnected. Patient informed of the right to choose the form of care delivery, including the right to refuse a telehealth visit.     Patient Response to Request for Consent: Yes  Telehealth Platform utilized: [facility] Licensed Zoom  Visit Type performed: Audio and Video  The patient is at home: Yes  The patient is in [place]:   yes  Those who participated in the encounter: Patient, Provider, and group          Mental Status:  Findings  Mood: Euthymic (normal)  Affect: Full Range  Rapport: Appropriate, Attentive  Eye Contact: WNL  Appearance: Well Groomed  Psychomotor Functioning: WNL  Thought Process: WNL  Positive Involvement: Open, Relevant, Interested, Easilty Engaged, Empathetic, Self-Aware  Judgment: Good  Insight: Intact    Patients reaction:  Pt shared her gratitude for attending group today and her efforts to leave work early so she can attend.  She also shared her attendance may be more sporadic due to move and change in commute.  She was engaged in topic discussion and gained validation from peers.  She participated in movement and meditation practice.    Visit Diagnosis:      ICD-10-CM ICD-9-CM   1. Adjustment disorder with anxious mood  F43.22 309.24   2. Recurrent  major depressive disorder, in partial remission (CMS/HCC)  F33.41 296.35       Plan:  F/U with Biweekly  group with Clinician.   Pt to F/U with treatment goals as outlined in treatment plan.  Pt to F/U with local ED/call 911 should SI/HI arises.   Renate Bailey, LPC

## 2022-12-22 DIAGNOSIS — F33.0 MAJOR DEPRESSIVE DISORDER, RECURRENT EPISODE, MILD (CMS/HCC): ICD-10-CM

## 2022-12-22 RX ORDER — ESCITALOPRAM OXALATE 20 MG/1
TABLET ORAL
Qty: 90 TABLET | Refills: 0 | Status: SHIPPED | OUTPATIENT
Start: 2022-12-22 | End: 2023-04-03

## 2022-12-22 NOTE — TELEPHONE ENCOUNTER
Medicine Refill Request    Last Office: 10/20/2022   Last Consult Visit: Visit date not found  Last Telemedicine Visit: 8/24/2021 Mariel Lee CRNP    Next Appointment: Visit date not found    Refill pended     Current Outpatient Medications:   •  AMABELZ 0.5-0.1 mg per tablet, 1 tablet 3 (three) times a week (Mon, Wed, Fri). TID weekly, due tomorrow Saturday 8/1/2020 , Disp: , Rfl:   •  ascorbic acid (VITAMIN C) 500 mg tablet, Take 1,000 mg by mouth daily., Disp: , Rfl:   •  aspirin 81 mg enteric coated tablet, Take 81 mg by mouth daily., Disp: , Rfl:   •  b complex vitamins capsule, Take 1 capsule by mouth daily., Disp: , Rfl:   •  cholecalciferol, vitamin D3, 1,000 unit (25 mcg) tablet, Take 1,000 Units by mouth daily., Disp: , Rfl:   •  diclofenac (VOLTAREN) 50 mg EC tablet, Take 50 mg by mouth 2 (two) times a day as needed.  , Disp: , Rfl:   •  escitalopram (LEXAPRO) 20 mg tablet, TAKE 1 TABLET ONCE DAILY, Disp: 90 tablet, Rfl: 0  •  LORazepam (ATIVAN) 2 mg tablet, Take 2 mg by mouth every 6 (six) hours as needed., Disp: , Rfl:   •  RANEXA 500 mg 12 hr tablet, , Disp: , Rfl:   •  rosuvastatin (CRESTOR) 40 mg tablet, , Disp: , Rfl:       BP Readings from Last 3 Encounters:   10/20/22 130/82   04/20/22 138/82   04/22/21 120/72       Recent Lab results:  Lab Results   Component Value Date    CHOL 188 05/27/2021   ,   Lab Results   Component Value Date    HDL 73 05/27/2021   ,   Lab Results   Component Value Date    LDLCALC 101 (H) 05/27/2021   ,   Lab Results   Component Value Date    TRIG 74 05/27/2021        Lab Results   Component Value Date    GLUCOSE 97 04/16/2022   ,   Lab Results   Component Value Date    HGBA1C 5.3 09/16/2021         Lab Results   Component Value Date    CREATININE 0.97 04/16/2022       Lab Results   Component Value Date    TSH 2.220 08/19/2021

## 2022-12-27 ENCOUNTER — TELEPHONE (OUTPATIENT)
Dept: PRIMARY CARE | Facility: CLINIC | Age: 62
End: 2022-12-27
Payer: COMMERCIAL

## 2022-12-27 DIAGNOSIS — U07.1 COVID: ICD-10-CM

## 2022-12-27 DIAGNOSIS — R68.89 FLU-LIKE SYMPTOMS: Primary | ICD-10-CM

## 2022-12-27 NOTE — TELEPHONE ENCOUNTER
Pt is still taking Ranexa. She is going to call her Cardiologist to see if she can hold it for 5 days and call us back with an answer

## 2022-12-27 NOTE — TELEPHONE ENCOUNTER
"Returned pt's call. Pt reports headache on 12/25. She COVID tested negative that night. On 12/26, she reported body aches, fatigue, sore throat, nasal congestion, chills/sweats. Denies chest congestion and SOB. She reports never getting a \"fever\" according to a thermometer. +Covid test today  She has been alternating between Advil and Tylenol. Taking Sudafed.  Recommended to continue OTC meds, plenty of fluids and rest.     Since she has heart disease, she is interested in Paxlovid and would like to start today if Mariel recommends med. There are no appts left for today    Please advise     "

## 2022-12-27 NOTE — TELEPHONE ENCOUNTER
Pt called to let us know she tested positive today for Covid.  Pt has body pain, sore throat, runny nose, headache, congestion, no fever.  Pt wants Paxlovid, no appt left for today.   Recommended to call tomorrow morning but let her know will send message as well.  She is requesting a call back.

## 2022-12-27 NOTE — TELEPHONE ENCOUNTER
My fault- I forgot to add +covid test this morning    Paxlovid pended  Do you want pt to schedule telemed tomorrow?

## 2022-12-27 NOTE — TELEPHONE ENCOUNTER
It says Teresita reacts with her Ranexa. Can you see if she is still taking this med and if it can be held for 5 days?

## 2022-12-28 ENCOUNTER — TELEMEDICINE (OUTPATIENT)
Dept: PRIMARY CARE | Facility: CLINIC | Age: 62
End: 2022-12-28
Payer: COMMERCIAL

## 2022-12-28 DIAGNOSIS — U07.1 COVID-19: Primary | ICD-10-CM

## 2022-12-28 PROBLEM — R73.01 IMPAIRED FASTING GLUCOSE: Status: RESOLVED | Noted: 2021-08-24 | Resolved: 2022-12-28

## 2022-12-28 PROBLEM — B37.0 ORAL CANDIDIASIS: Status: RESOLVED | Noted: 2022-10-20 | Resolved: 2022-12-28

## 2022-12-28 PROCEDURE — 99213 OFFICE O/P EST LOW 20 MIN: CPT | Mod: 95 | Performed by: NURSE PRACTITIONER

## 2022-12-28 ASSESSMENT — ENCOUNTER SYMPTOMS
RHINORRHEA: 0
SINUS PAIN: 1
DIARRHEA: 0
HEADACHES: 1
VOMITING: 0
SORE THROAT: 0
ABDOMINAL PAIN: 0
NAUSEA: 0
COUGH: 0

## 2022-12-28 NOTE — PROGRESS NOTES
Verification of Patient Location:  The patient affirms they are currently located in the following state: [place]    Request for Consent:    Audio and Video Encounter   Hello, my name is ALBERT Bowen.  Before we proceed, can you please verify your identification by telling me your full name and date of birth?  Can you tell me who is in the room with you?    You and I are about to have a telemedicine check-in or visit because you have requested it.  This is a live video-conference.  I am a real person, speaking to you in real time.  There is no one else with me on the video-conference. I am not recording this conversation and I am asking you not to record it.  This telemedicine visit will be billed to your health insurance or you, if you are self-insured.  You understand you will be responsible for any copayments or coinsurances that apply to your telemedicine visit.  Communication platform used for this encounter:  [employer] Video Visit (Epic Video Client)       Before starting our telemedicine visit, I am required to get your consent for this virtual check-in or visit by telemedicine. Do you consent?      Patient Response to Request for Consent:  Yes    Patient Active Problem List   Diagnosis   • Angina pectoris syndrome (CMS/HCC)   • Family history of ischemic heart disease   • Generalized anxiety disorder   • Hyperlipidemia   • Major depressive disorder, recurrent episode, mild (CMS/HCC)   • Symptomatic menopausal or female climacteric states   • Persistent disorder of initiating or maintaining sleep   • Polyp of colon   • Moderate acquired hearing loss   • Schatzki's ring   • Seasonal allergic rhinitis due to pollen   • Chronic fatigue   • COVID-19     Current Outpatient Medications on File Prior to Visit   Medication Sig Dispense Refill   • AMABELZ 0.5-0.1 mg per tablet 1 tablet 3 (three) times a week (Mon, Wed, Fri). TID weekly, due tomorrow Saturday 8/1/2020      • ascorbic acid (VITAMIN C) 500 mg  tablet Take 1,000 mg by mouth daily.     • aspirin 81 mg enteric coated tablet Take 81 mg by mouth daily.     • b complex vitamins capsule Take 1 capsule by mouth daily.     • cholecalciferol, vitamin D3, 1,000 unit (25 mcg) tablet Take 1,000 Units by mouth daily.     • diclofenac (VOLTAREN) 50 mg EC tablet Take 50 mg by mouth 2 (two) times a day as needed.       • escitalopram (LEXAPRO) 20 mg tablet TAKE 1 TABLET ONCE DAILY 90 tablet 0   • LORazepam (ATIVAN) 2 mg tablet Take 2 mg by mouth every 6 (six) hours as needed.     • RANEXA 500 mg 12 hr tablet      • rosuvastatin (CRESTOR) 40 mg tablet        No current facility-administered medications on file prior to visit.     Allergies   Allergen Reactions   • Bee Sting [Bee Venom Protein (Honey Bee)] Anaphylaxis   • Penicillins Hives   • Iodinated Contrast Media Hives   • Spinach GI intolerance     Raw Spinach only   • Sulfa (Sulfonamide Antibiotics) Rash     Social History     Tobacco Use   • Smoking status: Never   • Smokeless tobacco: Never   Substance Use Topics   • Alcohol use: Yes     Alcohol/week: 2.0 standard drinks     Types: 2 Glasses of wine per week   • Drug use: Never     Health Maintenance   Topic Date Due   • DTaP, Tdap, and Td Vaccines (1 - Tdap) 04/19/2023 (Originally 10/2/1979)   • Breast Cancer Screening  08/29/2023   • Depression Screening  10/20/2023   • Colorectal Cancer Screening  01/18/2024   • Cervical Cancer Screening  01/29/2025   • Zoster Vaccine  Completed   • Influenza Vaccine  Completed   • Hepatitis C Screening  Completed   • COVID-19 Vaccine  Completed   • Meningococcal ACWY  Aged Out   • HIB Vaccines  Aged Out   • Hepatitis B Vaccines  Aged Out   • IPV Vaccines  Aged Out   • HPV Vaccines  Aged Out   • Pneumococcal  Aged Out   • HIV Screening  Discontinued       Visit Documentation:  Subjective     Patient ID: Billie Frank is a 62 y.o. female.  1960      Tested + for COVID yesterday  Started with symptoms Sunday night  Fever,  chills, flu like, congestion.  Found out Paxlovid interacts with Ranexa her heart med. Cannot take it.  Today she is feeling mildly better--no fever, flu symptoms. Just tired and congested.  Denies CP, SOB  Using Ibuprofen and Tylenol with relief  Was able to get out and take a ride with her  today.      URI   This is a new problem. The current episode started in the past 7 days. The problem has been gradually improving. There has been no fever. Associated symptoms include congestion, headaches and sinus pain. Pertinent negatives include no abdominal pain, chest pain, coughing, diarrhea, nausea, rhinorrhea, sneezing, sore throat or vomiting. She has tried acetaminophen, NSAIDs and increased fluids for the symptoms. The treatment provided mild relief.       The following have been reviewed and updated as appropriate in this visit:   Allergies  Meds  Problems       Review of Systems   HENT: Positive for congestion and sinus pain. Negative for rhinorrhea, sneezing and sore throat.    Respiratory: Negative for cough.    Cardiovascular: Negative for chest pain.   Gastrointestinal: Negative for abdominal pain, diarrhea, nausea and vomiting.   Neurological: Positive for headaches.       Physical Exam  Constitutional:       General: She is not in acute distress.     Appearance: Normal appearance. She is ill-appearing. She is not toxic-appearing or diaphoretic.   HENT:      Nose: Congestion present. No rhinorrhea.   Pulmonary:      Effort: Pulmonary effort is normal. No respiratory distress.      Breath sounds: No stridor. No wheezing.   Neurological:      Mental Status: She is alert and oriented to person, place, and time.   Psychiatric:         Mood and Affect: Mood and affect normal.         Speech: Speech normal.         Behavior: Behavior normal.         Assessment/Plan   Diagnoses and all orders for this visit:    COVID-19 (Primary)  Assessment & Plan:  Discussed other antivirals for treatment.  Pt feeling  better today and wants to hold off.  She will continue Tylenol and Ibuprofen PRN  REST and push PO fluids  She will report any worsening symptoms and report status daily via [employer] portal for Thursday and Friday.  Quarantine through Friday and then if she is better can mask up bu ok to go out for another 5 days.        Time Spent:  I spent 20 minutes on this date of service performing the following activities: obtaining history, performing examination, documenting, preparing for visit, obtaining / reviewing records and providing counseling and education.

## 2022-12-28 NOTE — ASSESSMENT & PLAN NOTE
Discussed other antivirals for treatment.  Pt feeling better today and wants to hold off.  She will continue Tylenol and Ibuprofen PRN  REST and push PO fluids  She will report any worsening symptoms and report status daily via [employer] portal for Thursday and Friday.  Quarantine through Friday and then if she is better can mask up bu ok to go out for another 5 days.

## 2022-12-28 NOTE — TELEPHONE ENCOUNTER
Pt called. She spoke with her Cardiologist. There is an interaction between Paxlovid and Ranexa. Due to the half life of Ranexa, it would put the pt outside of the 5 day window. Cardio suggested the antiviral medication, Molnupiravir 800mg BID. It does not have an interaction with Paxlovid.  Pt mentioned if the monoclonal antibodies would be an option. She is agreeable to not taking those treatments if Mariel thinks letting the virus run its course is the best option.   She does have a telemed appointment today at 2:45pm. If Molnupiravir is the best option, please let me know and I can call pharmacies to see if it's stock.

## 2022-12-29 ENCOUNTER — TELEPHONE (OUTPATIENT)
Dept: PRIMARY CARE | Facility: CLINIC | Age: 62
End: 2022-12-29
Payer: COMMERCIAL

## 2022-12-29 DIAGNOSIS — R05.1 ACUTE COUGH: Primary | ICD-10-CM

## 2022-12-29 RX ORDER — BENZONATATE 200 MG/1
200 CAPSULE ORAL 3 TIMES DAILY PRN
Qty: 30 CAPSULE | Refills: 0 | Status: SHIPPED | OUTPATIENT
Start: 2022-12-29 | End: 2023-01-08

## 2022-12-29 NOTE — TELEPHONE ENCOUNTER
Pt is calling to follow up.  Feeling a little more poorly than yesterday, however she is not having trouble breathing.  She has increased body aches and now has a bad cough.  Not short of breath, though. Last night, the cough did keep her up.  Ribs are sore from coughing.  Cough is semi-productive.   Pt wants you to know that she is not advocating for the antiviral, if you think her body can handle it.  She just wants you to decide what you think is best.    Pharmacy is [facility] in [place].    Pt ph# [phone]

## 2022-12-30 RX ORDER — PROMETHAZINE HYDROCHLORIDE AND CODEINE PHOSPHATE 6.25; 1 MG/5ML; MG/5ML
5 SOLUTION ORAL EVERY 4 HOURS PRN
Qty: 240 ML | Refills: 0 | Status: SHIPPED | OUTPATIENT
Start: 2022-12-30 | End: 2022-12-30 | Stop reason: SDUPTHER

## 2022-12-30 NOTE — TELEPHONE ENCOUNTER
Pt will check with local pharmacies and call back with information.    Pt also wants to know if Rx for Paxlovid etc... should be sent in as well (has been mentioned in notes)

## 2022-12-30 NOTE — TELEPHONE ENCOUNTER
Pt was advised by Mariel to call today with an update,  She is still not feeling any better and does not know what she should do, she is undecided about getting Rx.    Pt is also asking of Rx for Phenergan with Codeine since Tessalon did not help at all. She had Phenergan approx 2 years ago and that was the only thing that helped.

## 2022-12-30 NOTE — TELEPHONE ENCOUNTER
Received a call from [place], promethazine-codeine (PHENERGAN with CODEINE) 6.25-10 mg/5 mL syrup     Is not stocked at their pharmacies, Rx will need to be sent elsewhere.    Pt suggested [facility]   [address] [id number] -   Which I added to chart.       Pt is also checking on status of question as to if she should get Rx for Covid.

## 2023-01-12 LAB
BASOPHILS # BLD AUTO: 0 X10E3/UL (ref 0–0.2)
BASOPHILS NFR BLD AUTO: 1 %
EOSINOPHIL # BLD AUTO: 0.3 X10E3/UL (ref 0–0.4)
EOSINOPHIL NFR BLD AUTO: 4 %
ERYTHROCYTE [DISTWIDTH] IN BLOOD BY AUTOMATED COUNT: 12.5 % (ref 11.7–15.4)
HBA1C MFR BLD: 5.5 % (ref 4.8–5.6)
HCT VFR BLD AUTO: 40.7 % (ref 34–46.6)
HGB BLD-MCNC: 13.6 G/DL (ref 11.1–15.9)
IMM GRANULOCYTES # BLD AUTO: 0 X10E3/UL (ref 0–0.1)
IMM GRANULOCYTES NFR BLD AUTO: 1 %
LYMPHOCYTES # BLD AUTO: 1.9 X10E3/UL (ref 0.7–3.1)
LYMPHOCYTES NFR BLD AUTO: 28 %
MCH RBC QN AUTO: 30.8 PG (ref 26.6–33)
MCHC RBC AUTO-ENTMCNC: 33.4 G/DL (ref 31.5–35.7)
MCV RBC AUTO: 92 FL (ref 79–97)
MONOCYTES # BLD AUTO: 0.4 X10E3/UL (ref 0.1–0.9)
MONOCYTES NFR BLD AUTO: 6 %
NEUTROPHILS # BLD AUTO: 4 X10E3/UL (ref 1.4–7)
NEUTROPHILS NFR BLD AUTO: 60 %
PLATELET # BLD AUTO: 272 X10E3/UL (ref 150–450)
RBC # BLD AUTO: 4.41 X10E6/UL (ref 3.77–5.28)
WBC # BLD AUTO: 6.6 X10E3/UL (ref 3.4–10.8)

## 2023-01-13 LAB
ALBUMIN SERPL-MCNC: 4.9 G/DL (ref 3.8–4.8)
ALBUMIN/GLOB SERPL: 2.1 {RATIO} (ref 1.2–2.2)
ALP SERPL-CCNC: 67 IU/L (ref 44–121)
ALT SERPL-CCNC: 19 IU/L (ref 0–32)
AST SERPL-CCNC: 20 IU/L (ref 0–40)
BILIRUB SERPL-MCNC: 0.4 MG/DL (ref 0–1.2)
BUN SERPL-MCNC: 19 MG/DL (ref 8–27)
BUN/CREAT SERPL: 23 (ref 12–28)
CALCIUM SERPL-MCNC: 9.2 MG/DL (ref 8.7–10.3)
CHLORIDE SERPL-SCNC: 103 MMOL/L (ref 96–106)
CHOLEST SERPL-MCNC: 167 MG/DL (ref 100–199)
CO2 SERPL-SCNC: 22 MMOL/L (ref 20–29)
CREAT SERPL-MCNC: 0.84 MG/DL (ref 0.57–1)
EGFRCR SERPLBLD CKD-EPI 2021: 78 ML/MIN/1.73
GLOBULIN SER CALC-MCNC: 2.3 G/DL (ref 1.5–4.5)
GLUCOSE SERPL-MCNC: 111 MG/DL (ref 70–99)
HDLC SERPL-MCNC: 54 MG/DL
LDLC SERPL CALC-MCNC: 99 MG/DL (ref 0–99)
POTASSIUM SERPL-SCNC: 4.5 MMOL/L (ref 3.5–5.2)
PROT SERPL-MCNC: 7.2 G/DL (ref 6–8.5)
SODIUM SERPL-SCNC: 141 MMOL/L (ref 134–144)
TRIGL SERPL-MCNC: 74 MG/DL (ref 0–149)
TSH SERPL DL<=0.005 MIU/L-ACNC: 2.42 UIU/ML (ref 0.45–4.5)
VLDLC SERPL CALC-MCNC: 14 MG/DL (ref 5–40)

## 2023-01-16 LAB
APPEARANCE UR: CLEAR
BACTERIA #/AREA URNS HPF: ABNORMAL /[HPF]
BACTERIA UR CULT: NORMAL
BACTERIA UR CULT: NORMAL
BILIRUB UR QL STRIP: NEGATIVE
CASTS URNS QL MICRO: ABNORMAL /LPF
COLOR UR: YELLOW
EPI CELLS #/AREA URNS HPF: >10 /HPF (ref 0–10)
GLUCOSE UR QL STRIP: NEGATIVE
HGB UR QL STRIP: NEGATIVE
KETONES UR QL STRIP: NEGATIVE
LAB CORP URINALYSIS REFLEX: ABNORMAL
LEUKOCYTE ESTERASE UR QL STRIP: ABNORMAL
MICRO URNS: ABNORMAL
NITRITE UR QL STRIP: NEGATIVE
PH UR STRIP: 7 [PH] (ref 5–7.5)
PROT UR QL STRIP: NEGATIVE
RBC #/AREA URNS HPF: ABNORMAL /HPF (ref 0–2)
SP GR UR STRIP: 1.01 (ref 1–1.03)
UROBILINOGEN UR STRIP-MCNC: 0.2 MG/DL (ref 0.2–1)
WBC #/AREA URNS HPF: ABNORMAL /HPF (ref 0–5)

## 2023-01-17 ENCOUNTER — TELEMEDICINE (OUTPATIENT)
Dept: PSYCHIATRY | Facility: HOSPITAL | Age: 63
End: 2023-01-17
Attending: COUNSELOR
Payer: COMMERCIAL

## 2023-01-17 DIAGNOSIS — F33.41 RECURRENT MAJOR DEPRESSIVE DISORDER, IN PARTIAL REMISSION (CMS/HCC): ICD-10-CM

## 2023-01-17 DIAGNOSIS — F43.22 ADJUSTMENT DISORDER WITH ANXIOUS MOOD: Primary | ICD-10-CM

## 2023-01-17 PROCEDURE — 90853 GROUP PSYCHOTHERAPY: CPT | Mod: 95 | Performed by: COUNSELOR

## 2023-01-17 ASSESSMENT — COGNITIVE AND FUNCTIONAL STATUS - GENERAL
JUDGEMENT: GOOD
APPEARANCE: WELL GROOMED
MOOD: EUTHYMIC (NORMAL)
EYE_CONTACT: WNL
INSIGHT: INTACT
PSYCHOMOTOR FUNCTIONING: WNL
AFFECT: FULL RANGE
THOUGHT_PROCESS: WNL

## 2023-01-18 NOTE — GROUP NOTE
Date:  1/17/2023  Start Time:   5:30 PM  End Time:   7:00 PM    Billie Frank, YOB: 1960,  was an active group participant.    M3 Session 3: Examining the Internal Experience with Compassion  2 members attended group today.  Clinician introduced the format and expectations for participating in the Mindfulness, Meditation and Movement Group, advising all that the flow of the group is designed to introduce members to movement practices, guided meditations and mindfulness-based psycho-education topics.    Clinician shared that the focus of group today is to explore and grow awareness about negative core beliefs that perpetuate symptoms of anxiety and depression and how mindfulness-based techniques, including meditation and movement, can assist in decreasing the impact, intensity and frequency of our negative internal voice.  Group members were led through a series of mindful movements to assist with release and strengthening of the psoas muscle.  Group members were educated about the psoas muscle and provided including The Muscle of the Soul may be triggering your Fear and Anxiety; Check your hips for Back Pain and How to Stretch and Strengthen the Psoas. The group also reviewed a checklist of 10 Cognitive distortions (Hughes, Feeling Good: the New Mood Therapy (1980) and discussed the impact of their own tape(s) on their lives.  Additionally, the group was introduced to the work of Julissa Nolasco, PhD and the practice of self-compassion through the use of the Acronym “RAIN” 1. Recognize what is going on 2. A: allow the experience to be there, just as it is, I: Investigate with Kindness and N: Natural awareness that comes from not identifying with the experience/emotions.  Group then transitioned into the meditation portion of the group with a self-compassion/lovingkindness guided meditation conducted by Clinician with focus on offering oneself compassion with intension to better modulate the inner experience  of emotions and the breath moving in the body.      Request for Consent  Patient provided consent to treat via telehealth technology.Patient understands the telehealth visit will be billed to their insurance or patient directly.  Patient was informed only the group patients  and the clinician are permitted on the telehealth visit, visits are not recorded by the clinician, and the patient is not permitted to record the visit. Patient was provided information on how to access HIPAA compliant platform. Clinician confirmed identification of patient by name and birthdate, provider name, location of patient in [place], and callback number in case disconnected. Patient informed of the right to choose the form of care delivery, including the right to refuse a telehealth visit.     Patient Response to Request for Consent: Yes  Telehealth Platform utilized: [facility] Licensed Zoom  Visit Type performed: Audio and Video  The patient is at home: Yes  The patient is in [place]:   yes  Those who participated in the encounter: Patient, Provider, and group                    Mental Status:  Findings  Mood: Euthymic (normal)  Affect: Full Range  Rapport: Appropriate, Attentive  Eye Contact: WNL  Appearance: Well Groomed  Psychomotor Functioning: WNL  Thought Process: WNL  Positive Involvement: Open, Relevant, Interested, Easilty Engaged, Empathetic, Self-Aware  Judgment: Good  Insight: Intact    Patients reaction:  Pt was engaged in topic discussion today and shared how she often notices her feelings and practices visualizing them as outside of herself to separate herself from the feeling.  She also noted how she uses Ride the Wave as another mindfulness practice.  She was welcoming and empathetic toward her peer.  She participated fully in meditation and movement practice.    Visit Diagnosis:      ICD-10-CM ICD-9-CM   1. Adjustment disorder with anxious mood  F43.22 309.24   2. Recurrent major depressive disorder, in partial  remission (CMS/HCC)  F33.41 296.35       Plan:  F/U with Biweekly  group with Clinician.   Pt to F/U with treatment goals as outlined in treatment plan.  Pt to F/U with local ED/call 911 should SI/HI arises.   Renate Bailey, LPC

## 2023-01-31 ENCOUNTER — APPOINTMENT (OUTPATIENT)
Dept: URBAN - METROPOLITAN AREA CLINIC 203 | Age: 63
Setting detail: DERMATOLOGY
End: 2023-02-01

## 2023-01-31 ENCOUNTER — TELEMEDICINE (OUTPATIENT)
Dept: PSYCHIATRY | Facility: HOSPITAL | Age: 63
End: 2023-01-31
Attending: COUNSELOR
Payer: COMMERCIAL

## 2023-01-31 DIAGNOSIS — L57.8 OTHER SKIN CHANGES DUE TO CHRONIC EXPOSURE TO NONIONIZING RADIATION: ICD-10-CM

## 2023-01-31 DIAGNOSIS — F43.22 ADJUSTMENT DISORDER WITH ANXIOUS MOOD: Primary | ICD-10-CM

## 2023-01-31 DIAGNOSIS — Z87.2 PERSONAL HISTORY OF DISEASES OF THE SKIN AND SUBCUTANEOUS TISSUE: ICD-10-CM

## 2023-01-31 DIAGNOSIS — F33.41 RECURRENT MAJOR DEPRESSIVE DISORDER, IN PARTIAL REMISSION (CMS/HCC): ICD-10-CM

## 2023-01-31 DIAGNOSIS — L82.0 INFLAMED SEBORRHEIC KERATOSIS: ICD-10-CM

## 2023-01-31 PROCEDURE — 99213 OFFICE O/P EST LOW 20 MIN: CPT

## 2023-01-31 PROCEDURE — OTHER COUNSELING: OTHER

## 2023-01-31 PROCEDURE — 90853 GROUP PSYCHOTHERAPY: CPT | Mod: 95 | Performed by: COUNSELOR

## 2023-01-31 PROCEDURE — OTHER SUNSCREEN RECOMMENDATIONS: OTHER

## 2023-01-31 PROCEDURE — OTHER REASSURANCE: OTHER

## 2023-01-31 ASSESSMENT — COGNITIVE AND FUNCTIONAL STATUS - GENERAL
EYE_CONTACT: WNL
PSYCHOMOTOR FUNCTIONING: WNL
AFFECT: FULL RANGE
APPEARANCE: WELL GROOMED
THOUGHT_PROCESS: WNL
INSIGHT: INTACT
JUDGEMENT: GOOD
MOOD: EUTHYMIC (NORMAL);DEPRESSED

## 2023-01-31 ASSESSMENT — LOCATION DETAILED DESCRIPTION DERM
LOCATION DETAILED: RIGHT LATERAL EYEBROW
LOCATION DETAILED: RIGHT LATERAL EYEBROW
LOCATION DETAILED: LEFT MEDIAL BREAST 10-11:00 REGION
LOCATION DETAILED: LEFT MEDIAL BREAST 11-12:00 REGION
LOCATION DETAILED: RIGHT SUPERIOR LATERAL UPPER BACK

## 2023-01-31 ASSESSMENT — LOCATION ZONE DERM
LOCATION ZONE: TRUNK
LOCATION ZONE: FACE
LOCATION ZONE: FACE

## 2023-01-31 ASSESSMENT — PAIN INTENSITY VAS: HOW INTENSE IS YOUR PAIN 0 BEING NO PAIN, 10 BEING THE MOST SEVERE PAIN POSSIBLE?: NO PAIN

## 2023-01-31 ASSESSMENT — LOCATION SIMPLE DESCRIPTION DERM
LOCATION SIMPLE: RIGHT EYEBROW
LOCATION SIMPLE: RIGHT BACK
LOCATION SIMPLE: LEFT BREAST
LOCATION SIMPLE: RIGHT EYEBROW

## 2023-02-01 NOTE — GROUP NOTE
Date:  1/31/2023  Start Time:   5:30 PM  End Time:   7:00 PM    Billie Frank, YOB: 1960,  was an active group participant.    M3    Session 4: The Anxiety Spiral  7 members attended group today.  Clinician introduced the format and expectations for participating in the Mindfulness, Meditation and Movement Group, advising all that the flow of the group is designed to introduce members to movement practices, guided meditations and mindfulness-based psycho-education topics.  Clinician shared that the focus of group today was increased understanding of the impact and intensity of the “anxiety spiral” on our thoughts, physiology and behavior.   Group were reminded of the benefits of mindful movement and why the use of focused attention on the body and its internal experience (interoception) helps highly anxious individuals who may experience difficulty with traditional meditation and/or psychotherapy due to difficulty accessing the frontal lobe.   During the psycho-education portion of the group; the group discussed the connections between anxiety and the enteric nervous system resulting in gastrointestinal disorders including IBS, GERD, nausea, & functional dyspepsia.  Group then discussed the link between anxiety and chronic respiratory disorders, heart disease and migraines and reviewed the article, Anxiety and Physical Illness from [employer] (7/2008).  They were reminded that those with high anxiety may have difficulty verbally and cognitively processing stressors.  They were educated of the benefits of regular internal focus to increase body awareness when experiencing distressing emotions/thoughts in future stressful situations.  Group was then educated about various forms of anxiety including social anxiety and reviewed the Social Anxiety spiral taken from Managing Social Anxiety: CBT Therapy Approach by Gabbi Walls (2010).  Group was also provided The Emotional Guidance Scale  taken from Ask and It Is Given by Briana Owen to better understand the spiraling thoughts that occur as stress increases in our life.  Group also reviewed the link of cognitions, physiology/feelings and Behaviors/movements in managing and better understanding anxiety.  Group was also led through mindfulness practice for anxiety by using the breath as an anchor to manage stress.  Clinician led group through Alternate Nostril breath and Breath of paul with focus on increasing focus and attention to sensations, temperature changes, pressure, color, level of agitation/energy & lethargy.  Group members were then led through a 20 minute mindful movement exercise with focus of interoception and growing a relationship with the inner workings/experience of their bodies during the exercise.   Group was then led though a guided mindfulness based body scan meditation by Clinician, with the focus on the experience of anxiety within the body.    Request for Consent  Patient provided consent to treat via telehealth technology.Patient understands the telehealth visit will be billed to their insurance or patient directly.  Patient was informed only the group patients  and the clinician are permitted on the telehealth visit, visits are not recorded by the clinician, and the patient is not permitted to record the visit. Patient was provided information on how to access HIPAA compliant platform. Clinician confirmed identification of patient by name and birthdate, provider name, location of patient in [place], and callback number in case disconnected. Patient informed of the right to choose the form of care delivery, including the right to refuse a telehealth visit.     Patient Response to Request for Consent: Yes  Telehealth Platform utilized: [facility] Licensed Zoom  Visit Type performed: Audio and Video  The patient is at home: Yes  The patient is in [place]:   yes  Those who participated in the encounter: Patient,  Provider, and group                  Mental Status:  Findings  Mood: Euthymic (normal), Depressed  Affect: Full Range  Rapport: Appropriate, Attentive  Eye Contact: WNL  Appearance: Well Groomed  Psychomotor Functioning: WNL  Thought Process: WNL  Positive Involvement: Open, Interested, Easilty Engaged, Empathetic  Judgment: Good  Insight: Intact    Patients reaction:  Pt expressed recently feeling upset following disturbing postpartum psychosis news story.  She noted relating to the woman who was likely in a program like PHP and she felt upset and sad for this woman.  Peers offered support and LPC validated pt's feelings.  Pt was unable to stay for whole session, but was grateful for the space.    Visit Diagnosis:      ICD-10-CM ICD-9-CM   1. Adjustment disorder with anxious mood  F43.22 309.24   2. Recurrent major depressive disorder, in partial remission (CMS/HCC)  F33.41 296.35       Plan:  F/U with Biweekly  group with Clinician.   Pt to F/U with treatment goals as outlined in treatment plan.  Pt to F/U with local ED/call 911 should SI/HI arises.   Renate Bailey, LPC

## 2023-02-14 ENCOUNTER — TELEMEDICINE (OUTPATIENT)
Dept: PSYCHIATRY | Facility: HOSPITAL | Age: 63
End: 2023-02-14
Attending: COUNSELOR
Payer: COMMERCIAL

## 2023-02-14 DIAGNOSIS — F33.41 RECURRENT MAJOR DEPRESSIVE DISORDER, IN PARTIAL REMISSION (CMS/HCC): Primary | ICD-10-CM

## 2023-02-14 DIAGNOSIS — F43.22 ADJUSTMENT DISORDER WITH ANXIOUS MOOD: ICD-10-CM

## 2023-02-14 PROCEDURE — 90853 GROUP PSYCHOTHERAPY: CPT | Mod: 95 | Performed by: COUNSELOR

## 2023-02-14 ASSESSMENT — COGNITIVE AND FUNCTIONAL STATUS - GENERAL
AFFECT: FULL RANGE
PSYCHOMOTOR FUNCTIONING: WNL
THOUGHT_PROCESS: WNL
INSIGHT: INTACT
MOOD: EUTHYMIC (NORMAL)
EYE_CONTACT: WNL
JUDGEMENT: GOOD
APPEARANCE: WELL GROOMED

## 2023-02-15 NOTE — GROUP NOTE
Date:  2/14/2023  Start Time:   5:30 PM  End Time:   7:00 PM    Billie Frank, YOB: 1960,  was an active group participant.    M3 Session 5: The Depression Spiral  7 members attended group today.  Clinician introduced the format and expectations for participating in the Mindfulness, Meditation and Movement Group, advising all that the flow of the group is designed to introduce members to movement practices, guided meditations and mindfulness-based psycho-education topics.  Clinician shared that the focus of group today was increased understanding of the impact and intensity of the “depression spiral” on our thoughts, physiology and behavior. During the psycho-education portion of the group; group members were educated about the impacts of depression on the body.  Group members learned about the impact to the Central nervous system which leads to increased likelihood of addictions, difficulty sleeping, decreased interest in pleasurable activities, headaches, chronic pain and body aches.  Group were also educated about the relationship between the digestive system and depression including “Stuffing” feelings through the use of overeating/binging which can lead to conditions such as NIDDM, loss of appetite/interest in eating healthy foods, stomach aches, constipation and malnutrition.  Group members were educated about the impact on the cardiovascular system and the immune system with increased evidence linking decreased immune functioning in chronically depressed individuals.   Group were then educated about the link between depressive cognitions which leads to physiology/feeling changes and behavior/movement changes.  Group reviewed the concept of the three “P”’s of Permanence, Pervasiveness, and Personalization by Branden Ames, PhD.   Group were encouraged to begin identifying ruminative depressed thinking and having a low mood as part of their experience, not part of their core being.  Group were  educated of the link between Depression and benefits of practicing loving-kindness meditations.  Group members were led through a 20 minute mindful movement exercise with focus of interoception and growing a relationship with the inner workings/experience of their bodies during the exercise.  Group was reminded of the benefits of mindful movement and why the use of focused attention on the body and its internal experience (interoception) helps highly depressed individuals.  Group were led through a mindful “meta” (loving-kindness) meditation    Request for Consent  Patient provided consent to treat via telehealth technology.Patient understands the telehealth visit will be billed to their insurance or patient directly.  Patient was informed only the group patients  and the clinician are permitted on the telehealth visit, visits are not recorded by the clinician, and the patient is not permitted to record the visit. Patient was provided information on how to access HIPAA compliant platform. Clinician confirmed identification of patient by name and birthdate, provider name, location of patient in [place], and callback number in case disconnected. Patient informed of the right to choose the form of care delivery, including the right to refuse a telehealth visit.     Patient Response to Request for Consent: Yes  Telehealth Platform utilized: [facility] Licensed Zoom  Visit Type performed: Audio and Video  The patient is at home: Yes  The patient is in [place]:   yes  Those who participated in the encounter: Patient, Provider, and group                  Mental Status:  Findings  Mood: Euthymic (normal)  Affect: Full Range  Rapport: Appropriate, Attentive  Eye Contact: WNL  Appearance: Well Groomed  Psychomotor Functioning: WNL  Thought Process: WNL  Positive Involvement: Open, Interested, Easilty Engaged, Empathetic, Self-Aware, Relevant  Judgment: Good  Insight: Intact    Patients reaction:  Pt shared that she has  recently been feeling as though she has plateaued in her improvements with regard to her mental health.  Peers offered reframes to this and LPC validated pt's feelings, noting that it can be helpful to mindfully acknowledge this feeling.  Pt was appreciative and was also supportive to other peers.       Visit Diagnosis:      ICD-10-CM ICD-9-CM   1. Recurrent major depressive disorder, in partial remission (CMS/HCC)  F33.41 296.35   2. Adjustment disorder with anxious mood  F43.22 309.24       Plan:  F/U with Biweekly  group with Clinician.   Pt to F/U with treatment goals as outlined in treatment plan.  Pt to F/U with local ED/call 911 should SI/HI arises.   Renate Bailey, LPC

## 2023-02-28 ENCOUNTER — TELEMEDICINE (OUTPATIENT)
Dept: PSYCHIATRY | Facility: HOSPITAL | Age: 63
End: 2023-02-28
Attending: COUNSELOR
Payer: COMMERCIAL

## 2023-02-28 DIAGNOSIS — F33.41 RECURRENT MAJOR DEPRESSIVE DISORDER, IN PARTIAL REMISSION (CMS/HCC): Primary | ICD-10-CM

## 2023-02-28 DIAGNOSIS — F43.22 ADJUSTMENT DISORDER WITH ANXIOUS MOOD: ICD-10-CM

## 2023-02-28 PROCEDURE — 90853 GROUP PSYCHOTHERAPY: CPT | Mod: 95 | Performed by: COUNSELOR

## 2023-03-02 ENCOUNTER — OFFICE VISIT (OUTPATIENT)
Dept: PRIMARY CARE | Facility: CLINIC | Age: 63
End: 2023-03-02
Payer: COMMERCIAL

## 2023-03-02 VITALS
HEART RATE: 74 BPM | BODY MASS INDEX: 22.58 KG/M2 | DIASTOLIC BLOOD PRESSURE: 82 MMHG | TEMPERATURE: 97.9 F | SYSTOLIC BLOOD PRESSURE: 132 MMHG | RESPIRATION RATE: 18 BRPM | HEIGHT: 69 IN | OXYGEN SATURATION: 99 %

## 2023-03-02 DIAGNOSIS — M94.0 COSTOCHONDRITIS: Primary | ICD-10-CM

## 2023-03-02 DIAGNOSIS — B02.9 HERPES ZOSTER WITHOUT COMPLICATION: ICD-10-CM

## 2023-03-02 PROBLEM — U07.1 COVID-19: Status: RESOLVED | Noted: 2022-12-28 | Resolved: 2023-03-02

## 2023-03-02 PROCEDURE — 99213 OFFICE O/P EST LOW 20 MIN: CPT | Performed by: NURSE PRACTITIONER

## 2023-03-02 PROCEDURE — 3008F BODY MASS INDEX DOCD: CPT | Performed by: NURSE PRACTITIONER

## 2023-03-02 RX ORDER — VALACYCLOVIR HYDROCHLORIDE 1 G/1
1000 TABLET, FILM COATED ORAL 3 TIMES DAILY
Qty: 21 TABLET | Refills: 0 | Status: SHIPPED | OUTPATIENT
Start: 2023-03-02 | End: 2023-03-09

## 2023-03-02 RX ORDER — MELOXICAM 15 MG/1
15 TABLET ORAL DAILY
Qty: 14 TABLET | Refills: 0 | Status: SHIPPED | OUTPATIENT
Start: 2023-03-02 | End: 2023-03-16

## 2023-03-02 ASSESSMENT — ENCOUNTER SYMPTOMS
COUGH: 0
NUMBNESS: 0
FEVER: 0
WEAKNESS: 0
ABDOMINAL PAIN: 0

## 2023-03-02 ASSESSMENT — PATIENT HEALTH QUESTIONNAIRE - PHQ9: SUM OF ALL RESPONSES TO PHQ9 QUESTIONS 1 & 2: 0

## 2023-03-02 ASSESSMENT — PAIN SCALES - GENERAL: PAINLEVEL: 0-NO PAIN

## 2023-03-02 NOTE — PROGRESS NOTES
[facility] at [address]  [place] [address]  [phone]  Fax [phone]      Patient ID: Billie Frank                              : 1960    Visit Date: 3/2/2023    Chief Complaint: Herpes Zoster (Rib pain/Now having little bumps/)         Patient ID: Billie Frank is a 62 y.o. female.    Patient Active Problem List   Diagnosis   • Angina pectoris syndrome (CMS/HCC)   • Family history of ischemic heart disease   • Generalized anxiety disorder   • Hyperlipidemia   • Major depressive disorder, recurrent episode, mild (CMS/HCC)   • Symptomatic menopausal or female climacteric states   • Persistent disorder of initiating or maintaining sleep   • Polyp of colon   • Moderate acquired hearing loss   • Schatzki's ring   • Seasonal allergic rhinitis due to pollen   • Chronic fatigue   • Herpes zoster without complication   • Costochondritis         Current Outpatient Medications:   •  AMABELZ 0.5-0.1 mg per tablet, 1 tablet 3 (three) times a week (Mon, Wed, Fri). TID weekly, due tomorrow 2020 , Disp: , Rfl:   •  ascorbic acid (VITAMIN C) 500 mg tablet, Take 1,000 mg by mouth daily., Disp: , Rfl:   •  aspirin 81 mg enteric coated tablet, Take 81 mg by mouth daily., Disp: , Rfl:   •  b complex vitamins capsule, Take 1 capsule by mouth daily., Disp: , Rfl:   •  cholecalciferol, vitamin D3, 1,000 unit (25 mcg) tablet, Take 1,000 Units by mouth daily., Disp: , Rfl:   •  escitalopram (LEXAPRO) 20 mg tablet, TAKE 1 TABLET ONCE DAILY, Disp: 90 tablet, Rfl: 0  •  LORazepam (ATIVAN) 2 mg tablet, Take 2 mg by mouth every 6 (six) hours as needed., Disp: , Rfl:   •  meloxicam (MOBIC) 15 mg tablet, Take 1 tablet (15 mg total) by mouth daily for 14 days. With food, Disp: 14 tablet, Rfl: 0  •  RANEXA 500 mg 12 hr tablet, , Disp: , Rfl:   •  rosuvastatin (CRESTOR) 40 mg tablet, , Disp: , Rfl:   •  valACYclovir (VALTREX) 1 gram tablet, Take 1  tablet (1,000 mg total) by mouth 3 (three) times a day for 7 days., Disp: 21 tablet, Rfl: 0    Allergies   Allergen Reactions   • Bee Sting [Bee Venom Protein (Honey Bee)] Anaphylaxis   • Penicillins Hives   • Iodinated Contrast Media Hives   • Spinach GI intolerance     Raw Spinach only   • Sulfa (Sulfonamide Antibiotics) Rash       Social History     Tobacco Use   • Smoking status: Never   • Smokeless tobacco: Never   Substance Use Topics   • Alcohol use: Yes     Alcohol/week: 2.0 standard drinks     Types: 2 Glasses of wine per week   • Drug use: Never       Health Maintenance   Topic Date Due   • DTaP, Tdap, and Td Vaccines (1 - Tdap) 04/19/2023 (Originally [birth year]2/1979)   • Breast Cancer Screening  08/29/2023   • Colorectal Cancer Screening  01/18/2024   • Cervical Cancer Screening  01/29/2025   • Zoster Vaccine  Completed   • Influenza Vaccine  Completed   • Hepatitis C Screening  Completed   • COVID-19 Vaccine  Completed   • Meningococcal ACWY  Aged Out   • HIB Vaccines  Aged Out   • Hepatitis B Vaccines  Aged Out   • IPV Vaccines  Aged Out   • HPV Vaccines  Aged Out   • Pneumococcal  Aged Out   • HIV Screening  Discontinued       HPI  R rib pain  ? Rash  Worried about shingles    Chest Pain   This is a new problem. The current episode started in the past 7 days. The onset quality is sudden. The problem occurs constantly. The problem has been unchanged. The pain is present in the lateral region (right side only). The pain is moderate. The quality of the pain is described as dull, tightness and stabbing. The pain does not radiate. Pertinent negatives include no abdominal pain, cough, fever, numbness or weakness. The pain is aggravated by movement. She has tried NSAIDs for the symptoms. The treatment provided mild relief.       The following have been reviewed and updated as appropriate in this visit:   Allergies  Meds  Problems         Review of System  Review of Systems   Constitutional: Negative for fever.  "  Respiratory: Negative for cough.    Cardiovascular: Positive for chest pain.   Gastrointestinal: Negative for abdominal pain.   Neurological: Negative for weakness and numbness.       Objective     Vitals  Vitals:    03/02/23 1119   BP: 132/82   BP Location: Left upper arm   Patient Position: Sitting   Pulse: 74   Resp: 18   Temp: 36.6 °C (97.9 °F)   TempSrc: Temporal   SpO2: 99%   Height: 1.757 m (5' 9.17\")     Body mass index is 22.58 kg/m².      Physical Exam  Vitals reviewed.   Constitutional:       General: She is not in acute distress.     Appearance: Normal appearance. She is not ill-appearing, toxic-appearing or diaphoretic.   Cardiovascular:      Rate and Rhythm: Normal rate and regular rhythm.      Heart sounds: No murmur heard.    No friction rub. No gallop.   Pulmonary:      Effort: Pulmonary effort is normal.      Breath sounds: Normal breath sounds. No wheezing, rhonchi or rales.      Comments: Tenderness and mild swelling R lateral lower rib cage noted  Chest:      Chest wall: Tenderness present.   Skin:     General: Skin is warm and dry.      Findings: No erythema, lesion or rash.   Neurological:      Mental Status: She is alert and oriented to person, place, and time.         Assessment/Plan     Problem List Items Addressed This Visit     Herpes zoster without complication     Much less likely dx as pt has had zoster before and has been fully vaccinated.  Ok to start on Valtrex just in case.  Watch for rash/blisters and report any changes.         Relevant Medications    valACYclovir (VALTREX) 1 gram tablet    Costochondritis - Primary     Meloxicam daily with food  Heat  Avoid twisting exercise or CW strength training.  Follow up if symptoms worsen/persist.           Relevant Medications    meloxicam (MOBIC) 15 mg tablet           ALBERT Bowen  3/2/2023  "

## 2023-03-02 NOTE — ASSESSMENT & PLAN NOTE
Meloxicam daily with food  Heat  Avoid twisting exercise or CW strength training.  Follow up if symptoms worsen/persist.

## 2023-03-02 NOTE — ASSESSMENT & PLAN NOTE
Much less likely dx as pt has had zoster before and has been fully vaccinated.  Ok to start on Valtrex just in case.  Watch for rash/blisters and report any changes.

## 2023-03-14 ENCOUNTER — TELEMEDICINE (OUTPATIENT)
Dept: PSYCHIATRY | Facility: HOSPITAL | Age: 63
End: 2023-03-14
Attending: COUNSELOR
Payer: COMMERCIAL

## 2023-03-14 DIAGNOSIS — F43.22 ADJUSTMENT DISORDER WITH ANXIOUS MOOD: ICD-10-CM

## 2023-03-14 DIAGNOSIS — F33.41 RECURRENT MAJOR DEPRESSIVE DISORDER, IN PARTIAL REMISSION (CMS/HCC): Primary | ICD-10-CM

## 2023-03-14 PROCEDURE — 90853 GROUP PSYCHOTHERAPY: CPT | Mod: 95 | Performed by: COUNSELOR

## 2023-03-14 ASSESSMENT — COGNITIVE AND FUNCTIONAL STATUS - GENERAL
JUDGEMENT: GOOD
THOUGHT_PROCESS: WNL;WORRY
INSIGHT: INTACT
PSYCHOMOTOR FUNCTIONING: WNL
AFFECT: FULL RANGE
MOOD: EUTHYMIC (NORMAL);HOPEFUL
APPEARANCE: WELL GROOMED
EYE_CONTACT: WNL

## 2023-03-14 NOTE — GROUP NOTE
Date:  3/14/2023  Start Time:   5:30 PM  End Time:   7:00 PM    Billie Frank, YOB: 1960,  was an active group participant.    M3 Session 7: Mindful Boundaries  6 members attended group today.  Clinician introduced the format and expectations for participating in the Mindfulness, Meditation and Movement Group, advising all that the flow of the group is designed to introduce members to movement practices, guided meditations and mindfulness-based psycho-education topics.  Clinician shared that the focus of today’s group will explore the connection between internal boundaries and internal communication vs. boundaries in relationships and how we relate to others.  Group members were provided an analogy of understanding boundary systems as an apple from Setting Healthy Boundaries by Cristian Guthrie.   Group then reviewed the concept of empathy fatigue, hyper-empathy and what relationships with others who have any of these conditions could look like.  Group members were encouraged to share about their own relationships and experiences with setting (or having difficulty setting) boundaries in their own lives.  Group members were educated between the differences/characteristics of rigid, permissive and healthy boundaried relationships.  Group members were led through mindful movement practice today for 20-30 minutes.  Group members were then led through a 20 minute loving-kindness/Tonglen meditation practice.      Request for Consent  Patient provided consent to treat via telehealth technology.Patient understands the telehealth visit will be billed to their insurance or patient directly.  Patient was informed only the group patients  and the clinician are permitted on the telehealth visit, visits are not recorded by the clinician, and the patient is not permitted to record the visit. Patient was provided information on how to access HIPAA compliant platform. Clinician confirmed identification of patient by name and  birthdate, provider name, location of patient in [place], and callback number in case disconnected. Patient informed of the right to choose the form of care delivery, including the right to refuse a telehealth visit.     Patient Response to Request for Consent: Yes  Telehealth Platform utilized: [facility] Licensed Zoom  Visit Type performed: Audio and Video  The patient is at home: Yes  The patient is in [place]:   yes  Those who participated in the encounter: Patient, Provider, and group  Patient Call back number: There are no phone numbers on file.                  Mental Status:  Findings  Mood: Euthymic (normal), Hopeful  Affect: Full Range  Rapport: Appropriate, Attentive  Eye Contact: WNL  Appearance: Well Groomed  Psychomotor Functioning: WNL  Thought Process: WNL, Worry  Positive Involvement: Open, Interested, Easilty Engaged, Empathetic, Self-Aware, Relevant  Judgment: Good  Insight: Intact    Patients reaction:  Pt expressed feeling some worry due to two of her children dealing with major life stressors.  She was able to engage with the group discussion about mindfulness and sleep and was also engaged in movement practice.  She also shared candidly that at times it is hard to connect with the meditations at the start of group, but by the end of group is feeling more grounded.    Visit Diagnosis:      ICD-10-CM ICD-9-CM   1. Recurrent major depressive disorder, in partial remission (CMS/HCC)  F33.41 296.35   2. Adjustment disorder with anxious mood  F43.22 309.24       Plan:  F/U with Biweekly  group with Clinician.   Pt to F/U with treatment goals as outlined in treatment plan.  Pt to F/U with local ED/call 911 should SI/HI arises.   Renate Bailey, LPC

## 2023-03-28 ENCOUNTER — TELEMEDICINE (OUTPATIENT)
Dept: PSYCHIATRY | Facility: HOSPITAL | Age: 63
End: 2023-03-28
Attending: COUNSELOR
Payer: COMMERCIAL

## 2023-03-28 DIAGNOSIS — F33.41 RECURRENT MAJOR DEPRESSIVE DISORDER, IN PARTIAL REMISSION (CMS/HCC): Primary | ICD-10-CM

## 2023-03-28 DIAGNOSIS — F43.22 ADJUSTMENT DISORDER WITH ANXIOUS MOOD: ICD-10-CM

## 2023-03-28 PROCEDURE — 90853 GROUP PSYCHOTHERAPY: CPT | Mod: 95 | Performed by: COUNSELOR

## 2023-03-28 ASSESSMENT — COGNITIVE AND FUNCTIONAL STATUS - GENERAL
MOOD: EUTHYMIC (NORMAL);ANXIOUS;MOTIVATED
INSIGHT: INTACT
THOUGHT_PROCESS: WNL;WORRY
AFFECT: FULL RANGE
APPEARANCE: WELL GROOMED
EYE_CONTACT: WNL
PSYCHOMOTOR FUNCTIONING: WNL
JUDGEMENT: GOOD

## 2023-03-28 NOTE — GROUP NOTE
Date:  3/28/2023  Start Time:   5:30 PM  End Time:   7:00 PM    Billie Frank, YOB: 1960,  was an active group participant.    M3 Session 8: Attachments   6 members attended group today.  Clinician introduced the format and expectations for participating in the Mindfulness, Meditation and Movement Group, advising all that the flow of the group is designed to introduce members to movement practices, guided meditations and mindfulness-based psycho-education topics.  Clinician shared that the focus of today’s group will explore how our attachments to routines and impulsive needs such as substances, food, unhealthy people, activities and even ritual movements and behaviors increase our experience of suffering and emotional/physical pain.  Group was then encouraged to identify their own coping skills/attachments and how they could be impacting and increasing their experience of emotional and physical pain.  Group members were educated about the experience of a wanting mind through the work of Julissa Nolasco in [employer] and were educated about our innate ability to mindfully watch our attachments, drives, urges and impulses which is improved significantly through practices of mindfulness, meditation and movement.  Group members discussed/ encouraged to focus on noticing ways we use our attachments to routine/”trance like” impulses such as our use of sugar, substances, sex, chaotic relationships and even ritual ways we hold our body in an effort to avoid truly feeling the emotions we are experiencing in the moment. Group were introduced to a video on the Biochemistry of attachment to sugar and its effects on the body’s health/wellness by watching the following video:  http://ed.[employer]/lessons/how-sugar-affects-the-brain-brigido-aman.  Group members were encouraged to notice what the price of their own attachments are and encouraged to grow awareness of the emotions underlying those attachments.  Group  was led through and encouraged to hold mindful movements in an effort to watch “what comes up” when the group members were encouraged to hold a pose for up to 3-5 mins at a time.  Group members were then led through a mindful eating exercise/meditation and encouraged to focus on the judgments/thoughts/impulses that came up during this exercise.     Request for Consent  Patient provided consent to treat via telehealth technology.Patient understands the telehealth visit will be billed to their insurance or patient directly.  Patient was informed only the group patients  and the clinician are permitted on the telehealth visit, visits are not recorded by the clinician, and the patient is not permitted to record the visit. Patient was provided information on how to access HIPAA compliant platform. Clinician confirmed identification of patient by name and birthdate, provider name, location of patient in [place], and callback number in case disconnected. Patient informed of the right to choose the form of care delivery, including the right to refuse a telehealth visit.     Patient Response to Request for Consent: Yes  Telehealth Platform utilized: [facility] Licensed Zoom  Visit Type performed: Audio and Video  The patient is at home: Yes  The patient is in [place]:   yes  Those who participated in the encounter: Patient, Provider, and group  Patient Call back number: There are no phone numbers on file.                  Mental Status:  Findings  Mood: Euthymic (normal), Anxious, Motivated  Affect: Full Range  Rapport: Appropriate, Attentive  Eye Contact: WNL  Appearance: Well Groomed  Psychomotor Functioning: WNL  Thought Process: WNL, Worry  Positive Involvement: Open, Interested, Easilty Engaged, Empathetic, Relevant, Self-Aware  Judgment: Good  Insight: Intact    Patients reaction:  Pt shared about news she just learned at work that is causing her stress.  She was able to stay for the whole session despite being  unsure if she could due to the work stressor.  She was engaged throughout the session and shared how she has used the STOP skill when at her office.  She has even brought a blanket to do yoga midday when she needs a break.  Peers and LPC were supportive of these steps for self care.  LPC will review txp with pt next session.    Visit Diagnosis:      ICD-10-CM ICD-9-CM   1. Recurrent major depressive disorder, in partial remission (CMS/HCC)  F33.41 296.35   2. Adjustment disorder with anxious mood  F43.22 309.24       Plan:  F/U with Biweekly  group with Clinician.   Pt to F/U with treatment goals as outlined in treatment plan.  Pt to F/U with local ED/call 911 should SI/HI arises.   Renate Bailey, KAIDEN

## 2023-04-25 ENCOUNTER — TELEMEDICINE (OUTPATIENT)
Dept: PSYCHIATRY | Facility: HOSPITAL | Age: 63
End: 2023-04-25
Attending: COUNSELOR
Payer: COMMERCIAL

## 2023-04-25 DIAGNOSIS — F43.22 ADJUSTMENT DISORDER WITH ANXIOUS MOOD: ICD-10-CM

## 2023-04-25 DIAGNOSIS — F33.41 RECURRENT MAJOR DEPRESSIVE DISORDER, IN PARTIAL REMISSION (CMS/HCC): Primary | ICD-10-CM

## 2023-04-25 PROCEDURE — 90853 GROUP PSYCHOTHERAPY: CPT | Mod: 95 | Performed by: COUNSELOR

## 2023-04-25 ASSESSMENT — COGNITIVE AND FUNCTIONAL STATUS - GENERAL
JUDGEMENT: GOOD
PSYCHOMOTOR FUNCTIONING: WNL
INSIGHT: INTACT
AFFECT: FULL RANGE
THOUGHT_PROCESS: WNL
EYE_CONTACT: WNL
MOOD: EUTHYMIC (NORMAL);ANXIOUS;MOTIVATED
APPEARANCE: WELL GROOMED

## 2023-04-25 NOTE — GROUP NOTE
Date:  4/25/2023  Start Time:   5:30 PM  End Time:   7:00 PM    Billie Frank, YOB: 1960,  was an active group participant.    M3  Session 10: Grief, Loss, and Gratitude  4 members attended group today.  Clinician introduced the format and expectations for participating in the Mindfulness, Meditation and Movement Group, advising all that the flow of the group is designed to introduce members to movement practices, guided meditations and mindfulness-based psycho-education topics.    Clinician shared that the focus of group today is to explore how the use of movement, meditation and mindfulness skills can increase our ability to notice pain, suffering, grief and loss.   Group was educated about the concept of attachments and how our belief in “how things should be” impacts our ability to cope with what is.  Group were provided information from [employer] about what the emotion and experience of fear does to the body physically.  Group then discussed the concept of gratitude and how gratitude can be used to combat the experience of loss in our lives.  Group reviewed the definition of gratitude as expounded by Georges Rob in Gratitude Works! And reviewed, journaled during class/practiced identifying gratitudes in their lives and received scientific information regarding the efficacy of starting a gratitude practice in their own lives.  Group completed an activity encouraging group members to share what gratitude they see in their own lives and were encouraged to follow up with 5 gratitude building exercises/practices including daily journaling, nature experiences/walks; meditation; collages/art and eating mindfully.  Group were then led through movements which encouraged group members to explore poses that may cause increased strain/energy discharge and how those poses impacted their thoughts in the moment.  Group was then led through a 20 minute loving-kindness meditation to begin offering  compassion to the self that may be hurting.            Request for Consent  Patient provided consent to treat via telehealth technology.Patient understands the telehealth visit will be billed to their insurance or patient directly.  Patient was informed only the group patients  and the clinician are permitted on the telehealth visit, visits are not recorded by the clinician, and the patient is not permitted to record the visit. Patient was provided information on how to access HIPAA compliant platform. Clinician confirmed identification of patient by name and birthdate, provider name, location of patient in [place], and callback number in case disconnected. Patient informed of the right to choose the form of care delivery, including the right to refuse a telehealth visit.     Patient Response to Request for Consent: Yes  Telehealth Platform utilized: [facility] Licensed Zoom  Visit Type performed: Audio and Video  The patient is at home: Yes  The patient is in [place]:   yes  Those who participated in the encounter: Patient, Provider, and group  Patient Call back number: There are no phone numbers on file.            Mental Status:  Findings  Mood: Euthymic (normal), Anxious, Motivated  Affect: Full Range  Rapport: Appropriate, Attentive  Eye Contact: WNL  Appearance: Well Groomed  Psychomotor Functioning: WNL  Thought Process: WNL  Positive Involvement: Open, Interested, Easilty Engaged, Empathetic, Relevant, Self-Aware  Judgment: Good  Insight: Intact    Patients reaction:  Pt shared her good news that her daughter in law and grandchild have been approved for visa to come to [place].  Pt also asked for support from group related to holding boundaries at work.  She related to content shared about grind culture.  LPC and pt met after group to review and update treatment plan.    Visit Diagnosis:      ICD-10-CM ICD-9-CM   1. Recurrent major depressive disorder, in partial remission (CMS/[facility])  F33.41 296.35   2.  Adjustment disorder with anxious mood  F43.22 309.24       Plan:  F/U with Biweekly  group with Clinician.   Pt to F/U with treatment goals as outlined in treatment plan.  Pt to F/U with local ED/call 911 should SI/HI arises.   Renate Bailey, LPC

## 2023-04-25 NOTE — LETTER
[facility] OP TREATMENT PLAN 04/25/23   Effective from: 4/25/2023  Effective to: 10/22/2023    Active  Plan ID: [id number]           Participants as of 4/25/2023    Name Type Comments Contact Info    Renate Bailey LPC [facility] Psychotherapist  [phone]    Billie Frank Patient  [phone]    Lilliam Diego MD Consulting Physician  [phone]      Problem: M3 Group     Description: Billie has been a patient of [facility] since August 2020 when she engaged in PHP and IOP for increased depression and anxiety due to infidelity in her marriage.  She has since maintained gains made in these programs.  She continues to experience some mild anxiety and depression, but it is well managed through her self care plan, indivudual therapist, psychiatry, and participation in M3.    UPDATE 4/25/23 - Billie continues IT every other week, still sees psychiatrist every 8 weeks, works out with  and is taking [place] lessons. Her  is still in 12 steps and going to therapy.  Despite some anxiety, she reports little sx of depression and feels her anxiety is well managed with the above supports in addition to M3 group.    Disciplines:  Therapies    Goal: Development of effective mindfulness-based coping techniques for anxiety and depression     Disciplines:  Therapies    Intervention: Pt will explore 3 positive coping skills or gestures to improve mood such as journaling, art, music, deep breathing. Pt will discuss use of skills in group.     Description: This past treatment period Billie has attended M3 regularly.  She continues to utilize coping skills such as STOP, deep breathing, and has also begun doing yoga stretches in her office.  Goal to continue.          Goal: Use of regular self-care techniques and positive self-talk     Disciplines:  Therapies    Intervention: Pt will begin to practice 3 identified self care practices and positive self-talk practices to reduce symptoms of anxiety. Pt will discuss use of  "skills in group.     Description: Billie engages in movement, regular therapy, and connects with social supports as part of her self care practice.  She also attends M3 biweekly as part of her self care.  Her coping statements are often similar to \"this will pass\" or \"This is uncomfortable, but I can accept it and allow it to pass.\"  Goal to continue.         Patient Strengths     None      Interventions        PHQ-9: Brief Depression Severity Measure Score: 3          GAD7 Total Score: : 8    Pt strengths: motivated, committed, resilient, insightful    Interventions:    Referred to Outpatient Group:  Mindfulness, Meditation & Movement Group  Follow up with Primary Care Provider and other medical providers for medical needs  Patient to F/U with local ED or call 911 should SI/HI arise.                     "

## 2023-05-09 ENCOUNTER — TELEMEDICINE (OUTPATIENT)
Dept: PSYCHIATRY | Facility: HOSPITAL | Age: 63
End: 2023-05-09
Attending: COUNSELOR
Payer: COMMERCIAL

## 2023-05-09 DIAGNOSIS — F33.41 RECURRENT MAJOR DEPRESSIVE DISORDER, IN PARTIAL REMISSION (CMS/HCC): Primary | ICD-10-CM

## 2023-05-09 DIAGNOSIS — F43.22 ADJUSTMENT DISORDER WITH ANXIOUS MOOD: ICD-10-CM

## 2023-05-09 PROCEDURE — 90853 GROUP PSYCHOTHERAPY: CPT | Mod: 95 | Performed by: COUNSELOR

## 2023-05-09 ASSESSMENT — COGNITIVE AND FUNCTIONAL STATUS - GENERAL
PSYCHOMOTOR FUNCTIONING: WNL
EYE_CONTACT: WNL
THOUGHT_PROCESS: WNL
MOOD: EUTHYMIC (NORMAL);HOPEFUL;MOTIVATED
JUDGEMENT: GOOD
APPEARANCE: WELL GROOMED
AFFECT: FULL RANGE
INSIGHT: INTACT

## 2023-05-09 NOTE — GROUP NOTE
Date:  5/9/2023  Start Time:   5:30 PM  End Time:   7:00 PM    Billie Frank, YOB: 1960,  was an active group participant.    M3  Session 11: Mindfulness Review/Where to go from here  5 members attended group today.  Clinician introduced the format and expectations for participating in the Mindfulness, Meditation and Movement Group, advising all that the flow of the group is designed to introduce members to movement practices, guided meditations and mindfulness-based psycho-education topics.  Clinician shared that the focus of group today is to explore how the use of movement, meditation and mindfulness skills can help women build a personalized self-care plan.  Group members were introduced to the theory of “Motivational Interviewing” and “the Cycle of Change” by Kenyon and Mariana as a means of better understanding/offering ourselves compassion when experiencing difficulty maintaining positive changes in our lives.  Group were also educated and received a handout on Maslow’s hierarchy of needs as a means of better understanding how certain basic and psychological needs have to be met before we can work on the goal of achieving our own full potential/ability to create.  Group members were encouraged to reflect on ways to take breaks throughout their day and encouraged to share what works for them currently.  Group members were led through 30 minutes of mindful movements with a focus on increasing the ability to interocept or watch what physical experiences occurred within their bodies throughout the practice.  Group members were then led through a safe place/calm place guided practice at the end.  Group members were encouraged to process and share anything they noticed during the movement and guided meditation parts of the group and encouraged to share with each other what practices of mindfulness/meditation and movement have changed their lives/helped them in an effort to collaborate together  "in a safe place.        Request for Consent  Patient provided consent to treat via telehealth technology.Patient understands the telehealth visit will be billed to their insurance or patient directly.  Patient was informed only the group patients  and the clinician are permitted on the telehealth visit, visits are not recorded by the clinician, and the patient is not permitted to record the visit. Patient was provided information on how to access HIPAA compliant platform. Clinician confirmed identification of patient by name and birthdate, provider name, location of patient in [place], and callback number in case disconnected. Patient informed of the right to choose the form of care delivery, including the right to refuse a telehealth visit.     Patient Response to Request for Consent: Yes  Telehealth Platform utilized: [facility] Licensed Zoom  Visit Type performed: Audio and Video  The patient is at home: Yes  The patient is in [place]:   yes  Those who participated in the encounter: Patient, Provider, and group  Patient Call back number: There are no phone numbers on file.                Mental Status:  Findings  Mood: Euthymic (normal), Hopeful, Motivated  Affect: Full Range  Rapport: Appropriate, Attentive  Eye Contact: WNL  Appearance: Well Groomed  Psychomotor Functioning: WNL  Thought Process: WNL  Positive Involvement: Open, Interested, Easilty Engaged, Empathetic, Relevant, Self-Aware  Judgment: Good  Insight: Intact    Patients reaction:  Pt shared she is happy to be in group tonight, but otherwise did not share an intention.  She was active in discussion on self care and shared about her own barriers.  She was validated by peers who also shared the difficulty of feeling they \"should\" do more things, but don't have the energy some days.  Pt participated in meditation and movement practice.    Visit Diagnosis:      ICD-10-CM ICD-9-CM   1. Recurrent major depressive disorder, in partial remission " (CMS/[facility])  F33.41 296.35   2. Adjustment disorder with anxious mood  F43.22 309.24       Plan:  F/U with Biweekly  group with Clinician.   Pt to F/U with treatment goals as outlined in treatment plan.  Pt to F/U with local ED/call 911 should SI/HI arises.   Renate Bailey, LPC

## 2023-06-06 ENCOUNTER — TELEMEDICINE (OUTPATIENT)
Dept: PSYCHIATRY | Facility: HOSPITAL | Age: 63
End: 2023-06-06
Attending: COUNSELOR
Payer: COMMERCIAL

## 2023-06-06 DIAGNOSIS — F43.22 ADJUSTMENT DISORDER WITH ANXIOUS MOOD: ICD-10-CM

## 2023-06-06 DIAGNOSIS — F33.41 RECURRENT MAJOR DEPRESSIVE DISORDER, IN PARTIAL REMISSION (CMS/HCC): Primary | ICD-10-CM

## 2023-06-06 PROCEDURE — 90853 GROUP PSYCHOTHERAPY: CPT | Mod: 95 | Performed by: COUNSELOR

## 2023-06-06 ASSESSMENT — COGNITIVE AND FUNCTIONAL STATUS - GENERAL
PSYCHOMOTOR FUNCTIONING: WNL
EYE_CONTACT: WNL
INSIGHT: INTACT
THOUGHT_PROCESS: WNL
JUDGEMENT: GOOD
APPEARANCE: WELL GROOMED
MOOD: DEPRESSED;HOPEFUL
AFFECT: FULL RANGE;TEARFUL

## 2023-06-06 NOTE — GROUP NOTE
Date:  6/6/2023  Start Time:   5:30 PM  End Time:   7:00 PM    Billie Frank, YOB: 1960,  was an active group participant.    M3   Session 2: Saying Yes to Imperfection  6 members attended group today.  Clinician introduced the format and expectations for participating in the Mindfulness, Meditation and Movement Group, advising all that the flow of the group is designed to introduce members to movement practices, guided meditations and mindfulness-based psychoeducation topics. Group content included review of the following articles: Breathing: The Little Known Secret to Peace of Mind by Linda Alvares, PhD and Breathing and Your Brain: 5 Reasons to Grab the Controls by John Hicks.  Group members were educated about the Autonomic Nervous system which houses the Sympathetic, Parasympathetic and Enteric nervous systems.  Group were educated that the PNS system conserves energy in our bodies and is responsible for ongoing, mellow, steady state.  Group were then educated that the SNS and PNS are activated whenever we breathe in/breathe out.  Group members were educated about the impact of chronic SNS activation and resultant physical/emotional/psychological side effects.  Group members were educated about how the breath can be an anchor to the present moment which can alleviate anxiety and depressive based thoughts/belief systems.  Group members were then led through the 4 count/square breathing exercise and taught the efficacy of using this breath work to experience decreased agitation, anxiety and overwhelmed emotions.  Group members were introduced to the concept of interoception as a skill to increase awareness of moods/emotions.  Group members were introduced to several therapeutic mindful movements and encouraged to begin exploring their own interoceptive capabilities in the group setting for 35 minutes.  Group members were then encouraged to engage in a guided meditation which consisted of a 10-15  minute Body Scan completed by Clinician. Group members were provided handouts with suggested materials to review at their own pace.    Request for Consent  Patient provided consent to treat via telehealth technology.Patient understands the telehealth visit will be billed to their insurance or patient directly.  Patient was informed only the group patients  and the clinician are permitted on the telehealth visit, visits are not recorded by the clinician, and the patient is not permitted to record the visit. Patient was provided information on how to access HIPAA compliant platform. Clinician confirmed identification of patient by name and birthdate, provider name, location of patient in [place], and callback number in case disconnected. Patient informed of the right to choose the form of care delivery, including the right to refuse a telehealth visit.     Patient Response to Request for Consent: Yes  Telehealth Platform utilized: [facility] Licensed Zoom  Visit Type performed: Audio and Video  The patient is at home: Yes  The patient is in [place]:   yes  Those who participated in the encounter: Patient, Provider, and group  Patient Call back number: There are no phone numbers on file.            Mental Status:  Findings  Mood: Depressed, Hopeful  Affect: Full Range, Tearful  Rapport: Appropriate, Attentive  Eye Contact: WNL  Appearance: Well Groomed  Psychomotor Functioning: WNL  Thought Process: WNL  Positive Involvement: Open, Interested, Easilty Engaged, Empathetic, Relevant, Self-Aware  Judgment: Good  Insight: Intact    Patients reaction:  Pt shared the reason she missed last session is due to an employee of hers passing away suddenly.  Peers offered pt support and condolences.  Pt commented on how it has been a difficult few weeks in which she has been supporting her coworkers through this loss.  Pt also acknowledged her own feelings of guilt related to the loss.  Peers encouraged self compassion.  Pt was  able to participate in meditation and movement practice tonight.  LPC also shared about upcoming scheduling changes.    Visit Diagnosis:      ICD-10-CM ICD-9-CM   1. Recurrent major depressive disorder, in partial remission (CMS/HCC)  F33.41 296.35   2. Adjustment disorder with anxious mood  F43.22 309.24       Plan:  F/U with Biweekly  group with Clinician.   Pt to F/U with treatment goals as outlined in treatment plan.  Pt to F/U with local ED/call 911 should SI/HI arises.   Renate Bailey, LPC

## 2023-06-20 ENCOUNTER — TELEMEDICINE (OUTPATIENT)
Dept: PSYCHIATRY | Facility: HOSPITAL | Age: 63
End: 2023-06-20
Attending: COUNSELOR
Payer: COMMERCIAL

## 2023-06-20 DIAGNOSIS — F43.22 ADJUSTMENT DISORDER WITH ANXIOUS MOOD: ICD-10-CM

## 2023-06-20 DIAGNOSIS — F33.41 RECURRENT MAJOR DEPRESSIVE DISORDER, IN PARTIAL REMISSION (CMS/HCC): Primary | ICD-10-CM

## 2023-06-20 PROCEDURE — 90853 GROUP PSYCHOTHERAPY: CPT | Mod: 95 | Performed by: COUNSELOR

## 2023-06-20 ASSESSMENT — COGNITIVE AND FUNCTIONAL STATUS - GENERAL
AFFECT: FULL RANGE;TEARFUL
INSIGHT: INTACT
EYE_CONTACT: WNL
THOUGHT_PROCESS: WNL
APPEARANCE: WELL GROOMED
MOOD: HOPEFUL;EUTHYMIC (NORMAL)
PSYCHOMOTOR FUNCTIONING: WNL
JUDGEMENT: GOOD

## 2023-06-20 NOTE — GROUP NOTE
Date:  9/27/2022  Start Time:   5:30 PM  End Time:   7:00 PM    Billie Frank, YOB: 1960,  was an active group participant.    M3 Session 8: Attachments   4 members attended group today.  Clinician introduced the format and expectations for participating in the Mindfulness, Meditation and Movement Group, advising all that the flow of the group is designed to introduce members to movement practices, guided meditations and mindfulness-based psycho-education topics.  Clinician shared that the focus of todays group will explore how our attachments to routines and impulsive needs such as substances, food, unhealthy people, activities and even ritual movements and behaviors increase our experience of suffering and emotional/physical pain.  Group was then encouraged to identify their own coping skills/attachments and how they could be impacting and increasing their experience of emotional and physical pain.  Group members were educated about the experience of a wanting mind through the work of Julissa Nolasco in [employer] and were educated about our innate ability to mindfully watch our attachments, drives, urges and impulses which is improved significantly through practices of mindfulness, meditation and movement.  Group members discussed/ encouraged to focus on noticing ways we use our attachments to routine/trance like impulses such as our use of sugar, substances, sex, chaotic relationships and even ritual ways we hold our body in an effort to avoid truly feeling the emotions we are experiencing in the moment. Group were introduced to a video on the Biochemistry of attachment to sugar and its effects on the bodys health/wellness by watching the following video:  http://[email or web address].Gaikai/lessons/how-sugar-affects-the-brain-brigido-aman.  Group members were encouraged to notice what the price of their own attachments are and encouraged to grow awareness of the emotions underlying those attachments.  Group  was led through and encouraged to hold mindful movements in an effort to watch what comes up when the group members were encouraged to hold a pose for up to 3-5 mins at a time.  Group members were then led through a mindful eating exercise/meditation and encouraged to focus on the judgments/thoughts/impulses that came up during this exercise.     Request for Consent  Patient provided verbal consent to treat via telemedicine. Clinician introduced the secure telemedicine platform that we are utilizing to provide care during the COVID-19 pandemic. Patient understands the session will be billed to their insurance or patient directly.  Patient was informed only the group patients  and the clinician are permitted on?the video conference, sessions are not recorded by the clinician, and the patient is not permitted to record the session.? Patient was provided clinician's unique meeting ID prior to session, patient was asked to arrive to virtual session on time just as patient would if we were in the office. Clinician confirmed identification of patient by name and birthdate, provider name, location of patient and clinician, and callback number in case disconnected. Patient consents to behavioral health treatment    Patient Response to Request for Consent: Yes  Visit Type performed: Audio and Video                  Mental Status:  Findings  Mood: Hopeful, Motivated, Euthymic (normal)  Affect: Full Range  Rapport: Appropriate, Attentive  Eye Contact: WNL  Appearance: Well Groomed  Psychomotor Functioning: WNL  Thought Process: WNL  Positive Involvement: Open, Relevant, Interested, Easilty Engaged, Empathetic, Self-Aware  Judgment: Good  Insight: Intact    Patients reaction:  Pt reported that work has been very busy and she is in the midst of a move which has also been stressful.  Pt reports she consistently feels the impact of lessons she has learned in M3 and appreciates the time for herself to check in.  LPC and pt met  after group today to review her tx plan and OWL scores.    Visit Diagnosis:      ICD-10-CM ICD-9-CM   1. Recurrent major depressive disorder, in partial remission (CMS/HCC)  F33.41 296.35   2. Adjustment disorder with anxious mood  F43.22 309.24       Plan:  F/U with Biweekly  group with Clinician.   Pt to F/U with treatment goals as outlined in treatment plan.  Pt to F/U with local ED/call 911 should SI/HI arises.   Renate Bailey, LPC     room air

## 2023-06-20 NOTE — GROUP NOTE
Date:  6/20/2023  Start Time:   5:30 PM  End Time:   7:00 PM     Billie Frank, YOB: 1960,  was an active group participant.    M3 Session 3: Examining the Internal Experience with Compassion  5 members attended group today.  Clinician introduced the format and expectations for participating in the Mindfulness, Meditation and Movement Group, advising all that the flow of the group is designed to introduce members to movement practices, guided meditations and mindfulness-based psycho-education topics.    Clinician shared that the focus of group today is to explore and grow awareness about negative core beliefs that perpetuate symptoms of anxiety and depression and how mindfulness-based techniques, including meditation and movement, can assist in decreasing the impact, intensity and frequency of our negative internal voice.  Group members were led through a series of mindful movements to assist with release and strengthening of the psoas muscle.  Group members were educated about the psoas muscle and provided including The Muscle of the Soul may be triggering your Fear and Anxiety; Check your hips for Back Pain and How to Stretch and Strengthen the Psoas. The group also reviewed a checklist of 10 Cognitive distortions (Hughes, Feeling Good: the New Mood Therapy (1980) and discussed the impact of their own tape(s) on their lives.  Additionally, the group was introduced to the work of Julissa Nolasco, PhD and the practice of self-compassion through the use of the Acronym “RAIN” 1. Recognize what is going on 2. A: allow the experience to be there, just as it is, I: Investigate with Kindness and N: Natural awareness that comes from not identifying with the experience/emotions.  Group then transitioned into the meditation portion of the group with a self-compassion/lovingkindness guided meditation conducted by Clinician with focus on offering oneself compassion with intension to better modulate the inner  "experience of emotions and the breath moving in the body.      Request for Consent  Patient provided consent to treat via telehealth technology.Patient understands the telehealth visit will be billed to their insurance or patient directly.  Patient was informed only the group patients  and the clinician are permitted on the telehealth visit, visits are not recorded by the clinician, and the patient is not permitted to record the visit. Patient was provided information on how to access HIPAA compliant platform. Clinician confirmed identification of patient by name and birthdate, provider name, location of patient in [place], and callback number in case disconnected. Patient informed of the right to choose the form of care delivery, including the right to refuse a telehealth visit.     Patient Response to Request for Consent: Yes  Telehealth Platform utilized: Epic Video Client  Visit Type performed: Audio and Video  The patient is at home: Yes  The patient is in [place]:   yes  Those who participated in the encounter: Patient and Provider  Patient Call back number: There are no phone numbers on file.                  Mental Status:  Findings  Mood: Hopeful, Euthymic (normal)  Affect: Full Range, Tearful  Rapport: Appropriate, Attentive  Eye Contact: WNL  Appearance: Well Groomed  Psychomotor Functioning: WNL  Thought Process: WNL  Positive Involvement: Interested, Easilty Engaged, Relevant, Self-Aware, Emotional, Open  Judgment: Good  Insight: Intact    Patients reaction:  Pt shared her reflections on the RAIN of self compassion meditation.  She expressed feeling fear related to upcoming changes with M3 as well as her psychiatrist who is moving into private practice and will no longer take her insurance.  Pt discussed the \"scaffolding\" she has built around her since her time in [facility] a few years ago.  She is considering whether she is ready to \"take off the training wheels\" and use the skills she has to " support herself even more with these coming changes.  Peers shared their support for her and reminded her that change is hard, but not all change is bad.  Pt shared her gratitude for this group and noted that she may not be able to make it in the future due to scheduling changes.  LPC wished pt well.    Visit Diagnosis:      ICD-10-CM ICD-9-CM   1. Recurrent major depressive disorder, in partial remission (CMS/HCC)  F33.41 296.35   2. Adjustment disorder with anxious mood  F43.22 309.24       Plan:  F/U with Biweekly  group with Clinician.   Pt to F/U with treatment goals as outlined in treatment plan.  Pt to F/U with local ED/call 911 should SI/HI arises.   Renate Bailey, LPC

## 2023-08-01 RX ORDER — EPINEPHRINE 0.3 MG/.3ML
1 INJECTION SUBCUTANEOUS AS NEEDED
Qty: 2 EACH | Refills: 1 | Status: SHIPPED | OUTPATIENT
Start: 2023-08-01

## 2023-08-01 RX ORDER — EPINEPHRINE 0.3 MG/.3ML
1 INJECTION SUBCUTANEOUS AS NEEDED
COMMUNITY
End: 2023-08-01 | Stop reason: SDUPTHER

## 2023-12-18 DIAGNOSIS — F33.0 MAJOR DEPRESSIVE DISORDER, RECURRENT EPISODE, MILD (CMS/HCC): ICD-10-CM

## 2023-12-18 RX ORDER — ESCITALOPRAM OXALATE 20 MG/1
TABLET ORAL
Qty: 90 TABLET | Refills: 0 | Status: SHIPPED | OUTPATIENT
Start: 2023-12-18

## 2023-12-18 NOTE — TELEPHONE ENCOUNTER
Medicine Refill Request    Last Office Visit: 3/2/2023   Last Consult Visit: Visit date not found  Last Telemedicine Visit: 12/28/2022 Mariel Lee CRNP    Next Appointment: Visit date not found      Current Outpatient Medications:   •  AMABELZ 0.5-0.1 mg per tablet, 1 tablet 3 (three) times a week (Mon, Wed, Fri). TID weekly, due tomorrow Saturday 8/1/2020 , Disp: , Rfl:   •  ascorbic acid (VITAMIN C) 500 mg tablet, Take 1,000 mg by mouth daily., Disp: , Rfl:   •  aspirin 81 mg enteric coated tablet, Take 81 mg by mouth daily., Disp: , Rfl:   •  b complex vitamins capsule, Take 1 capsule by mouth daily., Disp: , Rfl:   •  cholecalciferol, vitamin D3, 1,000 unit (25 mcg) tablet, Take 1,000 Units by mouth daily., Disp: , Rfl:   •  EPINEPHrine (EPIPEN) 0.3 mg/0.3 mL injection syringe, Inject 0.3 mL (0.3 mg total) into the thigh as needed for anaphylaxis., Disp: 2 each, Rfl: 1  •  escitalopram (LEXAPRO) 20 mg tablet, TAKE 1 TABLET ONCE DAILY, Disp: 90 tablet, Rfl: 0  •  LORazepam (ATIVAN) 2 mg tablet, Take 2 mg by mouth every 6 (six) hours as needed., Disp: , Rfl:   •  meloxicam (MOBIC) 15 mg tablet, Take 1 tablet (15 mg total) by mouth daily for 14 days. With food, Disp: 14 tablet, Rfl: 0  •  RANEXA 500 mg 12 hr tablet, , Disp: , Rfl:   •  rosuvastatin (CRESTOR) 40 mg tablet, , Disp: , Rfl:   •  valACYclovir (VALTREX) 1 gram tablet, Take 1 tablet (1,000 mg total) by mouth 3 (three) times a day for 7 days., Disp: 21 tablet, Rfl: 0      BP Readings from Last 3 Encounters:   03/02/23 132/82   10/20/22 130/82   04/20/22 138/82       Recent Lab results:  Lab Results   Component Value Date    CHOL 167 01/12/2023   ,   Lab Results   Component Value Date    HDL 54 01/12/2023   ,   Lab Results   Component Value Date    LDLCALC 99 01/12/2023   ,   Lab Results   Component Value Date    TRIG 74 01/12/2023        Lab Results   Component Value Date    GLUCOSE 111 (H) 01/12/2023   ,   Lab Results   Component Value Date    HGBA1C  5.5 01/12/2023         Lab Results   Component Value Date    CREATININE 0.84 01/12/2023       Lab Results   Component Value Date    TSH 2.420 01/12/2023           Lab Results   Component Value Date    HGBA1C 5.5 01/12/2023

## 2023-12-19 ENCOUNTER — DOCUMENTATION (OUTPATIENT)
Dept: PSYCHIATRY | Facility: HOSPITAL | Age: 63
End: 2023-12-19
Payer: COMMERCIAL

## 2023-12-19 NOTE — DISCHARGE SUMMARY
Discharge Summary     Patient Name: Billie Frank  : 1960  Treatment start date: 10/1/2020  Treatment end date: 23    Discharge Type: Psychotherapy   Discharge Reason: Incomplete Discharge    Reason for admission and treatment: Psychotherapy to address Depression, Anxiety, Self Care, Mood dysregulation, Coping Skills, Mindfulness and CBT skills  Services offered and response to treatment:  Pt did not follow up to treatment despite multiple calls and letter sent 10/10/2023. Pt was provided [place], [place], [place] and [facility] Crisis Numbers in the letter and was encouraged to f/u with [facility] or local emergency room should any SI/HI arise or symptoms worsen. Pt was encouraged to contact [facility] at any time if needed to discuss treatment needs and [facility] availability.  As a result of lapse in treatment and lack of return correspondence, pt’s chart will be moved to closed status.    Client status: Condition upon discharge: Unknown secondary to no patient f/up  Clinical concerns to be addressed in continued care: Unknown secondary to no patient f/up     Aftercare appointments: Unknown secondary to no patient f/up  Special Instructions: None     Medical Follow Up needed?  No  Is patient receiving Medicated Assisted Treatment? No    Mental Health Crisis Support:    [facility] Crisis Number: [phone]   [facility] Crisis Number: [phone]   [facility] Crisis Number: [phone]   [facility] Crisis Number: [phone]      In case of Mental Health Crisis, go to the closest Emergency Department, call , or contact the National Suicide Prevention Hotline at: 1-226.138.3359  Other Support Agencies:  [place] National [place] of Mental Illness: [phone]    [place] Advocacy System: [phone]    Depression & Bipolar [place]: [phone]      Patient offered a copy of plan? No, due to no pt f/up.    Patient provided a copy?  No, due to no pt f/up       Fátima Davenport,  MSW @ 3:43 PM